# Patient Record
Sex: MALE | Race: WHITE | Employment: OTHER
[De-identification: names, ages, dates, MRNs, and addresses within clinical notes are randomized per-mention and may not be internally consistent; named-entity substitution may affect disease eponyms.]

---

## 2017-01-17 ENCOUNTER — TELEPHONE (OUTPATIENT)
Dept: ONCOLOGY | Facility: CLINIC | Age: 67
End: 2017-01-17

## 2017-02-01 ENCOUNTER — TELEPHONE (OUTPATIENT)
Dept: INFUSION THERAPY | Facility: MEDICAL CENTER | Age: 67
End: 2017-02-01

## 2017-02-10 ENCOUNTER — TELEPHONE (OUTPATIENT)
Dept: ONCOLOGY | Facility: CLINIC | Age: 67
End: 2017-02-10

## 2017-02-13 ENCOUNTER — HOSPITAL ENCOUNTER (OUTPATIENT)
Dept: INFUSION THERAPY | Facility: MEDICAL CENTER | Age: 67
Discharge: HOME | End: 2017-02-13
Payer: MEDICARE

## 2017-02-13 VITALS
DIASTOLIC BLOOD PRESSURE: 65 MMHG | RESPIRATION RATE: 18 BRPM | SYSTOLIC BLOOD PRESSURE: 147 MMHG | HEART RATE: 85 BPM | TEMPERATURE: 97.5 F

## 2017-02-13 DIAGNOSIS — N18.4 ANEMIA ASSOCIATED WITH CHRONIC RENAL FAILURE, STAGE 4 (SEVERE): ICD-10-CM

## 2017-02-13 DIAGNOSIS — N18.4 CKD (CHRONIC KIDNEY DISEASE) STAGE 4, GFR 15-29 ML/MIN (HCC): ICD-10-CM

## 2017-02-13 DIAGNOSIS — D63.1 ANEMIA ASSOCIATED WITH CHRONIC RENAL FAILURE, STAGE 4 (SEVERE): ICD-10-CM

## 2017-02-13 PROCEDURE — 96372 THER/PROPH/DIAG INJ SC/IM: CPT

## 2017-02-13 PROCEDURE — 36415 COLL VENOUS BLD VENIPUNCTURE: CPT

## 2017-02-13 PROCEDURE — 6360000002 HC RX W HCPCS: Performed by: INTERNAL MEDICINE

## 2017-02-13 RX ORDER — SODIUM CHLORIDE 9 MG/ML
INJECTION, SOLUTION INTRAVENOUS ONCE
Status: CANCELLED | OUTPATIENT
Start: 2017-02-13 | End: 2017-02-13

## 2017-02-13 RX ADMIN — DARBEPOETIN ALFA 60 MCG: 60 INJECTION, SOLUTION INTRAVENOUS; SUBCUTANEOUS at 13:31

## 2017-02-13 NOTE — PROGRESS NOTES
Gena Samples arrives ambulatory with wife  Patient relates recently hospitalized with low blood count and transfusions  Last hg noted was 8.5 on 2/9/17  See MAR for aranesp injection  Tolerated well  Discharged per ambulatory

## 2017-02-24 ENCOUNTER — HOSPITAL ENCOUNTER (INPATIENT)
Age: 67
LOS: 14 days | Discharge: NURSING, REHAB OR LONG-TERM CARE | DRG: 291 | End: 2017-03-10
Attending: EMERGENCY MEDICINE | Admitting: INTERNAL MEDICINE
Payer: MEDICARE

## 2017-02-24 ENCOUNTER — APPOINTMENT (OUTPATIENT)
Dept: GENERAL RADIOLOGY | Age: 67
DRG: 291 | End: 2017-02-24
Payer: MEDICARE

## 2017-02-24 DIAGNOSIS — I50.9 ACUTE ON CHRONIC CONGESTIVE HEART FAILURE, UNSPECIFIED CONGESTIVE HEART FAILURE TYPE: ICD-10-CM

## 2017-02-24 DIAGNOSIS — M25.561 CHRONIC PAIN OF RIGHT KNEE: ICD-10-CM

## 2017-02-24 DIAGNOSIS — R09.02 HYPOXIA: Primary | ICD-10-CM

## 2017-02-24 DIAGNOSIS — G89.29 CHRONIC PAIN OF RIGHT KNEE: ICD-10-CM

## 2017-02-24 LAB
ABSOLUTE EOS #: 0 K/UL (ref 0–0.4)
ABSOLUTE LYMPH #: 0.27 K/UL (ref 1–4.8)
ABSOLUTE MONO #: 0.48 K/UL (ref 0.1–0.8)
ANION GAP SERPL CALCULATED.3IONS-SCNC: 16 MMOL/L (ref 9–17)
BASOPHILS # BLD: 0 % (ref 0–2)
BASOPHILS ABSOLUTE: 0 K/UL (ref 0–0.2)
BNP INTERPRETATION: ABNORMAL
BUN BLDV-MCNC: 55 MG/DL (ref 8–23)
BUN/CREAT BLD: ABNORMAL (ref 9–20)
CALCIUM IONIZED: 1.15 MMOL/L (ref 1.13–1.33)
CALCIUM SERPL-MCNC: 9 MG/DL (ref 8.6–10.4)
CHLORIDE BLD-SCNC: 97 MMOL/L (ref 98–107)
CO2: 23 MMOL/L (ref 20–31)
CREAT SERPL-MCNC: 3.41 MG/DL (ref 0.7–1.2)
DIFFERENTIAL TYPE: ABNORMAL
EOSINOPHILS RELATIVE PERCENT: 0 % (ref 1–4)
GFR AFRICAN AMERICAN: 22 ML/MIN
GFR NON-AFRICAN AMERICAN: 18 ML/MIN
GFR SERPL CREATININE-BSD FRML MDRD: ABNORMAL ML/MIN/{1.73_M2}
GFR SERPL CREATININE-BSD FRML MDRD: ABNORMAL ML/MIN/{1.73_M2}
GLUCOSE BLD-MCNC: 166 MG/DL (ref 70–99)
HCT VFR BLD CALC: 22.6 % (ref 41–53)
HEMOGLOBIN: 7.6 G/DL (ref 13.5–17.5)
LYMPHOCYTES # BLD: 5 % (ref 24–44)
MAGNESIUM: 2.7 MG/DL (ref 1.6–2.6)
MCH RBC QN AUTO: 30.6 PG (ref 26–34)
MCHC RBC AUTO-ENTMCNC: 33.5 G/DL (ref 31–37)
MCV RBC AUTO: 91.6 FL (ref 80–100)
MONOCYTES # BLD: 9 % (ref 1–7)
MORPHOLOGY: ABNORMAL
MYOGLOBIN: 115 NG/ML (ref 28–72)
PDW BLD-RTO: 16.7 % (ref 12.5–15.4)
PHOSPHORUS: 3.6 MG/DL (ref 2.5–4.5)
PLATELET # BLD: 128 K/UL (ref 140–450)
PLATELET ESTIMATE: ABNORMAL
PMV BLD AUTO: 7.3 FL (ref 6–12)
POC TROPONIN I: 0.01 NG/ML (ref 0–0.1)
POC TROPONIN INTERP: NORMAL
POTASSIUM SERPL-SCNC: 4.7 MMOL/L (ref 3.7–5.3)
PRO-BNP: 6672 PG/ML
RBC # BLD: 2.47 M/UL (ref 4.5–5.9)
RBC # BLD: ABNORMAL 10*6/UL
SEG NEUTROPHILS: 86 % (ref 36–66)
SEGMENTED NEUTROPHILS ABSOLUTE COUNT: 4.55 K/UL (ref 1.8–7.7)
SODIUM BLD-SCNC: 136 MMOL/L (ref 135–144)
TOTAL CK: 26 U/L (ref 39–308)
WBC # BLD: 5.3 K/UL (ref 3.5–11)
WBC # BLD: ABNORMAL 10*3/UL

## 2017-02-24 PROCEDURE — 82550 ASSAY OF CK (CPK): CPT

## 2017-02-24 PROCEDURE — 84100 ASSAY OF PHOSPHORUS: CPT

## 2017-02-24 PROCEDURE — 99284 EMERGENCY DEPT VISIT MOD MDM: CPT

## 2017-02-24 PROCEDURE — 82607 VITAMIN B-12: CPT

## 2017-02-24 PROCEDURE — 71020 XR CHEST STANDARD TWO VW: CPT | Performed by: RADIOLOGY

## 2017-02-24 PROCEDURE — 82330 ASSAY OF CALCIUM: CPT

## 2017-02-24 PROCEDURE — 73562 X-RAY EXAM OF KNEE 3: CPT | Performed by: RADIOLOGY

## 2017-02-24 PROCEDURE — 73562 X-RAY EXAM OF KNEE 3: CPT

## 2017-02-24 PROCEDURE — 84484 ASSAY OF TROPONIN QUANT: CPT

## 2017-02-24 PROCEDURE — 71020 XR CHEST STANDARD TWO VW: CPT

## 2017-02-24 PROCEDURE — 82746 ASSAY OF FOLIC ACID SERUM: CPT

## 2017-02-24 PROCEDURE — 85025 COMPLETE CBC W/AUTO DIFF WBC: CPT

## 2017-02-24 PROCEDURE — 83874 ASSAY OF MYOGLOBIN: CPT

## 2017-02-24 PROCEDURE — 80048 BASIC METABOLIC PNL TOTAL CA: CPT

## 2017-02-24 PROCEDURE — 83880 ASSAY OF NATRIURETIC PEPTIDE: CPT

## 2017-02-24 PROCEDURE — 93005 ELECTROCARDIOGRAM TRACING: CPT

## 2017-02-24 PROCEDURE — 6360000002 HC RX W HCPCS: Performed by: EMERGENCY MEDICINE

## 2017-02-24 PROCEDURE — 83735 ASSAY OF MAGNESIUM: CPT

## 2017-02-24 PROCEDURE — 2060000000 HC ICU INTERMEDIATE R&B

## 2017-02-24 RX ORDER — MORPHINE SULFATE 4 MG/ML
4 INJECTION, SOLUTION INTRAMUSCULAR; INTRAVENOUS ONCE
Status: COMPLETED | OUTPATIENT
Start: 2017-02-24 | End: 2017-02-24

## 2017-02-24 RX ORDER — FUROSEMIDE 10 MG/ML
40 INJECTION INTRAMUSCULAR; INTRAVENOUS ONCE
Status: COMPLETED | OUTPATIENT
Start: 2017-02-24 | End: 2017-02-24

## 2017-02-24 RX ORDER — ONDANSETRON 2 MG/ML
4 INJECTION INTRAMUSCULAR; INTRAVENOUS ONCE
Status: COMPLETED | OUTPATIENT
Start: 2017-02-24 | End: 2017-02-24

## 2017-02-24 RX ADMIN — ONDANSETRON 4 MG: 2 INJECTION, SOLUTION INTRAMUSCULAR; INTRAVENOUS at 22:01

## 2017-02-24 RX ADMIN — FUROSEMIDE 40 MG: 10 INJECTION, SOLUTION INTRAMUSCULAR; INTRAVENOUS at 23:19

## 2017-02-24 RX ADMIN — MAGNESIUM SULFATE IN DEXTROSE 2 G: 10 INJECTION, SOLUTION INTRAVENOUS at 22:48

## 2017-02-24 RX ADMIN — MORPHINE SULFATE 4 MG: 4 INJECTION, SOLUTION INTRAMUSCULAR; INTRAVENOUS at 22:02

## 2017-02-24 ASSESSMENT — PAIN DESCRIPTION - LOCATION: LOCATION: KNEE

## 2017-02-24 ASSESSMENT — PAIN SCALES - GENERAL: PAINLEVEL_OUTOF10: 10

## 2017-02-24 ASSESSMENT — ENCOUNTER SYMPTOMS
SHORTNESS OF BREATH: 0
WHEEZING: 0
STRIDOR: 0
COUGH: 0
VOMITING: 0
DIARRHEA: 0
RECTAL PAIN: 0
ABDOMINAL PAIN: 0

## 2017-02-24 ASSESSMENT — PAIN DESCRIPTION - PAIN TYPE: TYPE: CHRONIC PAIN

## 2017-02-25 ENCOUNTER — APPOINTMENT (OUTPATIENT)
Dept: ULTRASOUND IMAGING | Age: 67
DRG: 291 | End: 2017-02-25
Payer: MEDICARE

## 2017-02-25 PROBLEM — I50.33 ACUTE ON CHRONIC DIASTOLIC CHF (CONGESTIVE HEART FAILURE) (HCC): Status: ACTIVE | Noted: 2017-02-25

## 2017-02-25 PROBLEM — M25.461 KNEE EFFUSION, RIGHT: Status: ACTIVE | Noted: 2017-02-25

## 2017-02-25 LAB
ABSOLUTE EOS #: 0.17 K/UL (ref 0–0.4)
ABSOLUTE LYMPH #: 0.13 K/UL (ref 1–4.8)
ABSOLUTE MONO #: 0.09 K/UL (ref 0.1–0.8)
ABSOLUTE RETIC #: 0.05 M/UL (ref 0.02–0.1)
ANION GAP SERPL CALCULATED.3IONS-SCNC: 13 MMOL/L (ref 9–17)
BASOPHILS # BLD: 0 % (ref 0–2)
BASOPHILS ABSOLUTE: 0 K/UL (ref 0–0.2)
BLOOD BANK SPECIMEN: NORMAL
BUN BLDV-MCNC: 56 MG/DL (ref 8–23)
BUN/CREAT BLD: ABNORMAL (ref 9–20)
C-REACTIVE PROTEIN: 153.9 MG/L (ref 0–5)
CALCIUM SERPL-MCNC: 8.5 MG/DL (ref 8.6–10.4)
CHLORIDE BLD-SCNC: 101 MMOL/L (ref 98–107)
CO2: 24 MMOL/L (ref 20–31)
CREAT SERPL-MCNC: 3.6 MG/DL (ref 0.7–1.2)
DIFFERENTIAL TYPE: ABNORMAL
EOSINOPHILS RELATIVE PERCENT: 4 % (ref 1–4)
FERRITIN: 753 UG/L (ref 30–400)
FERRITIN: 830 UG/L (ref 30–400)
FOLATE: >20 NG/ML
FOLATE: >20 NG/ML
GFR AFRICAN AMERICAN: 20 ML/MIN
GFR NON-AFRICAN AMERICAN: 17 ML/MIN
GFR SERPL CREATININE-BSD FRML MDRD: ABNORMAL ML/MIN/{1.73_M2}
GFR SERPL CREATININE-BSD FRML MDRD: ABNORMAL ML/MIN/{1.73_M2}
GLUCOSE BLD-MCNC: 136 MG/DL (ref 70–99)
GLUCOSE BLD-MCNC: 158 MG/DL (ref 75–110)
GLUCOSE BLD-MCNC: 174 MG/DL (ref 75–110)
GLUCOSE BLD-MCNC: 191 MG/DL (ref 75–110)
GLUCOSE BLD-MCNC: 202 MG/DL (ref 75–110)
HCT VFR BLD CALC: 21.2 % (ref 41–53)
HEMOGLOBIN: 7.1 G/DL (ref 13.5–17.5)
IRON SATURATION: 10 % (ref 20–55)
IRON SATURATION: 11 % (ref 20–55)
IRON: 13 UG/DL (ref 59–158)
IRON: 15 UG/DL (ref 59–158)
LACTATE DEHYDROGENASE: 142 U/L (ref 135–225)
LACTATE DEHYDROGENASE: 166 U/L (ref 135–225)
LYMPHOCYTES # BLD: 3 % (ref 24–44)
MCH RBC QN AUTO: 30.6 PG (ref 26–34)
MCHC RBC AUTO-ENTMCNC: 33.7 G/DL (ref 31–37)
MCV RBC AUTO: 91.1 FL (ref 80–100)
MONOCYTES # BLD: 2 % (ref 1–7)
MORPHOLOGY: ABNORMAL
PDW BLD-RTO: 16.3 % (ref 12.5–15.4)
PLATELET # BLD: 116 K/UL (ref 140–450)
PLATELET ESTIMATE: ABNORMAL
PMV BLD AUTO: 7.3 FL (ref 6–12)
POTASSIUM SERPL-SCNC: 4.7 MMOL/L (ref 3.7–5.3)
RBC # BLD: 2.33 M/UL (ref 4.5–5.9)
RBC # BLD: ABNORMAL 10*6/UL
RETIC %: 2.1 % (ref 0.5–2)
SEDIMENTATION RATE, ERYTHROCYTE: 121 MM (ref 0–10)
SEG NEUTROPHILS: 91 % (ref 36–66)
SEGMENTED NEUTROPHILS ABSOLUTE COUNT: 3.91 K/UL (ref 1.8–7.7)
SODIUM BLD-SCNC: 138 MMOL/L (ref 135–144)
TOTAL IRON BINDING CAPACITY: 129 UG/DL (ref 250–450)
TOTAL IRON BINDING CAPACITY: 136 UG/DL (ref 250–450)
TSH SERPL DL<=0.05 MIU/L-ACNC: 0.85 MIU/L (ref 0.3–5)
TSH SERPL DL<=0.05 MIU/L-ACNC: 1.03 MIU/L (ref 0.3–5)
UNSATURATED IRON BINDING CAPACITY: 116 UG/DL (ref 112–347)
UNSATURATED IRON BINDING CAPACITY: 121 UG/DL (ref 112–347)
VITAMIN B-12: >2000 PG/ML (ref 211–946)
VITAMIN B-12: >2000 PG/ML (ref 211–946)
WBC # BLD: 4.3 K/UL (ref 3.5–11)
WBC # BLD: ABNORMAL 10*3/UL

## 2017-02-25 PROCEDURE — 86140 C-REACTIVE PROTEIN: CPT

## 2017-02-25 PROCEDURE — 80048 BASIC METABOLIC PNL TOTAL CA: CPT

## 2017-02-25 PROCEDURE — 2580000003 HC RX 258: Performed by: INTERNAL MEDICINE

## 2017-02-25 PROCEDURE — 86900 BLOOD TYPING SEROLOGIC ABO: CPT

## 2017-02-25 PROCEDURE — 84443 ASSAY THYROID STIM HORMONE: CPT

## 2017-02-25 PROCEDURE — 76775 US EXAM ABDO BACK WALL LIM: CPT

## 2017-02-25 PROCEDURE — 82728 ASSAY OF FERRITIN: CPT

## 2017-02-25 PROCEDURE — 36415 COLL VENOUS BLD VENIPUNCTURE: CPT

## 2017-02-25 PROCEDURE — 82746 ASSAY OF FOLIC ACID SERUM: CPT

## 2017-02-25 PROCEDURE — 83615 LACTATE (LD) (LDH) ENZYME: CPT

## 2017-02-25 PROCEDURE — 83550 IRON BINDING TEST: CPT

## 2017-02-25 PROCEDURE — 51798 US URINE CAPACITY MEASURE: CPT

## 2017-02-25 PROCEDURE — 82947 ASSAY GLUCOSE BLOOD QUANT: CPT

## 2017-02-25 PROCEDURE — 36430 TRANSFUSION BLD/BLD COMPNT: CPT

## 2017-02-25 PROCEDURE — 83540 ASSAY OF IRON: CPT

## 2017-02-25 PROCEDURE — 85651 RBC SED RATE NONAUTOMATED: CPT

## 2017-02-25 PROCEDURE — 99223 1ST HOSP IP/OBS HIGH 75: CPT | Performed by: FAMILY MEDICINE

## 2017-02-25 PROCEDURE — 76775 US EXAM ABDO BACK WALL LIM: CPT | Performed by: RADIOLOGY

## 2017-02-25 PROCEDURE — 86920 COMPATIBILITY TEST SPIN: CPT

## 2017-02-25 PROCEDURE — 6360000002 HC RX W HCPCS: Performed by: INTERNAL MEDICINE

## 2017-02-25 PROCEDURE — P9016 RBC LEUKOCYTES REDUCED: HCPCS

## 2017-02-25 PROCEDURE — 86850 RBC ANTIBODY SCREEN: CPT

## 2017-02-25 PROCEDURE — 85025 COMPLETE CBC W/AUTO DIFF WBC: CPT

## 2017-02-25 PROCEDURE — 86901 BLOOD TYPING SEROLOGIC RH(D): CPT

## 2017-02-25 PROCEDURE — 2580000003 HC RX 258: Performed by: FAMILY MEDICINE

## 2017-02-25 PROCEDURE — 2060000000 HC ICU INTERMEDIATE R&B

## 2017-02-25 PROCEDURE — 82607 VITAMIN B-12: CPT

## 2017-02-25 PROCEDURE — 85045 AUTOMATED RETICULOCYTE COUNT: CPT

## 2017-02-25 PROCEDURE — 6360000002 HC RX W HCPCS: Performed by: FAMILY MEDICINE

## 2017-02-25 PROCEDURE — 6370000000 HC RX 637 (ALT 250 FOR IP): Performed by: FAMILY MEDICINE

## 2017-02-25 RX ORDER — AMLODIPINE BESYLATE 10 MG/1
10 TABLET ORAL 2 TIMES DAILY
Status: DISCONTINUED | OUTPATIENT
Start: 2017-02-25 | End: 2017-03-04

## 2017-02-25 RX ORDER — ISOSORBIDE MONONITRATE 30 MG/1
30 TABLET, EXTENDED RELEASE ORAL DAILY
Status: DISCONTINUED | OUTPATIENT
Start: 2017-02-25 | End: 2017-03-06

## 2017-02-25 RX ORDER — FUROSEMIDE 10 MG/ML
40 INJECTION INTRAMUSCULAR; INTRAVENOUS ONCE
Status: COMPLETED | OUTPATIENT
Start: 2017-02-25 | End: 2017-02-25

## 2017-02-25 RX ORDER — VENLAFAXINE 75 MG/1
75 TABLET ORAL DAILY
Status: DISCONTINUED | OUTPATIENT
Start: 2017-02-25 | End: 2017-03-10 | Stop reason: HOSPADM

## 2017-02-25 RX ORDER — ARIPIPRAZOLE 5 MG/1
5 TABLET ORAL DAILY
Status: DISCONTINUED | OUTPATIENT
Start: 2017-02-25 | End: 2017-03-02

## 2017-02-25 RX ORDER — SODIUM CHLORIDE 0.9 % (FLUSH) 0.9 %
10 SYRINGE (ML) INJECTION EVERY 12 HOURS SCHEDULED
Status: DISCONTINUED | OUTPATIENT
Start: 2017-02-25 | End: 2017-03-10 | Stop reason: HOSPADM

## 2017-02-25 RX ORDER — DOCUSATE SODIUM 100 MG/1
100 CAPSULE, LIQUID FILLED ORAL 2 TIMES DAILY PRN
Status: DISCONTINUED | OUTPATIENT
Start: 2017-02-25 | End: 2017-03-10 | Stop reason: HOSPADM

## 2017-02-25 RX ORDER — FUROSEMIDE 10 MG/ML
40 INJECTION INTRAMUSCULAR; INTRAVENOUS 2 TIMES DAILY
Status: DISCONTINUED | OUTPATIENT
Start: 2017-02-25 | End: 2017-02-26

## 2017-02-25 RX ORDER — 0.9 % SODIUM CHLORIDE 0.9 %
250 INTRAVENOUS SOLUTION INTRAVENOUS ONCE
Status: COMPLETED | OUTPATIENT
Start: 2017-02-25 | End: 2017-02-26

## 2017-02-25 RX ORDER — LEVOTHYROXINE SODIUM 0.05 MG/1
50 TABLET ORAL DAILY
Status: DISCONTINUED | OUTPATIENT
Start: 2017-02-25 | End: 2017-03-10 | Stop reason: HOSPADM

## 2017-02-25 RX ORDER — LAMOTRIGINE 100 MG/1
100 TABLET ORAL DAILY
Status: DISCONTINUED | OUTPATIENT
Start: 2017-02-25 | End: 2017-03-10 | Stop reason: HOSPADM

## 2017-02-25 RX ORDER — ACETAMINOPHEN 325 MG/1
650 TABLET ORAL EVERY 4 HOURS PRN
Status: DISCONTINUED | OUTPATIENT
Start: 2017-02-25 | End: 2017-03-10 | Stop reason: HOSPADM

## 2017-02-25 RX ORDER — SODIUM BICARBONATE 650 MG/1
1950 TABLET ORAL 3 TIMES DAILY
Status: DISCONTINUED | OUTPATIENT
Start: 2017-02-25 | End: 2017-03-05

## 2017-02-25 RX ORDER — NIACIN 500 MG/1
500 TABLET, EXTENDED RELEASE ORAL NIGHTLY
Status: DISCONTINUED | OUTPATIENT
Start: 2017-02-25 | End: 2017-03-10 | Stop reason: HOSPADM

## 2017-02-25 RX ORDER — MORPHINE SULFATE 2 MG/ML
2 INJECTION, SOLUTION INTRAMUSCULAR; INTRAVENOUS EVERY 4 HOURS PRN
Status: DISCONTINUED | OUTPATIENT
Start: 2017-02-25 | End: 2017-03-07

## 2017-02-25 RX ORDER — OXYCODONE HYDROCHLORIDE AND ACETAMINOPHEN 5; 325 MG/1; MG/1
1 TABLET ORAL EVERY 4 HOURS PRN
Status: DISCONTINUED | OUTPATIENT
Start: 2017-02-25 | End: 2017-03-10

## 2017-02-25 RX ORDER — VENLAFAXINE 37.5 MG/1
37.5 TABLET ORAL DAILY
Status: DISCONTINUED | OUTPATIENT
Start: 2017-02-25 | End: 2017-03-10 | Stop reason: HOSPADM

## 2017-02-25 RX ORDER — ASPIRIN 81 MG/1
81 TABLET, CHEWABLE ORAL EVERY OTHER DAY
Status: DISCONTINUED | OUTPATIENT
Start: 2017-02-25 | End: 2017-03-04

## 2017-02-25 RX ORDER — MORPHINE SULFATE 4 MG/ML
4 INJECTION, SOLUTION INTRAMUSCULAR; INTRAVENOUS EVERY 4 HOURS PRN
Status: DISCONTINUED | OUTPATIENT
Start: 2017-02-25 | End: 2017-03-07

## 2017-02-25 RX ORDER — LANOLIN ALCOHOL/MO/W.PET/CERES
325 CREAM (GRAM) TOPICAL
Status: DISCONTINUED | OUTPATIENT
Start: 2017-02-26 | End: 2017-03-03

## 2017-02-25 RX ORDER — PANTOPRAZOLE SODIUM 40 MG/1
40 TABLET, DELAYED RELEASE ORAL DAILY
Status: DISCONTINUED | OUTPATIENT
Start: 2017-02-25 | End: 2017-03-04

## 2017-02-25 RX ORDER — SODIUM CHLORIDE 0.9 % (FLUSH) 0.9 %
10 SYRINGE (ML) INJECTION PRN
Status: DISCONTINUED | OUTPATIENT
Start: 2017-02-25 | End: 2017-03-10 | Stop reason: HOSPADM

## 2017-02-25 RX ADMIN — VITAMIN D, TAB 1000IU (100/BT) 1000 UNITS: 25 TAB at 11:14

## 2017-02-25 RX ADMIN — VENLAFAXINE 37.5 MG: 37.5 TABLET ORAL at 14:34

## 2017-02-25 RX ADMIN — FUROSEMIDE 40 MG: 10 INJECTION, SOLUTION INTRAMUSCULAR; INTRAVENOUS at 09:37

## 2017-02-25 RX ADMIN — OXYCODONE HYDROCHLORIDE AND ACETAMINOPHEN 1 TABLET: 5; 325 TABLET ORAL at 11:19

## 2017-02-25 RX ADMIN — ASPIRIN 81 MG 81 MG: 81 TABLET ORAL at 11:15

## 2017-02-25 RX ADMIN — Medication 10 ML: at 21:01

## 2017-02-25 RX ADMIN — OXYCODONE HYDROCHLORIDE AND ACETAMINOPHEN 1 TABLET: 5; 325 TABLET ORAL at 19:00

## 2017-02-25 RX ADMIN — ARIPIPRAZOLE 5 MG: 5 TABLET ORAL at 11:15

## 2017-02-25 RX ADMIN — FUROSEMIDE 40 MG: 10 INJECTION, SOLUTION INTRAMUSCULAR; INTRAVENOUS at 20:56

## 2017-02-25 RX ADMIN — Medication 10 ML: at 09:38

## 2017-02-25 RX ADMIN — MORPHINE SULFATE 4 MG: 4 INJECTION, SOLUTION INTRAMUSCULAR; INTRAVENOUS at 07:41

## 2017-02-25 RX ADMIN — MORPHINE SULFATE 4 MG: 4 INJECTION, SOLUTION INTRAMUSCULAR; INTRAVENOUS at 01:19

## 2017-02-25 RX ADMIN — LAMOTRIGINE 100 MG: 100 TABLET ORAL at 11:15

## 2017-02-25 RX ADMIN — SODIUM BICARBONATE 1950 MG: 650 TABLET, ORALLY DISINTEGRATING ORAL at 21:03

## 2017-02-25 RX ADMIN — SODIUM CHLORIDE 250 ML: 0.9 INJECTION, SOLUTION INTRAVENOUS at 17:57

## 2017-02-25 RX ADMIN — SODIUM BICARBONATE 1950 MG: 650 TABLET, ORALLY DISINTEGRATING ORAL at 14:35

## 2017-02-25 RX ADMIN — PANTOPRAZOLE SODIUM 40 MG: 40 TABLET, DELAYED RELEASE ORAL at 11:14

## 2017-02-25 RX ADMIN — AMLODIPINE BESYLATE 10 MG: 10 TABLET ORAL at 21:03

## 2017-02-25 RX ADMIN — LEVOTHYROXINE SODIUM 50 MCG: 50 TABLET ORAL at 11:15

## 2017-02-25 RX ADMIN — NIACIN 500 MG: 500 TABLET, FILM COATED, EXTENDED RELEASE ORAL at 21:03

## 2017-02-25 RX ADMIN — ISOSORBIDE MONONITRATE 30 MG: 30 TABLET ORAL at 11:16

## 2017-02-25 ASSESSMENT — ENCOUNTER SYMPTOMS
SHORTNESS OF BREATH: 1
WHEEZING: 0
SORE THROAT: 0
SINUS PRESSURE: 0
COUGH: 0
ABDOMINAL PAIN: 0
BLOOD IN STOOL: 0
CONSTIPATION: 0
NAUSEA: 0
VOICE CHANGE: 0
VOMITING: 0
DIARRHEA: 0

## 2017-02-25 ASSESSMENT — PAIN SCALES - GENERAL
PAINLEVEL_OUTOF10: 10
PAINLEVEL_OUTOF10: 10
PAINLEVEL_OUTOF10: 8
PAINLEVEL_OUTOF10: 10
PAINLEVEL_OUTOF10: 8

## 2017-02-25 ASSESSMENT — PAIN DESCRIPTION - PAIN TYPE: TYPE: ACUTE PAIN

## 2017-02-25 ASSESSMENT — PAIN DESCRIPTION - LOCATION: LOCATION: KNEE

## 2017-02-26 LAB
ANION GAP SERPL CALCULATED.3IONS-SCNC: 13 MMOL/L (ref 9–17)
BUN BLDV-MCNC: 68 MG/DL (ref 8–23)
BUN/CREAT BLD: ABNORMAL (ref 9–20)
CALCIUM SERPL-MCNC: 8.4 MG/DL (ref 8.6–10.4)
CHLORIDE BLD-SCNC: 93 MMOL/L (ref 98–107)
CO2: 23 MMOL/L (ref 20–31)
CREAT SERPL-MCNC: 4.36 MG/DL (ref 0.7–1.2)
GFR AFRICAN AMERICAN: 16 ML/MIN
GFR NON-AFRICAN AMERICAN: 14 ML/MIN
GFR SERPL CREATININE-BSD FRML MDRD: ABNORMAL ML/MIN/{1.73_M2}
GFR SERPL CREATININE-BSD FRML MDRD: ABNORMAL ML/MIN/{1.73_M2}
GLUCOSE BLD-MCNC: 147 MG/DL (ref 75–110)
GLUCOSE BLD-MCNC: 163 MG/DL (ref 70–99)
GLUCOSE BLD-MCNC: 165 MG/DL (ref 75–110)
GLUCOSE BLD-MCNC: 174 MG/DL (ref 75–110)
GLUCOSE BLD-MCNC: 218 MG/DL (ref 75–110)
HCT VFR BLD CALC: 22.7 % (ref 41–53)
HEMOGLOBIN: 7.5 G/DL (ref 13.5–17.5)
MCH RBC QN AUTO: 30.4 PG (ref 26–34)
MCHC RBC AUTO-ENTMCNC: 33 G/DL (ref 31–37)
MCV RBC AUTO: 92.1 FL (ref 80–100)
PDW BLD-RTO: 16.1 % (ref 12.5–15.4)
PLATELET # BLD: 131 K/UL (ref 140–450)
PMV BLD AUTO: 7.3 FL (ref 6–12)
POTASSIUM SERPL-SCNC: 5.2 MMOL/L (ref 3.7–5.3)
RBC # BLD: 2.47 M/UL (ref 4.5–5.9)
SODIUM BLD-SCNC: 129 MMOL/L (ref 135–144)
URIC ACID: 8.8 MG/DL (ref 3.4–7)
WBC # BLD: 4.7 K/UL (ref 3.5–11)

## 2017-02-26 PROCEDURE — 84550 ASSAY OF BLOOD/URIC ACID: CPT

## 2017-02-26 PROCEDURE — 2060000000 HC ICU INTERMEDIATE R&B

## 2017-02-26 PROCEDURE — 82272 OCCULT BLD FECES 1-3 TESTS: CPT

## 2017-02-26 PROCEDURE — 82947 ASSAY GLUCOSE BLOOD QUANT: CPT

## 2017-02-26 PROCEDURE — P9046 ALBUMIN (HUMAN), 25%, 20 ML: HCPCS | Performed by: INTERNAL MEDICINE

## 2017-02-26 PROCEDURE — 6370000000 HC RX 637 (ALT 250 FOR IP): Performed by: FAMILY MEDICINE

## 2017-02-26 PROCEDURE — 99232 SBSQ HOSP IP/OBS MODERATE 35: CPT | Performed by: FAMILY MEDICINE

## 2017-02-26 PROCEDURE — 36415 COLL VENOUS BLD VENIPUNCTURE: CPT

## 2017-02-26 PROCEDURE — 2580000003 HC RX 258: Performed by: FAMILY MEDICINE

## 2017-02-26 PROCEDURE — 2580000003 HC RX 258: Performed by: INTERNAL MEDICINE

## 2017-02-26 PROCEDURE — 86900 BLOOD TYPING SEROLOGIC ABO: CPT

## 2017-02-26 PROCEDURE — 6360000002 HC RX W HCPCS: Performed by: INTERNAL MEDICINE

## 2017-02-26 PROCEDURE — P9016 RBC LEUKOCYTES REDUCED: HCPCS

## 2017-02-26 PROCEDURE — 85027 COMPLETE CBC AUTOMATED: CPT

## 2017-02-26 PROCEDURE — 80048 BASIC METABOLIC PNL TOTAL CA: CPT

## 2017-02-26 RX ORDER — 0.9 % SODIUM CHLORIDE 0.9 %
250 INTRAVENOUS SOLUTION INTRAVENOUS ONCE
Status: COMPLETED | OUTPATIENT
Start: 2017-02-26 | End: 2017-02-26

## 2017-02-26 RX ORDER — CHLOROTHIAZIDE SODIUM 500 MG/1
250 INJECTION INTRAVENOUS ONCE
Status: COMPLETED | OUTPATIENT
Start: 2017-02-26 | End: 2017-02-26

## 2017-02-26 RX ORDER — ALBUMIN (HUMAN) 12.5 G/50ML
25 SOLUTION INTRAVENOUS ONCE
Status: COMPLETED | OUTPATIENT
Start: 2017-02-26 | End: 2017-02-26

## 2017-02-26 RX ORDER — FUROSEMIDE 10 MG/ML
40 INJECTION INTRAMUSCULAR; INTRAVENOUS ONCE
Status: DISCONTINUED | OUTPATIENT
Start: 2017-02-26 | End: 2017-02-26

## 2017-02-26 RX ADMIN — FUROSEMIDE 10 MG/HR: 10 INJECTION, SOLUTION INTRAVENOUS at 16:57

## 2017-02-26 RX ADMIN — LEVOTHYROXINE SODIUM 50 MCG: 50 TABLET ORAL at 08:46

## 2017-02-26 RX ADMIN — FERROUS SULFATE TAB EC 325 MG (65 MG FE EQUIVALENT) 325 MG: 325 (65 FE) TABLET DELAYED RESPONSE at 12:31

## 2017-02-26 RX ADMIN — OXYCODONE HYDROCHLORIDE AND ACETAMINOPHEN 1 TABLET: 5; 325 TABLET ORAL at 20:29

## 2017-02-26 RX ADMIN — MORPHINE SULFATE 4 MG: 4 INJECTION, SOLUTION INTRAMUSCULAR; INTRAVENOUS at 12:30

## 2017-02-26 RX ADMIN — AMLODIPINE BESYLATE 10 MG: 10 TABLET ORAL at 20:29

## 2017-02-26 RX ADMIN — FUROSEMIDE 40 MG: 10 INJECTION, SOLUTION INTRAMUSCULAR; INTRAVENOUS at 08:46

## 2017-02-26 RX ADMIN — VENLAFAXINE 37.5 MG: 37.5 TABLET ORAL at 08:45

## 2017-02-26 RX ADMIN — ISOSORBIDE MONONITRATE 30 MG: 30 TABLET ORAL at 08:45

## 2017-02-26 RX ADMIN — SODIUM BICARBONATE 1950 MG: 650 TABLET, ORALLY DISINTEGRATING ORAL at 20:29

## 2017-02-26 RX ADMIN — FERROUS SULFATE TAB EC 325 MG (65 MG FE EQUIVALENT) 325 MG: 325 (65 FE) TABLET DELAYED RESPONSE at 17:08

## 2017-02-26 RX ADMIN — AMLODIPINE BESYLATE 10 MG: 10 TABLET ORAL at 08:46

## 2017-02-26 RX ADMIN — FERROUS SULFATE TAB EC 325 MG (65 MG FE EQUIVALENT) 325 MG: 325 (65 FE) TABLET DELAYED RESPONSE at 08:46

## 2017-02-26 RX ADMIN — OXYCODONE HYDROCHLORIDE AND ACETAMINOPHEN 1 TABLET: 5; 325 TABLET ORAL at 14:30

## 2017-02-26 RX ADMIN — SODIUM BICARBONATE 1950 MG: 650 TABLET, ORALLY DISINTEGRATING ORAL at 14:47

## 2017-02-26 RX ADMIN — ALBUMIN (HUMAN) 25 G: 0.25 INJECTION, SOLUTION INTRAVENOUS at 14:53

## 2017-02-26 RX ADMIN — MORPHINE SULFATE 4 MG: 4 INJECTION, SOLUTION INTRAMUSCULAR; INTRAVENOUS at 17:25

## 2017-02-26 RX ADMIN — OXYCODONE HYDROCHLORIDE AND ACETAMINOPHEN 1 TABLET: 5; 325 TABLET ORAL at 08:43

## 2017-02-26 RX ADMIN — MORPHINE SULFATE 2 MG: 2 INJECTION, SOLUTION INTRAMUSCULAR; INTRAVENOUS at 02:50

## 2017-02-26 RX ADMIN — CHLOROTHIAZIDE SODIUM 250 MG: 500 INJECTION, POWDER, LYOPHILIZED, FOR SOLUTION INTRAVENOUS at 14:48

## 2017-02-26 RX ADMIN — SODIUM CHLORIDE 250 ML: 0.9 INJECTION, SOLUTION INTRAVENOUS at 11:25

## 2017-02-26 RX ADMIN — OXYCODONE HYDROCHLORIDE AND ACETAMINOPHEN 1 TABLET: 5; 325 TABLET ORAL at 04:49

## 2017-02-26 RX ADMIN — SODIUM BICARBONATE 1950 MG: 650 TABLET, ORALLY DISINTEGRATING ORAL at 08:45

## 2017-02-26 RX ADMIN — FUROSEMIDE 40 MG: 10 INJECTION, SOLUTION INTRAMUSCULAR; INTRAVENOUS at 00:05

## 2017-02-26 RX ADMIN — VITAMIN D, TAB 1000IU (100/BT) 1000 UNITS: 25 TAB at 08:46

## 2017-02-26 RX ADMIN — Medication 10 ML: at 08:48

## 2017-02-26 RX ADMIN — ARIPIPRAZOLE 5 MG: 5 TABLET ORAL at 08:46

## 2017-02-26 RX ADMIN — VENLAFAXINE 75 MG: 75 TABLET ORAL at 08:49

## 2017-02-26 RX ADMIN — PANTOPRAZOLE SODIUM 40 MG: 40 TABLET, DELAYED RELEASE ORAL at 08:45

## 2017-02-26 RX ADMIN — MORPHINE SULFATE 2 MG: 2 INJECTION, SOLUTION INTRAMUSCULAR; INTRAVENOUS at 08:30

## 2017-02-26 RX ADMIN — NIACIN 500 MG: 500 TABLET, FILM COATED, EXTENDED RELEASE ORAL at 20:29

## 2017-02-26 RX ADMIN — LAMOTRIGINE 100 MG: 100 TABLET ORAL at 08:45

## 2017-02-26 ASSESSMENT — PAIN SCALES - GENERAL
PAINLEVEL_OUTOF10: 10
PAINLEVEL_OUTOF10: 9
PAINLEVEL_OUTOF10: 10
PAINLEVEL_OUTOF10: 9

## 2017-02-26 ASSESSMENT — ENCOUNTER SYMPTOMS
SHORTNESS OF BREATH: 1
CONSTIPATION: 0
VOICE CHANGE: 0
NAUSEA: 0
WHEEZING: 0
DIARRHEA: 0
BLOOD IN STOOL: 0
COUGH: 0
ABDOMINAL PAIN: 0
SORE THROAT: 0
VOMITING: 0
SINUS PRESSURE: 0

## 2017-02-27 ENCOUNTER — HOSPITAL ENCOUNTER (OUTPATIENT)
Dept: INFUSION THERAPY | Facility: MEDICAL CENTER | Age: 67
Discharge: HOME | End: 2017-02-27
Payer: MEDICARE

## 2017-02-27 LAB
ABO/RH: NORMAL
ANION GAP SERPL CALCULATED.3IONS-SCNC: 15 MMOL/L (ref 9–17)
ANTIBODY SCREEN: NEGATIVE
ARM BAND NUMBER: NORMAL
BLD PROD TYP BPU: NORMAL
BLD PROD TYP BPU: NORMAL
BUN BLDV-MCNC: 78 MG/DL (ref 8–23)
BUN/CREAT BLD: ABNORMAL (ref 9–20)
CALCIUM SERPL-MCNC: 8.2 MG/DL (ref 8.6–10.4)
CHLORIDE BLD-SCNC: 94 MMOL/L (ref 98–107)
CO2: 25 MMOL/L (ref 20–31)
CREAT SERPL-MCNC: 4.61 MG/DL (ref 0.7–1.2)
CROSSMATCH RESULT: NORMAL
CROSSMATCH RESULT: NORMAL
DISPENSE STATUS BLOOD BANK: NORMAL
DISPENSE STATUS BLOOD BANK: NORMAL
EXPIRATION DATE: NORMAL
GFR AFRICAN AMERICAN: 15 ML/MIN
GFR NON-AFRICAN AMERICAN: 13 ML/MIN
GFR SERPL CREATININE-BSD FRML MDRD: ABNORMAL ML/MIN/{1.73_M2}
GFR SERPL CREATININE-BSD FRML MDRD: ABNORMAL ML/MIN/{1.73_M2}
GLUCOSE BLD-MCNC: 197 MG/DL (ref 75–110)
GLUCOSE BLD-MCNC: 247 MG/DL (ref 70–99)
HCT VFR BLD CALC: 22.7 % (ref 41–53)
HEMOGLOBIN: 7.4 G/DL (ref 13.5–17.5)
MCH RBC QN AUTO: 29.6 PG (ref 26–34)
MCHC RBC AUTO-ENTMCNC: 32.8 G/DL (ref 31–37)
MCV RBC AUTO: 90.4 FL (ref 80–100)
PDW BLD-RTO: 16.9 % (ref 12.5–15.4)
PLATELET # BLD: 132 K/UL (ref 140–450)
PMV BLD AUTO: 7.2 FL (ref 6–12)
POTASSIUM SERPL-SCNC: 4.5 MMOL/L (ref 3.7–5.3)
RBC # BLD: 2.51 M/UL (ref 4.5–5.9)
SODIUM BLD-SCNC: 134 MMOL/L (ref 135–144)
TRANSFUSION STATUS: NORMAL
TRANSFUSION STATUS: NORMAL
UNIT DIVISION: 0
UNIT DIVISION: 0
UNIT NUMBER: NORMAL
UNIT NUMBER: NORMAL
WBC # BLD: 4.4 K/UL (ref 3.5–11)

## 2017-02-27 PROCEDURE — 36415 COLL VENOUS BLD VENIPUNCTURE: CPT

## 2017-02-27 PROCEDURE — 76937 US GUIDE VASCULAR ACCESS: CPT

## 2017-02-27 PROCEDURE — 96365 THER/PROPH/DIAG IV INF INIT: CPT

## 2017-02-27 PROCEDURE — 6360000002 HC RX W HCPCS: Performed by: FAMILY MEDICINE

## 2017-02-27 PROCEDURE — 93306 TTE W/DOPPLER COMPLETE: CPT

## 2017-02-27 PROCEDURE — 96375 TX/PRO/DX INJ NEW DRUG ADDON: CPT

## 2017-02-27 PROCEDURE — 6370000000 HC RX 637 (ALT 250 FOR IP): Performed by: FAMILY MEDICINE

## 2017-02-27 PROCEDURE — 85027 COMPLETE CBC AUTOMATED: CPT

## 2017-02-27 PROCEDURE — 80048 BASIC METABOLIC PNL TOTAL CA: CPT

## 2017-02-27 PROCEDURE — 93970 EXTREMITY STUDY: CPT

## 2017-02-27 PROCEDURE — 2060000000 HC ICU INTERMEDIATE R&B

## 2017-02-27 PROCEDURE — 99232 SBSQ HOSP IP/OBS MODERATE 35: CPT | Performed by: FAMILY MEDICINE

## 2017-02-27 PROCEDURE — 2580000003 HC RX 258: Performed by: INTERNAL MEDICINE

## 2017-02-27 PROCEDURE — 6360000002 HC RX W HCPCS: Performed by: INTERNAL MEDICINE

## 2017-02-27 PROCEDURE — 82947 ASSAY GLUCOSE BLOOD QUANT: CPT

## 2017-02-27 RX ORDER — BENZONATATE 100 MG/1
100 CAPSULE ORAL 3 TIMES DAILY PRN
Status: DISCONTINUED | OUTPATIENT
Start: 2017-02-27 | End: 2017-03-10 | Stop reason: HOSPADM

## 2017-02-27 RX ORDER — GUAIFENESIN 100 MG/5ML
10 SOLUTION ORAL EVERY 4 HOURS PRN
Status: DISCONTINUED | OUTPATIENT
Start: 2017-02-27 | End: 2017-03-10 | Stop reason: HOSPADM

## 2017-02-27 RX ADMIN — ARIPIPRAZOLE 5 MG: 5 TABLET ORAL at 10:40

## 2017-02-27 RX ADMIN — OXYCODONE HYDROCHLORIDE AND ACETAMINOPHEN 1 TABLET: 5; 325 TABLET ORAL at 02:36

## 2017-02-27 RX ADMIN — SODIUM BICARBONATE 1950 MG: 650 TABLET, ORALLY DISINTEGRATING ORAL at 14:39

## 2017-02-27 RX ADMIN — OXYCODONE HYDROCHLORIDE AND ACETAMINOPHEN 1 TABLET: 5; 325 TABLET ORAL at 16:29

## 2017-02-27 RX ADMIN — NIACIN 500 MG: 500 TABLET, FILM COATED, EXTENDED RELEASE ORAL at 21:47

## 2017-02-27 RX ADMIN — ASPIRIN 81 MG 81 MG: 81 TABLET ORAL at 10:41

## 2017-02-27 RX ADMIN — MORPHINE SULFATE 4 MG: 4 INJECTION, SOLUTION INTRAMUSCULAR; INTRAVENOUS at 18:49

## 2017-02-27 RX ADMIN — FUROSEMIDE 19 MG/HR: 10 INJECTION, SOLUTION INTRAVENOUS at 14:48

## 2017-02-27 RX ADMIN — FUROSEMIDE 17 MG/HR: 10 INJECTION, SOLUTION INTRAVENOUS at 11:51

## 2017-02-27 RX ADMIN — FERROUS SULFATE TAB EC 325 MG (65 MG FE EQUIVALENT) 325 MG: 325 (65 FE) TABLET DELAYED RESPONSE at 10:40

## 2017-02-27 RX ADMIN — LEVOTHYROXINE SODIUM 50 MCG: 50 TABLET ORAL at 10:40

## 2017-02-27 RX ADMIN — OXYCODONE HYDROCHLORIDE AND ACETAMINOPHEN 1 TABLET: 5; 325 TABLET ORAL at 07:14

## 2017-02-27 RX ADMIN — FUROSEMIDE 12 MG/HR: 10 INJECTION, SOLUTION INTRAVENOUS at 08:54

## 2017-02-27 RX ADMIN — MORPHINE SULFATE 4 MG: 4 INJECTION, SOLUTION INTRAMUSCULAR; INTRAVENOUS at 12:21

## 2017-02-27 RX ADMIN — AMLODIPINE BESYLATE 10 MG: 10 TABLET ORAL at 21:47

## 2017-02-27 RX ADMIN — VENLAFAXINE 37.5 MG: 37.5 TABLET ORAL at 10:44

## 2017-02-27 RX ADMIN — OXYCODONE HYDROCHLORIDE AND ACETAMINOPHEN 1 TABLET: 5; 325 TABLET ORAL at 11:18

## 2017-02-27 RX ADMIN — FUROSEMIDE 15 MG/HR: 10 INJECTION, SOLUTION INTRAVENOUS at 08:56

## 2017-02-27 RX ADMIN — SODIUM BICARBONATE 1950 MG: 650 TABLET, ORALLY DISINTEGRATING ORAL at 21:47

## 2017-02-27 RX ADMIN — DARBEPOETIN ALFA 80 MCG: 40 INJECTION, SOLUTION INTRAVENOUS; SUBCUTANEOUS at 14:40

## 2017-02-27 RX ADMIN — VENLAFAXINE 75 MG: 75 TABLET ORAL at 10:45

## 2017-02-27 RX ADMIN — AMLODIPINE BESYLATE 10 MG: 10 TABLET ORAL at 10:40

## 2017-02-27 RX ADMIN — OXYCODONE HYDROCHLORIDE AND ACETAMINOPHEN 1 TABLET: 5; 325 TABLET ORAL at 21:50

## 2017-02-27 RX ADMIN — LAMOTRIGINE 100 MG: 100 TABLET ORAL at 10:40

## 2017-02-27 RX ADMIN — FERROUS SULFATE TAB EC 325 MG (65 MG FE EQUIVALENT) 325 MG: 325 (65 FE) TABLET DELAYED RESPONSE at 14:40

## 2017-02-27 RX ADMIN — SODIUM BICARBONATE 1950 MG: 650 TABLET, ORALLY DISINTEGRATING ORAL at 10:40

## 2017-02-27 RX ADMIN — ISOSORBIDE MONONITRATE 30 MG: 30 TABLET ORAL at 10:40

## 2017-02-27 RX ADMIN — FUROSEMIDE 21 MG/HR: 10 INJECTION, SOLUTION INTRAVENOUS at 16:26

## 2017-02-27 RX ADMIN — PANTOPRAZOLE SODIUM 40 MG: 40 TABLET, DELAYED RELEASE ORAL at 10:40

## 2017-02-27 RX ADMIN — MORPHINE SULFATE 4 MG: 4 INJECTION, SOLUTION INTRAMUSCULAR; INTRAVENOUS at 08:40

## 2017-02-27 RX ADMIN — FERROUS SULFATE TAB EC 325 MG (65 MG FE EQUIVALENT) 325 MG: 325 (65 FE) TABLET DELAYED RESPONSE at 21:47

## 2017-02-27 RX ADMIN — VITAMIN D, TAB 1000IU (100/BT) 1000 UNITS: 25 TAB at 10:40

## 2017-02-27 RX ADMIN — FUROSEMIDE 17 MG/HR: 10 INJECTION, SOLUTION INTRAVENOUS at 12:19

## 2017-02-27 ASSESSMENT — PAIN SCALES - GENERAL
PAINLEVEL_OUTOF10: 7
PAINLEVEL_OUTOF10: 10
PAINLEVEL_OUTOF10: 9
PAINLEVEL_OUTOF10: 10
PAINLEVEL_OUTOF10: 5
PAINLEVEL_OUTOF10: 9
PAINLEVEL_OUTOF10: 9
PAINLEVEL_OUTOF10: 7

## 2017-02-27 ASSESSMENT — ENCOUNTER SYMPTOMS
VOMITING: 0
COUGH: 0
SORE THROAT: 0
ABDOMINAL PAIN: 0
BLOOD IN STOOL: 0
SINUS PRESSURE: 0
CONSTIPATION: 0
VOICE CHANGE: 0
SHORTNESS OF BREATH: 1
DIARRHEA: 0
NAUSEA: 0
WHEEZING: 0

## 2017-02-28 LAB
ANION GAP SERPL CALCULATED.3IONS-SCNC: 17 MMOL/L (ref 9–17)
BUN BLDV-MCNC: 86 MG/DL (ref 8–23)
BUN/CREAT BLD: ABNORMAL (ref 9–20)
CALCIUM SERPL-MCNC: 8.5 MG/DL (ref 8.6–10.4)
CHLORIDE BLD-SCNC: 93 MMOL/L (ref 98–107)
CO2: 25 MMOL/L (ref 20–31)
CREAT SERPL-MCNC: 4.55 MG/DL (ref 0.7–1.2)
EKG ATRIAL RATE: 75 BPM
EKG Q-T INTERVAL: 420 MS
EKG QRS DURATION: 100 MS
EKG QTC CALCULATION (BAZETT): 496 MS
EKG R AXIS: 18 DEGREES
EKG T AXIS: 10 DEGREES
EKG VENTRICULAR RATE: 84 BPM
FIO2: 21
FIO2: 5
GFR AFRICAN AMERICAN: 16 ML/MIN
GFR NON-AFRICAN AMERICAN: 13 ML/MIN
GFR SERPL CREATININE-BSD FRML MDRD: ABNORMAL ML/MIN/{1.73_M2}
GFR SERPL CREATININE-BSD FRML MDRD: ABNORMAL ML/MIN/{1.73_M2}
GLUCOSE BLD-MCNC: 150 MG/DL (ref 70–99)
GLUCOSE BLD-MCNC: 176 MG/DL (ref 75–110)
GLUCOSE BLD-MCNC: 217 MG/DL (ref 75–110)
HCO3, MIXED: 26.9 MMOL/L (ref 23–29)
HCT VFR BLD CALC: 23 % (ref 41–53)
HEMOGLOBIN: 7.8 G/DL (ref 13.5–17.5)
MCH RBC QN AUTO: 30.1 PG (ref 26–34)
MCHC RBC AUTO-ENTMCNC: 33.9 G/DL (ref 31–37)
MCV RBC AUTO: 88.9 FL (ref 80–100)
NEGATIVE BASE EXCESS, ART: ABNORMAL (ref 0–2)
NEGATIVE BASE EXCESS, MIXED: NORMAL (ref 0–2)
O2 DEVICE/FLOW/%: ABNORMAL
O2 DEVICE/FLOW/%: NORMAL
O2 SAT, MIXED: 77 %
PATIENT TEMP: ABNORMAL
PATIENT TEMP: NORMAL
PCO2 MIXED: 44 MM HG (ref 42–52)
PDW BLD-RTO: 16.2 % (ref 12.5–15.4)
PH, MIXED: 7.39 (ref 7.31–7.41)
PLATELET # BLD: 144 K/UL (ref 140–450)
PMV BLD AUTO: 7.3 FL (ref 6–12)
PO2 MIXED: 42 MM HG (ref 35–45)
POC HCO3: 25.8 MMOL/L (ref 22–27)
POC O2 SATURATION: 85 %
POC PCO2 TEMP: ABNORMAL MM HG
POC PCO2 TEMP: NORMAL MM HG
POC PCO2: 36 MM HG (ref 32–45)
POC PH TEMP: ABNORMAL
POC PH TEMP: NORMAL
POC PH: 7.46 (ref 7.35–7.45)
POC PO2 TEMP: ABNORMAL MM HG
POC PO2 TEMP: NORMAL MM HG
POC PO2: 47 MM HG (ref 75–95)
POSITIVE BASE EXCESS, ART: 2 (ref 0–2)
POSITIVE BASE EXCESS, MIXED: 2 (ref 0–2)
POTASSIUM SERPL-SCNC: 4.4 MMOL/L (ref 3.7–5.3)
RBC # BLD: 2.59 M/UL (ref 4.5–5.9)
SODIUM BLD-SCNC: 135 MMOL/L (ref 135–144)
SURGICAL PATHOLOGY REPORT: NORMAL
TCO2 (CALC), ART: 27 MM HG (ref 23–28)
TCO2 CALC MIXED: 28 MM HG (ref 24–30)
WBC # BLD: 4.4 K/UL (ref 3.5–11)

## 2017-02-28 PROCEDURE — 2060000000 HC ICU INTERMEDIATE R&B

## 2017-02-28 PROCEDURE — 85027 COMPLETE CBC AUTOMATED: CPT

## 2017-02-28 PROCEDURE — 82803 BLOOD GASES ANY COMBINATION: CPT

## 2017-02-28 PROCEDURE — 6360000002 HC RX W HCPCS: Performed by: INTERNAL MEDICINE

## 2017-02-28 PROCEDURE — 2580000003 HC RX 258: Performed by: INTERNAL MEDICINE

## 2017-02-28 PROCEDURE — 6370000000 HC RX 637 (ALT 250 FOR IP): Performed by: FAMILY MEDICINE

## 2017-02-28 PROCEDURE — 99231 SBSQ HOSP IP/OBS SF/LOW 25: CPT | Performed by: INTERNAL MEDICINE

## 2017-02-28 PROCEDURE — 80048 BASIC METABOLIC PNL TOTAL CA: CPT

## 2017-02-28 PROCEDURE — 36600 WITHDRAWAL OF ARTERIAL BLOOD: CPT

## 2017-02-28 PROCEDURE — G0009 ADMIN PNEUMOCOCCAL VACCINE: HCPCS | Performed by: INTERNAL MEDICINE

## 2017-02-28 PROCEDURE — 82272 OCCULT BLD FECES 1-3 TESTS: CPT

## 2017-02-28 PROCEDURE — 90670 PCV13 VACCINE IM: CPT | Performed by: INTERNAL MEDICINE

## 2017-02-28 PROCEDURE — 36415 COLL VENOUS BLD VENIPUNCTURE: CPT

## 2017-02-28 PROCEDURE — 6370000000 HC RX 637 (ALT 250 FOR IP): Performed by: INTERNAL MEDICINE

## 2017-02-28 PROCEDURE — 82947 ASSAY GLUCOSE BLOOD QUANT: CPT

## 2017-02-28 RX ORDER — METOLAZONE 5 MG/1
5 TABLET ORAL DAILY
Status: DISCONTINUED | OUTPATIENT
Start: 2017-02-28 | End: 2017-03-05

## 2017-02-28 RX ADMIN — OXYCODONE HYDROCHLORIDE AND ACETAMINOPHEN 1 TABLET: 5; 325 TABLET ORAL at 11:26

## 2017-02-28 RX ADMIN — FUROSEMIDE 35 MG/HR: 10 INJECTION, SOLUTION INTRAVENOUS at 09:54

## 2017-02-28 RX ADMIN — FUROSEMIDE 40 MG/HR: 10 INJECTION, SOLUTION INTRAVENOUS at 11:58

## 2017-02-28 RX ADMIN — FUROSEMIDE 38 MG/HR: 10 INJECTION, SOLUTION INTRAVENOUS at 10:56

## 2017-02-28 RX ADMIN — AMLODIPINE BESYLATE 10 MG: 10 TABLET ORAL at 20:51

## 2017-02-28 RX ADMIN — FUROSEMIDE 10 MG/HR: 10 INJECTION, SOLUTION INTRAVENOUS at 07:10

## 2017-02-28 RX ADMIN — AMLODIPINE BESYLATE 10 MG: 10 TABLET ORAL at 08:57

## 2017-02-28 RX ADMIN — FERROUS SULFATE TAB EC 325 MG (65 MG FE EQUIVALENT) 325 MG: 325 (65 FE) TABLET DELAYED RESPONSE at 08:57

## 2017-02-28 RX ADMIN — FERROUS SULFATE TAB EC 325 MG (65 MG FE EQUIVALENT) 325 MG: 325 (65 FE) TABLET DELAYED RESPONSE at 12:22

## 2017-02-28 RX ADMIN — IRON SUCROSE 200 MG: 20 INJECTION, SOLUTION INTRAVENOUS at 21:27

## 2017-02-28 RX ADMIN — VITAMIN D, TAB 1000IU (100/BT) 1000 UNITS: 25 TAB at 08:57

## 2017-02-28 RX ADMIN — FERROUS SULFATE TAB EC 325 MG (65 MG FE EQUIVALENT) 325 MG: 325 (65 FE) TABLET DELAYED RESPONSE at 17:37

## 2017-02-28 RX ADMIN — OXYCODONE HYDROCHLORIDE AND ACETAMINOPHEN 1 TABLET: 5; 325 TABLET ORAL at 02:43

## 2017-02-28 RX ADMIN — LEVOTHYROXINE SODIUM 50 MCG: 50 TABLET ORAL at 08:57

## 2017-02-28 RX ADMIN — Medication 10 ML: at 09:02

## 2017-02-28 RX ADMIN — ISOSORBIDE MONONITRATE 30 MG: 30 TABLET ORAL at 08:57

## 2017-02-28 RX ADMIN — SODIUM BICARBONATE 1950 MG: 650 TABLET, ORALLY DISINTEGRATING ORAL at 20:51

## 2017-02-28 RX ADMIN — OXYCODONE HYDROCHLORIDE AND ACETAMINOPHEN 1 TABLET: 5; 325 TABLET ORAL at 17:23

## 2017-02-28 RX ADMIN — ARIPIPRAZOLE 5 MG: 5 TABLET ORAL at 08:57

## 2017-02-28 RX ADMIN — VENLAFAXINE 75 MG: 75 TABLET ORAL at 09:53

## 2017-02-28 RX ADMIN — SODIUM BICARBONATE 1950 MG: 650 TABLET, ORALLY DISINTEGRATING ORAL at 17:24

## 2017-02-28 RX ADMIN — METOLAZONE 5 MG: 5 TABLET ORAL at 12:22

## 2017-02-28 RX ADMIN — MORPHINE SULFATE 4 MG: 4 INJECTION, SOLUTION INTRAMUSCULAR; INTRAVENOUS at 04:02

## 2017-02-28 RX ADMIN — PNEUMOCOCCAL 13-VALENT CONJUGATE VACCINE 0.5 ML: 2.2; 2.2; 2.2; 2.2; 2.2; 4.4; 2.2; 2.2; 2.2; 2.2; 2.2; 2.2; 2.2 INJECTION, SUSPENSION INTRAMUSCULAR at 10:32

## 2017-02-28 RX ADMIN — FUROSEMIDE 40 MG/HR: 10 INJECTION, SOLUTION INTRAVENOUS at 22:05

## 2017-02-28 RX ADMIN — Medication 10 ML: at 20:56

## 2017-02-28 RX ADMIN — PANTOPRAZOLE SODIUM 40 MG: 40 TABLET, DELAYED RELEASE ORAL at 08:57

## 2017-02-28 RX ADMIN — NIACIN 500 MG: 500 TABLET, FILM COATED, EXTENDED RELEASE ORAL at 20:51

## 2017-02-28 RX ADMIN — SODIUM BICARBONATE 1950 MG: 650 TABLET, ORALLY DISINTEGRATING ORAL at 08:57

## 2017-02-28 RX ADMIN — LAMOTRIGINE 100 MG: 100 TABLET ORAL at 08:57

## 2017-02-28 RX ADMIN — VENLAFAXINE 37.5 MG: 37.5 TABLET ORAL at 08:57

## 2017-02-28 RX ADMIN — OXYCODONE HYDROCHLORIDE AND ACETAMINOPHEN 1 TABLET: 5; 325 TABLET ORAL at 07:13

## 2017-02-28 ASSESSMENT — PAIN SCALES - GENERAL
PAINLEVEL_OUTOF10: 7
PAINLEVEL_OUTOF10: 9
PAINLEVEL_OUTOF10: 9
PAINLEVEL_OUTOF10: 10

## 2017-03-01 ENCOUNTER — APPOINTMENT (OUTPATIENT)
Dept: CT IMAGING | Age: 67
DRG: 291 | End: 2017-03-01
Payer: MEDICARE

## 2017-03-01 ENCOUNTER — HOSPITAL ENCOUNTER (INPATIENT)
Dept: DIALYSIS | Age: 67
Discharge: HOME | DRG: 291 | End: 2017-03-01
Payer: MEDICARE

## 2017-03-01 ENCOUNTER — APPOINTMENT (OUTPATIENT)
Dept: INTERVENTIONAL RADIOLOGY/VASCULAR | Age: 67
DRG: 291 | End: 2017-03-01
Payer: MEDICARE

## 2017-03-01 PROBLEM — G93.41 METABOLIC ENCEPHALOPATHY: Status: ACTIVE | Noted: 2017-03-01

## 2017-03-01 PROBLEM — Z86.73 HISTORY OF CEREBRAL INFARCTION: Status: ACTIVE | Noted: 2017-03-01

## 2017-03-01 PROBLEM — G40.909 SEIZURE DISORDER (HCC): Status: ACTIVE | Noted: 2017-03-01

## 2017-03-01 LAB
ALBUMIN SERPL-MCNC: 3 G/DL (ref 3.5–5.2)
AMMONIA: 23 UMOL/L (ref 16–60)
ANION GAP SERPL CALCULATED.3IONS-SCNC: 16 MMOL/L (ref 9–17)
BUN BLDV-MCNC: 87 MG/DL (ref 8–23)
BUN/CREAT BLD: ABNORMAL (ref 9–20)
CALCIUM SERPL-MCNC: 8.4 MG/DL (ref 8.6–10.4)
CHLORIDE BLD-SCNC: 97 MMOL/L (ref 98–107)
CO2: 27 MMOL/L (ref 20–31)
CREAT SERPL-MCNC: 4.42 MG/DL (ref 0.7–1.2)
GFR AFRICAN AMERICAN: 16 ML/MIN
GFR NON-AFRICAN AMERICAN: 13 ML/MIN
GFR SERPL CREATININE-BSD FRML MDRD: ABNORMAL ML/MIN/{1.73_M2}
GFR SERPL CREATININE-BSD FRML MDRD: ABNORMAL ML/MIN/{1.73_M2}
GLUCOSE BLD-MCNC: 167 MG/DL (ref 70–99)
HBV SURFACE AB TITR SER: <3.5 MIU/ML
HCT VFR BLD CALC: 22.9 % (ref 41–53)
HEMOGLOBIN: 7.8 G/DL (ref 13.5–17.5)
HEPATITIS B CORE TOTAL ANTIBODY: NONREACTIVE
HEPATITIS B SURFACE ANTIGEN: NONREACTIVE
HEPATITIS C ANTIBODY: NONREACTIVE
INR BLD: 1.3
MCH RBC QN AUTO: 29.9 PG (ref 26–34)
MCHC RBC AUTO-ENTMCNC: 33.8 G/DL (ref 31–37)
MCV RBC AUTO: 88.4 FL (ref 80–100)
PARTIAL THROMBOPLASTIN TIME: 30.3 SEC (ref 21.3–31.3)
PDW BLD-RTO: 16.3 % (ref 12.5–15.4)
PLATELET # BLD: 158 K/UL (ref 140–450)
PMV BLD AUTO: 7.6 FL (ref 6–12)
POTASSIUM SERPL-SCNC: 4.3 MMOL/L (ref 3.7–5.3)
PROTHROMBIN TIME: 13.9 SEC (ref 9.4–12.6)
RBC # BLD: 2.59 M/UL (ref 4.5–5.9)
SODIUM BLD-SCNC: 140 MMOL/L (ref 135–144)
WBC # BLD: 5.8 K/UL (ref 3.5–11)

## 2017-03-01 PROCEDURE — 90937 HEMODIALYSIS REPEATED EVAL: CPT

## 2017-03-01 PROCEDURE — 2580000003 HC RX 258: Performed by: INTERNAL MEDICINE

## 2017-03-01 PROCEDURE — 76937 US GUIDE VASCULAR ACCESS: CPT

## 2017-03-01 PROCEDURE — 87340 HEPATITIS B SURFACE AG IA: CPT

## 2017-03-01 PROCEDURE — 82040 ASSAY OF SERUM ALBUMIN: CPT

## 2017-03-01 PROCEDURE — 2060000000 HC ICU INTERMEDIATE R&B

## 2017-03-01 PROCEDURE — 99222 1ST HOSP IP/OBS MODERATE 55: CPT | Performed by: PSYCHIATRY & NEUROLOGY

## 2017-03-01 PROCEDURE — 6360000002 HC RX W HCPCS: Performed by: RADIOLOGY

## 2017-03-01 PROCEDURE — C1752 CATH,HEMODIALYSIS,SHORT-TERM: HCPCS

## 2017-03-01 PROCEDURE — 86706 HEP B SURFACE ANTIBODY: CPT

## 2017-03-01 PROCEDURE — 80048 BASIC METABOLIC PNL TOTAL CA: CPT

## 2017-03-01 PROCEDURE — 86803 HEPATITIS C AB TEST: CPT

## 2017-03-01 PROCEDURE — 02HV33Z INSERTION OF INFUSION DEVICE INTO SUPERIOR VENA CAVA, PERCUTANEOUS APPROACH: ICD-10-PCS | Performed by: RADIOLOGY

## 2017-03-01 PROCEDURE — 5A1D60Z PERFORMANCE OF URINARY FILTRATION, MULTIPLE: ICD-10-PCS | Performed by: FAMILY MEDICINE

## 2017-03-01 PROCEDURE — 99232 SBSQ HOSP IP/OBS MODERATE 35: CPT | Performed by: INTERNAL MEDICINE

## 2017-03-01 PROCEDURE — 6360000002 HC RX W HCPCS

## 2017-03-01 PROCEDURE — 85610 PROTHROMBIN TIME: CPT

## 2017-03-01 PROCEDURE — 6360000002 HC RX W HCPCS: Performed by: INTERNAL MEDICINE

## 2017-03-01 PROCEDURE — 6370000000 HC RX 637 (ALT 250 FOR IP): Performed by: FAMILY MEDICINE

## 2017-03-01 PROCEDURE — 85027 COMPLETE CBC AUTOMATED: CPT

## 2017-03-01 PROCEDURE — 70450 CT HEAD/BRAIN W/O DYE: CPT

## 2017-03-01 PROCEDURE — 36558 INSERT TUNNELED CV CATH: CPT

## 2017-03-01 PROCEDURE — 82140 ASSAY OF AMMONIA: CPT

## 2017-03-01 PROCEDURE — 85730 THROMBOPLASTIN TIME PARTIAL: CPT

## 2017-03-01 PROCEDURE — 86704 HEP B CORE ANTIBODY TOTAL: CPT

## 2017-03-01 PROCEDURE — 90935 HEMODIALYSIS ONE EVALUATION: CPT

## 2017-03-01 PROCEDURE — 77001 FLUOROGUIDE FOR VEIN DEVICE: CPT

## 2017-03-01 RX ORDER — HEPARIN SODIUM 5000 [USP'U]/ML
1600 INJECTION, SOLUTION INTRAVENOUS; SUBCUTANEOUS PRN
Status: DISCONTINUED | OUTPATIENT
Start: 2017-03-01 | End: 2017-03-03 | Stop reason: DRUGHIGH

## 2017-03-01 RX ORDER — FENTANYL CITRATE 50 UG/ML
INJECTION, SOLUTION INTRAMUSCULAR; INTRAVENOUS
Status: COMPLETED | OUTPATIENT
Start: 2017-03-01 | End: 2017-03-01

## 2017-03-01 RX ORDER — HEPARIN SODIUM 1000 [USP'U]/ML
1600 INJECTION, SOLUTION INTRAVENOUS; SUBCUTANEOUS PRN
Status: DISCONTINUED | OUTPATIENT
Start: 2017-03-01 | End: 2017-03-03 | Stop reason: SDUPTHER

## 2017-03-01 RX ORDER — MIDAZOLAM HYDROCHLORIDE 1 MG/ML
INJECTION INTRAMUSCULAR; INTRAVENOUS
Status: COMPLETED | OUTPATIENT
Start: 2017-03-01 | End: 2017-03-01

## 2017-03-01 RX ADMIN — MIDAZOLAM HYDROCHLORIDE 1 MG: 1 INJECTION, SOLUTION INTRAMUSCULAR; INTRAVENOUS at 16:38

## 2017-03-01 RX ADMIN — OXYCODONE HYDROCHLORIDE AND ACETAMINOPHEN 1 TABLET: 5; 325 TABLET ORAL at 22:14

## 2017-03-01 RX ADMIN — NIACIN 500 MG: 500 TABLET, FILM COATED, EXTENDED RELEASE ORAL at 20:43

## 2017-03-01 RX ADMIN — FENTANYL CITRATE 50 MCG: 50 INJECTION INTRAMUSCULAR; INTRAVENOUS at 16:43

## 2017-03-01 RX ADMIN — AMLODIPINE BESYLATE 10 MG: 10 TABLET ORAL at 20:43

## 2017-03-01 RX ADMIN — HEPARIN SODIUM 1600 UNITS: 1000 INJECTION, SOLUTION INTRAVENOUS; SUBCUTANEOUS at 19:40

## 2017-03-01 RX ADMIN — IRON SUCROSE 200 MG: 20 INJECTION, SOLUTION INTRAVENOUS at 20:42

## 2017-03-01 RX ADMIN — SODIUM BICARBONATE 1950 MG: 650 TABLET, ORALLY DISINTEGRATING ORAL at 20:43

## 2017-03-01 RX ADMIN — FENTANYL CITRATE 50 MCG: 50 INJECTION INTRAMUSCULAR; INTRAVENOUS at 16:38

## 2017-03-01 RX ADMIN — FERROUS SULFATE TAB EC 325 MG (65 MG FE EQUIVALENT) 325 MG: 325 (65 FE) TABLET DELAYED RESPONSE at 20:43

## 2017-03-01 RX ADMIN — Medication 10 ML: at 20:44

## 2017-03-01 RX ADMIN — CEFAZOLIN SODIUM 1 G: 1 INJECTION, SOLUTION INTRAVENOUS at 15:36

## 2017-03-01 ASSESSMENT — PAIN DESCRIPTION - ORIENTATION: ORIENTATION: RIGHT

## 2017-03-01 ASSESSMENT — PAIN SCALES - GENERAL
PAINLEVEL_OUTOF10: 0
PAINLEVEL_OUTOF10: 6
PAINLEVEL_OUTOF10: 0
PAINLEVEL_OUTOF10: 3

## 2017-03-01 ASSESSMENT — PAIN DESCRIPTION - PAIN TYPE: TYPE: ACUTE PAIN

## 2017-03-01 ASSESSMENT — PAIN DESCRIPTION - LOCATION: LOCATION: LEG

## 2017-03-02 ENCOUNTER — HOSPITAL ENCOUNTER (INPATIENT)
Dept: DIALYSIS | Age: 67
Discharge: HOME | DRG: 291 | End: 2017-03-02
Payer: MEDICARE

## 2017-03-02 LAB
ANION GAP SERPL CALCULATED.3IONS-SCNC: 14 MMOL/L (ref 9–17)
BUN BLDV-MCNC: 73 MG/DL (ref 8–23)
BUN/CREAT BLD: ABNORMAL (ref 9–20)
CALCIUM SERPL-MCNC: 8.7 MG/DL (ref 8.6–10.4)
CHLORIDE BLD-SCNC: 93 MMOL/L (ref 98–107)
CHOLESTEROL/HDL RATIO: 4.4
CHOLESTEROL: 140 MG/DL
CO2: 29 MMOL/L (ref 20–31)
CREAT SERPL-MCNC: 3.46 MG/DL (ref 0.7–1.2)
DATE, STOOL #1: NORMAL
DATE, STOOL #2: NORMAL
DATE, STOOL #3: NORMAL
GFR AFRICAN AMERICAN: 21 ML/MIN
GFR NON-AFRICAN AMERICAN: 18 ML/MIN
GFR SERPL CREATININE-BSD FRML MDRD: ABNORMAL ML/MIN/{1.73_M2}
GFR SERPL CREATININE-BSD FRML MDRD: ABNORMAL ML/MIN/{1.73_M2}
GLUCOSE BLD-MCNC: 163 MG/DL (ref 75–110)
GLUCOSE BLD-MCNC: 169 MG/DL (ref 70–99)
GLUCOSE BLD-MCNC: 205 MG/DL (ref 75–110)
HCT VFR BLD CALC: 24.5 % (ref 41–53)
HDLC SERPL-MCNC: 32 MG/DL
HEMOCCULT SP1 STL QL: NEGATIVE
HEMOCCULT SP2 STL QL: NORMAL
HEMOCCULT SP3 STL QL: NORMAL
HEMOGLOBIN: 8.2 G/DL (ref 13.5–17.5)
LDL CHOLESTEROL: 90 MG/DL (ref 0–130)
MCH RBC QN AUTO: 29.7 PG (ref 26–34)
MCHC RBC AUTO-ENTMCNC: 33.5 G/DL (ref 31–37)
MCV RBC AUTO: 88.7 FL (ref 80–100)
PATHOLOGIST REVIEW: NORMAL
PDW BLD-RTO: 16.4 % (ref 12.5–15.4)
PLATELET # BLD: 197 K/UL (ref 140–450)
PMV BLD AUTO: 7.3 FL (ref 6–12)
POTASSIUM SERPL-SCNC: 3.7 MMOL/L (ref 3.7–5.3)
RBC # BLD: 2.76 M/UL (ref 4.5–5.9)
SODIUM BLD-SCNC: 136 MMOL/L (ref 135–144)
TIME, STOOL #1: NORMAL
TIME, STOOL #2: NORMAL
TIME, STOOL #3: NORMAL
TRIGL SERPL-MCNC: 88 MG/DL
VITAMIN D 25-HYDROXY: 28 NG/ML (ref 30–100)
VLDLC SERPL CALC-MCNC: ABNORMAL MG/DL (ref 1–30)
WBC # BLD: 8.6 K/UL (ref 3.5–11)

## 2017-03-02 PROCEDURE — 85027 COMPLETE CBC AUTOMATED: CPT

## 2017-03-02 PROCEDURE — 6360000002 HC RX W HCPCS: Performed by: INTERNAL MEDICINE

## 2017-03-02 PROCEDURE — 2060000000 HC ICU INTERMEDIATE R&B

## 2017-03-02 PROCEDURE — 6370000000 HC RX 637 (ALT 250 FOR IP): Performed by: FAMILY MEDICINE

## 2017-03-02 PROCEDURE — 90937 HEMODIALYSIS REPEATED EVAL: CPT

## 2017-03-02 PROCEDURE — 80061 LIPID PANEL: CPT

## 2017-03-02 PROCEDURE — 82947 ASSAY GLUCOSE BLOOD QUANT: CPT

## 2017-03-02 PROCEDURE — 99232 SBSQ HOSP IP/OBS MODERATE 35: CPT | Performed by: NURSE PRACTITIONER

## 2017-03-02 PROCEDURE — 99232 SBSQ HOSP IP/OBS MODERATE 35: CPT | Performed by: INTERNAL MEDICINE

## 2017-03-02 PROCEDURE — 6370000000 HC RX 637 (ALT 250 FOR IP): Performed by: NURSE PRACTITIONER

## 2017-03-02 PROCEDURE — 90935 HEMODIALYSIS ONE EVALUATION: CPT

## 2017-03-02 PROCEDURE — 80048 BASIC METABOLIC PNL TOTAL CA: CPT

## 2017-03-02 PROCEDURE — 93880 EXTRACRANIAL BILAT STUDY: CPT

## 2017-03-02 PROCEDURE — 2580000003 HC RX 258: Performed by: INTERNAL MEDICINE

## 2017-03-02 PROCEDURE — 95819 EEG AWAKE AND ASLEEP: CPT

## 2017-03-02 PROCEDURE — G0328 FECAL BLOOD SCRN IMMUNOASSAY: HCPCS

## 2017-03-02 PROCEDURE — 82306 VITAMIN D 25 HYDROXY: CPT

## 2017-03-02 RX ORDER — QUETIAPINE FUMARATE 25 MG/1
25 TABLET, FILM COATED ORAL 2 TIMES DAILY
Status: DISCONTINUED | OUTPATIENT
Start: 2017-03-02 | End: 2017-03-10 | Stop reason: HOSPADM

## 2017-03-02 RX ORDER — ATORVASTATIN CALCIUM 20 MG/1
20 TABLET, FILM COATED ORAL NIGHTLY
Status: DISCONTINUED | OUTPATIENT
Start: 2017-03-02 | End: 2017-03-10 | Stop reason: HOSPADM

## 2017-03-02 RX ADMIN — MORPHINE SULFATE 2 MG: 2 INJECTION, SOLUTION INTRAMUSCULAR; INTRAVENOUS at 22:12

## 2017-03-02 RX ADMIN — MORPHINE SULFATE 2 MG: 2 INJECTION, SOLUTION INTRAMUSCULAR; INTRAVENOUS at 09:51

## 2017-03-02 RX ADMIN — ISOSORBIDE MONONITRATE 30 MG: 30 TABLET ORAL at 18:58

## 2017-03-02 RX ADMIN — HEPARIN SODIUM 1600 UNITS: 1000 INJECTION, SOLUTION INTRAVENOUS; SUBCUTANEOUS at 11:12

## 2017-03-02 RX ADMIN — ATORVASTATIN CALCIUM 20 MG: 20 TABLET, FILM COATED ORAL at 22:07

## 2017-03-02 RX ADMIN — DARBEPOETIN ALFA 80 MCG: 40 INJECTION, SOLUTION INTRAVENOUS; SUBCUTANEOUS at 10:36

## 2017-03-02 RX ADMIN — QUETIAPINE FUMARATE 25 MG: 25 TABLET ORAL at 22:07

## 2017-03-02 RX ADMIN — OXYCODONE HYDROCHLORIDE AND ACETAMINOPHEN 1 TABLET: 5; 325 TABLET ORAL at 18:37

## 2017-03-02 RX ADMIN — Medication 10 ML: at 22:07

## 2017-03-02 RX ADMIN — SODIUM BICARBONATE 1950 MG: 650 TABLET, ORALLY DISINTEGRATING ORAL at 18:56

## 2017-03-02 RX ADMIN — AMLODIPINE BESYLATE 10 MG: 10 TABLET ORAL at 18:37

## 2017-03-02 RX ADMIN — NIACIN 500 MG: 500 TABLET, FILM COATED, EXTENDED RELEASE ORAL at 22:07

## 2017-03-02 RX ADMIN — OXYCODONE HYDROCHLORIDE AND ACETAMINOPHEN 1 TABLET: 5; 325 TABLET ORAL at 13:37

## 2017-03-02 RX ADMIN — OXYCODONE HYDROCHLORIDE AND ACETAMINOPHEN 1 TABLET: 5; 325 TABLET ORAL at 05:44

## 2017-03-02 RX ADMIN — AMLODIPINE BESYLATE 10 MG: 10 TABLET ORAL at 22:06

## 2017-03-02 RX ADMIN — Medication 100 MG: at 09:42

## 2017-03-02 ASSESSMENT — PAIN SCALES - GENERAL
PAINLEVEL_OUTOF10: 6
PAINLEVEL_OUTOF10: 7
PAINLEVEL_OUTOF10: 8
PAINLEVEL_OUTOF10: 8

## 2017-03-02 ASSESSMENT — PAIN DESCRIPTION - ORIENTATION: ORIENTATION: RIGHT

## 2017-03-02 ASSESSMENT — PAIN DESCRIPTION - FREQUENCY: FREQUENCY: INTERMITTENT

## 2017-03-02 ASSESSMENT — PAIN DESCRIPTION - PAIN TYPE: TYPE: ACUTE PAIN

## 2017-03-02 ASSESSMENT — PAIN DESCRIPTION - LOCATION: LOCATION: LEG

## 2017-03-03 ENCOUNTER — APPOINTMENT (OUTPATIENT)
Dept: MRI IMAGING | Age: 67
DRG: 291 | End: 2017-03-03
Payer: MEDICARE

## 2017-03-03 ENCOUNTER — HOSPITAL ENCOUNTER (INPATIENT)
Dept: DIALYSIS | Age: 67
Discharge: HOME | DRG: 291 | End: 2017-03-03
Payer: MEDICARE

## 2017-03-03 PROBLEM — N28.9 RENAL LESION: Status: ACTIVE | Noted: 2017-03-03

## 2017-03-03 LAB
ANION GAP SERPL CALCULATED.3IONS-SCNC: 16 MMOL/L (ref 9–17)
BUN BLDV-MCNC: 59 MG/DL (ref 8–23)
BUN/CREAT BLD: ABNORMAL (ref 9–20)
CALCIUM SERPL-MCNC: 8.9 MG/DL (ref 8.6–10.4)
CHLORIDE BLD-SCNC: 96 MMOL/L (ref 98–107)
CO2: 24 MMOL/L (ref 20–31)
CREAT SERPL-MCNC: 3.29 MG/DL (ref 0.7–1.2)
DATE, STOOL #1: ABNORMAL
DATE, STOOL #2: ABNORMAL
DATE, STOOL #3: ABNORMAL
GFR AFRICAN AMERICAN: 23 ML/MIN
GFR NON-AFRICAN AMERICAN: 19 ML/MIN
GFR SERPL CREATININE-BSD FRML MDRD: ABNORMAL ML/MIN/{1.73_M2}
GFR SERPL CREATININE-BSD FRML MDRD: ABNORMAL ML/MIN/{1.73_M2}
GLUCOSE BLD-MCNC: 152 MG/DL (ref 75–110)
GLUCOSE BLD-MCNC: 157 MG/DL (ref 70–99)
GLUCOSE BLD-MCNC: 192 MG/DL (ref 75–110)
HCT VFR BLD CALC: 25 % (ref 41–53)
HEMOCCULT SP1 STL QL: POSITIVE
HEMOCCULT SP2 STL QL: NEGATIVE
HEMOCCULT SP3 STL QL: NEGATIVE
HEMOGLOBIN: 8.2 G/DL (ref 13.5–17.5)
MCH RBC QN AUTO: 29.1 PG (ref 26–34)
MCHC RBC AUTO-ENTMCNC: 33 G/DL (ref 31–37)
MCV RBC AUTO: 88.3 FL (ref 80–100)
PDW BLD-RTO: 16.5 % (ref 12.5–15.4)
PLATELET # BLD: 223 K/UL (ref 140–450)
PMV BLD AUTO: 6.9 FL (ref 6–12)
POTASSIUM SERPL-SCNC: 3.7 MMOL/L (ref 3.7–5.3)
RBC # BLD: 2.83 M/UL (ref 4.5–5.9)
SODIUM BLD-SCNC: 136 MMOL/L (ref 135–144)
TIME, STOOL #1: ABNORMAL
TIME, STOOL #2: ABNORMAL
TIME, STOOL #3: ABNORMAL
WBC # BLD: 8.3 K/UL (ref 3.5–11)

## 2017-03-03 PROCEDURE — 81001 URINALYSIS AUTO W/SCOPE: CPT

## 2017-03-03 PROCEDURE — 6370000000 HC RX 637 (ALT 250 FOR IP): Performed by: NURSE PRACTITIONER

## 2017-03-03 PROCEDURE — 87086 URINE CULTURE/COLONY COUNT: CPT

## 2017-03-03 PROCEDURE — 82947 ASSAY GLUCOSE BLOOD QUANT: CPT

## 2017-03-03 PROCEDURE — 2060000000 HC ICU INTERMEDIATE R&B

## 2017-03-03 PROCEDURE — 92610 EVALUATE SWALLOWING FUNCTION: CPT

## 2017-03-03 PROCEDURE — 2580000003 HC RX 258: Performed by: INTERNAL MEDICINE

## 2017-03-03 PROCEDURE — 85027 COMPLETE CBC AUTOMATED: CPT

## 2017-03-03 PROCEDURE — 90937 HEMODIALYSIS REPEATED EVAL: CPT

## 2017-03-03 PROCEDURE — 6370000000 HC RX 637 (ALT 250 FOR IP): Performed by: FAMILY MEDICINE

## 2017-03-03 PROCEDURE — 90935 HEMODIALYSIS ONE EVALUATION: CPT

## 2017-03-03 PROCEDURE — G8998 SWALLOW D/C STATUS: HCPCS

## 2017-03-03 PROCEDURE — 99232 SBSQ HOSP IP/OBS MODERATE 35: CPT | Performed by: INTERNAL MEDICINE

## 2017-03-03 PROCEDURE — 70551 MRI BRAIN STEM W/O DYE: CPT

## 2017-03-03 PROCEDURE — 82272 OCCULT BLD FECES 1-3 TESTS: CPT

## 2017-03-03 PROCEDURE — 80048 BASIC METABOLIC PNL TOTAL CA: CPT

## 2017-03-03 PROCEDURE — 99232 SBSQ HOSP IP/OBS MODERATE 35: CPT | Performed by: NURSE PRACTITIONER

## 2017-03-03 PROCEDURE — 6360000002 HC RX W HCPCS: Performed by: INTERNAL MEDICINE

## 2017-03-03 PROCEDURE — G8996 SWALLOW CURRENT STATUS: HCPCS

## 2017-03-03 PROCEDURE — G8997 SWALLOW GOAL STATUS: HCPCS

## 2017-03-03 RX ORDER — HEPARIN SODIUM 1000 [USP'U]/ML
1600 INJECTION, SOLUTION INTRAVENOUS; SUBCUTANEOUS PRN
Status: DISCONTINUED | OUTPATIENT
Start: 2017-03-03 | End: 2017-03-10 | Stop reason: HOSPADM

## 2017-03-03 RX ADMIN — AMLODIPINE BESYLATE 10 MG: 10 TABLET ORAL at 21:00

## 2017-03-03 RX ADMIN — OXYCODONE HYDROCHLORIDE AND ACETAMINOPHEN 1 TABLET: 5; 325 TABLET ORAL at 15:20

## 2017-03-03 RX ADMIN — Medication 100 MG: at 11:18

## 2017-03-03 RX ADMIN — Medication 10 ML: at 21:00

## 2017-03-03 RX ADMIN — HEPARIN SODIUM 1600 UNITS: 1000 INJECTION, SOLUTION INTRAVENOUS; SUBCUTANEOUS at 12:40

## 2017-03-03 RX ADMIN — NIACIN 500 MG: 500 TABLET, FILM COATED, EXTENDED RELEASE ORAL at 21:00

## 2017-03-03 RX ADMIN — OXYCODONE HYDROCHLORIDE AND ACETAMINOPHEN 1 TABLET: 5; 325 TABLET ORAL at 22:53

## 2017-03-03 RX ADMIN — QUETIAPINE FUMARATE 25 MG: 25 TABLET ORAL at 21:00

## 2017-03-03 RX ADMIN — MORPHINE SULFATE 2 MG: 2 INJECTION, SOLUTION INTRAMUSCULAR; INTRAVENOUS at 11:08

## 2017-03-03 RX ADMIN — ATORVASTATIN CALCIUM 20 MG: 20 TABLET, FILM COATED ORAL at 21:00

## 2017-03-03 RX ADMIN — SODIUM BICARBONATE 1950 MG: 650 TABLET, ORALLY DISINTEGRATING ORAL at 21:00

## 2017-03-03 ASSESSMENT — PAIN DESCRIPTION - PAIN TYPE
TYPE: CHRONIC PAIN
TYPE: CHRONIC PAIN

## 2017-03-03 ASSESSMENT — PAIN SCALES - GENERAL
PAINLEVEL_OUTOF10: 5
PAINLEVEL_OUTOF10: 5
PAINLEVEL_OUTOF10: 6
PAINLEVEL_OUTOF10: 6

## 2017-03-03 ASSESSMENT — PAIN DESCRIPTION - ORIENTATION: ORIENTATION: RIGHT

## 2017-03-03 ASSESSMENT — PAIN DESCRIPTION - LOCATION
LOCATION: BACK;LEG
LOCATION: BACK;LEG

## 2017-03-04 PROBLEM — D64.9 ANEMIA: Status: ACTIVE | Noted: 2017-03-04

## 2017-03-04 PROBLEM — K92.1 HEMATOCHEZIA: Status: ACTIVE | Noted: 2017-03-04

## 2017-03-04 PROBLEM — K92.2 LOWER GI BLEED: Status: ACTIVE | Noted: 2017-03-04

## 2017-03-04 PROBLEM — R57.1 HYPOVOLEMIC SHOCK (HCC): Status: ACTIVE | Noted: 2017-03-04

## 2017-03-04 LAB
-: ABNORMAL
AMORPHOUS: ABNORMAL
ANION GAP SERPL CALCULATED.3IONS-SCNC: 14 MMOL/L (ref 9–17)
BACTERIA: ABNORMAL
BILIRUBIN URINE: ABNORMAL
BLOOD BANK SPECIMEN: NORMAL
BUN BLDV-MCNC: 43 MG/DL (ref 8–23)
BUN/CREAT BLD: ABNORMAL (ref 9–20)
CALCIUM SERPL-MCNC: 9 MG/DL (ref 8.6–10.4)
CASTS UA: ABNORMAL /LPF (ref 0–8)
CHLORIDE BLD-SCNC: 94 MMOL/L (ref 98–107)
CO2: 25 MMOL/L (ref 20–31)
COLOR: ABNORMAL
CREAT SERPL-MCNC: 2.99 MG/DL (ref 0.7–1.2)
CRYSTALS, UA: ABNORMAL /HPF
DATE, STOOL #1: ABNORMAL
DATE, STOOL #2: ABNORMAL
DATE, STOOL #3: ABNORMAL
DIRECT EXAM: ABNORMAL
EPITHELIAL CELLS UA: ABNORMAL /HPF (ref 0–5)
GFR AFRICAN AMERICAN: 25 ML/MIN
GFR NON-AFRICAN AMERICAN: 21 ML/MIN
GFR SERPL CREATININE-BSD FRML MDRD: ABNORMAL ML/MIN/{1.73_M2}
GFR SERPL CREATININE-BSD FRML MDRD: ABNORMAL ML/MIN/{1.73_M2}
GLUCOSE BLD-MCNC: 170 MG/DL (ref 70–99)
GLUCOSE BLD-MCNC: 209 MG/DL (ref 75–110)
GLUCOSE URINE: ABNORMAL
HCT VFR BLD CALC: 21.8 % (ref 41–53)
HCT VFR BLD CALC: 23.3 % (ref 41–53)
HCT VFR BLD CALC: 26.4 % (ref 41–53)
HEMOCCULT SP1 STL QL: POSITIVE
HEMOCCULT SP2 STL QL: ABNORMAL
HEMOCCULT SP3 STL QL: ABNORMAL
HEMOGLOBIN: 7.2 G/DL (ref 13.5–17.5)
HEMOGLOBIN: 7.3 G/DL (ref 13.5–17.5)
HEMOGLOBIN: 7.9 G/DL (ref 13.5–17.5)
HEMOGLOBIN: 8.6 G/DL (ref 13.5–17.5)
INR BLD: 1.3
KETONES, URINE: NEGATIVE
LACTIC ACID, WHOLE BLOOD: 3 MMOL/L (ref 0.7–2.1)
LEUKOCYTE ESTERASE, URINE: ABNORMAL
Lab: ABNORMAL
MCH RBC QN AUTO: 29.2 PG (ref 26–34)
MCHC RBC AUTO-ENTMCNC: 32.6 G/DL (ref 31–37)
MCV RBC AUTO: 89.5 FL (ref 80–100)
MUCUS: ABNORMAL
NITRITE, URINE: NEGATIVE
OTHER OBSERVATIONS UA: ABNORMAL
PARTIAL THROMBOPLASTIN TIME: 31.7 SEC (ref 21.3–31.3)
PDW BLD-RTO: 16.3 % (ref 12.5–15.4)
PH UA: 5 (ref 5–8)
PLATELET # BLD: 258 K/UL (ref 140–450)
PMV BLD AUTO: 7 FL (ref 6–12)
POTASSIUM SERPL-SCNC: 3.7 MMOL/L (ref 3.7–5.3)
PROTEIN UA: ABNORMAL
PROTHROMBIN TIME: 13.7 SEC (ref 9.4–12.6)
RBC # BLD: 2.95 M/UL (ref 4.5–5.9)
RBC UA: ABNORMAL /HPF (ref 0–2)
RENAL EPITHELIAL, UA: ABNORMAL /HPF
SODIUM BLD-SCNC: 133 MMOL/L (ref 135–144)
SPECIFIC GRAVITY UA: 1.02 (ref 1–1.03)
SPECIMEN DESCRIPTION: ABNORMAL
STATUS: ABNORMAL
TIME, STOOL #1: ABNORMAL
TIME, STOOL #2: ABNORMAL
TIME, STOOL #3: ABNORMAL
TRICHOMONAS: ABNORMAL
TURBIDITY: ABNORMAL
URINE HGB: ABNORMAL
UROBILINOGEN, URINE: ABNORMAL
WBC # BLD: 7.9 K/UL (ref 3.5–11)
WBC UA: ABNORMAL /HPF (ref 0–5)
YEAST: ABNORMAL

## 2017-03-04 PROCEDURE — 36416 COLLJ CAPILLARY BLOOD SPEC: CPT

## 2017-03-04 PROCEDURE — 83605 ASSAY OF LACTIC ACID: CPT

## 2017-03-04 PROCEDURE — 2580000003 HC RX 258: Performed by: INTERNAL MEDICINE

## 2017-03-04 PROCEDURE — 36415 COLL VENOUS BLD VENIPUNCTURE: CPT

## 2017-03-04 PROCEDURE — 6370000000 HC RX 637 (ALT 250 FOR IP): Performed by: FAMILY MEDICINE

## 2017-03-04 PROCEDURE — 2000000000 HC ICU R&B

## 2017-03-04 PROCEDURE — 99232 SBSQ HOSP IP/OBS MODERATE 35: CPT | Performed by: INTERNAL MEDICINE

## 2017-03-04 PROCEDURE — 82947 ASSAY GLUCOSE BLOOD QUANT: CPT

## 2017-03-04 PROCEDURE — 6360000002 HC RX W HCPCS

## 2017-03-04 PROCEDURE — 36556 INSERT NON-TUNNEL CV CATH: CPT | Performed by: NURSE PRACTITIONER

## 2017-03-04 PROCEDURE — 86900 BLOOD TYPING SEROLOGIC ABO: CPT

## 2017-03-04 PROCEDURE — 6370000000 HC RX 637 (ALT 250 FOR IP): Performed by: NURSE PRACTITIONER

## 2017-03-04 PROCEDURE — 85018 HEMOGLOBIN: CPT

## 2017-03-04 PROCEDURE — 85014 HEMATOCRIT: CPT

## 2017-03-04 PROCEDURE — 86850 RBC ANTIBODY SCREEN: CPT

## 2017-03-04 PROCEDURE — 36556 INSERT NON-TUNNEL CV CATH: CPT | Performed by: INTERNAL MEDICINE

## 2017-03-04 PROCEDURE — 87641 MR-STAPH DNA AMP PROBE: CPT

## 2017-03-04 PROCEDURE — 99291 CRITICAL CARE FIRST HOUR: CPT | Performed by: INTERNAL MEDICINE

## 2017-03-04 PROCEDURE — P9016 RBC LEUKOCYTES REDUCED: HCPCS

## 2017-03-04 PROCEDURE — 86901 BLOOD TYPING SEROLOGIC RH(D): CPT

## 2017-03-04 PROCEDURE — 36430 TRANSFUSION BLD/BLD COMPNT: CPT | Performed by: NURSE PRACTITIONER

## 2017-03-04 PROCEDURE — 85730 THROMBOPLASTIN TIME PARTIAL: CPT

## 2017-03-04 PROCEDURE — 6360000002 HC RX W HCPCS: Performed by: INTERNAL MEDICINE

## 2017-03-04 PROCEDURE — 85610 PROTHROMBIN TIME: CPT

## 2017-03-04 PROCEDURE — G0328 FECAL BLOOD SCRN IMMUNOASSAY: HCPCS

## 2017-03-04 PROCEDURE — 80048 BASIC METABOLIC PNL TOTAL CA: CPT

## 2017-03-04 PROCEDURE — 86920 COMPATIBILITY TEST SPIN: CPT

## 2017-03-04 PROCEDURE — C9113 INJ PANTOPRAZOLE SODIUM, VIA: HCPCS | Performed by: INTERNAL MEDICINE

## 2017-03-04 PROCEDURE — 85027 COMPLETE CBC AUTOMATED: CPT

## 2017-03-04 RX ORDER — 0.9 % SODIUM CHLORIDE 0.9 %
10 VIAL (ML) INJECTION DAILY
Status: DISCONTINUED | OUTPATIENT
Start: 2017-03-04 | End: 2017-03-07

## 2017-03-04 RX ORDER — MIDAZOLAM HYDROCHLORIDE 1 MG/ML
INJECTION INTRAMUSCULAR; INTRAVENOUS
Status: COMPLETED
Start: 2017-03-04 | End: 2017-03-04

## 2017-03-04 RX ORDER — FENTANYL CITRATE 50 UG/ML
INJECTION, SOLUTION INTRAMUSCULAR; INTRAVENOUS
Status: DISCONTINUED
Start: 2017-03-04 | End: 2017-03-04 | Stop reason: WASHOUT

## 2017-03-04 RX ORDER — 0.9 % SODIUM CHLORIDE 0.9 %
250 INTRAVENOUS SOLUTION INTRAVENOUS ONCE
Status: DISCONTINUED | OUTPATIENT
Start: 2017-03-04 | End: 2017-03-07

## 2017-03-04 RX ORDER — MIDAZOLAM HYDROCHLORIDE 1 MG/ML
1 INJECTION INTRAMUSCULAR; INTRAVENOUS ONCE
Status: COMPLETED | OUTPATIENT
Start: 2017-03-04 | End: 2017-03-04

## 2017-03-04 RX ORDER — 0.9 % SODIUM CHLORIDE 0.9 %
1000 INTRAVENOUS SOLUTION INTRAVENOUS ONCE
Status: COMPLETED | OUTPATIENT
Start: 2017-03-04 | End: 2017-03-04

## 2017-03-04 RX ORDER — PANTOPRAZOLE SODIUM 40 MG/10ML
40 INJECTION, POWDER, LYOPHILIZED, FOR SOLUTION INTRAVENOUS DAILY
Status: DISCONTINUED | OUTPATIENT
Start: 2017-03-04 | End: 2017-03-07

## 2017-03-04 RX ADMIN — SODIUM CHLORIDE 1000 ML: 9 INJECTION, SOLUTION INTRAVENOUS at 11:10

## 2017-03-04 RX ADMIN — ATORVASTATIN CALCIUM 20 MG: 20 TABLET, FILM COATED ORAL at 20:08

## 2017-03-04 RX ADMIN — SODIUM BICARBONATE 1950 MG: 650 TABLET, ORALLY DISINTEGRATING ORAL at 20:30

## 2017-03-04 RX ADMIN — Medication 10 ML: at 14:09

## 2017-03-04 RX ADMIN — NIACIN 500 MG: 500 TABLET, FILM COATED, EXTENDED RELEASE ORAL at 20:08

## 2017-03-04 RX ADMIN — SODIUM CHLORIDE 1000 ML: 9 INJECTION, SOLUTION INTRAVENOUS at 13:00

## 2017-03-04 RX ADMIN — MIDAZOLAM HYDROCHLORIDE 1 MG: 1 INJECTION, SOLUTION INTRAMUSCULAR; INTRAVENOUS at 13:20

## 2017-03-04 RX ADMIN — MIDAZOLAM HYDROCHLORIDE 1 MG: 1 INJECTION INTRAMUSCULAR; INTRAVENOUS at 13:20

## 2017-03-04 RX ADMIN — QUETIAPINE FUMARATE 25 MG: 25 TABLET ORAL at 20:08

## 2017-03-04 RX ADMIN — Medication 10 ML: at 21:00

## 2017-03-04 RX ADMIN — PANTOPRAZOLE SODIUM 40 MG: 40 INJECTION, POWDER, FOR SOLUTION INTRAVENOUS at 14:08

## 2017-03-04 ASSESSMENT — PAIN SCALES - PAIN ASSESSMENT IN ADVANCED DEMENTIA (PAINAD)
BREATHING: 0
FACIALEXPRESSION: 0
TOTALSCORE: 2
BODYLANGUAGE: 1
CONSOLABILITY: 0
NEGVOCALIZATION: 1

## 2017-03-05 ENCOUNTER — APPOINTMENT (OUTPATIENT)
Dept: CT IMAGING | Age: 67
DRG: 291 | End: 2017-03-05
Payer: MEDICARE

## 2017-03-05 LAB
ANION GAP SERPL CALCULATED.3IONS-SCNC: 18 MMOL/L (ref 9–17)
BLD PROD TYP BPU: NORMAL
BUN BLDV-MCNC: 64 MG/DL (ref 8–23)
BUN/CREAT BLD: ABNORMAL (ref 9–20)
CALCIUM SERPL-MCNC: 8 MG/DL (ref 8.6–10.4)
CHLORIDE BLD-SCNC: 97 MMOL/L (ref 98–107)
CO2: 21 MMOL/L (ref 20–31)
CREAT SERPL-MCNC: 4.08 MG/DL (ref 0.7–1.2)
DISPENSE STATUS BLOOD BANK: NORMAL
GFR AFRICAN AMERICAN: 18 ML/MIN
GFR NON-AFRICAN AMERICAN: 15 ML/MIN
GFR SERPL CREATININE-BSD FRML MDRD: ABNORMAL ML/MIN/{1.73_M2}
GFR SERPL CREATININE-BSD FRML MDRD: ABNORMAL ML/MIN/{1.73_M2}
GLUCOSE BLD-MCNC: 199 MG/DL (ref 70–99)
HCT VFR BLD CALC: 18.2 % (ref 41–53)
HCT VFR BLD CALC: 19 % (ref 41–53)
HCT VFR BLD CALC: 20.2 % (ref 41–53)
HCT VFR BLD CALC: 20.2 % (ref 41–53)
HCT VFR BLD CALC: 22.5 % (ref 41–53)
HEMOGLOBIN: 6.1 G/DL (ref 13.5–17.5)
HEMOGLOBIN: 6.2 G/DL (ref 13.5–17.5)
HEMOGLOBIN: 6.7 G/DL (ref 13.5–17.5)
HEMOGLOBIN: 6.7 G/DL (ref 13.5–17.5)
HEMOGLOBIN: 7.3 G/DL (ref 13.5–17.5)
MCH RBC QN AUTO: 29 PG (ref 26–34)
MCHC RBC AUTO-ENTMCNC: 32.4 G/DL (ref 31–37)
MCV RBC AUTO: 89.6 FL (ref 80–100)
PDW BLD-RTO: 16.5 % (ref 12.5–15.4)
PLATELET # BLD: 264 K/UL (ref 140–450)
PMV BLD AUTO: 7 FL (ref 6–12)
POTASSIUM SERPL-SCNC: 4.3 MMOL/L (ref 3.7–5.3)
RBC # BLD: 2.51 M/UL (ref 4.5–5.9)
SODIUM BLD-SCNC: 136 MMOL/L (ref 135–144)
TRANSFUSION STATUS: NORMAL
UNIT DIVISION: 0
UNIT NUMBER: NORMAL
WBC # BLD: 10.9 K/UL (ref 3.5–11)

## 2017-03-05 PROCEDURE — 36415 COLL VENOUS BLD VENIPUNCTURE: CPT

## 2017-03-05 PROCEDURE — 2580000003 HC RX 258: Performed by: INTERNAL MEDICINE

## 2017-03-05 PROCEDURE — 51798 US URINE CAPACITY MEASURE: CPT | Performed by: NURSE PRACTITIONER

## 2017-03-05 PROCEDURE — 74175 CTA ABDOMEN W/CONTRAST: CPT

## 2017-03-05 PROCEDURE — 99232 SBSQ HOSP IP/OBS MODERATE 35: CPT | Performed by: INTERNAL MEDICINE

## 2017-03-05 PROCEDURE — 85018 HEMOGLOBIN: CPT

## 2017-03-05 PROCEDURE — C9113 INJ PANTOPRAZOLE SODIUM, VIA: HCPCS | Performed by: INTERNAL MEDICINE

## 2017-03-05 PROCEDURE — 6370000000 HC RX 637 (ALT 250 FOR IP): Performed by: INTERNAL MEDICINE

## 2017-03-05 PROCEDURE — 85014 HEMATOCRIT: CPT

## 2017-03-05 PROCEDURE — 6370000000 HC RX 637 (ALT 250 FOR IP): Performed by: NURSE PRACTITIONER

## 2017-03-05 PROCEDURE — 2000000000 HC ICU R&B

## 2017-03-05 PROCEDURE — 6370000000 HC RX 637 (ALT 250 FOR IP): Performed by: FAMILY MEDICINE

## 2017-03-05 PROCEDURE — 86900 BLOOD TYPING SEROLOGIC ABO: CPT

## 2017-03-05 PROCEDURE — 74177 CT ABD & PELVIS W/CONTRAST: CPT

## 2017-03-05 PROCEDURE — 99291 CRITICAL CARE FIRST HOUR: CPT | Performed by: INTERNAL MEDICINE

## 2017-03-05 PROCEDURE — 6360000002 HC RX W HCPCS: Performed by: INTERNAL MEDICINE

## 2017-03-05 PROCEDURE — 85027 COMPLETE CBC AUTOMATED: CPT

## 2017-03-05 PROCEDURE — 36430 TRANSFUSION BLD/BLD COMPNT: CPT

## 2017-03-05 PROCEDURE — P9016 RBC LEUKOCYTES REDUCED: HCPCS

## 2017-03-05 PROCEDURE — 6360000004 HC RX CONTRAST MEDICATION: Performed by: INTERNAL MEDICINE

## 2017-03-05 PROCEDURE — 80048 BASIC METABOLIC PNL TOTAL CA: CPT

## 2017-03-05 RX ORDER — FENTANYL CITRATE 50 UG/ML
50 INJECTION, SOLUTION INTRAMUSCULAR; INTRAVENOUS
Status: DISCONTINUED | OUTPATIENT
Start: 2017-03-05 | End: 2017-03-05

## 2017-03-05 RX ORDER — FENTANYL CITRATE 50 UG/ML
75 INJECTION, SOLUTION INTRAMUSCULAR; INTRAVENOUS
Status: DISCONTINUED | OUTPATIENT
Start: 2017-03-05 | End: 2017-03-05

## 2017-03-05 RX ORDER — 0.9 % SODIUM CHLORIDE 0.9 %
500 INTRAVENOUS SOLUTION INTRAVENOUS ONCE
Status: COMPLETED | OUTPATIENT
Start: 2017-03-05 | End: 2017-03-05

## 2017-03-05 RX ORDER — FENTANYL CITRATE 50 UG/ML
100 INJECTION, SOLUTION INTRAMUSCULAR; INTRAVENOUS
Status: DISCONTINUED | OUTPATIENT
Start: 2017-03-05 | End: 2017-03-05

## 2017-03-05 RX ADMIN — Medication 10 ML: at 08:39

## 2017-03-05 RX ADMIN — MORPHINE SULFATE 4 MG: 4 INJECTION, SOLUTION INTRAMUSCULAR; INTRAVENOUS at 01:05

## 2017-03-05 RX ADMIN — METOLAZONE 5 MG: 5 TABLET ORAL at 08:34

## 2017-03-05 RX ADMIN — OXYCODONE HYDROCHLORIDE AND ACETAMINOPHEN 1 TABLET: 5; 325 TABLET ORAL at 22:00

## 2017-03-05 RX ADMIN — QUETIAPINE FUMARATE 25 MG: 25 TABLET ORAL at 08:37

## 2017-03-05 RX ADMIN — PANTOPRAZOLE SODIUM 40 MG: 40 INJECTION, POWDER, FOR SOLUTION INTRAVENOUS at 08:39

## 2017-03-05 RX ADMIN — ISOSORBIDE MONONITRATE 30 MG: 30 TABLET ORAL at 08:34

## 2017-03-05 RX ADMIN — LAMOTRIGINE 100 MG: 100 TABLET ORAL at 08:35

## 2017-03-05 RX ADMIN — OXYCODONE HYDROCHLORIDE AND ACETAMINOPHEN 1 TABLET: 5; 325 TABLET ORAL at 16:30

## 2017-03-05 RX ADMIN — QUETIAPINE FUMARATE 25 MG: 25 TABLET ORAL at 21:30

## 2017-03-05 RX ADMIN — Medication 10 ML: at 21:00

## 2017-03-05 RX ADMIN — NIACIN 500 MG: 500 TABLET, FILM COATED, EXTENDED RELEASE ORAL at 21:30

## 2017-03-05 RX ADMIN — VENLAFAXINE 75 MG: 75 TABLET ORAL at 12:00

## 2017-03-05 RX ADMIN — SODIUM CHLORIDE 500 ML: 9 INJECTION, SOLUTION INTRAVENOUS at 10:41

## 2017-03-05 RX ADMIN — VENLAFAXINE 37.5 MG: 37.5 TABLET ORAL at 08:36

## 2017-03-05 RX ADMIN — Medication 10 ML: at 08:38

## 2017-03-05 RX ADMIN — SODIUM BICARBONATE 1950 MG: 650 TABLET, ORALLY DISINTEGRATING ORAL at 08:36

## 2017-03-05 RX ADMIN — IOHEXOL 170 ML: 350 INJECTION, SOLUTION INTRAVENOUS at 01:17

## 2017-03-05 RX ADMIN — VITAMIN D, TAB 1000IU (100/BT) 1000 UNITS: 25 TAB at 08:37

## 2017-03-05 RX ADMIN — LEVOTHYROXINE SODIUM 50 MCG: 50 TABLET ORAL at 08:33

## 2017-03-05 RX ADMIN — ATORVASTATIN CALCIUM 20 MG: 20 TABLET, FILM COATED ORAL at 21:30

## 2017-03-05 ASSESSMENT — PAIN SCALES - GENERAL: PAINLEVEL_OUTOF10: 4

## 2017-03-05 ASSESSMENT — PAIN SCALES - PAIN ASSESSMENT IN ADVANCED DEMENTIA (PAINAD)
TOTALSCORE: 2
CONSOLABILITY: 0
TOTALSCORE: 2
BREATHING: 0
BODYLANGUAGE: 1
BODYLANGUAGE: 1
CONSOLABILITY: 0
NEGVOCALIZATION: 1
TOTALSCORE: 2
CONSOLABILITY: 0
BODYLANGUAGE: 1
NEGVOCALIZATION: 1
BREATHING: 0
BREATHING: 0
FACIALEXPRESSION: 0
NEGVOCALIZATION: 1
FACIALEXPRESSION: 0
FACIALEXPRESSION: 0

## 2017-03-06 ENCOUNTER — HOSPITAL ENCOUNTER (INPATIENT)
Dept: DIALYSIS | Age: 67
Discharge: HOME | DRG: 291 | End: 2017-03-06
Payer: MEDICARE

## 2017-03-06 LAB
ANION GAP SERPL CALCULATED.3IONS-SCNC: 18 MMOL/L (ref 9–17)
BUN BLDV-MCNC: 76 MG/DL (ref 8–23)
BUN/CREAT BLD: ABNORMAL (ref 9–20)
CALCIUM SERPL-MCNC: 8.4 MG/DL (ref 8.6–10.4)
CHLORIDE BLD-SCNC: 98 MMOL/L (ref 98–107)
CO2: 22 MMOL/L (ref 20–31)
CREAT SERPL-MCNC: 4.77 MG/DL (ref 0.7–1.2)
CULTURE: NO GROWTH
CULTURE: NORMAL
GFR AFRICAN AMERICAN: 15 ML/MIN
GFR NON-AFRICAN AMERICAN: 12 ML/MIN
GFR SERPL CREATININE-BSD FRML MDRD: ABNORMAL ML/MIN/{1.73_M2}
GFR SERPL CREATININE-BSD FRML MDRD: ABNORMAL ML/MIN/{1.73_M2}
GLUCOSE BLD-MCNC: 140 MG/DL (ref 75–110)
GLUCOSE BLD-MCNC: 164 MG/DL (ref 75–110)
GLUCOSE BLD-MCNC: 166 MG/DL (ref 75–110)
GLUCOSE BLD-MCNC: 183 MG/DL (ref 70–99)
GLUCOSE BLD-MCNC: 188 MG/DL (ref 75–110)
GLUCOSE BLD-MCNC: 194 MG/DL (ref 75–110)
HCT VFR BLD CALC: 19.4 % (ref 41–53)
HCT VFR BLD CALC: 21.5 % (ref 41–53)
HCT VFR BLD CALC: 23.2 % (ref 41–53)
HCT VFR BLD CALC: 23.6 % (ref 41–53)
HEMOGLOBIN: 6.4 G/DL (ref 13.5–17.5)
HEMOGLOBIN: 7.1 G/DL (ref 13.5–17.5)
HEMOGLOBIN: 7.6 G/DL (ref 13.5–17.5)
HEMOGLOBIN: 7.9 G/DL (ref 13.5–17.5)
INR BLD: 1.2
Lab: NORMAL
MCH RBC QN AUTO: 29.5 PG (ref 26–34)
MCH RBC QN AUTO: 29.6 PG (ref 26–34)
MCHC RBC AUTO-ENTMCNC: 32.8 G/DL (ref 31–37)
MCHC RBC AUTO-ENTMCNC: 32.9 G/DL (ref 31–37)
MCV RBC AUTO: 89.6 FL (ref 80–100)
MCV RBC AUTO: 90.5 FL (ref 80–100)
PDW BLD-RTO: 16.4 % (ref 12.5–15.4)
PDW BLD-RTO: 16.9 % (ref 12.5–15.4)
PLATELET # BLD: 254 K/UL (ref 140–450)
PLATELET # BLD: 91 K/UL (ref 140–450)
PMV BLD AUTO: 7 FL (ref 6–12)
PMV BLD AUTO: 7.2 FL (ref 6–12)
POTASSIUM SERPL-SCNC: 4.6 MMOL/L (ref 3.7–5.3)
PROTHROMBIN TIME: 12.8 SEC (ref 9.4–12.6)
RBC # BLD: 2.38 M/UL (ref 4.5–5.9)
RBC # BLD: 2.59 M/UL (ref 4.5–5.9)
SODIUM BLD-SCNC: 138 MMOL/L (ref 135–144)
SPECIMEN DESCRIPTION: NORMAL
STATUS: NORMAL
WBC # BLD: 10.2 K/UL (ref 3.5–11)
WBC # BLD: 10.9 K/UL (ref 3.5–11)

## 2017-03-06 PROCEDURE — 51798 US URINE CAPACITY MEASURE: CPT

## 2017-03-06 PROCEDURE — 80048 BASIC METABOLIC PNL TOTAL CA: CPT

## 2017-03-06 PROCEDURE — 82947 ASSAY GLUCOSE BLOOD QUANT: CPT | Performed by: INTERNAL MEDICINE

## 2017-03-06 PROCEDURE — 6370000000 HC RX 637 (ALT 250 FOR IP): Performed by: INTERNAL MEDICINE

## 2017-03-06 PROCEDURE — 2580000003 HC RX 258: Performed by: INTERNAL MEDICINE

## 2017-03-06 PROCEDURE — 85610 PROTHROMBIN TIME: CPT

## 2017-03-06 PROCEDURE — 85027 COMPLETE CBC AUTOMATED: CPT

## 2017-03-06 PROCEDURE — 99232 SBSQ HOSP IP/OBS MODERATE 35: CPT | Performed by: NURSE PRACTITIONER

## 2017-03-06 PROCEDURE — 97162 PT EVAL MOD COMPLEX 30 MIN: CPT

## 2017-03-06 PROCEDURE — P9016 RBC LEUKOCYTES REDUCED: HCPCS

## 2017-03-06 PROCEDURE — 36430 TRANSFUSION BLD/BLD COMPNT: CPT

## 2017-03-06 PROCEDURE — 36415 COLL VENOUS BLD VENIPUNCTURE: CPT

## 2017-03-06 PROCEDURE — 99233 SBSQ HOSP IP/OBS HIGH 50: CPT | Performed by: INTERNAL MEDICINE

## 2017-03-06 PROCEDURE — 6360000002 HC RX W HCPCS: Performed by: INTERNAL MEDICINE

## 2017-03-06 PROCEDURE — 85014 HEMATOCRIT: CPT

## 2017-03-06 PROCEDURE — 90937 HEMODIALYSIS REPEATED EVAL: CPT

## 2017-03-06 PROCEDURE — 99211 OFF/OP EST MAY X REQ PHY/QHP: CPT

## 2017-03-06 PROCEDURE — 97535 SELF CARE MNGMENT TRAINING: CPT | Performed by: OCCUPATIONAL THERAPIST

## 2017-03-06 PROCEDURE — G8988 SELF CARE GOAL STATUS: HCPCS | Performed by: OCCUPATIONAL THERAPIST

## 2017-03-06 PROCEDURE — G8987 SELF CARE CURRENT STATUS: HCPCS | Performed by: OCCUPATIONAL THERAPIST

## 2017-03-06 PROCEDURE — 6370000000 HC RX 637 (ALT 250 FOR IP): Performed by: HOSPITALIST

## 2017-03-06 PROCEDURE — G8978 MOBILITY CURRENT STATUS: HCPCS

## 2017-03-06 PROCEDURE — C9113 INJ PANTOPRAZOLE SODIUM, VIA: HCPCS | Performed by: INTERNAL MEDICINE

## 2017-03-06 PROCEDURE — 2060000000 HC ICU INTERMEDIATE R&B

## 2017-03-06 PROCEDURE — 6370000000 HC RX 637 (ALT 250 FOR IP): Performed by: NURSE PRACTITIONER

## 2017-03-06 PROCEDURE — 85018 HEMOGLOBIN: CPT

## 2017-03-06 PROCEDURE — 86900 BLOOD TYPING SEROLOGIC ABO: CPT

## 2017-03-06 PROCEDURE — 97110 THERAPEUTIC EXERCISES: CPT

## 2017-03-06 PROCEDURE — 90935 HEMODIALYSIS ONE EVALUATION: CPT

## 2017-03-06 PROCEDURE — 6370000000 HC RX 637 (ALT 250 FOR IP): Performed by: FAMILY MEDICINE

## 2017-03-06 PROCEDURE — 97167 OT EVAL HIGH COMPLEX 60 MIN: CPT | Performed by: OCCUPATIONAL THERAPIST

## 2017-03-06 RX ORDER — POLYETHYLENE GLYCOL 3350 17 G/17G
238 POWDER ORAL ONCE
Status: COMPLETED | OUTPATIENT
Start: 2017-03-06 | End: 2017-03-06

## 2017-03-06 RX ORDER — DEXTROSE MONOHYDRATE 50 MG/ML
100 INJECTION, SOLUTION INTRAVENOUS PRN
Status: DISCONTINUED | OUTPATIENT
Start: 2017-03-06 | End: 2017-03-10 | Stop reason: HOSPADM

## 2017-03-06 RX ORDER — DEXTROSE MONOHYDRATE 25 G/50ML
12.5 INJECTION, SOLUTION INTRAVENOUS PRN
Status: DISCONTINUED | OUTPATIENT
Start: 2017-03-06 | End: 2017-03-10 | Stop reason: HOSPADM

## 2017-03-06 RX ORDER — 0.9 % SODIUM CHLORIDE 0.9 %
250 INTRAVENOUS SOLUTION INTRAVENOUS ONCE
Status: DISCONTINUED | OUTPATIENT
Start: 2017-03-06 | End: 2017-03-07

## 2017-03-06 RX ORDER — NICOTINE POLACRILEX 4 MG
15 LOZENGE BUCCAL PRN
Status: DISCONTINUED | OUTPATIENT
Start: 2017-03-06 | End: 2017-03-10 | Stop reason: HOSPADM

## 2017-03-06 RX ADMIN — POLYETHYLENE GLYCOL 3350 238 G: 17 POWDER, FOR SOLUTION ORAL at 11:34

## 2017-03-06 RX ADMIN — INSULIN LISPRO 1 UNITS: 100 INJECTION, SOLUTION INTRAVENOUS; SUBCUTANEOUS at 21:45

## 2017-03-06 RX ADMIN — ISOSORBIDE MONONITRATE 30 MG: 30 TABLET ORAL at 09:00

## 2017-03-06 RX ADMIN — OXYCODONE HYDROCHLORIDE AND ACETAMINOPHEN 1 TABLET: 5; 325 TABLET ORAL at 17:19

## 2017-03-06 RX ADMIN — QUETIAPINE FUMARATE 25 MG: 25 TABLET ORAL at 20:31

## 2017-03-06 RX ADMIN — PANTOPRAZOLE SODIUM 40 MG: 40 INJECTION, POWDER, FOR SOLUTION INTRAVENOUS at 09:00

## 2017-03-06 RX ADMIN — VENLAFAXINE 37.5 MG: 37.5 TABLET ORAL at 09:03

## 2017-03-06 RX ADMIN — VITAMIN D, TAB 1000IU (100/BT) 1000 UNITS: 25 TAB at 08:59

## 2017-03-06 RX ADMIN — Medication 10 ML: at 09:04

## 2017-03-06 RX ADMIN — MORPHINE SULFATE 4 MG: 4 INJECTION, SOLUTION INTRAMUSCULAR; INTRAVENOUS at 04:30

## 2017-03-06 RX ADMIN — Medication 100 MG: at 18:57

## 2017-03-06 RX ADMIN — INSULIN LISPRO 1 UNITS: 100 INJECTION, SOLUTION INTRAVENOUS; SUBCUTANEOUS at 17:17

## 2017-03-06 RX ADMIN — MORPHINE SULFATE 4 MG: 4 INJECTION, SOLUTION INTRAMUSCULAR; INTRAVENOUS at 09:00

## 2017-03-06 RX ADMIN — LEVOTHYROXINE SODIUM 50 MCG: 50 TABLET ORAL at 09:00

## 2017-03-06 RX ADMIN — Medication 10 ML: at 09:00

## 2017-03-06 RX ADMIN — MORPHINE SULFATE 2 MG: 2 INJECTION, SOLUTION INTRAMUSCULAR; INTRAVENOUS at 14:32

## 2017-03-06 RX ADMIN — NIACIN 500 MG: 500 TABLET, FILM COATED, EXTENDED RELEASE ORAL at 20:31

## 2017-03-06 RX ADMIN — HEPARIN SODIUM 1600 UNITS: 1000 INJECTION, SOLUTION INTRAVENOUS; SUBCUTANEOUS at 18:38

## 2017-03-06 RX ADMIN — BENZONATATE 100 MG: 100 CAPSULE ORAL at 20:31

## 2017-03-06 RX ADMIN — OXYCODONE HYDROCHLORIDE AND ACETAMINOPHEN 1 TABLET: 5; 325 TABLET ORAL at 12:39

## 2017-03-06 RX ADMIN — Medication 10 ML: at 19:35

## 2017-03-06 RX ADMIN — LAMOTRIGINE 100 MG: 100 TABLET ORAL at 09:00

## 2017-03-06 RX ADMIN — INSULIN LISPRO 1 UNITS: 100 INJECTION, SOLUTION INTRAVENOUS; SUBCUTANEOUS at 10:31

## 2017-03-06 RX ADMIN — POLYETHYLENE GLYCOL 3350, SODIUM SULFATE ANHYDROUS, SODIUM BICARBONATE, SODIUM CHLORIDE, POTASSIUM CHLORIDE 2000 ML: 236; 22.74; 6.74; 5.86; 2.97 POWDER, FOR SOLUTION ORAL at 17:07

## 2017-03-06 RX ADMIN — MORPHINE SULFATE 2 MG: 2 INJECTION, SOLUTION INTRAMUSCULAR; INTRAVENOUS at 23:21

## 2017-03-06 RX ADMIN — QUETIAPINE FUMARATE 25 MG: 25 TABLET ORAL at 09:00

## 2017-03-06 RX ADMIN — VENLAFAXINE 75 MG: 75 TABLET ORAL at 09:02

## 2017-03-06 RX ADMIN — Medication 10 ML: at 14:33

## 2017-03-06 RX ADMIN — ATORVASTATIN CALCIUM 20 MG: 20 TABLET, FILM COATED ORAL at 20:31

## 2017-03-06 ASSESSMENT — PAIN SCALES - PAIN ASSESSMENT IN ADVANCED DEMENTIA (PAINAD)
BODYLANGUAGE: 0
BREATHING: 0
FACIALEXPRESSION: 1
CONSOLABILITY: 1
CONSOLABILITY: 1
FACIALEXPRESSION: 1
TOTALSCORE: 2
CONSOLABILITY: 1
TOTALSCORE: 3
BREATHING: 0
FACIALEXPRESSION: 1
NEGVOCALIZATION: 1
NEGVOCALIZATION: 1
BODYLANGUAGE: 0
FACIALEXPRESSION: 0
BODYLANGUAGE: 0
NEGVOCALIZATION: 1
BREATHING: 0
FACIALEXPRESSION: 0
BODYLANGUAGE: 0
TOTALSCORE: 3
BODYLANGUAGE: 1
FACIALEXPRESSION: 1
NEGVOCALIZATION: 1
BREATHING: 0
BREATHING: 0
NEGVOCALIZATION: 1
NEGVOCALIZATION: 1
BREATHING: 0
TOTALSCORE: 2
CONSOLABILITY: 0
CONSOLABILITY: 1
BODYLANGUAGE: 1
TOTALSCORE: 3
CONSOLABILITY: 0
TOTALSCORE: 3

## 2017-03-06 ASSESSMENT — PAIN SCALES - GENERAL
PAINLEVEL_OUTOF10: 8
PAINLEVEL_OUTOF10: 7
PAINLEVEL_OUTOF10: 0
PAINLEVEL_OUTOF10: 10
PAINLEVEL_OUTOF10: 10

## 2017-03-06 ASSESSMENT — PAIN DESCRIPTION - DESCRIPTORS
DESCRIPTORS: SHARP
DESCRIPTORS: SHARP;ACHING;DISCOMFORT
DESCRIPTORS: SHARP
DESCRIPTORS: DISCOMFORT;SHARP;ACHING

## 2017-03-06 ASSESSMENT — PAIN DESCRIPTION - PROGRESSION
CLINICAL_PROGRESSION: NOT CHANGED

## 2017-03-06 ASSESSMENT — PAIN DESCRIPTION - PAIN TYPE
TYPE: ACUTE PAIN
TYPE: ACUTE PAIN
TYPE: CHRONIC PAIN
TYPE: CHRONIC PAIN
TYPE: ACUTE PAIN
TYPE: ACUTE PAIN

## 2017-03-06 ASSESSMENT — PAIN DESCRIPTION - ORIENTATION
ORIENTATION: RIGHT

## 2017-03-06 ASSESSMENT — PAIN DESCRIPTION - LOCATION
LOCATION: KNEE;LEG;FOOT
LOCATION: LEG
LOCATION: KNEE
LOCATION: LEG
LOCATION: LEG
LOCATION: KNEE;LEG;FOOT

## 2017-03-06 ASSESSMENT — PAIN DESCRIPTION - ONSET
ONSET: ON-GOING
ONSET: ON-GOING

## 2017-03-06 ASSESSMENT — PAIN DESCRIPTION - FREQUENCY
FREQUENCY: INTERMITTENT

## 2017-03-06 ASSESSMENT — PAIN SCALES - WONG BAKER: WONGBAKER_NUMERICALRESPONSE: 8

## 2017-03-07 LAB
ANION GAP SERPL CALCULATED.3IONS-SCNC: 17 MMOL/L (ref 9–17)
BUN BLDV-MCNC: 34 MG/DL (ref 8–23)
BUN/CREAT BLD: ABNORMAL (ref 9–20)
C-REACTIVE PROTEIN: 91.3 MG/L (ref 0–5)
CALCIUM SERPL-MCNC: 8.1 MG/DL (ref 8.6–10.4)
CHLORIDE BLD-SCNC: 99 MMOL/L (ref 98–107)
CO2: 25 MMOL/L (ref 20–31)
CREAT SERPL-MCNC: 3.37 MG/DL (ref 0.7–1.2)
GFR AFRICAN AMERICAN: 22 ML/MIN
GFR NON-AFRICAN AMERICAN: 18 ML/MIN
GFR SERPL CREATININE-BSD FRML MDRD: ABNORMAL ML/MIN/{1.73_M2}
GFR SERPL CREATININE-BSD FRML MDRD: ABNORMAL ML/MIN/{1.73_M2}
GLUCOSE BLD-MCNC: 120 MG/DL (ref 75–110)
GLUCOSE BLD-MCNC: 127 MG/DL (ref 75–110)
GLUCOSE BLD-MCNC: 143 MG/DL (ref 70–99)
GLUCOSE BLD-MCNC: 178 MG/DL (ref 75–110)
GLUCOSE BLD-MCNC: 213 MG/DL (ref 75–110)
HCT VFR BLD CALC: 20 % (ref 41–53)
HCT VFR BLD CALC: 20.2 % (ref 41–53)
HCT VFR BLD CALC: 20.8 % (ref 41–53)
HCT VFR BLD CALC: 21.1 % (ref 41–53)
HCT VFR BLD CALC: 22.7 % (ref 41–53)
HEMOGLOBIN: 6.6 G/DL (ref 13.5–17.5)
HEMOGLOBIN: 7 G/DL (ref 13.5–17.5)
HEMOGLOBIN: 7.1 G/DL (ref 13.5–17.5)
HEMOGLOBIN: 7.1 G/DL (ref 13.5–17.5)
HEMOGLOBIN: 7.7 G/DL (ref 13.5–17.5)
MCH RBC QN AUTO: 29.7 PG (ref 26–34)
MCHC RBC AUTO-ENTMCNC: 33.6 G/DL (ref 31–37)
MCV RBC AUTO: 88.3 FL (ref 80–100)
PDW BLD-RTO: 16.3 % (ref 12.5–15.4)
PLATELET # BLD: 237 K/UL (ref 140–450)
PMV BLD AUTO: 6.6 FL (ref 6–12)
POTASSIUM SERPL-SCNC: 4 MMOL/L (ref 3.7–5.3)
RBC # BLD: 2.57 M/UL (ref 4.5–5.9)
SEDIMENTATION RATE, ERYTHROCYTE: 95 MM (ref 0–10)
SODIUM BLD-SCNC: 141 MMOL/L (ref 135–144)
URIC ACID: 5 MG/DL (ref 3.4–7)
WBC # BLD: 9 K/UL (ref 3.5–11)

## 2017-03-07 PROCEDURE — 2580000003 HC RX 258: Performed by: INTERNAL MEDICINE

## 2017-03-07 PROCEDURE — 99232 SBSQ HOSP IP/OBS MODERATE 35: CPT | Performed by: INTERNAL MEDICINE

## 2017-03-07 PROCEDURE — 86900 BLOOD TYPING SEROLOGIC ABO: CPT

## 2017-03-07 PROCEDURE — P9016 RBC LEUKOCYTES REDUCED: HCPCS

## 2017-03-07 PROCEDURE — 6370000000 HC RX 637 (ALT 250 FOR IP): Performed by: INTERNAL MEDICINE

## 2017-03-07 PROCEDURE — 85018 HEMOGLOBIN: CPT

## 2017-03-07 PROCEDURE — 99233 SBSQ HOSP IP/OBS HIGH 50: CPT | Performed by: INTERNAL MEDICINE

## 2017-03-07 PROCEDURE — 84550 ASSAY OF BLOOD/URIC ACID: CPT

## 2017-03-07 PROCEDURE — 85014 HEMATOCRIT: CPT

## 2017-03-07 PROCEDURE — 36430 TRANSFUSION BLD/BLD COMPNT: CPT

## 2017-03-07 PROCEDURE — 36415 COLL VENOUS BLD VENIPUNCTURE: CPT

## 2017-03-07 PROCEDURE — 86140 C-REACTIVE PROTEIN: CPT

## 2017-03-07 PROCEDURE — 6370000000 HC RX 637 (ALT 250 FOR IP): Performed by: NURSE PRACTITIONER

## 2017-03-07 PROCEDURE — 6370000000 HC RX 637 (ALT 250 FOR IP): Performed by: FAMILY MEDICINE

## 2017-03-07 PROCEDURE — 97110 THERAPEUTIC EXERCISES: CPT

## 2017-03-07 PROCEDURE — 85027 COMPLETE CBC AUTOMATED: CPT

## 2017-03-07 PROCEDURE — 1200000000 HC SEMI PRIVATE

## 2017-03-07 PROCEDURE — 6370000000 HC RX 637 (ALT 250 FOR IP): Performed by: HOSPITALIST

## 2017-03-07 PROCEDURE — 82947 ASSAY GLUCOSE BLOOD QUANT: CPT

## 2017-03-07 PROCEDURE — 0DJD8ZZ INSPECTION OF LOWER INTESTINAL TRACT, VIA NATURAL OR ARTIFICIAL OPENING ENDOSCOPIC: ICD-10-PCS | Performed by: INTERNAL MEDICINE

## 2017-03-07 PROCEDURE — 6360000002 HC RX W HCPCS: Performed by: INTERNAL MEDICINE

## 2017-03-07 PROCEDURE — 80048 BASIC METABOLIC PNL TOTAL CA: CPT

## 2017-03-07 PROCEDURE — 6360000002 HC RX W HCPCS

## 2017-03-07 PROCEDURE — 2580000003 HC RX 258: Performed by: EMERGENCY MEDICINE

## 2017-03-07 PROCEDURE — 85651 RBC SED RATE NONAUTOMATED: CPT

## 2017-03-07 PROCEDURE — P9040 RBC LEUKOREDUCED IRRADIATED: HCPCS

## 2017-03-07 PROCEDURE — C9113 INJ PANTOPRAZOLE SODIUM, VIA: HCPCS | Performed by: INTERNAL MEDICINE

## 2017-03-07 RX ORDER — 0.9 % SODIUM CHLORIDE 0.9 %
250 INTRAVENOUS SOLUTION INTRAVENOUS ONCE
Status: COMPLETED | OUTPATIENT
Start: 2017-03-07 | End: 2017-03-08

## 2017-03-07 RX ORDER — PANTOPRAZOLE SODIUM 40 MG/1
40 TABLET, DELAYED RELEASE ORAL
Status: DISCONTINUED | OUTPATIENT
Start: 2017-03-08 | End: 2017-03-10 | Stop reason: HOSPADM

## 2017-03-07 RX ORDER — POLYETHYLENE GLYCOL 3350 17 G/17G
17 POWDER, FOR SOLUTION ORAL DAILY
Status: DISCONTINUED | OUTPATIENT
Start: 2017-03-08 | End: 2017-03-10 | Stop reason: HOSPADM

## 2017-03-07 RX ORDER — MIDAZOLAM HYDROCHLORIDE 1 MG/ML
INJECTION INTRAMUSCULAR; INTRAVENOUS
Status: COMPLETED
Start: 2017-03-07 | End: 2017-03-07

## 2017-03-07 RX ORDER — SODIUM CHLORIDE 9 MG/ML
INJECTION, SOLUTION INTRAVENOUS CONTINUOUS
Status: DISCONTINUED | OUTPATIENT
Start: 2017-03-07 | End: 2017-03-10 | Stop reason: HOSPADM

## 2017-03-07 RX ORDER — SODIUM CHLORIDE 9 MG/ML
INJECTION, SOLUTION INTRAVENOUS CONTINUOUS
Status: DISCONTINUED | OUTPATIENT
Start: 2017-03-07 | End: 2017-03-07

## 2017-03-07 RX ORDER — HYDROCORTISONE ACETATE 25 MG/1
25 SUPPOSITORY RECTAL DAILY
Status: DISCONTINUED | OUTPATIENT
Start: 2017-03-08 | End: 2017-03-10 | Stop reason: HOSPADM

## 2017-03-07 RX ORDER — FENTANYL CITRATE 50 UG/ML
INJECTION, SOLUTION INTRAMUSCULAR; INTRAVENOUS
Status: COMPLETED
Start: 2017-03-07 | End: 2017-03-07

## 2017-03-07 RX ORDER — 0.9 % SODIUM CHLORIDE 0.9 %
250 INTRAVENOUS SOLUTION INTRAVENOUS ONCE
Status: COMPLETED | OUTPATIENT
Start: 2017-03-07 | End: 2017-03-07

## 2017-03-07 RX ADMIN — POLYETHYLENE GLYCOL 3350, SODIUM SULFATE ANHYDROUS, SODIUM BICARBONATE, SODIUM CHLORIDE, POTASSIUM CHLORIDE 2000 ML: 236; 22.74; 6.74; 5.86; 2.97 POWDER, FOR SOLUTION ORAL at 01:57

## 2017-03-07 RX ADMIN — OXYCODONE HYDROCHLORIDE AND ACETAMINOPHEN 1 TABLET: 5; 325 TABLET ORAL at 15:03

## 2017-03-07 RX ADMIN — NIACIN 500 MG: 500 TABLET, FILM COATED, EXTENDED RELEASE ORAL at 23:11

## 2017-03-07 RX ADMIN — SODIUM CHLORIDE 50 ML: 9 INJECTION, SOLUTION INTRAVENOUS at 03:28

## 2017-03-07 RX ADMIN — ATORVASTATIN CALCIUM 20 MG: 20 TABLET, FILM COATED ORAL at 23:11

## 2017-03-07 RX ADMIN — FENTANYL CITRATE 100 MCG: 50 INJECTION, SOLUTION INTRAMUSCULAR; INTRAVENOUS at 11:15

## 2017-03-07 RX ADMIN — SODIUM CHLORIDE 250 ML: 9 INJECTION, SOLUTION INTRAVENOUS at 13:23

## 2017-03-07 RX ADMIN — VITAMIN D, TAB 1000IU (100/BT) 1000 UNITS: 25 TAB at 11:25

## 2017-03-07 RX ADMIN — QUETIAPINE FUMARATE 25 MG: 25 TABLET ORAL at 11:25

## 2017-03-07 RX ADMIN — Medication 10 ML: at 04:44

## 2017-03-07 RX ADMIN — Medication 10 ML: at 09:00

## 2017-03-07 RX ADMIN — QUETIAPINE FUMARATE 25 MG: 25 TABLET ORAL at 23:11

## 2017-03-07 RX ADMIN — VENLAFAXINE 37.5 MG: 37.5 TABLET ORAL at 11:25

## 2017-03-07 RX ADMIN — INSULIN LISPRO 1 UNITS: 100 INJECTION, SOLUTION INTRAVENOUS; SUBCUTANEOUS at 23:11

## 2017-03-07 RX ADMIN — SODIUM CHLORIDE: 9 INJECTION, SOLUTION INTRAVENOUS at 11:14

## 2017-03-07 RX ADMIN — MORPHINE SULFATE 4 MG: 4 INJECTION, SOLUTION INTRAMUSCULAR; INTRAVENOUS at 04:42

## 2017-03-07 RX ADMIN — MIDAZOLAM HYDROCHLORIDE 2 MG: 1 INJECTION, SOLUTION INTRAMUSCULAR; INTRAVENOUS at 10:00

## 2017-03-07 RX ADMIN — Medication 10 ML: at 04:45

## 2017-03-07 RX ADMIN — VENLAFAXINE 75 MG: 75 TABLET ORAL at 11:25

## 2017-03-07 RX ADMIN — Medication 10 ML: at 11:14

## 2017-03-07 RX ADMIN — PANTOPRAZOLE SODIUM 40 MG: 40 INJECTION, POWDER, FOR SOLUTION INTRAVENOUS at 11:14

## 2017-03-07 RX ADMIN — LAMOTRIGINE 100 MG: 100 TABLET ORAL at 11:25

## 2017-03-07 ASSESSMENT — PAIN SCALES - PAIN ASSESSMENT IN ADVANCED DEMENTIA (PAINAD)
CONSOLABILITY: 1
TOTALSCORE: 4
CONSOLABILITY: 1
BREATHING: 0
NEGVOCALIZATION: 2
TOTALSCORE: 4
NEGVOCALIZATION: 2
BODYLANGUAGE: 0
BODYLANGUAGE: 0
FACIALEXPRESSION: 1
BODYLANGUAGE: 0
FACIALEXPRESSION: 1
FACIALEXPRESSION: 1
NEGVOCALIZATION: 2
CONSOLABILITY: 1
TOTALSCORE: 4
CONSOLABILITY: 1
TOTALSCORE: 4
BODYLANGUAGE: 0
CONSOLABILITY: 1
NEGVOCALIZATION: 2
FACIALEXPRESSION: 1
BODYLANGUAGE: 0
CONSOLABILITY: 1
BODYLANGUAGE: 0
NEGVOCALIZATION: 2
BREATHING: 0
BREATHING: 0
FACIALEXPRESSION: 1
FACIALEXPRESSION: 1
BREATHING: 0
NEGVOCALIZATION: 2
BODYLANGUAGE: 0
TOTALSCORE: 4
FACIALEXPRESSION: 1
TOTALSCORE: 4
BREATHING: 0
CONSOLABILITY: 1
TOTALSCORE: 4
NEGVOCALIZATION: 2
BREATHING: 0
BODYLANGUAGE: 0
BODYLANGUAGE: 0
FACIALEXPRESSION: 1
BODYLANGUAGE: 0
BODYLANGUAGE: 0
BREATHING: 0
TOTALSCORE: 3
TOTALSCORE: 4
BODYLANGUAGE: 0
NEGVOCALIZATION: 2
CONSOLABILITY: 1
BODYLANGUAGE: 0
NEGVOCALIZATION: 1
NEGVOCALIZATION: 1
NEGVOCALIZATION: 2
NEGVOCALIZATION: 1
CONSOLABILITY: 1
BREATHING: 0
TOTALSCORE: 4
CONSOLABILITY: 1
TOTALSCORE: 3
BREATHING: 0
TOTALSCORE: 4
FACIALEXPRESSION: 1
CONSOLABILITY: 1
NEGVOCALIZATION: 2
NEGVOCALIZATION: 2
BODYLANGUAGE: 0
TOTALSCORE: 4
TOTALSCORE: 4
CONSOLABILITY: 1
BREATHING: 0
FACIALEXPRESSION: 1
TOTALSCORE: 4
FACIALEXPRESSION: 1
BODYLANGUAGE: 0
BREATHING: 0
BREATHING: 0
CONSOLABILITY: 1
NEGVOCALIZATION: 1
FACIALEXPRESSION: 1
CONSOLABILITY: 1
NEGVOCALIZATION: 2
BREATHING: 0
TOTALSCORE: 4
FACIALEXPRESSION: 1
FACIALEXPRESSION: 1
BODYLANGUAGE: 0
TOTALSCORE: 3
NEGVOCALIZATION: 2
BREATHING: 0
FACIALEXPRESSION: 1
TOTALSCORE: 4
BODYLANGUAGE: 0
NEGVOCALIZATION: 2
FACIALEXPRESSION: 1
BREATHING: 0
CONSOLABILITY: 1
BREATHING: 0
FACIALEXPRESSION: 1
BREATHING: 0
FACIALEXPRESSION: 1
BREATHING: 0
BODYLANGUAGE: 0
CONSOLABILITY: 1
FACIALEXPRESSION: 1
CONSOLABILITY: 1
TOTALSCORE: 4
BREATHING: 0
NEGVOCALIZATION: 2
CONSOLABILITY: 1
BODYLANGUAGE: 0
TOTALSCORE: 3
NEGVOCALIZATION: 2
TOTALSCORE: 4
FACIALEXPRESSION: 1
BODYLANGUAGE: 0
FACIALEXPRESSION: 1
CONSOLABILITY: 1
BREATHING: 0
BREATHING: 0
FACIALEXPRESSION: 1
CONSOLABILITY: 1
BREATHING: 0
TOTALSCORE: 4

## 2017-03-07 ASSESSMENT — PAIN SCALES - GENERAL
PAINLEVEL_OUTOF10: 6
PAINLEVEL_OUTOF10: 10
PAINLEVEL_OUTOF10: 0

## 2017-03-07 ASSESSMENT — PAIN DESCRIPTION - PROGRESSION
CLINICAL_PROGRESSION: NOT CHANGED
CLINICAL_PROGRESSION: NOT CHANGED

## 2017-03-07 ASSESSMENT — PAIN SCALES - WONG BAKER
WONGBAKER_NUMERICALRESPONSE: 8

## 2017-03-07 ASSESSMENT — PAIN DESCRIPTION - PAIN TYPE
TYPE: ACUTE PAIN
TYPE: ACUTE PAIN

## 2017-03-07 ASSESSMENT — PAIN DESCRIPTION - DESCRIPTORS
DESCRIPTORS: ACHING;DISCOMFORT
DESCRIPTORS: SHARP;ACHING;DISCOMFORT

## 2017-03-07 ASSESSMENT — PAIN DESCRIPTION - LOCATION
LOCATION: KNEE;LEG;FOOT
LOCATION: KNEE

## 2017-03-07 ASSESSMENT — PAIN DESCRIPTION - ORIENTATION
ORIENTATION: RIGHT
ORIENTATION: RIGHT

## 2017-03-07 ASSESSMENT — PAIN DESCRIPTION - FREQUENCY: FREQUENCY: INTERMITTENT

## 2017-03-08 ENCOUNTER — HOSPITAL ENCOUNTER (INPATIENT)
Dept: DIALYSIS | Age: 67
Discharge: HOME | DRG: 291 | End: 2017-03-08
Payer: MEDICARE

## 2017-03-08 LAB
ABO/RH: NORMAL
ANION GAP SERPL CALCULATED.3IONS-SCNC: 17 MMOL/L (ref 9–17)
ANTIBODY SCREEN: NEGATIVE
ARM BAND NUMBER: NORMAL
BLD PROD TYP BPU: NORMAL
BUN BLDV-MCNC: 40 MG/DL (ref 8–23)
BUN/CREAT BLD: ABNORMAL (ref 9–20)
CALCIUM SERPL-MCNC: 8 MG/DL (ref 8.6–10.4)
CHLORIDE BLD-SCNC: 96 MMOL/L (ref 98–107)
CO2: 23 MMOL/L (ref 20–31)
CREAT SERPL-MCNC: 4.7 MG/DL (ref 0.7–1.2)
CROSSMATCH RESULT: NORMAL
DISPENSE STATUS BLOOD BANK: NORMAL
EXPIRATION DATE: NORMAL
GFR AFRICAN AMERICAN: 15 ML/MIN
GFR NON-AFRICAN AMERICAN: 12 ML/MIN
GFR SERPL CREATININE-BSD FRML MDRD: ABNORMAL ML/MIN/{1.73_M2}
GFR SERPL CREATININE-BSD FRML MDRD: ABNORMAL ML/MIN/{1.73_M2}
GLUCOSE BLD-MCNC: 130 MG/DL (ref 70–99)
GLUCOSE BLD-MCNC: 134 MG/DL (ref 75–110)
GLUCOSE BLD-MCNC: 167 MG/DL (ref 75–110)
GLUCOSE BLD-MCNC: 237 MG/DL (ref 75–110)
GLUCOSE BLD-MCNC: 255 MG/DL (ref 75–110)
HCT VFR BLD CALC: 20.8 % (ref 41–53)
HCT VFR BLD CALC: 21.3 % (ref 41–53)
HCT VFR BLD CALC: 21.8 % (ref 41–53)
HEMOGLOBIN: 7 G/DL (ref 13.5–17.5)
HEMOGLOBIN: 7.1 G/DL (ref 13.5–17.5)
HEMOGLOBIN: 7.3 G/DL (ref 13.5–17.5)
MCH RBC QN AUTO: 29.7 PG (ref 26–34)
MCHC RBC AUTO-ENTMCNC: 33.2 G/DL (ref 31–37)
MCV RBC AUTO: 89.6 FL (ref 80–100)
PDW BLD-RTO: 16.3 % (ref 12.5–15.4)
PLATELET # BLD: 228 K/UL (ref 140–450)
PMV BLD AUTO: 7.1 FL (ref 6–12)
POTASSIUM SERPL-SCNC: 3.8 MMOL/L (ref 3.7–5.3)
RBC # BLD: 2.38 M/UL (ref 4.5–5.9)
SODIUM BLD-SCNC: 136 MMOL/L (ref 135–144)
TRANSFUSION STATUS: NORMAL
UNIT DIVISION: 0
UNIT NUMBER: NORMAL
WBC # BLD: 9.1 K/UL (ref 3.5–11)

## 2017-03-08 PROCEDURE — 90937 HEMODIALYSIS REPEATED EVAL: CPT

## 2017-03-08 PROCEDURE — 2580000003 HC RX 258: Performed by: INTERNAL MEDICINE

## 2017-03-08 PROCEDURE — 6370000000 HC RX 637 (ALT 250 FOR IP): Performed by: HOSPITALIST

## 2017-03-08 PROCEDURE — 97535 SELF CARE MNGMENT TRAINING: CPT

## 2017-03-08 PROCEDURE — 85027 COMPLETE CBC AUTOMATED: CPT

## 2017-03-08 PROCEDURE — 6360000002 HC RX W HCPCS: Performed by: INTERNAL MEDICINE

## 2017-03-08 PROCEDURE — 6370000000 HC RX 637 (ALT 250 FOR IP): Performed by: INTERNAL MEDICINE

## 2017-03-08 PROCEDURE — 36415 COLL VENOUS BLD VENIPUNCTURE: CPT

## 2017-03-08 PROCEDURE — 85018 HEMOGLOBIN: CPT

## 2017-03-08 PROCEDURE — 99233 SBSQ HOSP IP/OBS HIGH 50: CPT | Performed by: INTERNAL MEDICINE

## 2017-03-08 PROCEDURE — 6370000000 HC RX 637 (ALT 250 FOR IP): Performed by: FAMILY MEDICINE

## 2017-03-08 PROCEDURE — 1200000000 HC SEMI PRIVATE

## 2017-03-08 PROCEDURE — 80048 BASIC METABOLIC PNL TOTAL CA: CPT

## 2017-03-08 PROCEDURE — 85014 HEMATOCRIT: CPT

## 2017-03-08 PROCEDURE — 6370000000 HC RX 637 (ALT 250 FOR IP): Performed by: COLON & RECTAL SURGERY

## 2017-03-08 PROCEDURE — 6370000000 HC RX 637 (ALT 250 FOR IP): Performed by: NURSE PRACTITIONER

## 2017-03-08 PROCEDURE — 82947 ASSAY GLUCOSE BLOOD QUANT: CPT

## 2017-03-08 PROCEDURE — 99232 SBSQ HOSP IP/OBS MODERATE 35: CPT | Performed by: FAMILY MEDICINE

## 2017-03-08 RX ORDER — MIDODRINE HYDROCHLORIDE 5 MG/1
5 TABLET ORAL PRN
Status: DISCONTINUED | OUTPATIENT
Start: 2017-03-08 | End: 2017-03-10 | Stop reason: HOSPADM

## 2017-03-08 RX ADMIN — LAMOTRIGINE 100 MG: 100 TABLET ORAL at 08:23

## 2017-03-08 RX ADMIN — HEPARIN SODIUM 1600 UNITS: 1000 INJECTION, SOLUTION INTRAVENOUS; SUBCUTANEOUS at 13:10

## 2017-03-08 RX ADMIN — Medication 100 MG: at 11:19

## 2017-03-08 RX ADMIN — ATORVASTATIN CALCIUM 20 MG: 20 TABLET, FILM COATED ORAL at 20:57

## 2017-03-08 RX ADMIN — HYDROCORTISONE ACETATE 25 MG: 25 SUPPOSITORY RECTAL at 08:25

## 2017-03-08 RX ADMIN — Medication 10 ML: at 20:58

## 2017-03-08 RX ADMIN — VENLAFAXINE 75 MG: 75 TABLET ORAL at 08:26

## 2017-03-08 RX ADMIN — VITAMIN D, TAB 1000IU (100/BT) 1000 UNITS: 25 TAB at 08:23

## 2017-03-08 RX ADMIN — QUETIAPINE FUMARATE 25 MG: 25 TABLET ORAL at 08:23

## 2017-03-08 RX ADMIN — MIDODRINE HYDROCHLORIDE 5 MG: 5 TABLET ORAL at 11:00

## 2017-03-08 RX ADMIN — PANTOPRAZOLE SODIUM 40 MG: 40 TABLET, DELAYED RELEASE ORAL at 08:30

## 2017-03-08 RX ADMIN — INSULIN LISPRO 2 UNITS: 100 INJECTION, SOLUTION INTRAVENOUS; SUBCUTANEOUS at 18:19

## 2017-03-08 RX ADMIN — OXYCODONE HYDROCHLORIDE AND ACETAMINOPHEN 1 TABLET: 5; 325 TABLET ORAL at 18:26

## 2017-03-08 RX ADMIN — QUETIAPINE FUMARATE 25 MG: 25 TABLET ORAL at 20:58

## 2017-03-08 RX ADMIN — BENZONATATE 100 MG: 100 CAPSULE ORAL at 08:23

## 2017-03-08 RX ADMIN — POLYETHYLENE GLYCOL 3350 17 G: 17 POWDER, FOR SOLUTION ORAL at 08:23

## 2017-03-08 RX ADMIN — DARBEPOETIN ALFA 80 MCG: 40 INJECTION, SOLUTION INTRAVENOUS; SUBCUTANEOUS at 11:17

## 2017-03-08 RX ADMIN — Medication 10 ML: at 08:26

## 2017-03-08 RX ADMIN — VENLAFAXINE 37.5 MG: 37.5 TABLET ORAL at 08:24

## 2017-03-08 RX ADMIN — NIACIN 500 MG: 500 TABLET, FILM COATED, EXTENDED RELEASE ORAL at 20:58

## 2017-03-08 RX ADMIN — LEVOTHYROXINE SODIUM 50 MCG: 50 TABLET ORAL at 08:23

## 2017-03-08 RX ADMIN — OXYCODONE HYDROCHLORIDE AND ACETAMINOPHEN 1 TABLET: 5; 325 TABLET ORAL at 11:10

## 2017-03-08 ASSESSMENT — PAIN SCALES - PAIN ASSESSMENT IN ADVANCED DEMENTIA (PAINAD)
NEGVOCALIZATION: 2
BREATHING: 0
CONSOLABILITY: 1
TOTALSCORE: 4
CONSOLABILITY: 1
BREATHING: 0
NEGVOCALIZATION: 2
BODYLANGUAGE: 0
TOTALSCORE: 4
CONSOLABILITY: 1
NEGVOCALIZATION: 2
FACIALEXPRESSION: 1
BREATHING: 0
CONSOLABILITY: 1
FACIALEXPRESSION: 1
TOTALSCORE: 4
NEGVOCALIZATION: 2
NEGVOCALIZATION: 2
FACIALEXPRESSION: 1
CONSOLABILITY: 1
FACIALEXPRESSION: 1
FACIALEXPRESSION: 1
BODYLANGUAGE: 0
FACIALEXPRESSION: 1
BODYLANGUAGE: 0
CONSOLABILITY: 1
BODYLANGUAGE: 0
TOTALSCORE: 4
BREATHING: 0
NEGVOCALIZATION: 2
BODYLANGUAGE: 0
BODYLANGUAGE: 0
BREATHING: 0
TOTALSCORE: 4
BREATHING: 0
TOTALSCORE: 4

## 2017-03-08 ASSESSMENT — PAIN SCALES - WONG BAKER
WONGBAKER_NUMERICALRESPONSE: 8

## 2017-03-08 ASSESSMENT — PAIN SCALES - GENERAL
PAINLEVEL_OUTOF10: 10
PAINLEVEL_OUTOF10: 10
PAINLEVEL_OUTOF10: 6

## 2017-03-08 ASSESSMENT — ENCOUNTER SYMPTOMS
WHEEZING: 0
SORE THROAT: 0
SHORTNESS OF BREATH: 0
DIARRHEA: 0
ABDOMINAL PAIN: 0
COUGH: 0
BLOOD IN STOOL: 0
VOMITING: 0
SINUS PRESSURE: 0
CONSTIPATION: 0
NAUSEA: 0
VOICE CHANGE: 0

## 2017-03-09 LAB
ANION GAP SERPL CALCULATED.3IONS-SCNC: 12 MMOL/L (ref 9–17)
BLOOD BANK SPECIMEN: NORMAL
BUN BLDV-MCNC: 24 MG/DL (ref 8–23)
BUN/CREAT BLD: ABNORMAL (ref 9–20)
CALCIUM SERPL-MCNC: 8 MG/DL (ref 8.6–10.4)
CHLORIDE BLD-SCNC: 102 MMOL/L (ref 98–107)
CO2: 24 MMOL/L (ref 20–31)
CREAT SERPL-MCNC: 3.91 MG/DL (ref 0.7–1.2)
GFR AFRICAN AMERICAN: 19 ML/MIN
GFR NON-AFRICAN AMERICAN: 15 ML/MIN
GFR SERPL CREATININE-BSD FRML MDRD: ABNORMAL ML/MIN/{1.73_M2}
GFR SERPL CREATININE-BSD FRML MDRD: ABNORMAL ML/MIN/{1.73_M2}
GLUCOSE BLD-MCNC: 138 MG/DL (ref 75–110)
GLUCOSE BLD-MCNC: 147 MG/DL (ref 70–99)
GLUCOSE BLD-MCNC: 164 MG/DL (ref 75–110)
GLUCOSE BLD-MCNC: 234 MG/DL (ref 75–110)
GLUCOSE BLD-MCNC: 240 MG/DL (ref 75–110)
HCT VFR BLD CALC: 19.6 % (ref 41–53)
HCT VFR BLD CALC: 21.4 % (ref 41–53)
HCT VFR BLD CALC: 21.6 % (ref 41–53)
HCT VFR BLD CALC: 22.8 % (ref 41–53)
HEMOGLOBIN: 6.8 G/DL (ref 13.5–17.5)
HEMOGLOBIN: 7.1 G/DL (ref 13.5–17.5)
HEMOGLOBIN: 7.1 G/DL (ref 13.5–17.5)
HEMOGLOBIN: 7.4 G/DL (ref 13.5–17.5)
MCH RBC QN AUTO: 30 PG (ref 26–34)
MCHC RBC AUTO-ENTMCNC: 33 G/DL (ref 31–37)
MCV RBC AUTO: 90.8 FL (ref 80–100)
PDW BLD-RTO: 16.3 % (ref 12.5–15.4)
PLATELET # BLD: 212 K/UL (ref 140–450)
PMV BLD AUTO: 6.9 FL (ref 6–12)
POTASSIUM SERPL-SCNC: 4.2 MMOL/L (ref 3.7–5.3)
RBC # BLD: 2.38 M/UL (ref 4.5–5.9)
SODIUM BLD-SCNC: 138 MMOL/L (ref 135–144)
WBC # BLD: 8.3 K/UL (ref 3.5–11)

## 2017-03-09 PROCEDURE — 6370000000 HC RX 637 (ALT 250 FOR IP): Performed by: COLON & RECTAL SURGERY

## 2017-03-09 PROCEDURE — 99212 OFFICE O/P EST SF 10 MIN: CPT

## 2017-03-09 PROCEDURE — 36415 COLL VENOUS BLD VENIPUNCTURE: CPT

## 2017-03-09 PROCEDURE — 6370000000 HC RX 637 (ALT 250 FOR IP): Performed by: HOSPITALIST

## 2017-03-09 PROCEDURE — 85014 HEMATOCRIT: CPT

## 2017-03-09 PROCEDURE — 86900 BLOOD TYPING SEROLOGIC ABO: CPT

## 2017-03-09 PROCEDURE — 82947 ASSAY GLUCOSE BLOOD QUANT: CPT

## 2017-03-09 PROCEDURE — 85018 HEMOGLOBIN: CPT

## 2017-03-09 PROCEDURE — 99232 SBSQ HOSP IP/OBS MODERATE 35: CPT | Performed by: FAMILY MEDICINE

## 2017-03-09 PROCEDURE — 6370000000 HC RX 637 (ALT 250 FOR IP): Performed by: FAMILY MEDICINE

## 2017-03-09 PROCEDURE — 1200000000 HC SEMI PRIVATE

## 2017-03-09 PROCEDURE — 86920 COMPATIBILITY TEST SPIN: CPT

## 2017-03-09 PROCEDURE — 6370000000 HC RX 637 (ALT 250 FOR IP): Performed by: NURSE PRACTITIONER

## 2017-03-09 PROCEDURE — 80048 BASIC METABOLIC PNL TOTAL CA: CPT

## 2017-03-09 PROCEDURE — P9016 RBC LEUKOCYTES REDUCED: HCPCS

## 2017-03-09 PROCEDURE — 2580000003 HC RX 258: Performed by: INTERNAL MEDICINE

## 2017-03-09 PROCEDURE — 36430 TRANSFUSION BLD/BLD COMPNT: CPT

## 2017-03-09 PROCEDURE — 85027 COMPLETE CBC AUTOMATED: CPT

## 2017-03-09 PROCEDURE — 2580000003 HC RX 258: Performed by: NURSE PRACTITIONER

## 2017-03-09 PROCEDURE — 86901 BLOOD TYPING SEROLOGIC RH(D): CPT

## 2017-03-09 PROCEDURE — 6370000000 HC RX 637 (ALT 250 FOR IP): Performed by: INTERNAL MEDICINE

## 2017-03-09 PROCEDURE — 86850 RBC ANTIBODY SCREEN: CPT

## 2017-03-09 RX ORDER — POLYETHYLENE GLYCOL 3350 17 G/17G
17 POWDER, FOR SOLUTION ORAL DAILY
Qty: 527 G | Refills: 1 | DISCHARGE
Start: 2017-03-09 | End: 2017-04-08

## 2017-03-09 RX ORDER — MIDODRINE HYDROCHLORIDE 5 MG/1
5 TABLET ORAL PRN
Qty: 90 TABLET | Refills: 3 | DISCHARGE
Start: 2017-03-09 | End: 2019-05-13 | Stop reason: ALTCHOICE

## 2017-03-09 RX ORDER — HYDROCORTISONE ACETATE 25 MG/1
25 SUPPOSITORY RECTAL DAILY
Status: ON HOLD | DISCHARGE
Start: 2017-03-09 | End: 2018-09-17

## 2017-03-09 RX ORDER — FERROUS SULFATE 325(65) MG
325 TABLET ORAL
Qty: 60 TABLET | Refills: 6 | DISCHARGE
Start: 2017-03-09 | End: 2019-05-13 | Stop reason: ALTCHOICE

## 2017-03-09 RX ORDER — 0.9 % SODIUM CHLORIDE 0.9 %
250 INTRAVENOUS SOLUTION INTRAVENOUS ONCE
Status: COMPLETED | OUTPATIENT
Start: 2017-03-09 | End: 2017-03-09

## 2017-03-09 RX ADMIN — OXYCODONE HYDROCHLORIDE AND ACETAMINOPHEN 1 TABLET: 5; 325 TABLET ORAL at 01:48

## 2017-03-09 RX ADMIN — POLYETHYLENE GLYCOL 3350 17 G: 17 POWDER, FOR SOLUTION ORAL at 10:21

## 2017-03-09 RX ADMIN — Medication 10 ML: at 10:24

## 2017-03-09 RX ADMIN — LAMOTRIGINE 100 MG: 100 TABLET ORAL at 10:20

## 2017-03-09 RX ADMIN — QUETIAPINE FUMARATE 25 MG: 25 TABLET ORAL at 10:21

## 2017-03-09 RX ADMIN — OXYCODONE HYDROCHLORIDE AND ACETAMINOPHEN 1 TABLET: 5; 325 TABLET ORAL at 20:07

## 2017-03-09 RX ADMIN — INSULIN LISPRO 2 UNITS: 100 INJECTION, SOLUTION INTRAVENOUS; SUBCUTANEOUS at 14:17

## 2017-03-09 RX ADMIN — SODIUM CHLORIDE 250 ML: 0.9 INJECTION, SOLUTION INTRAVENOUS at 04:40

## 2017-03-09 RX ADMIN — HYDROCORTISONE ACETATE 25 MG: 25 SUPPOSITORY RECTAL at 10:21

## 2017-03-09 RX ADMIN — VENLAFAXINE 37.5 MG: 37.5 TABLET ORAL at 10:23

## 2017-03-09 RX ADMIN — INSULIN LISPRO 2 UNITS: 100 INJECTION, SOLUTION INTRAVENOUS; SUBCUTANEOUS at 18:23

## 2017-03-09 RX ADMIN — Medication 10 ML: at 20:12

## 2017-03-09 RX ADMIN — INSULIN LISPRO 1 UNITS: 100 INJECTION, SOLUTION INTRAVENOUS; SUBCUTANEOUS at 21:53

## 2017-03-09 RX ADMIN — NIACIN 500 MG: 500 TABLET, FILM COATED, EXTENDED RELEASE ORAL at 20:07

## 2017-03-09 RX ADMIN — PANTOPRAZOLE SODIUM 40 MG: 40 TABLET, DELAYED RELEASE ORAL at 10:23

## 2017-03-09 RX ADMIN — VITAMIN D, TAB 1000IU (100/BT) 1000 UNITS: 25 TAB at 10:22

## 2017-03-09 RX ADMIN — VENLAFAXINE 75 MG: 75 TABLET ORAL at 10:22

## 2017-03-09 RX ADMIN — ATORVASTATIN CALCIUM 20 MG: 20 TABLET, FILM COATED ORAL at 20:07

## 2017-03-09 RX ADMIN — OXYCODONE HYDROCHLORIDE AND ACETAMINOPHEN 1 TABLET: 5; 325 TABLET ORAL at 10:47

## 2017-03-09 RX ADMIN — OXYCODONE HYDROCHLORIDE AND ACETAMINOPHEN 1 TABLET: 5; 325 TABLET ORAL at 15:26

## 2017-03-09 RX ADMIN — LEVOTHYROXINE SODIUM 50 MCG: 50 TABLET ORAL at 10:21

## 2017-03-09 ASSESSMENT — PAIN SCALES - WONG BAKER
WONGBAKER_NUMERICALRESPONSE: 8

## 2017-03-09 ASSESSMENT — ENCOUNTER SYMPTOMS
SORE THROAT: 0
CONSTIPATION: 0
DIARRHEA: 0
VOMITING: 0
SINUS PRESSURE: 0
BLOOD IN STOOL: 0
VOICE CHANGE: 0
SHORTNESS OF BREATH: 0
COUGH: 0
NAUSEA: 0
ABDOMINAL PAIN: 0
WHEEZING: 0

## 2017-03-09 ASSESSMENT — PAIN SCALES - GENERAL
PAINLEVEL_OUTOF10: 8
PAINLEVEL_OUTOF10: 7
PAINLEVEL_OUTOF10: 10
PAINLEVEL_OUTOF10: 10
PAINLEVEL_OUTOF10: 8

## 2017-03-09 ASSESSMENT — PAIN DESCRIPTION - ORIENTATION
ORIENTATION: RIGHT
ORIENTATION: RIGHT

## 2017-03-09 ASSESSMENT — PAIN DESCRIPTION - PAIN TYPE
TYPE: CHRONIC PAIN
TYPE: CHRONIC PAIN

## 2017-03-09 ASSESSMENT — PAIN DESCRIPTION - LOCATION
LOCATION: LEG
LOCATION: LEG

## 2017-03-10 ENCOUNTER — HOSPITAL ENCOUNTER (INPATIENT)
Dept: DIALYSIS | Age: 67
Discharge: HOME | DRG: 291 | End: 2017-03-10
Payer: MEDICARE

## 2017-03-10 VITALS
HEART RATE: 83 BPM | HEIGHT: 72 IN | WEIGHT: 230.16 LBS | TEMPERATURE: 97.7 F | BODY MASS INDEX: 31.17 KG/M2 | OXYGEN SATURATION: 97 % | RESPIRATION RATE: 16 BRPM | DIASTOLIC BLOOD PRESSURE: 78 MMHG | SYSTOLIC BLOOD PRESSURE: 136 MMHG

## 2017-03-10 VITALS — HEART RATE: 80 BPM | SYSTOLIC BLOOD PRESSURE: 133 MMHG | DIASTOLIC BLOOD PRESSURE: 71 MMHG

## 2017-03-10 LAB
ABO/RH: NORMAL
ANION GAP SERPL CALCULATED.3IONS-SCNC: 13 MMOL/L (ref 9–17)
ANTIBODY SCREEN: NEGATIVE
ARM BAND NUMBER: NORMAL
BLD PROD TYP BPU: NORMAL
BUN BLDV-MCNC: 35 MG/DL (ref 8–23)
BUN/CREAT BLD: ABNORMAL (ref 9–20)
CALCIUM SERPL-MCNC: 8.1 MG/DL (ref 8.6–10.4)
CHLORIDE BLD-SCNC: 98 MMOL/L (ref 98–107)
CO2: 22 MMOL/L (ref 20–31)
CREAT SERPL-MCNC: 4.56 MG/DL (ref 0.7–1.2)
CROSSMATCH RESULT: NORMAL
DISPENSE STATUS BLOOD BANK: NORMAL
EXPIRATION DATE: NORMAL
GFR AFRICAN AMERICAN: 16 ML/MIN
GFR NON-AFRICAN AMERICAN: 13 ML/MIN
GFR SERPL CREATININE-BSD FRML MDRD: ABNORMAL ML/MIN/{1.73_M2}
GFR SERPL CREATININE-BSD FRML MDRD: ABNORMAL ML/MIN/{1.73_M2}
GLUCOSE BLD-MCNC: 116 MG/DL (ref 70–99)
GLUCOSE BLD-MCNC: 149 MG/DL (ref 75–110)
HCT VFR BLD CALC: 22 % (ref 41–53)
HCT VFR BLD CALC: 22.3 % (ref 41–53)
HCT VFR BLD CALC: 23.2 % (ref 41–53)
HEMOGLOBIN: 7.2 G/DL (ref 13.5–17.5)
HEMOGLOBIN: 7.4 G/DL (ref 13.5–17.5)
HEMOGLOBIN: 7.8 G/DL (ref 13.5–17.5)
MCH RBC QN AUTO: 30.2 PG (ref 26–34)
MCHC RBC AUTO-ENTMCNC: 33.2 G/DL (ref 31–37)
MCV RBC AUTO: 91 FL (ref 80–100)
PDW BLD-RTO: 16.6 % (ref 12.5–15.4)
PLATELET # BLD: 207 K/UL (ref 140–450)
PMV BLD AUTO: 6.8 FL (ref 6–12)
POTASSIUM SERPL-SCNC: 4.4 MMOL/L (ref 3.7–5.3)
RBC # BLD: 2.45 M/UL (ref 4.5–5.9)
SODIUM BLD-SCNC: 133 MMOL/L (ref 135–144)
TRANSFUSION STATUS: NORMAL
UNIT DIVISION: 0
UNIT NUMBER: NORMAL
WBC # BLD: 7.5 K/UL (ref 3.5–11)

## 2017-03-10 PROCEDURE — 85014 HEMATOCRIT: CPT

## 2017-03-10 PROCEDURE — 6360000002 HC RX W HCPCS: Performed by: FAMILY MEDICINE

## 2017-03-10 PROCEDURE — 80048 BASIC METABOLIC PNL TOTAL CA: CPT

## 2017-03-10 PROCEDURE — 99239 HOSP IP/OBS DSCHRG MGMT >30: CPT | Performed by: FAMILY MEDICINE

## 2017-03-10 PROCEDURE — 6370000000 HC RX 637 (ALT 250 FOR IP): Performed by: INTERNAL MEDICINE

## 2017-03-10 PROCEDURE — 6370000000 HC RX 637 (ALT 250 FOR IP): Performed by: FAMILY MEDICINE

## 2017-03-10 PROCEDURE — 2580000003 HC RX 258: Performed by: INTERNAL MEDICINE

## 2017-03-10 PROCEDURE — 6370000000 HC RX 637 (ALT 250 FOR IP): Performed by: COLON & RECTAL SURGERY

## 2017-03-10 PROCEDURE — 85027 COMPLETE CBC AUTOMATED: CPT

## 2017-03-10 PROCEDURE — 6360000002 HC RX W HCPCS: Performed by: INTERNAL MEDICINE

## 2017-03-10 PROCEDURE — 6370000000 HC RX 637 (ALT 250 FOR IP): Performed by: NURSE PRACTITIONER

## 2017-03-10 PROCEDURE — 85018 HEMOGLOBIN: CPT

## 2017-03-10 PROCEDURE — 2500000003 HC RX 250 WO HCPCS

## 2017-03-10 PROCEDURE — 97110 THERAPEUTIC EXERCISES: CPT

## 2017-03-10 PROCEDURE — 36415 COLL VENOUS BLD VENIPUNCTURE: CPT

## 2017-03-10 PROCEDURE — 82947 ASSAY GLUCOSE BLOOD QUANT: CPT

## 2017-03-10 PROCEDURE — 90937 HEMODIALYSIS REPEATED EVAL: CPT

## 2017-03-10 RX ORDER — OXYCODONE HYDROCHLORIDE AND ACETAMINOPHEN 5; 325 MG/1; MG/1
1 TABLET ORAL EVERY 4 HOURS PRN
Status: DISCONTINUED | OUTPATIENT
Start: 2017-03-10 | End: 2017-03-10 | Stop reason: HOSPADM

## 2017-03-10 RX ORDER — OXYCODONE HYDROCHLORIDE AND ACETAMINOPHEN 5; 325 MG/1; MG/1
1 TABLET ORAL EVERY 4 HOURS PRN
Qty: 20 TABLET | Refills: 0 | Status: SHIPPED | OUTPATIENT
Start: 2017-03-10 | End: 2017-03-17

## 2017-03-10 RX ORDER — OXYCODONE HYDROCHLORIDE AND ACETAMINOPHEN 5; 325 MG/1; MG/1
2 TABLET ORAL EVERY 4 HOURS PRN
Status: DISCONTINUED | OUTPATIENT
Start: 2017-03-10 | End: 2017-03-10 | Stop reason: HOSPADM

## 2017-03-10 RX ADMIN — Medication 10 ML: at 08:27

## 2017-03-10 RX ADMIN — VENLAFAXINE 37.5 MG: 37.5 TABLET ORAL at 16:08

## 2017-03-10 RX ADMIN — OXYCODONE HYDROCHLORIDE AND ACETAMINOPHEN 1 TABLET: 5; 325 TABLET ORAL at 00:10

## 2017-03-10 RX ADMIN — LEVOTHYROXINE SODIUM 50 MCG: 50 TABLET ORAL at 16:06

## 2017-03-10 RX ADMIN — VITAMIN D, TAB 1000IU (100/BT) 1000 UNITS: 25 TAB at 16:06

## 2017-03-10 RX ADMIN — OXYCODONE HYDROCHLORIDE AND ACETAMINOPHEN 2 TABLET: 5; 325 TABLET ORAL at 10:34

## 2017-03-10 RX ADMIN — OXYCODONE HYDROCHLORIDE AND ACETAMINOPHEN 1 TABLET: 5; 325 TABLET ORAL at 05:51

## 2017-03-10 RX ADMIN — QUETIAPINE FUMARATE 25 MG: 25 TABLET ORAL at 16:06

## 2017-03-10 RX ADMIN — HYDROCORTISONE ACETATE 25 MG: 25 SUPPOSITORY RECTAL at 16:09

## 2017-03-10 RX ADMIN — PANTOPRAZOLE SODIUM 40 MG: 40 TABLET, DELAYED RELEASE ORAL at 16:06

## 2017-03-10 RX ADMIN — QUETIAPINE FUMARATE 25 MG: 25 TABLET ORAL at 00:10

## 2017-03-10 RX ADMIN — Medication: at 16:05

## 2017-03-10 RX ADMIN — HYDROMORPHONE HYDROCHLORIDE 1 MG: 1 INJECTION, SOLUTION INTRAMUSCULAR; INTRAVENOUS; SUBCUTANEOUS at 08:25

## 2017-03-10 RX ADMIN — Medication 100 MG: at 10:29

## 2017-03-10 RX ADMIN — OXYCODONE HYDROCHLORIDE AND ACETAMINOPHEN 2 TABLET: 5; 325 TABLET ORAL at 13:19

## 2017-03-10 RX ADMIN — LAMOTRIGINE 100 MG: 100 TABLET ORAL at 16:06

## 2017-03-10 ASSESSMENT — PAIN SCALES - GENERAL
PAINLEVEL_OUTOF10: 8
PAINLEVEL_OUTOF10: 10
PAINLEVEL_OUTOF10: 9

## 2017-03-10 ASSESSMENT — ENCOUNTER SYMPTOMS
SORE THROAT: 0
SHORTNESS OF BREATH: 0
WHEEZING: 0
VOMITING: 0
COUGH: 0
ABDOMINAL PAIN: 0
BLOOD IN STOOL: 0
CONSTIPATION: 0
DIARRHEA: 0
NAUSEA: 0
SINUS PRESSURE: 0
VOICE CHANGE: 0

## 2017-03-10 ASSESSMENT — PAIN DESCRIPTION - ONSET
ONSET: ON-GOING
ONSET: PROGRESSIVE

## 2017-03-10 ASSESSMENT — PAIN DESCRIPTION - PAIN TYPE
TYPE: CHRONIC PAIN
TYPE: ACUTE PAIN;CHRONIC PAIN
TYPE: CHRONIC PAIN

## 2017-03-10 ASSESSMENT — PAIN DESCRIPTION - DESCRIPTORS
DESCRIPTORS: CONSTANT
DESCRIPTORS: CONSTANT

## 2017-03-10 ASSESSMENT — PAIN DESCRIPTION - ORIENTATION
ORIENTATION: RIGHT

## 2017-03-10 ASSESSMENT — PAIN DESCRIPTION - FREQUENCY
FREQUENCY: CONTINUOUS
FREQUENCY: CONTINUOUS

## 2017-03-10 ASSESSMENT — PAIN DESCRIPTION - LOCATION
LOCATION: KNEE
LOCATION: LEG
LOCATION: KNEE

## 2017-03-13 ENCOUNTER — HOSPITAL ENCOUNTER (OUTPATIENT)
Dept: DIALYSIS | Age: 67
Setting detail: DIALYSIS SERIES
Discharge: HOME | End: 2017-03-13
Payer: MEDICARE

## 2017-03-13 ENCOUNTER — HOSPITAL ENCOUNTER (OUTPATIENT)
Dept: INFUSION THERAPY | Facility: MEDICAL CENTER | Age: 67
Discharge: HOME | End: 2017-03-13
Payer: MEDICARE

## 2017-03-13 LAB
ANION GAP SERPL CALCULATED.3IONS-SCNC: 21 MMOL/L (ref 9–17)
BUN BLDV-MCNC: 52 MG/DL (ref 8–23)
BUN/CREAT BLD: ABNORMAL (ref 9–20)
CALCIUM SERPL-MCNC: 8.2 MG/DL (ref 8.6–10.4)
CHLORIDE BLD-SCNC: 91 MMOL/L (ref 98–107)
CO2: 21 MMOL/L (ref 20–31)
CREAT SERPL-MCNC: 4.59 MG/DL (ref 0.7–1.2)
GFR AFRICAN AMERICAN: 15 ML/MIN
GFR NON-AFRICAN AMERICAN: 13 ML/MIN
GFR SERPL CREATININE-BSD FRML MDRD: ABNORMAL ML/MIN/{1.73_M2}
GFR SERPL CREATININE-BSD FRML MDRD: ABNORMAL ML/MIN/{1.73_M2}
GLUCOSE BLD-MCNC: 139 MG/DL (ref 70–99)
HCT VFR BLD CALC: 22.5 % (ref 41–53)
HEMOGLOBIN: 7.5 G/DL (ref 13.5–17.5)
POTASSIUM SERPL-SCNC: 4.3 MMOL/L (ref 3.7–5.3)
SODIUM BLD-SCNC: 133 MMOL/L (ref 135–144)

## 2017-03-13 PROCEDURE — 2500000003 HC RX 250 WO HCPCS: Performed by: INTERNAL MEDICINE

## 2017-03-13 PROCEDURE — 90937 HEMODIALYSIS REPEATED EVAL: CPT

## 2017-03-13 PROCEDURE — 90999 UNLISTED DIALYSIS PROCEDURE: CPT

## 2017-03-13 PROCEDURE — 80048 BASIC METABOLIC PNL TOTAL CA: CPT

## 2017-03-13 PROCEDURE — 85018 HEMOGLOBIN: CPT

## 2017-03-13 PROCEDURE — 85014 HEMATOCRIT: CPT

## 2017-03-13 RX ORDER — MIDODRINE HYDROCHLORIDE 5 MG/1
5 TABLET ORAL PRN
Status: DISCONTINUED | OUTPATIENT
Start: 2017-03-13 | End: 2017-03-13

## 2017-03-13 RX ORDER — 0.9 % SODIUM CHLORIDE 0.9 %
250 INTRAVENOUS SOLUTION INTRAVENOUS PRN
Status: DISCONTINUED | OUTPATIENT
Start: 2017-03-13 | End: 2017-03-14 | Stop reason: HOSPADM

## 2017-03-13 RX ORDER — 0.9 % SODIUM CHLORIDE 0.9 %
150 INTRAVENOUS SOLUTION INTRAVENOUS PRN
Status: DISCONTINUED | OUTPATIENT
Start: 2017-03-13 | End: 2017-03-14 | Stop reason: HOSPADM

## 2017-03-13 RX ORDER — MIDODRINE HYDROCHLORIDE 5 MG/1
10 TABLET ORAL PRN
Status: DISCONTINUED | OUTPATIENT
Start: 2017-03-13 | End: 2017-03-14 | Stop reason: HOSPADM

## 2017-03-13 RX ADMIN — Medication 3 ML: at 18:08

## 2017-03-20 ENCOUNTER — HOSPITAL ENCOUNTER (INPATIENT)
Age: 67
LOS: 4 days | Discharge: NURSING, REHAB OR LONG-TERM CARE | DRG: 485 | End: 2017-03-27
Attending: EMERGENCY MEDICINE | Admitting: INTERNAL MEDICINE
Payer: MEDICARE

## 2017-03-20 DIAGNOSIS — G89.29 CHRONIC PAIN OF RIGHT KNEE: ICD-10-CM

## 2017-03-20 DIAGNOSIS — M25.461 KNEE EFFUSION, RIGHT: Primary | ICD-10-CM

## 2017-03-20 DIAGNOSIS — N17.9 AKI (ACUTE KIDNEY INJURY) (HCC): ICD-10-CM

## 2017-03-20 DIAGNOSIS — M25.561 CHRONIC PAIN OF RIGHT KNEE: ICD-10-CM

## 2017-03-20 LAB
ABSOLUTE EOS #: 0.2 K/UL (ref 0–0.4)
ABSOLUTE LYMPH #: 0.6 K/UL (ref 1–4.8)
ABSOLUTE MONO #: 0.5 K/UL (ref 0.1–1.2)
ANION GAP SERPL CALCULATED.3IONS-SCNC: 14 MMOL/L (ref 9–17)
BASOPHILS # BLD: 1 % (ref 0–2)
BASOPHILS ABSOLUTE: 0 K/UL (ref 0–0.2)
BUN BLDV-MCNC: 48 MG/DL (ref 8–23)
BUN/CREAT BLD: ABNORMAL (ref 9–20)
CALCIUM SERPL-MCNC: 8.6 MG/DL (ref 8.6–10.4)
CHLORIDE BLD-SCNC: 93 MMOL/L (ref 98–107)
CO2: 29 MMOL/L (ref 20–31)
CREAT SERPL-MCNC: 3.21 MG/DL (ref 0.7–1.2)
DIFFERENTIAL TYPE: ABNORMAL
EOSINOPHILS RELATIVE PERCENT: 3 % (ref 1–4)
GFR AFRICAN AMERICAN: 23 ML/MIN
GFR NON-AFRICAN AMERICAN: 19 ML/MIN
GFR SERPL CREATININE-BSD FRML MDRD: ABNORMAL ML/MIN/{1.73_M2}
GFR SERPL CREATININE-BSD FRML MDRD: ABNORMAL ML/MIN/{1.73_M2}
GLUCOSE BLD-MCNC: 131 MG/DL (ref 70–99)
HCT VFR BLD CALC: 22.4 % (ref 41–53)
HEMOGLOBIN: 7.3 G/DL (ref 13.5–17.5)
LYMPHOCYTES # BLD: 9 % (ref 24–44)
MCH RBC QN AUTO: 29.5 PG (ref 26–34)
MCHC RBC AUTO-ENTMCNC: 32.7 G/DL (ref 31–37)
MCV RBC AUTO: 90.3 FL (ref 80–100)
MONOCYTES # BLD: 7 % (ref 2–11)
PDW BLD-RTO: 16.8 % (ref 12.5–15.4)
PLATELET # BLD: 277 K/UL (ref 140–450)
PLATELET ESTIMATE: ABNORMAL
PMV BLD AUTO: 6.8 FL (ref 6–12)
POTASSIUM SERPL-SCNC: 3.8 MMOL/L (ref 3.7–5.3)
RBC # BLD: 2.48 M/UL (ref 4.5–5.9)
RBC # BLD: ABNORMAL 10*6/UL
SEG NEUTROPHILS: 80 % (ref 36–66)
SEGMENTED NEUTROPHILS ABSOLUTE COUNT: 5.3 K/UL (ref 1.8–7.7)
SODIUM BLD-SCNC: 136 MMOL/L (ref 135–144)
WBC # BLD: 6.6 K/UL (ref 3.5–11)
WBC # BLD: ABNORMAL 10*3/UL

## 2017-03-20 PROCEDURE — 99284 EMERGENCY DEPT VISIT MOD MDM: CPT

## 2017-03-20 PROCEDURE — 96375 TX/PRO/DX INJ NEW DRUG ADDON: CPT

## 2017-03-20 PROCEDURE — 96376 TX/PRO/DX INJ SAME DRUG ADON: CPT

## 2017-03-20 PROCEDURE — 85025 COMPLETE CBC W/AUTO DIFF WBC: CPT

## 2017-03-20 PROCEDURE — 80048 BASIC METABOLIC PNL TOTAL CA: CPT

## 2017-03-20 PROCEDURE — 96374 THER/PROPH/DIAG INJ IV PUSH: CPT

## 2017-03-20 PROCEDURE — 6360000002 HC RX W HCPCS: Performed by: EMERGENCY MEDICINE

## 2017-03-20 RX ORDER — OXYCODONE HYDROCHLORIDE AND ACETAMINOPHEN 5; 325 MG/1; MG/1
1 TABLET ORAL ONCE
Status: DISCONTINUED | OUTPATIENT
Start: 2017-03-20 | End: 2017-03-20

## 2017-03-20 RX ORDER — MORPHINE SULFATE 2 MG/ML
2 INJECTION, SOLUTION INTRAMUSCULAR; INTRAVENOUS ONCE
Status: COMPLETED | OUTPATIENT
Start: 2017-03-20 | End: 2017-03-20

## 2017-03-20 RX ADMIN — MORPHINE SULFATE 2 MG: 2 INJECTION, SOLUTION INTRAMUSCULAR; INTRAVENOUS at 23:08

## 2017-03-20 ASSESSMENT — PAIN DESCRIPTION - LOCATION: LOCATION: KNEE

## 2017-03-20 ASSESSMENT — PAIN DESCRIPTION - ORIENTATION: ORIENTATION: RIGHT

## 2017-03-20 ASSESSMENT — PAIN SCALES - GENERAL
PAINLEVEL_OUTOF10: 10
PAINLEVEL_OUTOF10: 10

## 2017-03-21 ENCOUNTER — HOSPITAL ENCOUNTER (OUTPATIENT)
Dept: DIALYSIS | Age: 67
Setting detail: OBSERVATION
Discharge: HOME | DRG: 485 | End: 2017-03-21
Payer: MEDICARE

## 2017-03-21 VITALS
HEART RATE: 97 BPM | OXYGEN SATURATION: 100 % | DIASTOLIC BLOOD PRESSURE: 72 MMHG | WEIGHT: 235.45 LBS | SYSTOLIC BLOOD PRESSURE: 130 MMHG | BODY MASS INDEX: 31.93 KG/M2 | TEMPERATURE: 97.5 F

## 2017-03-21 LAB
ABSOLUTE EOS #: 0 K/UL (ref 0–0.4)
ABSOLUTE LYMPH #: 0.6 K/UL (ref 1–4.8)
ABSOLUTE MONO #: 0.5 K/UL (ref 0.1–1.2)
ANION GAP SERPL CALCULATED.3IONS-SCNC: 17 MMOL/L (ref 9–17)
BASOPHILS # BLD: 1 % (ref 0–2)
BASOPHILS ABSOLUTE: 0 K/UL (ref 0–0.2)
BUN BLDV-MCNC: 52 MG/DL (ref 8–23)
BUN/CREAT BLD: ABNORMAL (ref 9–20)
C-REACTIVE PROTEIN: 208 MG/L (ref 0–5)
CALCIUM SERPL-MCNC: 8.7 MG/DL (ref 8.6–10.4)
CHLORIDE BLD-SCNC: 93 MMOL/L (ref 98–107)
CO2: 27 MMOL/L (ref 20–31)
CREAT SERPL-MCNC: 3.69 MG/DL (ref 0.7–1.2)
DIFFERENTIAL TYPE: ABNORMAL
EOSINOPHILS RELATIVE PERCENT: 1 % (ref 1–4)
GFR AFRICAN AMERICAN: 20 ML/MIN
GFR NON-AFRICAN AMERICAN: 16 ML/MIN
GFR SERPL CREATININE-BSD FRML MDRD: ABNORMAL ML/MIN/{1.73_M2}
GFR SERPL CREATININE-BSD FRML MDRD: ABNORMAL ML/MIN/{1.73_M2}
GLUCOSE BLD-MCNC: 128 MG/DL (ref 70–99)
GLUCOSE BLD-MCNC: 157 MG/DL (ref 75–110)
HCT VFR BLD CALC: 21.6 % (ref 41–53)
HEMOGLOBIN: 7.3 G/DL (ref 13.5–17.5)
LYMPHOCYTES # BLD: 9 % (ref 24–44)
MAGNESIUM: 2.4 MG/DL (ref 1.6–2.6)
MCH RBC QN AUTO: 30.1 PG (ref 26–34)
MCHC RBC AUTO-ENTMCNC: 33.8 G/DL (ref 31–37)
MCV RBC AUTO: 89.1 FL (ref 80–100)
MONOCYTES # BLD: 7 % (ref 2–11)
PDW BLD-RTO: 16.3 % (ref 12.5–15.4)
PHOSPHORUS: 4.7 MG/DL (ref 2.5–4.5)
PLATELET # BLD: 282 K/UL (ref 140–450)
PLATELET ESTIMATE: ABNORMAL
PMV BLD AUTO: 6.8 FL (ref 6–12)
POTASSIUM SERPL-SCNC: 3.9 MMOL/L (ref 3.7–5.3)
RBC # BLD: 2.43 M/UL (ref 4.5–5.9)
RBC # BLD: ABNORMAL 10*6/UL
SEG NEUTROPHILS: 82 % (ref 36–66)
SEGMENTED NEUTROPHILS ABSOLUTE COUNT: 5.8 K/UL (ref 1.8–7.7)
SODIUM BLD-SCNC: 137 MMOL/L (ref 135–144)
URIC ACID: 3.6 MG/DL (ref 3.4–7)
WBC # BLD: 7 K/UL (ref 3.5–11)
WBC # BLD: ABNORMAL 10*3/UL

## 2017-03-21 PROCEDURE — 84550 ASSAY OF BLOOD/URIC ACID: CPT

## 2017-03-21 PROCEDURE — 2500000003 HC RX 250 WO HCPCS: Performed by: INTERNAL MEDICINE

## 2017-03-21 PROCEDURE — 6370000000 HC RX 637 (ALT 250 FOR IP): Performed by: EMERGENCY MEDICINE

## 2017-03-21 PROCEDURE — 2580000003 HC RX 258: Performed by: EMERGENCY MEDICINE

## 2017-03-21 PROCEDURE — G0378 HOSPITAL OBSERVATION PER HR: HCPCS

## 2017-03-21 PROCEDURE — 89051 BODY FLUID CELL COUNT: CPT

## 2017-03-21 PROCEDURE — 82947 ASSAY GLUCOSE BLOOD QUANT: CPT

## 2017-03-21 PROCEDURE — 83735 ASSAY OF MAGNESIUM: CPT

## 2017-03-21 PROCEDURE — 36415 COLL VENOUS BLD VENIPUNCTURE: CPT

## 2017-03-21 PROCEDURE — 96375 TX/PRO/DX INJ NEW DRUG ADDON: CPT

## 2017-03-21 PROCEDURE — 84100 ASSAY OF PHOSPHORUS: CPT

## 2017-03-21 PROCEDURE — 6360000002 HC RX W HCPCS: Performed by: EMERGENCY MEDICINE

## 2017-03-21 PROCEDURE — 90937 HEMODIALYSIS REPEATED EVAL: CPT

## 2017-03-21 PROCEDURE — 87075 CULTR BACTERIA EXCEPT BLOOD: CPT

## 2017-03-21 PROCEDURE — 85025 COMPLETE CBC W/AUTO DIFF WBC: CPT

## 2017-03-21 PROCEDURE — 5A1D60Z PERFORMANCE OF URINARY FILTRATION, MULTIPLE: ICD-10-PCS | Performed by: INTERNAL MEDICINE

## 2017-03-21 PROCEDURE — 96376 TX/PRO/DX INJ SAME DRUG ADON: CPT

## 2017-03-21 PROCEDURE — 89060 EXAM SYNOVIAL FLUID CRYSTALS: CPT

## 2017-03-21 PROCEDURE — 0S9C3ZX DRAINAGE OF RIGHT KNEE JOINT, PERCUTANEOUS APPROACH, DIAGNOSTIC: ICD-10-PCS | Performed by: ORTHOPAEDIC SURGERY

## 2017-03-21 PROCEDURE — 87070 CULTURE OTHR SPECIMN AEROBIC: CPT

## 2017-03-21 PROCEDURE — 87186 SC STD MICRODIL/AGAR DIL: CPT

## 2017-03-21 PROCEDURE — 80048 BASIC METABOLIC PNL TOTAL CA: CPT

## 2017-03-21 PROCEDURE — 85651 RBC SED RATE NONAUTOMATED: CPT

## 2017-03-21 PROCEDURE — 86140 C-REACTIVE PROTEIN: CPT

## 2017-03-21 PROCEDURE — 87205 SMEAR GRAM STAIN: CPT

## 2017-03-21 PROCEDURE — 86403 PARTICLE AGGLUT ANTBDY SCRN: CPT

## 2017-03-21 RX ORDER — ISOSORBIDE MONONITRATE 30 MG/1
30 TABLET, EXTENDED RELEASE ORAL DAILY
Status: DISCONTINUED | OUTPATIENT
Start: 2017-03-21 | End: 2017-03-27 | Stop reason: HOSPADM

## 2017-03-21 RX ORDER — SODIUM CHLORIDE 0.9 % (FLUSH) 0.9 %
10 SYRINGE (ML) INJECTION PRN
Status: DISCONTINUED | OUTPATIENT
Start: 2017-03-21 | End: 2017-03-27 | Stop reason: HOSPADM

## 2017-03-21 RX ORDER — AMLODIPINE BESYLATE 10 MG/1
10 TABLET ORAL 2 TIMES DAILY
Status: DISCONTINUED | OUTPATIENT
Start: 2017-03-21 | End: 2017-03-21

## 2017-03-21 RX ORDER — HYDROCORTISONE ACETATE 25 MG/1
25 SUPPOSITORY RECTAL DAILY
Status: DISCONTINUED | OUTPATIENT
Start: 2017-03-21 | End: 2017-03-27 | Stop reason: HOSPADM

## 2017-03-21 RX ORDER — PANTOPRAZOLE SODIUM 40 MG/1
40 TABLET, DELAYED RELEASE ORAL DAILY
Status: DISCONTINUED | OUTPATIENT
Start: 2017-03-21 | End: 2017-03-27 | Stop reason: HOSPADM

## 2017-03-21 RX ORDER — SODIUM CHLORIDE 0.9 % (FLUSH) 0.9 %
10 SYRINGE (ML) INJECTION EVERY 12 HOURS SCHEDULED
Status: DISCONTINUED | OUTPATIENT
Start: 2017-03-21 | End: 2017-03-27 | Stop reason: HOSPADM

## 2017-03-21 RX ORDER — VENLAFAXINE 37.5 MG/1
37.5 TABLET ORAL DAILY
Status: DISCONTINUED | OUTPATIENT
Start: 2017-03-21 | End: 2017-03-21 | Stop reason: CLARIF

## 2017-03-21 RX ORDER — MORPHINE SULFATE 4 MG/ML
4 INJECTION, SOLUTION INTRAMUSCULAR; INTRAVENOUS
Status: DISCONTINUED | OUTPATIENT
Start: 2017-03-21 | End: 2017-03-22

## 2017-03-21 RX ORDER — MIDODRINE HYDROCHLORIDE 5 MG/1
5 TABLET ORAL PRN
Status: DISCONTINUED | OUTPATIENT
Start: 2017-03-21 | End: 2017-03-21

## 2017-03-21 RX ORDER — LEVOTHYROXINE SODIUM 0.05 MG/1
50 TABLET ORAL DAILY
Status: DISCONTINUED | OUTPATIENT
Start: 2017-03-21 | End: 2017-03-27 | Stop reason: HOSPADM

## 2017-03-21 RX ORDER — SODIUM BICARBONATE 650 MG/1
1950 TABLET ORAL 3 TIMES DAILY
Status: DISCONTINUED | OUTPATIENT
Start: 2017-03-21 | End: 2017-03-21

## 2017-03-21 RX ORDER — ACETAMINOPHEN 325 MG/1
650 TABLET ORAL EVERY 4 HOURS PRN
Status: DISCONTINUED | OUTPATIENT
Start: 2017-03-21 | End: 2017-03-27 | Stop reason: HOSPADM

## 2017-03-21 RX ORDER — ASPIRIN 81 MG/1
81 TABLET ORAL EVERY OTHER DAY
Status: DISCONTINUED | OUTPATIENT
Start: 2017-03-21 | End: 2017-03-27 | Stop reason: HOSPADM

## 2017-03-21 RX ORDER — AMLODIPINE BESYLATE 5 MG/1
5 TABLET ORAL 2 TIMES DAILY
Status: DISCONTINUED | OUTPATIENT
Start: 2017-03-21 | End: 2017-03-24

## 2017-03-21 RX ORDER — MIDODRINE HYDROCHLORIDE 5 MG/1
5 TABLET ORAL EVERY 4 HOURS PRN
Status: DISCONTINUED | OUTPATIENT
Start: 2017-03-21 | End: 2017-03-27 | Stop reason: HOSPADM

## 2017-03-21 RX ORDER — LANOLIN ALCOHOL/MO/W.PET/CERES
325 CREAM (GRAM) TOPICAL
Status: DISCONTINUED | OUTPATIENT
Start: 2017-03-21 | End: 2017-03-27 | Stop reason: HOSPADM

## 2017-03-21 RX ORDER — VENLAFAXINE 75 MG/1
75 TABLET ORAL DAILY
Status: DISCONTINUED | OUTPATIENT
Start: 2017-03-21 | End: 2017-03-21

## 2017-03-21 RX ORDER — MORPHINE SULFATE 2 MG/ML
2 INJECTION, SOLUTION INTRAMUSCULAR; INTRAVENOUS
Status: DISCONTINUED | OUTPATIENT
Start: 2017-03-21 | End: 2017-03-22

## 2017-03-21 RX ORDER — LAMOTRIGINE 100 MG/1
100 TABLET ORAL DAILY
Status: DISCONTINUED | OUTPATIENT
Start: 2017-03-21 | End: 2017-03-27 | Stop reason: HOSPADM

## 2017-03-21 RX ADMIN — ISOSORBIDE MONONITRATE 30 MG: 30 TABLET ORAL at 16:43

## 2017-03-21 RX ADMIN — MORPHINE SULFATE 2 MG: 2 INJECTION, SOLUTION INTRAMUSCULAR; INTRAVENOUS at 22:27

## 2017-03-21 RX ADMIN — Medication 10 ML: at 22:11

## 2017-03-21 RX ADMIN — Medication 1.6 ML: at 14:03

## 2017-03-21 RX ADMIN — AMLODIPINE BESYLATE 5 MG: 5 TABLET ORAL at 16:41

## 2017-03-21 RX ADMIN — LAMOTRIGINE 100 MG: 100 TABLET ORAL at 16:44

## 2017-03-21 RX ADMIN — Medication 10 ML: at 09:07

## 2017-03-21 RX ADMIN — MORPHINE SULFATE 4 MG: 4 INJECTION, SOLUTION INTRAMUSCULAR; INTRAVENOUS at 16:29

## 2017-03-21 RX ADMIN — PANTOPRAZOLE SODIUM 40 MG: 40 TABLET, DELAYED RELEASE ORAL at 16:41

## 2017-03-21 RX ADMIN — VITAMIN D, TAB 1000IU (100/BT) 1000 UNITS: 25 TAB at 16:41

## 2017-03-21 RX ADMIN — MORPHINE SULFATE 2 MG: 2 INJECTION, SOLUTION INTRAMUSCULAR; INTRAVENOUS at 11:27

## 2017-03-21 RX ADMIN — HYDROMORPHONE HYDROCHLORIDE 1 MG: 1 INJECTION, SOLUTION INTRAMUSCULAR; INTRAVENOUS; SUBCUTANEOUS at 00:18

## 2017-03-21 RX ADMIN — VENLAFAXINE 112.5 MG: 75 TABLET ORAL at 16:45

## 2017-03-21 RX ADMIN — Medication 10 ML: at 16:30

## 2017-03-21 RX ADMIN — MORPHINE SULFATE 2 MG: 2 INJECTION, SOLUTION INTRAMUSCULAR; INTRAVENOUS at 14:02

## 2017-03-21 RX ADMIN — ASPIRIN 81 MG: 81 TABLET, COATED ORAL at 16:41

## 2017-03-21 RX ADMIN — MORPHINE SULFATE 2 MG: 2 INJECTION, SOLUTION INTRAMUSCULAR; INTRAVENOUS at 09:07

## 2017-03-21 RX ADMIN — HYDROMORPHONE HYDROCHLORIDE 0.5 MG: 1 INJECTION, SOLUTION INTRAMUSCULAR; INTRAVENOUS; SUBCUTANEOUS at 02:32

## 2017-03-21 RX ADMIN — LEVOTHYROXINE SODIUM 50 MCG: 50 TABLET ORAL at 16:44

## 2017-03-21 RX ADMIN — MORPHINE SULFATE 4 MG: 4 INJECTION, SOLUTION INTRAMUSCULAR; INTRAVENOUS at 20:08

## 2017-03-21 RX ADMIN — FERROUS SULFATE TAB EC 325 MG (65 MG FE EQUIVALENT) 325 MG: 325 (65 FE) TABLET DELAYED RESPONSE at 16:42

## 2017-03-21 RX ADMIN — HYDROCORTISONE ACETATE 25 MG: 25 SUPPOSITORY RECTAL at 16:43

## 2017-03-21 ASSESSMENT — PAIN DESCRIPTION - LOCATION
LOCATION: KNEE

## 2017-03-21 ASSESSMENT — PAIN DESCRIPTION - DESCRIPTORS
DESCRIPTORS: ACHING;SHOOTING
DESCRIPTORS: PATIENT UNABLE TO DESCRIBE

## 2017-03-21 ASSESSMENT — PAIN SCALES - GENERAL
PAINLEVEL_OUTOF10: 5
PAINLEVEL_OUTOF10: 10
PAINLEVEL_OUTOF10: 9
PAINLEVEL_OUTOF10: 10
PAINLEVEL_OUTOF10: 9
PAINLEVEL_OUTOF10: 6
PAINLEVEL_OUTOF10: 10
PAINLEVEL_OUTOF10: 7

## 2017-03-21 ASSESSMENT — PAIN DESCRIPTION - ORIENTATION
ORIENTATION: RIGHT

## 2017-03-21 ASSESSMENT — PAIN SCALES - PAIN ASSESSMENT IN ADVANCED DEMENTIA (PAINAD)
BODYLANGUAGE: 2
NEGVOCALIZATION: 1
FACIALEXPRESSION: 2
BREATHING: 1

## 2017-03-21 ASSESSMENT — PAIN DESCRIPTION - PAIN TYPE
TYPE: ACUTE PAIN
TYPE: ACUTE PAIN
TYPE: ACUTE PAIN;CHRONIC PAIN
TYPE: ACUTE PAIN;CHRONIC PAIN
TYPE: ACUTE PAIN
TYPE: ACUTE PAIN

## 2017-03-21 ASSESSMENT — PAIN DESCRIPTION - DIRECTION: RADIATING_TOWARDS: DOWN TO FOOT

## 2017-03-21 ASSESSMENT — PAIN DESCRIPTION - FREQUENCY: FREQUENCY: OTHER (COMMENT)

## 2017-03-22 LAB
APPEARANCE FLUID: NORMAL
BASO FLUID: NORMAL %
COLOR FLUID: NORMAL
CRYSTALS, FLUID: NEGATIVE
EOSINOPHIL FLUID: NORMAL %
FLUID DIFF COMMENT: NORMAL
GLUCOSE BLD-MCNC: 140 MG/DL (ref 75–110)
LYMPHOCYTES, BODY FLUID: 3 %
MONOCYTE, FLUID: NORMAL %
NEUTROPHIL, FLUID: 93 %
OTHER CELLS FLUID: NORMAL %
RBC FLUID: NORMAL /MM3
SEDIMENTATION RATE, ERYTHROCYTE: >130 MM (ref 0–10)
SPECIMEN TYPE: NORMAL
SPECIMEN TYPE: NORMAL
WBC FLUID: 1313 /MM3

## 2017-03-22 PROCEDURE — 6360000002 HC RX W HCPCS: Performed by: EMERGENCY MEDICINE

## 2017-03-22 PROCEDURE — 6370000000 HC RX 637 (ALT 250 FOR IP): Performed by: EMERGENCY MEDICINE

## 2017-03-22 PROCEDURE — 96376 TX/PRO/DX INJ SAME DRUG ADON: CPT

## 2017-03-22 PROCEDURE — 82947 ASSAY GLUCOSE BLOOD QUANT: CPT

## 2017-03-22 PROCEDURE — G0378 HOSPITAL OBSERVATION PER HR: HCPCS

## 2017-03-22 PROCEDURE — 2580000003 HC RX 258: Performed by: EMERGENCY MEDICINE

## 2017-03-22 PROCEDURE — G8988 SELF CARE GOAL STATUS: HCPCS

## 2017-03-22 PROCEDURE — 97535 SELF CARE MNGMENT TRAINING: CPT

## 2017-03-22 PROCEDURE — G8987 SELF CARE CURRENT STATUS: HCPCS

## 2017-03-22 PROCEDURE — 96365 THER/PROPH/DIAG IV INF INIT: CPT

## 2017-03-22 PROCEDURE — 97167 OT EVAL HIGH COMPLEX 60 MIN: CPT

## 2017-03-22 PROCEDURE — 76937 US GUIDE VASCULAR ACCESS: CPT

## 2017-03-22 PROCEDURE — 96366 THER/PROPH/DIAG IV INF ADDON: CPT

## 2017-03-22 RX ORDER — FENTANYL 50 UG/H
1 PATCH TRANSDERMAL
Status: DISCONTINUED | OUTPATIENT
Start: 2017-03-22 | End: 2017-03-22

## 2017-03-22 RX ORDER — OXYCODONE HYDROCHLORIDE AND ACETAMINOPHEN 5; 325 MG/1; MG/1
2 TABLET ORAL EVERY 4 HOURS PRN
Status: DISCONTINUED | OUTPATIENT
Start: 2017-03-22 | End: 2017-03-27 | Stop reason: HOSPADM

## 2017-03-22 RX ORDER — OXYCODONE HYDROCHLORIDE AND ACETAMINOPHEN 5; 325 MG/1; MG/1
1 TABLET ORAL EVERY 4 HOURS PRN
Status: DISCONTINUED | OUTPATIENT
Start: 2017-03-22 | End: 2017-03-27 | Stop reason: HOSPADM

## 2017-03-22 RX ORDER — MORPHINE SULFATE 15 MG/1
30 TABLET ORAL
Status: DISCONTINUED | OUTPATIENT
Start: 2017-03-22 | End: 2017-03-27 | Stop reason: HOSPADM

## 2017-03-22 RX ORDER — VANCOMYCIN HYDROCHLORIDE 1 G/200ML
1000 INJECTION, SOLUTION INTRAVENOUS
Status: DISCONTINUED | OUTPATIENT
Start: 2017-03-23 | End: 2017-03-23

## 2017-03-22 RX ADMIN — MORPHINE SULFATE 30 MG: 15 TABLET ORAL at 15:33

## 2017-03-22 RX ADMIN — AMLODIPINE BESYLATE 5 MG: 5 TABLET ORAL at 10:10

## 2017-03-22 RX ADMIN — FERROUS SULFATE TAB EC 325 MG (65 MG FE EQUIVALENT) 325 MG: 325 (65 FE) TABLET DELAYED RESPONSE at 15:33

## 2017-03-22 RX ADMIN — MORPHINE SULFATE 2 MG: 2 INJECTION, SOLUTION INTRAMUSCULAR; INTRAVENOUS at 04:52

## 2017-03-22 RX ADMIN — MORPHINE SULFATE 30 MG: 15 TABLET ORAL at 21:47

## 2017-03-22 RX ADMIN — VITAMIN D, TAB 1000IU (100/BT) 1000 UNITS: 25 TAB at 10:10

## 2017-03-22 RX ADMIN — MORPHINE SULFATE 4 MG: 4 INJECTION, SOLUTION INTRAMUSCULAR; INTRAVENOUS at 07:25

## 2017-03-22 RX ADMIN — MORPHINE SULFATE 2 MG: 2 INJECTION, SOLUTION INTRAMUSCULAR; INTRAVENOUS at 00:55

## 2017-03-22 RX ADMIN — LAMOTRIGINE 100 MG: 100 TABLET ORAL at 10:10

## 2017-03-22 RX ADMIN — FERROUS SULFATE TAB EC 325 MG (65 MG FE EQUIVALENT) 325 MG: 325 (65 FE) TABLET DELAYED RESPONSE at 10:10

## 2017-03-22 RX ADMIN — OXYCODONE HYDROCHLORIDE AND ACETAMINOPHEN 2 TABLET: 5; 325 TABLET ORAL at 12:46

## 2017-03-22 RX ADMIN — VENLAFAXINE 112.5 MG: 75 TABLET ORAL at 10:10

## 2017-03-22 RX ADMIN — Medication 10 ML: at 10:10

## 2017-03-22 RX ADMIN — OXYCODONE HYDROCHLORIDE AND ACETAMINOPHEN 2 TABLET: 5; 325 TABLET ORAL at 18:03

## 2017-03-22 RX ADMIN — MORPHINE SULFATE 4 MG: 4 INJECTION, SOLUTION INTRAMUSCULAR; INTRAVENOUS at 10:09

## 2017-03-22 RX ADMIN — VANCOMYCIN HYDROCHLORIDE 1500 MG: 100 INJECTION, POWDER, LYOPHILIZED, FOR SOLUTION INTRAVENOUS at 18:04

## 2017-03-22 RX ADMIN — PANTOPRAZOLE SODIUM 40 MG: 40 TABLET, DELAYED RELEASE ORAL at 10:10

## 2017-03-22 RX ADMIN — LEVOTHYROXINE SODIUM 50 MCG: 50 TABLET ORAL at 10:10

## 2017-03-22 RX ADMIN — ISOSORBIDE MONONITRATE 30 MG: 30 TABLET ORAL at 10:10

## 2017-03-22 ASSESSMENT — PAIN SCALES - GENERAL
PAINLEVEL_OUTOF10: 7
PAINLEVEL_OUTOF10: 4
PAINLEVEL_OUTOF10: 9
PAINLEVEL_OUTOF10: 6
PAINLEVEL_OUTOF10: 7
PAINLEVEL_OUTOF10: 8
PAINLEVEL_OUTOF10: 6
PAINLEVEL_OUTOF10: 5
PAINLEVEL_OUTOF10: 8
PAINLEVEL_OUTOF10: 8
PAINLEVEL_OUTOF10: 9
PAINLEVEL_OUTOF10: 9
PAINLEVEL_OUTOF10: 6
PAINLEVEL_OUTOF10: 4
PAINLEVEL_OUTOF10: 4
PAINLEVEL_OUTOF10: 2

## 2017-03-22 ASSESSMENT — PAIN DESCRIPTION - PAIN TYPE
TYPE: ACUTE PAIN

## 2017-03-22 ASSESSMENT — PAIN DESCRIPTION - ORIENTATION
ORIENTATION: RIGHT

## 2017-03-22 ASSESSMENT — PAIN DESCRIPTION - LOCATION
LOCATION: KNEE

## 2017-03-22 ASSESSMENT — PAIN DESCRIPTION - DESCRIPTORS: DESCRIPTORS: ACHING

## 2017-03-22 ASSESSMENT — PAIN DESCRIPTION - FREQUENCY: FREQUENCY: CONTINUOUS

## 2017-03-23 ENCOUNTER — ANESTHESIA EVENT (OUTPATIENT)
Dept: OPERATING ROOM | Age: 67
DRG: 485 | End: 2017-03-23
Payer: MEDICARE

## 2017-03-23 ENCOUNTER — ANESTHESIA (OUTPATIENT)
Dept: OPERATING ROOM | Age: 67
DRG: 485 | End: 2017-03-23
Payer: MEDICARE

## 2017-03-23 ENCOUNTER — HOSPITAL ENCOUNTER (OUTPATIENT)
Dept: DIALYSIS | Age: 67
Setting detail: OBSERVATION
Discharge: HOME | DRG: 485 | End: 2017-03-23
Payer: MEDICARE

## 2017-03-23 VITALS — DIASTOLIC BLOOD PRESSURE: 64 MMHG | TEMPERATURE: 86.2 F | OXYGEN SATURATION: 100 % | SYSTOLIC BLOOD PRESSURE: 131 MMHG

## 2017-03-23 PROBLEM — I10 HTN (HYPERTENSION): Status: ACTIVE | Noted: 2017-03-23

## 2017-03-23 PROBLEM — Z99.2 ESRD (END STAGE RENAL DISEASE) ON DIALYSIS (HCC): Status: ACTIVE | Noted: 2017-03-23

## 2017-03-23 PROBLEM — N18.6 ESRD (END STAGE RENAL DISEASE) ON DIALYSIS (HCC): Status: ACTIVE | Noted: 2017-03-23

## 2017-03-23 PROBLEM — M00.9 PYOGENIC ARTHRITIS OF RIGHT KNEE JOINT (HCC): Status: ACTIVE | Noted: 2017-03-23

## 2017-03-23 LAB
ANION GAP SERPL CALCULATED.3IONS-SCNC: 14 MMOL/L (ref 9–17)
BUN BLDV-MCNC: 41 MG/DL (ref 8–23)
BUN/CREAT BLD: ABNORMAL (ref 9–20)
C-REACTIVE PROTEIN: 153.7 MG/L (ref 0–5)
CALCIUM SERPL-MCNC: 8.5 MG/DL (ref 8.6–10.4)
CHLORIDE BLD-SCNC: 94 MMOL/L (ref 98–107)
CO2: 26 MMOL/L (ref 20–31)
CREAT SERPL-MCNC: 3.27 MG/DL (ref 0.7–1.2)
GFR AFRICAN AMERICAN: 23 ML/MIN
GFR NON-AFRICAN AMERICAN: 19 ML/MIN
GFR SERPL CREATININE-BSD FRML MDRD: ABNORMAL ML/MIN/{1.73_M2}
GFR SERPL CREATININE-BSD FRML MDRD: ABNORMAL ML/MIN/{1.73_M2}
GLUCOSE BLD-MCNC: 112 MG/DL (ref 70–99)
GLUCOSE BLD-MCNC: 117 MG/DL (ref 75–110)
GLUCOSE BLD-MCNC: 121 MG/DL (ref 75–110)
GLUCOSE BLD-MCNC: 132 MG/DL (ref 75–110)
POTASSIUM SERPL-SCNC: 3.7 MMOL/L (ref 3.7–5.3)
SODIUM BLD-SCNC: 134 MMOL/L (ref 135–144)

## 2017-03-23 PROCEDURE — 2500000003 HC RX 250 WO HCPCS: Performed by: NURSE ANESTHETIST, CERTIFIED REGISTERED

## 2017-03-23 PROCEDURE — 6360000002 HC RX W HCPCS: Performed by: INTERNAL MEDICINE

## 2017-03-23 PROCEDURE — 2580000003 HC RX 258: Performed by: ANESTHESIOLOGY

## 2017-03-23 PROCEDURE — 3600000004 HC SURGERY LEVEL 4 BASE: Performed by: ORTHOPAEDIC SURGERY

## 2017-03-23 PROCEDURE — 3600000014 HC SURGERY LEVEL 4 ADDTL 15MIN: Performed by: ORTHOPAEDIC SURGERY

## 2017-03-23 PROCEDURE — 2500000003 HC RX 250 WO HCPCS

## 2017-03-23 PROCEDURE — 2580000003 HC RX 258: Performed by: EMERGENCY MEDICINE

## 2017-03-23 PROCEDURE — 1200000000 HC SEMI PRIVATE

## 2017-03-23 PROCEDURE — 2720000010 HC SURG SUPPLY STERILE: Performed by: ORTHOPAEDIC SURGERY

## 2017-03-23 PROCEDURE — 6360000002 HC RX W HCPCS: Performed by: ANESTHESIOLOGY

## 2017-03-23 PROCEDURE — 2500000003 HC RX 250 WO HCPCS: Performed by: INTERNAL MEDICINE

## 2017-03-23 PROCEDURE — 6360000002 HC RX W HCPCS: Performed by: NURSE ANESTHETIST, CERTIFIED REGISTERED

## 2017-03-23 PROCEDURE — 80048 BASIC METABOLIC PNL TOTAL CA: CPT

## 2017-03-23 PROCEDURE — 3E1U38Z IRRIGATION OF JOINTS USING IRRIGATING SUBSTANCE, PERCUTANEOUS APPROACH: ICD-10-PCS | Performed by: ORTHOPAEDIC SURGERY

## 2017-03-23 PROCEDURE — 82947 ASSAY GLUCOSE BLOOD QUANT: CPT

## 2017-03-23 PROCEDURE — 2580000003 HC RX 258: Performed by: NURSE ANESTHETIST, CERTIFIED REGISTERED

## 2017-03-23 PROCEDURE — 6370000000 HC RX 637 (ALT 250 FOR IP): Performed by: EMERGENCY MEDICINE

## 2017-03-23 PROCEDURE — 90937 HEMODIALYSIS REPEATED EVAL: CPT

## 2017-03-23 PROCEDURE — 86140 C-REACTIVE PROTEIN: CPT

## 2017-03-23 PROCEDURE — 3700000000 HC ANESTHESIA ATTENDED CARE: Performed by: ORTHOPAEDIC SURGERY

## 2017-03-23 PROCEDURE — 7100000001 HC PACU RECOVERY - ADDTL 15 MIN: Performed by: ORTHOPAEDIC SURGERY

## 2017-03-23 PROCEDURE — 36415 COLL VENOUS BLD VENIPUNCTURE: CPT

## 2017-03-23 PROCEDURE — 2580000003 HC RX 258: Performed by: ORTHOPAEDIC SURGERY

## 2017-03-23 PROCEDURE — 7100000000 HC PACU RECOVERY - FIRST 15 MIN: Performed by: ORTHOPAEDIC SURGERY

## 2017-03-23 PROCEDURE — 0SBC4ZZ EXCISION OF RIGHT KNEE JOINT, PERCUTANEOUS ENDOSCOPIC APPROACH: ICD-10-PCS | Performed by: ORTHOPAEDIC SURGERY

## 2017-03-23 PROCEDURE — 3700000001 HC ADD 15 MINUTES (ANESTHESIA): Performed by: ORTHOPAEDIC SURGERY

## 2017-03-23 RX ORDER — FENTANYL CITRATE 50 UG/ML
25 INJECTION, SOLUTION INTRAMUSCULAR; INTRAVENOUS EVERY 5 MIN PRN
Status: DISCONTINUED | OUTPATIENT
Start: 2017-03-23 | End: 2017-03-27 | Stop reason: HOSPADM

## 2017-03-23 RX ORDER — SODIUM CHLORIDE 0.9 % (FLUSH) 0.9 %
10 SYRINGE (ML) INJECTION PRN
Status: DISCONTINUED | OUTPATIENT
Start: 2017-03-23 | End: 2017-03-27 | Stop reason: HOSPADM

## 2017-03-23 RX ORDER — SODIUM CHLORIDE 0.9 % (FLUSH) 0.9 %
10 SYRINGE (ML) INJECTION EVERY 12 HOURS SCHEDULED
Status: DISCONTINUED | OUTPATIENT
Start: 2017-03-23 | End: 2017-03-27 | Stop reason: HOSPADM

## 2017-03-23 RX ORDER — FENTANYL CITRATE 50 UG/ML
INJECTION, SOLUTION INTRAMUSCULAR; INTRAVENOUS PRN
Status: DISCONTINUED | OUTPATIENT
Start: 2017-03-23 | End: 2017-03-23 | Stop reason: SDUPTHER

## 2017-03-23 RX ORDER — LIDOCAINE HYDROCHLORIDE 10 MG/ML
INJECTION, SOLUTION INFILTRATION; PERINEURAL PRN
Status: DISCONTINUED | OUTPATIENT
Start: 2017-03-23 | End: 2017-03-23 | Stop reason: SDUPTHER

## 2017-03-23 RX ORDER — VANCOMYCIN HYDROCHLORIDE 1 G/200ML
1000 INJECTION, SOLUTION INTRAVENOUS
Status: DISCONTINUED | OUTPATIENT
Start: 2017-03-23 | End: 2017-03-24

## 2017-03-23 RX ORDER — FENTANYL CITRATE 50 UG/ML
25 INJECTION, SOLUTION INTRAMUSCULAR; INTRAVENOUS EVERY 5 MIN PRN
Status: COMPLETED | OUTPATIENT
Start: 2017-03-23 | End: 2017-03-23

## 2017-03-23 RX ORDER — ROCURONIUM BROMIDE 10 MG/ML
INJECTION, SOLUTION INTRAVENOUS PRN
Status: DISCONTINUED | OUTPATIENT
Start: 2017-03-23 | End: 2017-03-23 | Stop reason: SDUPTHER

## 2017-03-23 RX ORDER — GLYCOPYRROLATE 0.2 MG/ML
INJECTION INTRAMUSCULAR; INTRAVENOUS PRN
Status: DISCONTINUED | OUTPATIENT
Start: 2017-03-23 | End: 2017-03-23 | Stop reason: SDUPTHER

## 2017-03-23 RX ORDER — MAGNESIUM HYDROXIDE 1200 MG/15ML
LIQUID ORAL CONTINUOUS PRN
Status: DISCONTINUED | OUTPATIENT
Start: 2017-03-23 | End: 2017-03-27 | Stop reason: HOSPADM

## 2017-03-23 RX ORDER — MIDAZOLAM HYDROCHLORIDE 1 MG/ML
1 INJECTION INTRAMUSCULAR; INTRAVENOUS EVERY 10 MIN PRN
Status: DISCONTINUED | OUTPATIENT
Start: 2017-03-23 | End: 2017-03-27 | Stop reason: HOSPADM

## 2017-03-23 RX ORDER — ONDANSETRON 2 MG/ML
INJECTION INTRAMUSCULAR; INTRAVENOUS PRN
Status: DISCONTINUED | OUTPATIENT
Start: 2017-03-23 | End: 2017-03-23 | Stop reason: SDUPTHER

## 2017-03-23 RX ORDER — PROPOFOL 10 MG/ML
INJECTION, EMULSION INTRAVENOUS PRN
Status: DISCONTINUED | OUTPATIENT
Start: 2017-03-23 | End: 2017-03-23 | Stop reason: SDUPTHER

## 2017-03-23 RX ORDER — SODIUM CHLORIDE 9 MG/ML
INJECTION, SOLUTION INTRAVENOUS CONTINUOUS PRN
Status: DISCONTINUED | OUTPATIENT
Start: 2017-03-23 | End: 2017-03-23 | Stop reason: SDUPTHER

## 2017-03-23 RX ORDER — MIDAZOLAM HYDROCHLORIDE 1 MG/ML
0.5 INJECTION INTRAMUSCULAR; INTRAVENOUS
Status: COMPLETED | OUTPATIENT
Start: 2017-03-23 | End: 2017-03-23

## 2017-03-23 RX ADMIN — FENTANYL CITRATE 50 MCG: 50 INJECTION INTRAMUSCULAR; INTRAVENOUS at 14:48

## 2017-03-23 RX ADMIN — FENTANYL CITRATE 50 MCG: 50 INJECTION INTRAMUSCULAR; INTRAVENOUS at 15:15

## 2017-03-23 RX ADMIN — GLYCOPYRROLATE 0.2 MG: 0.2 INJECTION, SOLUTION INTRAMUSCULAR; INTRAVENOUS at 15:53

## 2017-03-23 RX ADMIN — PROPOFOL 150 MG: 10 INJECTION, EMULSION INTRAVENOUS at 14:48

## 2017-03-23 RX ADMIN — ONDANSETRON 4 MG: 2 INJECTION, SOLUTION INTRAMUSCULAR; INTRAVENOUS at 15:32

## 2017-03-23 RX ADMIN — FENTANYL CITRATE 25 MCG: 50 INJECTION, SOLUTION INTRAMUSCULAR; INTRAVENOUS at 16:45

## 2017-03-23 RX ADMIN — OXYCODONE HYDROCHLORIDE AND ACETAMINOPHEN 1 TABLET: 5; 325 TABLET ORAL at 22:12

## 2017-03-23 RX ADMIN — Medication 10 ML: at 02:55

## 2017-03-23 RX ADMIN — LIDOCAINE HYDROCHLORIDE 30 MG: 10 INJECTION, SOLUTION INFILTRATION; PERINEURAL at 14:48

## 2017-03-23 RX ADMIN — Medication 1.6 ML: at 12:08

## 2017-03-23 RX ADMIN — FENTANYL CITRATE 25 MCG: 50 INJECTION, SOLUTION INTRAMUSCULAR; INTRAVENOUS at 16:35

## 2017-03-23 RX ADMIN — MORPHINE SULFATE 30 MG: 15 TABLET ORAL at 05:40

## 2017-03-23 RX ADMIN — ROCURONIUM BROMIDE 20 MG: 10 INJECTION INTRAVENOUS at 14:48

## 2017-03-23 RX ADMIN — OXYCODONE HYDROCHLORIDE AND ACETAMINOPHEN 2 TABLET: 5; 325 TABLET ORAL at 02:50

## 2017-03-23 RX ADMIN — NEOSTIGMINE METHYLSULFATE 2 MG: 1 INJECTION, SOLUTION INTRAMUSCULAR; INTRAVENOUS; SUBCUTANEOUS at 15:53

## 2017-03-23 RX ADMIN — OXYCODONE HYDROCHLORIDE AND ACETAMINOPHEN 2 TABLET: 5; 325 TABLET ORAL at 17:53

## 2017-03-23 RX ADMIN — Medication 10 ML: at 22:11

## 2017-03-23 RX ADMIN — MIDAZOLAM HYDROCHLORIDE 0.5 MG: 1 INJECTION, SOLUTION INTRAMUSCULAR; INTRAVENOUS at 16:30

## 2017-03-23 RX ADMIN — VANCOMYCIN HYDROCHLORIDE 1000 MG: 1 INJECTION, SOLUTION INTRAVENOUS at 11:08

## 2017-03-23 RX ADMIN — Medication 2 G: at 14:55

## 2017-03-23 RX ADMIN — SODIUM CHLORIDE: 9 INJECTION, SOLUTION INTRAVENOUS at 14:46

## 2017-03-23 RX ADMIN — FENTANYL CITRATE 25 MCG: 50 INJECTION, SOLUTION INTRAMUSCULAR; INTRAVENOUS at 16:50

## 2017-03-23 RX ADMIN — FENTANYL CITRATE 25 MCG: 50 INJECTION, SOLUTION INTRAMUSCULAR; INTRAVENOUS at 16:40

## 2017-03-23 ASSESSMENT — PAIN DESCRIPTION - PAIN TYPE
TYPE: ACUTE PAIN
TYPE: SURGICAL PAIN

## 2017-03-23 ASSESSMENT — ENCOUNTER SYMPTOMS
NAUSEA: 0
ABDOMINAL PAIN: 0
SHORTNESS OF BREATH: 0
VOMITING: 0
COUGH: 0
SORE THROAT: 0

## 2017-03-23 ASSESSMENT — PAIN SCALES - GENERAL
PAINLEVEL_OUTOF10: 5
PAINLEVEL_OUTOF10: 9
PAINLEVEL_OUTOF10: 6
PAINLEVEL_OUTOF10: 5
PAINLEVEL_OUTOF10: 5
PAINLEVEL_OUTOF10: 7
PAINLEVEL_OUTOF10: 4
PAINLEVEL_OUTOF10: 5
PAINLEVEL_OUTOF10: 7
PAINLEVEL_OUTOF10: 6
PAINLEVEL_OUTOF10: 10
PAINLEVEL_OUTOF10: 0
PAINLEVEL_OUTOF10: 6
PAINLEVEL_OUTOF10: 5
PAINLEVEL_OUTOF10: 0
PAINLEVEL_OUTOF10: 10
PAINLEVEL_OUTOF10: 6

## 2017-03-23 ASSESSMENT — PAIN DESCRIPTION - FREQUENCY
FREQUENCY: INTERMITTENT
FREQUENCY: INTERMITTENT

## 2017-03-23 ASSESSMENT — PAIN SCALES - PAIN ASSESSMENT IN ADVANCED DEMENTIA (PAINAD)
FACIALEXPRESSION: 0
NEGVOCALIZATION: 0
CONSOLABILITY: 1
NEGVOCALIZATION: 0
NEGVOCALIZATION: 0
TOTALSCORE: 0
BODYLANGUAGE: 1
TOTALSCORE: 2
BREATHING: 1
BODYLANGUAGE: 0
FACIALEXPRESSION: 0
BREATHING: 0
CONSOLABILITY: 1
TOTALSCORE: 3
FACIALEXPRESSION: 0
CONSOLABILITY: 0
BODYLANGUAGE: 0
BREATHING: 1

## 2017-03-23 ASSESSMENT — PAIN DESCRIPTION - LOCATION
LOCATION: KNEE
LOCATION: KNEE

## 2017-03-23 ASSESSMENT — PAIN DESCRIPTION - PROGRESSION: CLINICAL_PROGRESSION: GRADUALLY IMPROVING

## 2017-03-23 ASSESSMENT — PAIN DESCRIPTION - DESCRIPTORS
DESCRIPTORS: ACHING
DESCRIPTORS: ACHING

## 2017-03-23 ASSESSMENT — PAIN DESCRIPTION - ORIENTATION
ORIENTATION: RIGHT
ORIENTATION: RIGHT

## 2017-03-23 ASSESSMENT — PAIN SCALES - WONG BAKER: WONGBAKER_NUMERICALRESPONSE: 2

## 2017-03-23 ASSESSMENT — PAIN DESCRIPTION - ONSET: ONSET: ON-GOING

## 2017-03-24 LAB
ANION GAP SERPL CALCULATED.3IONS-SCNC: 12 MMOL/L (ref 9–17)
BUN BLDV-MCNC: 22 MG/DL (ref 8–23)
BUN/CREAT BLD: ABNORMAL (ref 9–20)
CALCIUM SERPL-MCNC: 8.2 MG/DL (ref 8.6–10.4)
CHLORIDE BLD-SCNC: 94 MMOL/L (ref 98–107)
CO2: 26 MMOL/L (ref 20–31)
CREAT SERPL-MCNC: 2.45 MG/DL (ref 0.7–1.2)
CULTURE: ABNORMAL
DIRECT EXAM: ABNORMAL
DIRECT EXAM: ABNORMAL
ESTIMATED AVERAGE GLUCOSE: 105 MG/DL
GFR AFRICAN AMERICAN: 32 ML/MIN
GFR NON-AFRICAN AMERICAN: 26 ML/MIN
GFR SERPL CREATININE-BSD FRML MDRD: ABNORMAL ML/MIN/{1.73_M2}
GFR SERPL CREATININE-BSD FRML MDRD: ABNORMAL ML/MIN/{1.73_M2}
GLUCOSE BLD-MCNC: 115 MG/DL (ref 75–110)
GLUCOSE BLD-MCNC: 116 MG/DL (ref 75–110)
GLUCOSE BLD-MCNC: 118 MG/DL (ref 70–99)
GLUCOSE BLD-MCNC: 157 MG/DL (ref 75–110)
HBA1C MFR BLD: 5.3 % (ref 4–6)
Lab: ABNORMAL
ORGANISM: ABNORMAL
POTASSIUM SERPL-SCNC: 3.5 MMOL/L (ref 3.7–5.3)
SODIUM BLD-SCNC: 132 MMOL/L (ref 135–144)
SPECIMEN DESCRIPTION: ABNORMAL
STATUS: ABNORMAL

## 2017-03-24 PROCEDURE — 97530 THERAPEUTIC ACTIVITIES: CPT

## 2017-03-24 PROCEDURE — G8979 MOBILITY GOAL STATUS: HCPCS

## 2017-03-24 PROCEDURE — G8978 MOBILITY CURRENT STATUS: HCPCS

## 2017-03-24 PROCEDURE — 6370000000 HC RX 637 (ALT 250 FOR IP): Performed by: EMERGENCY MEDICINE

## 2017-03-24 PROCEDURE — 83036 HEMOGLOBIN GLYCOSYLATED A1C: CPT

## 2017-03-24 PROCEDURE — 2580000003 HC RX 258: Performed by: ANESTHESIOLOGY

## 2017-03-24 PROCEDURE — 6360000002 HC RX W HCPCS: Performed by: INTERNAL MEDICINE

## 2017-03-24 PROCEDURE — 6370000000 HC RX 637 (ALT 250 FOR IP): Performed by: INTERNAL MEDICINE

## 2017-03-24 PROCEDURE — 36415 COLL VENOUS BLD VENIPUNCTURE: CPT

## 2017-03-24 PROCEDURE — 80048 BASIC METABOLIC PNL TOTAL CA: CPT

## 2017-03-24 PROCEDURE — 82947 ASSAY GLUCOSE BLOOD QUANT: CPT

## 2017-03-24 PROCEDURE — 99223 1ST HOSP IP/OBS HIGH 75: CPT | Performed by: INTERNAL MEDICINE

## 2017-03-24 PROCEDURE — 6370000000 HC RX 637 (ALT 250 FOR IP): Performed by: HOSPITALIST

## 2017-03-24 PROCEDURE — 97162 PT EVAL MOD COMPLEX 30 MIN: CPT

## 2017-03-24 PROCEDURE — 1200000000 HC SEMI PRIVATE

## 2017-03-24 PROCEDURE — 93970 EXTREMITY STUDY: CPT

## 2017-03-24 PROCEDURE — 2580000003 HC RX 258: Performed by: EMERGENCY MEDICINE

## 2017-03-24 RX ORDER — POTASSIUM CHLORIDE 20 MEQ/1
20 TABLET, EXTENDED RELEASE ORAL ONCE
Status: COMPLETED | OUTPATIENT
Start: 2017-03-24 | End: 2017-03-24

## 2017-03-24 RX ORDER — NICOTINE POLACRILEX 4 MG
15 LOZENGE BUCCAL PRN
Status: DISCONTINUED | OUTPATIENT
Start: 2017-03-24 | End: 2017-03-27 | Stop reason: HOSPADM

## 2017-03-24 RX ORDER — DEXTROSE MONOHYDRATE 25 G/50ML
12.5 INJECTION, SOLUTION INTRAVENOUS PRN
Status: DISCONTINUED | OUTPATIENT
Start: 2017-03-24 | End: 2017-03-27 | Stop reason: HOSPADM

## 2017-03-24 RX ORDER — DEXTROSE MONOHYDRATE 50 MG/ML
100 INJECTION, SOLUTION INTRAVENOUS PRN
Status: DISCONTINUED | OUTPATIENT
Start: 2017-03-24 | End: 2017-03-27 | Stop reason: HOSPADM

## 2017-03-24 RX ORDER — AMLODIPINE BESYLATE 5 MG/1
5 TABLET ORAL DAILY
Status: DISCONTINUED | OUTPATIENT
Start: 2017-03-25 | End: 2017-03-27 | Stop reason: HOSPADM

## 2017-03-24 RX ADMIN — OXYCODONE HYDROCHLORIDE AND ACETAMINOPHEN 2 TABLET: 5; 325 TABLET ORAL at 06:30

## 2017-03-24 RX ADMIN — PANTOPRAZOLE SODIUM 40 MG: 40 TABLET, DELAYED RELEASE ORAL at 10:05

## 2017-03-24 RX ADMIN — OXYCODONE HYDROCHLORIDE AND ACETAMINOPHEN 1 TABLET: 5; 325 TABLET ORAL at 19:54

## 2017-03-24 RX ADMIN — FERROUS SULFATE TAB EC 325 MG (65 MG FE EQUIVALENT) 325 MG: 325 (65 FE) TABLET DELAYED RESPONSE at 06:31

## 2017-03-24 RX ADMIN — VITAMIN D, TAB 1000IU (100/BT) 1000 UNITS: 25 TAB at 10:06

## 2017-03-24 RX ADMIN — VENLAFAXINE 112.5 MG: 75 TABLET ORAL at 10:05

## 2017-03-24 RX ADMIN — OXYCODONE HYDROCHLORIDE AND ACETAMINOPHEN 1 TABLET: 5; 325 TABLET ORAL at 01:57

## 2017-03-24 RX ADMIN — OXYCODONE HYDROCHLORIDE AND ACETAMINOPHEN 2 TABLET: 5; 325 TABLET ORAL at 10:31

## 2017-03-24 RX ADMIN — INSULIN LISPRO 1 UNITS: 100 INJECTION, SOLUTION INTRAVENOUS; SUBCUTANEOUS at 22:30

## 2017-03-24 RX ADMIN — AMLODIPINE BESYLATE 5 MG: 5 TABLET ORAL at 10:05

## 2017-03-24 RX ADMIN — Medication 3 G: at 13:55

## 2017-03-24 RX ADMIN — Medication 10 ML: at 10:06

## 2017-03-24 RX ADMIN — LAMOTRIGINE 100 MG: 100 TABLET ORAL at 10:05

## 2017-03-24 RX ADMIN — Medication 10 ML: at 19:55

## 2017-03-24 RX ADMIN — LEVOTHYROXINE SODIUM 50 MCG: 50 TABLET ORAL at 06:31

## 2017-03-24 RX ADMIN — OXYCODONE HYDROCHLORIDE AND ACETAMINOPHEN 2 TABLET: 5; 325 TABLET ORAL at 16:05

## 2017-03-24 RX ADMIN — ISOSORBIDE MONONITRATE 30 MG: 30 TABLET ORAL at 10:05

## 2017-03-24 RX ADMIN — FERROUS SULFATE TAB EC 325 MG (65 MG FE EQUIVALENT) 325 MG: 325 (65 FE) TABLET DELAYED RESPONSE at 16:05

## 2017-03-24 RX ADMIN — POTASSIUM CHLORIDE 20 MEQ: 20 TABLET, EXTENDED RELEASE ORAL at 13:55

## 2017-03-24 ASSESSMENT — PAIN SCALES - WONG BAKER
WONGBAKER_NUMERICALRESPONSE: 8
WONGBAKER_NUMERICALRESPONSE: 6
WONGBAKER_NUMERICALRESPONSE: 4

## 2017-03-24 ASSESSMENT — PAIN DESCRIPTION - DESCRIPTORS: DESCRIPTORS: SHARP

## 2017-03-24 ASSESSMENT — PAIN SCALES - GENERAL
PAINLEVEL_OUTOF10: 5
PAINLEVEL_OUTOF10: 7
PAINLEVEL_OUTOF10: 6
PAINLEVEL_OUTOF10: 6
PAINLEVEL_OUTOF10: 5
PAINLEVEL_OUTOF10: 5

## 2017-03-24 ASSESSMENT — PAIN DESCRIPTION - PROGRESSION: CLINICAL_PROGRESSION: GRADUALLY IMPROVING

## 2017-03-24 ASSESSMENT — PAIN DESCRIPTION - ORIENTATION
ORIENTATION: RIGHT
ORIENTATION: RIGHT

## 2017-03-24 ASSESSMENT — PAIN DESCRIPTION - LOCATION
LOCATION: KNEE
LOCATION: KNEE

## 2017-03-24 ASSESSMENT — PAIN DESCRIPTION - PAIN TYPE: TYPE: ACUTE PAIN

## 2017-03-24 ASSESSMENT — PAIN DESCRIPTION - FREQUENCY: FREQUENCY: INTERMITTENT

## 2017-03-25 ENCOUNTER — HOSPITAL ENCOUNTER (INPATIENT)
Dept: DIALYSIS | Age: 67
Discharge: HOME | DRG: 485 | End: 2017-03-25
Payer: MEDICARE

## 2017-03-25 LAB
ABSOLUTE EOS #: 0.09 K/UL (ref 0–0.4)
ABSOLUTE LYMPH #: 0.32 K/UL (ref 1–4.8)
ABSOLUTE MONO #: 0.18 K/UL (ref 0.1–0.8)
ANION GAP SERPL CALCULATED.3IONS-SCNC: 15 MMOL/L (ref 9–17)
BASOPHILS # BLD: 3 % (ref 0–2)
BASOPHILS ABSOLUTE: 0.14 K/UL (ref 0–0.2)
BUN BLDV-MCNC: 32 MG/DL (ref 8–23)
BUN/CREAT BLD: ABNORMAL (ref 9–20)
C-REACTIVE PROTEIN: 151.2 MG/L (ref 0–5)
CALCIUM SERPL-MCNC: 8.2 MG/DL (ref 8.6–10.4)
CHLORIDE BLD-SCNC: 93 MMOL/L (ref 98–107)
CO2: 23 MMOL/L (ref 20–31)
CREAT SERPL-MCNC: 3.38 MG/DL (ref 0.7–1.2)
DIFFERENTIAL TYPE: ABNORMAL
EOSINOPHILS RELATIVE PERCENT: 2 % (ref 1–4)
GFR AFRICAN AMERICAN: 22 ML/MIN
GFR NON-AFRICAN AMERICAN: 18 ML/MIN
GFR SERPL CREATININE-BSD FRML MDRD: ABNORMAL ML/MIN/{1.73_M2}
GFR SERPL CREATININE-BSD FRML MDRD: ABNORMAL ML/MIN/{1.73_M2}
GLUCOSE BLD-MCNC: 144 MG/DL (ref 75–110)
GLUCOSE BLD-MCNC: 192 MG/DL (ref 75–110)
GLUCOSE BLD-MCNC: 201 MG/DL (ref 70–99)
GLUCOSE BLD-MCNC: 226 MG/DL (ref 75–110)
HCT VFR BLD CALC: 17 % (ref 41–53)
HCT VFR BLD CALC: 18.7 % (ref 41–53)
HCT VFR BLD CALC: 24.8 % (ref 41–53)
HEMOGLOBIN: 5.6 G/DL (ref 13.5–17.5)
HEMOGLOBIN: 6.1 G/DL (ref 13.5–17.5)
HEMOGLOBIN: 8.1 G/DL (ref 13.5–17.5)
LYMPHOCYTES # BLD: 7 % (ref 24–44)
MCH RBC QN AUTO: 29.6 PG (ref 26–34)
MCHC RBC AUTO-ENTMCNC: 33.2 G/DL (ref 31–37)
MCV RBC AUTO: 89.2 FL (ref 80–100)
MONOCYTES # BLD: 4 % (ref 1–7)
MORPHOLOGY: ABNORMAL
PDW BLD-RTO: 16.2 % (ref 12.5–15.4)
PLATELET # BLD: 236 K/UL (ref 140–450)
PLATELET ESTIMATE: ABNORMAL
PMV BLD AUTO: 6.7 FL (ref 6–12)
POTASSIUM SERPL-SCNC: 3.7 MMOL/L (ref 3.7–5.3)
RBC # BLD: 1.91 M/UL (ref 4.5–5.9)
RBC # BLD: ABNORMAL 10*6/UL
SEG NEUTROPHILS: 84 % (ref 36–66)
SEGMENTED NEUTROPHILS ABSOLUTE COUNT: 3.87 K/UL (ref 1.8–7.7)
SODIUM BLD-SCNC: 131 MMOL/L (ref 135–144)
WBC # BLD: 4.6 K/UL (ref 3.5–11)
WBC # BLD: ABNORMAL 10*3/UL

## 2017-03-25 PROCEDURE — 6370000000 HC RX 637 (ALT 250 FOR IP): Performed by: HOSPITALIST

## 2017-03-25 PROCEDURE — 6360000002 HC RX W HCPCS: Performed by: INTERNAL MEDICINE

## 2017-03-25 PROCEDURE — 80048 BASIC METABOLIC PNL TOTAL CA: CPT

## 2017-03-25 PROCEDURE — 86850 RBC ANTIBODY SCREEN: CPT

## 2017-03-25 PROCEDURE — 85025 COMPLETE CBC W/AUTO DIFF WBC: CPT

## 2017-03-25 PROCEDURE — 2580000003 HC RX 258: Performed by: ANESTHESIOLOGY

## 2017-03-25 PROCEDURE — 6370000000 HC RX 637 (ALT 250 FOR IP): Performed by: EMERGENCY MEDICINE

## 2017-03-25 PROCEDURE — 86140 C-REACTIVE PROTEIN: CPT

## 2017-03-25 PROCEDURE — 1200000000 HC SEMI PRIVATE

## 2017-03-25 PROCEDURE — 86900 BLOOD TYPING SEROLOGIC ABO: CPT

## 2017-03-25 PROCEDURE — 86901 BLOOD TYPING SEROLOGIC RH(D): CPT

## 2017-03-25 PROCEDURE — 85014 HEMATOCRIT: CPT

## 2017-03-25 PROCEDURE — 2580000003 HC RX 258: Performed by: INTERNAL MEDICINE

## 2017-03-25 PROCEDURE — 82947 ASSAY GLUCOSE BLOOD QUANT: CPT

## 2017-03-25 PROCEDURE — 85018 HEMOGLOBIN: CPT

## 2017-03-25 PROCEDURE — 2500000003 HC RX 250 WO HCPCS: Performed by: INTERNAL MEDICINE

## 2017-03-25 PROCEDURE — 99233 SBSQ HOSP IP/OBS HIGH 50: CPT | Performed by: INTERNAL MEDICINE

## 2017-03-25 PROCEDURE — P9016 RBC LEUKOCYTES REDUCED: HCPCS

## 2017-03-25 PROCEDURE — 86920 COMPATIBILITY TEST SPIN: CPT

## 2017-03-25 PROCEDURE — 36415 COLL VENOUS BLD VENIPUNCTURE: CPT

## 2017-03-25 PROCEDURE — 36430 TRANSFUSION BLD/BLD COMPNT: CPT

## 2017-03-25 PROCEDURE — 2580000003 HC RX 258: Performed by: EMERGENCY MEDICINE

## 2017-03-25 PROCEDURE — 90937 HEMODIALYSIS REPEATED EVAL: CPT

## 2017-03-25 RX ORDER — HALOPERIDOL 5 MG/ML
1 INJECTION INTRAMUSCULAR ONCE
Status: DISCONTINUED | OUTPATIENT
Start: 2017-03-25 | End: 2017-03-27 | Stop reason: HOSPADM

## 2017-03-25 RX ORDER — HEPARIN SODIUM 5000 [USP'U]/ML
5000 INJECTION, SOLUTION INTRAVENOUS; SUBCUTANEOUS EVERY 8 HOURS SCHEDULED
Status: DISCONTINUED | OUTPATIENT
Start: 2017-03-25 | End: 2017-03-26

## 2017-03-25 RX ADMIN — DARBEPOETIN ALFA 80 MCG: 40 INJECTION, SOLUTION INTRAVENOUS; SUBCUTANEOUS at 13:01

## 2017-03-25 RX ADMIN — OXYCODONE HYDROCHLORIDE AND ACETAMINOPHEN 2 TABLET: 5; 325 TABLET ORAL at 20:44

## 2017-03-25 RX ADMIN — Medication 10 ML: at 20:45

## 2017-03-25 RX ADMIN — HYDROMORPHONE HYDROCHLORIDE 0.5 MG: 1 INJECTION, SOLUTION INTRAMUSCULAR; INTRAVENOUS; SUBCUTANEOUS at 14:45

## 2017-03-25 RX ADMIN — OXYCODONE HYDROCHLORIDE AND ACETAMINOPHEN 2 TABLET: 5; 325 TABLET ORAL at 16:38

## 2017-03-25 RX ADMIN — DEXTROSE MONOHYDRATE 3 G: 50 INJECTION, SOLUTION INTRAVENOUS at 11:23

## 2017-03-25 RX ADMIN — Medication 1.6 ML: at 13:10

## 2017-03-25 RX ADMIN — FERROUS SULFATE TAB EC 325 MG (65 MG FE EQUIVALENT) 325 MG: 325 (65 FE) TABLET DELAYED RESPONSE at 20:44

## 2017-03-25 RX ADMIN — LEVOTHYROXINE SODIUM 50 MCG: 50 TABLET ORAL at 06:52

## 2017-03-25 RX ADMIN — OXYCODONE HYDROCHLORIDE AND ACETAMINOPHEN 2 TABLET: 5; 325 TABLET ORAL at 02:27

## 2017-03-25 RX ADMIN — INSULIN LISPRO 1 UNITS: 100 INJECTION, SOLUTION INTRAVENOUS; SUBCUTANEOUS at 20:56

## 2017-03-25 RX ADMIN — FERROUS SULFATE TAB EC 325 MG (65 MG FE EQUIVALENT) 325 MG: 325 (65 FE) TABLET DELAYED RESPONSE at 06:52

## 2017-03-25 RX ADMIN — OXYCODONE HYDROCHLORIDE AND ACETAMINOPHEN 2 TABLET: 5; 325 TABLET ORAL at 06:52

## 2017-03-25 RX ADMIN — MORPHINE SULFATE 30 MG: 15 TABLET ORAL at 10:16

## 2017-03-25 ASSESSMENT — PAIN SCALES - WONG BAKER
WONGBAKER_NUMERICALRESPONSE: 4
WONGBAKER_NUMERICALRESPONSE: 2

## 2017-03-25 ASSESSMENT — PAIN SCALES - GENERAL
PAINLEVEL_OUTOF10: 10
PAINLEVEL_OUTOF10: 10
PAINLEVEL_OUTOF10: 6
PAINLEVEL_OUTOF10: 9
PAINLEVEL_OUTOF10: 7
PAINLEVEL_OUTOF10: 3

## 2017-03-25 ASSESSMENT — PAIN DESCRIPTION - DESCRIPTORS
DESCRIPTORS: ACHING;CONSTANT;SORE;DISCOMFORT
DESCRIPTORS: ACHING;CONSTANT

## 2017-03-25 ASSESSMENT — PAIN DESCRIPTION - ORIENTATION
ORIENTATION: RIGHT;LEFT
ORIENTATION: RIGHT;LEFT
ORIENTATION: RIGHT
ORIENTATION: RIGHT

## 2017-03-25 ASSESSMENT — PAIN DESCRIPTION - LOCATION
LOCATION: KNEE
LOCATION: LEG;KNEE
LOCATION: LEG
LOCATION: KNEE

## 2017-03-25 ASSESSMENT — PAIN DESCRIPTION - PROGRESSION
CLINICAL_PROGRESSION: NOT CHANGED

## 2017-03-25 ASSESSMENT — PAIN DESCRIPTION - ONSET
ONSET: ON-GOING
ONSET: ON-GOING

## 2017-03-25 ASSESSMENT — PAIN DESCRIPTION - FREQUENCY
FREQUENCY: CONTINUOUS
FREQUENCY: INTERMITTENT

## 2017-03-25 ASSESSMENT — PAIN DESCRIPTION - PAIN TYPE
TYPE: ACUTE PAIN
TYPE: SURGICAL PAIN

## 2017-03-26 LAB
ABO/RH: NORMAL
ANTIBODY SCREEN: NEGATIVE
ARM BAND NUMBER: NORMAL
BLD PROD TYP BPU: NORMAL
BLD PROD TYP BPU: NORMAL
CROSSMATCH RESULT: NORMAL
CROSSMATCH RESULT: NORMAL
DATE, STOOL #1: NORMAL
DATE, STOOL #2: NORMAL
DATE, STOOL #3: NORMAL
DISPENSE STATUS BLOOD BANK: NORMAL
DISPENSE STATUS BLOOD BANK: NORMAL
EXPIRATION DATE: NORMAL
GLUCOSE BLD-MCNC: 108 MG/DL (ref 75–110)
GLUCOSE BLD-MCNC: 113 MG/DL (ref 75–110)
GLUCOSE BLD-MCNC: 133 MG/DL (ref 75–110)
GLUCOSE BLD-MCNC: 162 MG/DL (ref 75–110)
HCT VFR BLD CALC: 23.5 % (ref 41–53)
HEMOCCULT SP1 STL QL: NEGATIVE
HEMOCCULT SP2 STL QL: NORMAL
HEMOCCULT SP3 STL QL: NORMAL
HEMOGLOBIN: 7.9 G/DL (ref 13.5–17.5)
TIME, STOOL #1: 232
TIME, STOOL #2: NORMAL
TIME, STOOL #3: NORMAL
TRANSFUSION STATUS: NORMAL
TRANSFUSION STATUS: NORMAL
UNIT DIVISION: 0
UNIT DIVISION: 0
UNIT NUMBER: NORMAL
UNIT NUMBER: NORMAL

## 2017-03-26 PROCEDURE — 82947 ASSAY GLUCOSE BLOOD QUANT: CPT

## 2017-03-26 PROCEDURE — 6370000000 HC RX 637 (ALT 250 FOR IP): Performed by: EMERGENCY MEDICINE

## 2017-03-26 PROCEDURE — 2580000003 HC RX 258: Performed by: EMERGENCY MEDICINE

## 2017-03-26 PROCEDURE — 2580000003 HC RX 258: Performed by: ANESTHESIOLOGY

## 2017-03-26 PROCEDURE — G0328 FECAL BLOOD SCRN IMMUNOASSAY: HCPCS

## 2017-03-26 PROCEDURE — 36415 COLL VENOUS BLD VENIPUNCTURE: CPT

## 2017-03-26 PROCEDURE — 99232 SBSQ HOSP IP/OBS MODERATE 35: CPT | Performed by: INTERNAL MEDICINE

## 2017-03-26 PROCEDURE — 1200000000 HC SEMI PRIVATE

## 2017-03-26 PROCEDURE — 6370000000 HC RX 637 (ALT 250 FOR IP): Performed by: HOSPITALIST

## 2017-03-26 PROCEDURE — 6370000000 HC RX 637 (ALT 250 FOR IP): Performed by: INTERNAL MEDICINE

## 2017-03-26 PROCEDURE — 85018 HEMOGLOBIN: CPT

## 2017-03-26 PROCEDURE — 85014 HEMATOCRIT: CPT

## 2017-03-26 RX ADMIN — ISOSORBIDE MONONITRATE 30 MG: 30 TABLET ORAL at 08:27

## 2017-03-26 RX ADMIN — MORPHINE SULFATE 30 MG: 15 TABLET ORAL at 02:54

## 2017-03-26 RX ADMIN — PANTOPRAZOLE SODIUM 40 MG: 40 TABLET, DELAYED RELEASE ORAL at 08:27

## 2017-03-26 RX ADMIN — OXYCODONE HYDROCHLORIDE AND ACETAMINOPHEN 2 TABLET: 5; 325 TABLET ORAL at 13:54

## 2017-03-26 RX ADMIN — MORPHINE SULFATE 30 MG: 15 TABLET ORAL at 20:18

## 2017-03-26 RX ADMIN — Medication 10 ML: at 08:28

## 2017-03-26 RX ADMIN — AMLODIPINE BESYLATE 5 MG: 5 TABLET ORAL at 08:27

## 2017-03-26 RX ADMIN — OXYCODONE HYDROCHLORIDE AND ACETAMINOPHEN 2 TABLET: 5; 325 TABLET ORAL at 05:29

## 2017-03-26 RX ADMIN — FERROUS SULFATE TAB EC 325 MG (65 MG FE EQUIVALENT) 325 MG: 325 (65 FE) TABLET DELAYED RESPONSE at 06:49

## 2017-03-26 RX ADMIN — FERROUS SULFATE TAB EC 325 MG (65 MG FE EQUIVALENT) 325 MG: 325 (65 FE) TABLET DELAYED RESPONSE at 17:43

## 2017-03-26 RX ADMIN — INSULIN LISPRO 1 UNITS: 100 INJECTION, SOLUTION INTRAVENOUS; SUBCUTANEOUS at 21:35

## 2017-03-26 RX ADMIN — LEVOTHYROXINE SODIUM 50 MCG: 50 TABLET ORAL at 08:27

## 2017-03-26 RX ADMIN — OXYCODONE HYDROCHLORIDE AND ACETAMINOPHEN 2 TABLET: 5; 325 TABLET ORAL at 09:44

## 2017-03-26 RX ADMIN — VENLAFAXINE 112.5 MG: 75 TABLET ORAL at 08:27

## 2017-03-26 RX ADMIN — OXYCODONE HYDROCHLORIDE AND ACETAMINOPHEN 2 TABLET: 5; 325 TABLET ORAL at 00:47

## 2017-03-26 RX ADMIN — LAMOTRIGINE 100 MG: 100 TABLET ORAL at 08:27

## 2017-03-26 RX ADMIN — VITAMIN D, TAB 1000IU (100/BT) 1000 UNITS: 25 TAB at 08:27

## 2017-03-26 RX ADMIN — OXYCODONE HYDROCHLORIDE AND ACETAMINOPHEN 2 TABLET: 5; 325 TABLET ORAL at 17:42

## 2017-03-26 RX ADMIN — Medication 10 ML: at 21:35

## 2017-03-26 ASSESSMENT — PAIN SCALES - GENERAL
PAINLEVEL_OUTOF10: 5
PAINLEVEL_OUTOF10: 3
PAINLEVEL_OUTOF10: 7
PAINLEVEL_OUTOF10: 3
PAINLEVEL_OUTOF10: 8
PAINLEVEL_OUTOF10: 7
PAINLEVEL_OUTOF10: 6
PAINLEVEL_OUTOF10: 3
PAINLEVEL_OUTOF10: 7
PAINLEVEL_OUTOF10: 8
PAINLEVEL_OUTOF10: 7
PAINLEVEL_OUTOF10: 2
PAINLEVEL_OUTOF10: 7
PAINLEVEL_OUTOF10: 0

## 2017-03-26 ASSESSMENT — PAIN DESCRIPTION - ORIENTATION
ORIENTATION: RIGHT
ORIENTATION: RIGHT

## 2017-03-26 ASSESSMENT — PAIN DESCRIPTION - FREQUENCY
FREQUENCY: CONTINUOUS
FREQUENCY: CONTINUOUS

## 2017-03-26 ASSESSMENT — PAIN DESCRIPTION - LOCATION
LOCATION: KNEE
LOCATION: KNEE

## 2017-03-26 ASSESSMENT — PAIN DESCRIPTION - ONSET
ONSET: ON-GOING
ONSET: ON-GOING

## 2017-03-26 ASSESSMENT — PAIN DESCRIPTION - DESCRIPTORS
DESCRIPTORS: ACHING;CONSTANT
DESCRIPTORS: ACHING;CONSTANT;SORE;DISCOMFORT

## 2017-03-26 ASSESSMENT — PAIN DESCRIPTION - PROGRESSION
CLINICAL_PROGRESSION: GRADUALLY IMPROVING
CLINICAL_PROGRESSION: GRADUALLY IMPROVING

## 2017-03-26 ASSESSMENT — PAIN DESCRIPTION - PAIN TYPE
TYPE: ACUTE PAIN
TYPE: ACUTE PAIN

## 2017-03-27 ENCOUNTER — APPOINTMENT (OUTPATIENT)
Dept: CT IMAGING | Age: 67
DRG: 485 | End: 2017-03-27
Payer: MEDICARE

## 2017-03-27 VITALS
TEMPERATURE: 98.5 F | OXYGEN SATURATION: 99 % | WEIGHT: 227.51 LBS | RESPIRATION RATE: 17 BRPM | HEART RATE: 77 BPM | BODY MASS INDEX: 30.86 KG/M2 | DIASTOLIC BLOOD PRESSURE: 49 MMHG | SYSTOLIC BLOOD PRESSURE: 119 MMHG

## 2017-03-27 LAB
C-REACTIVE PROTEIN: 78.2 MG/L (ref 0–5)
GLUCOSE BLD-MCNC: 123 MG/DL (ref 75–110)
GLUCOSE BLD-MCNC: 86 MG/DL (ref 75–110)
HCT VFR BLD CALC: 23.2 % (ref 41–53)
HEMOGLOBIN: 7.7 G/DL (ref 13.5–17.5)
MCH RBC QN AUTO: 29.6 PG (ref 26–34)
MCHC RBC AUTO-ENTMCNC: 33.2 G/DL (ref 31–37)
MCV RBC AUTO: 89.3 FL (ref 80–100)
PDW BLD-RTO: 16.2 % (ref 12.5–15.4)
PLATELET # BLD: 254 K/UL (ref 140–450)
PMV BLD AUTO: 6.4 FL (ref 6–12)
RBC # BLD: 2.59 M/UL (ref 4.5–5.9)
WBC # BLD: 5.2 K/UL (ref 3.5–11)

## 2017-03-27 PROCEDURE — 6370000000 HC RX 637 (ALT 250 FOR IP): Performed by: EMERGENCY MEDICINE

## 2017-03-27 PROCEDURE — 85027 COMPLETE CBC AUTOMATED: CPT

## 2017-03-27 PROCEDURE — 74177 CT ABD & PELVIS W/CONTRAST: CPT

## 2017-03-27 PROCEDURE — 6370000000 HC RX 637 (ALT 250 FOR IP): Performed by: INTERNAL MEDICINE

## 2017-03-27 PROCEDURE — 99232 SBSQ HOSP IP/OBS MODERATE 35: CPT | Performed by: INTERNAL MEDICINE

## 2017-03-27 PROCEDURE — 2580000003 HC RX 258: Performed by: ANESTHESIOLOGY

## 2017-03-27 PROCEDURE — 86140 C-REACTIVE PROTEIN: CPT

## 2017-03-27 PROCEDURE — 97530 THERAPEUTIC ACTIVITIES: CPT

## 2017-03-27 PROCEDURE — 82947 ASSAY GLUCOSE BLOOD QUANT: CPT

## 2017-03-27 PROCEDURE — 6360000004 HC RX CONTRAST MEDICATION: Performed by: INTERNAL MEDICINE

## 2017-03-27 PROCEDURE — 36415 COLL VENOUS BLD VENIPUNCTURE: CPT

## 2017-03-27 RX ORDER — AMLODIPINE BESYLATE 5 MG/1
5 TABLET ORAL DAILY
Qty: 30 TABLET | Refills: 3 | Status: ON HOLD | OUTPATIENT
Start: 2017-03-27 | End: 2018-09-17

## 2017-03-27 RX ORDER — OXYCODONE HYDROCHLORIDE AND ACETAMINOPHEN 5; 325 MG/1; MG/1
1 TABLET ORAL EVERY 4 HOURS PRN
Qty: 20 TABLET | Refills: 0 | Status: SHIPPED | OUTPATIENT
Start: 2017-03-27 | End: 2017-04-03

## 2017-03-27 RX ORDER — TORSEMIDE 20 MG/1
20 TABLET ORAL 2 TIMES DAILY
Qty: 30 TABLET | Refills: 3 | Status: ON HOLD | OUTPATIENT
Start: 2017-03-27 | End: 2018-09-19 | Stop reason: HOSPADM

## 2017-03-27 RX ADMIN — LEVOTHYROXINE SODIUM 50 MCG: 50 TABLET ORAL at 07:39

## 2017-03-27 RX ADMIN — FERROUS SULFATE TAB EC 325 MG (65 MG FE EQUIVALENT) 325 MG: 325 (65 FE) TABLET DELAYED RESPONSE at 07:39

## 2017-03-27 RX ADMIN — LAMOTRIGINE 100 MG: 100 TABLET ORAL at 10:13

## 2017-03-27 RX ADMIN — OXYCODONE HYDROCHLORIDE AND ACETAMINOPHEN 2 TABLET: 5; 325 TABLET ORAL at 14:36

## 2017-03-27 RX ADMIN — ISOSORBIDE MONONITRATE 30 MG: 30 TABLET ORAL at 10:14

## 2017-03-27 RX ADMIN — Medication 10 ML: at 10:49

## 2017-03-27 RX ADMIN — OXYCODONE HYDROCHLORIDE AND ACETAMINOPHEN 2 TABLET: 5; 325 TABLET ORAL at 00:45

## 2017-03-27 RX ADMIN — AMLODIPINE BESYLATE 5 MG: 5 TABLET ORAL at 10:16

## 2017-03-27 RX ADMIN — VITAMIN D, TAB 1000IU (100/BT) 1000 UNITS: 25 TAB at 10:13

## 2017-03-27 RX ADMIN — OXYCODONE HYDROCHLORIDE AND ACETAMINOPHEN 2 TABLET: 5; 325 TABLET ORAL at 10:13

## 2017-03-27 RX ADMIN — ASPIRIN 81 MG: 81 TABLET, COATED ORAL at 10:14

## 2017-03-27 RX ADMIN — VENLAFAXINE 112.5 MG: 75 TABLET ORAL at 10:13

## 2017-03-27 RX ADMIN — PANTOPRAZOLE SODIUM 40 MG: 40 TABLET, DELAYED RELEASE ORAL at 10:14

## 2017-03-27 RX ADMIN — OXYCODONE HYDROCHLORIDE AND ACETAMINOPHEN 2 TABLET: 5; 325 TABLET ORAL at 05:07

## 2017-03-27 RX ADMIN — IOHEXOL 130 ML: 350 INJECTION, SOLUTION INTRAVENOUS at 13:05

## 2017-03-27 RX ADMIN — MIDODRINE HYDROCHLORIDE 5 MG: 5 TABLET ORAL at 10:13

## 2017-03-27 ASSESSMENT — PAIN DESCRIPTION - ORIENTATION
ORIENTATION: RIGHT
ORIENTATION: RIGHT

## 2017-03-27 ASSESSMENT — PAIN SCALES - GENERAL
PAINLEVEL_OUTOF10: 7
PAINLEVEL_OUTOF10: 10
PAINLEVEL_OUTOF10: 7
PAINLEVEL_OUTOF10: 3
PAINLEVEL_OUTOF10: 8
PAINLEVEL_OUTOF10: 10
PAINLEVEL_OUTOF10: 5
PAINLEVEL_OUTOF10: 3

## 2017-03-27 ASSESSMENT — PAIN DESCRIPTION - DESCRIPTORS: DESCRIPTORS: ACHING;DISCOMFORT;CONSTANT

## 2017-03-27 ASSESSMENT — PAIN DESCRIPTION - ONSET: ONSET: ON-GOING

## 2017-03-27 ASSESSMENT — PAIN DESCRIPTION - LOCATION
LOCATION: KNEE
LOCATION: KNEE

## 2017-03-27 ASSESSMENT — PAIN DESCRIPTION - PAIN TYPE
TYPE: ACUTE PAIN
TYPE: ACUTE PAIN

## 2017-03-27 ASSESSMENT — PAIN DESCRIPTION - PROGRESSION: CLINICAL_PROGRESSION: NOT CHANGED

## 2017-03-27 ASSESSMENT — PAIN DESCRIPTION - FREQUENCY: FREQUENCY: CONTINUOUS

## 2017-04-05 ENCOUNTER — HOSPITAL ENCOUNTER (OUTPATIENT)
Age: 67
Setting detail: SPECIMEN
Discharge: HOME | End: 2017-04-05
Payer: MEDICARE

## 2017-04-05 LAB
C DIFFICILE TOXINS, PCR: NORMAL
CAMPYLOBACTER PCR: NORMAL
SALMONELLA PCR: NORMAL
SHIGATOXIN GENE PCR: NORMAL
SHIGELLA SP PCR: NORMAL
SPECIMEN DESCRIPTION: NORMAL
SPECIMEN: NORMAL

## 2017-04-05 PROCEDURE — 87505 NFCT AGENT DETECTION GI: CPT

## 2017-04-05 PROCEDURE — 87493 C DIFF AMPLIFIED PROBE: CPT

## 2017-04-26 ENCOUNTER — APPOINTMENT (OUTPATIENT)
Dept: GENERAL RADIOLOGY | Age: 67
DRG: 553 | End: 2017-04-26
Payer: MEDICARE

## 2017-04-26 ENCOUNTER — HOSPITAL ENCOUNTER (INPATIENT)
Age: 67
LOS: 5 days | Discharge: NURSING, REHAB OR LONG-TERM CARE | DRG: 553 | End: 2017-05-01
Attending: EMERGENCY MEDICINE | Admitting: EMERGENCY MEDICINE
Payer: MEDICARE

## 2017-04-26 DIAGNOSIS — M25.561 RIGHT KNEE PAIN, UNSPECIFIED CHRONICITY: Primary | ICD-10-CM

## 2017-04-26 DIAGNOSIS — R73.9 HYPERGLYCEMIA: ICD-10-CM

## 2017-04-26 DIAGNOSIS — R79.89 ELEVATED SERUM CREATININE: ICD-10-CM

## 2017-04-26 DIAGNOSIS — D64.9 ANEMIA, UNSPECIFIED TYPE: ICD-10-CM

## 2017-04-26 DIAGNOSIS — D69.6 THROMBOCYTOPENIA (HCC): ICD-10-CM

## 2017-04-26 DIAGNOSIS — R79.82 ELEVATED C-REACTIVE PROTEIN (CRP): ICD-10-CM

## 2017-04-26 LAB
ABSOLUTE EOS #: 0.06 K/UL (ref 0–0.4)
ABSOLUTE LYMPH #: 0.7 K/UL (ref 1–4.8)
ABSOLUTE MONO #: 0.41 K/UL (ref 0.1–0.8)
ANION GAP SERPL CALCULATED.3IONS-SCNC: 15 MMOL/L (ref 9–17)
BASOPHILS # BLD: 1 %
BASOPHILS ABSOLUTE: 0.06 K/UL (ref 0–0.2)
BUN BLDV-MCNC: 31 MG/DL (ref 8–23)
BUN/CREAT BLD: ABNORMAL (ref 9–20)
C-REACTIVE PROTEIN: 41.7 MG/L (ref 0–5)
CALCIUM SERPL-MCNC: 8.2 MG/DL (ref 8.6–10.4)
CHLORIDE BLD-SCNC: 96 MMOL/L (ref 98–107)
CO2: 25 MMOL/L (ref 20–31)
CREAT SERPL-MCNC: 3.49 MG/DL (ref 0.7–1.2)
DIFFERENTIAL TYPE: ABNORMAL
EOSINOPHILS RELATIVE PERCENT: 1 %
GFR AFRICAN AMERICAN: 21 ML/MIN
GFR NON-AFRICAN AMERICAN: 18 ML/MIN
GFR SERPL CREATININE-BSD FRML MDRD: ABNORMAL ML/MIN/{1.73_M2}
GFR SERPL CREATININE-BSD FRML MDRD: ABNORMAL ML/MIN/{1.73_M2}
GLUCOSE BLD-MCNC: 162 MG/DL (ref 75–110)
GLUCOSE BLD-MCNC: 187 MG/DL (ref 70–99)
HCT VFR BLD CALC: 25.6 % (ref 41–53)
HEMOGLOBIN: 8.1 G/DL (ref 13.5–17.5)
LYMPHOCYTES # BLD: 12 %
MCH RBC QN AUTO: 30 PG (ref 26–34)
MCHC RBC AUTO-ENTMCNC: 31.8 G/DL (ref 31–37)
MCV RBC AUTO: 94.4 FL (ref 80–100)
MONOCYTES # BLD: 7 %
MORPHOLOGY: ABNORMAL
PDW BLD-RTO: 19.6 % (ref 12.5–15.4)
PLATELET # BLD: 127 K/UL (ref 140–450)
PLATELET ESTIMATE: ABNORMAL
PMV BLD AUTO: 7 FL (ref 6–12)
POC LACTIC ACID, VEN: 2.02 MMOL/L (ref 0.9–1.7)
POTASSIUM SERPL-SCNC: 3.8 MMOL/L (ref 3.7–5.3)
RBC # BLD: 2.71 M/UL (ref 4.5–5.9)
RBC # BLD: ABNORMAL 10*6/UL
SEDIMENTATION RATE, ERYTHROCYTE: 10 MM (ref 0–10)
SEG NEUTROPHILS: 79 %
SEGMENTED NEUTROPHILS ABSOLUTE COUNT: 4.57 K/UL (ref 1.8–7.7)
SODIUM BLD-SCNC: 136 MMOL/L (ref 135–144)
WBC # BLD: 5.8 K/UL (ref 3.5–11)
WBC # BLD: ABNORMAL 10*3/UL

## 2017-04-26 PROCEDURE — 72170 X-RAY EXAM OF PELVIS: CPT

## 2017-04-26 PROCEDURE — 1200000000 HC SEMI PRIVATE

## 2017-04-26 PROCEDURE — 2580000003 HC RX 258: Performed by: EMERGENCY MEDICINE

## 2017-04-26 PROCEDURE — 83550 IRON BINDING TEST: CPT

## 2017-04-26 PROCEDURE — 6370000000 HC RX 637 (ALT 250 FOR IP): Performed by: EMERGENCY MEDICINE

## 2017-04-26 PROCEDURE — 85025 COMPLETE CBC W/AUTO DIFF WBC: CPT

## 2017-04-26 PROCEDURE — 73562 X-RAY EXAM OF KNEE 3: CPT

## 2017-04-26 PROCEDURE — 80048 BASIC METABOLIC PNL TOTAL CA: CPT

## 2017-04-26 PROCEDURE — 83605 ASSAY OF LACTIC ACID: CPT

## 2017-04-26 PROCEDURE — 99284 EMERGENCY DEPT VISIT MOD MDM: CPT

## 2017-04-26 PROCEDURE — 83540 ASSAY OF IRON: CPT

## 2017-04-26 PROCEDURE — 82947 ASSAY GLUCOSE BLOOD QUANT: CPT

## 2017-04-26 PROCEDURE — 86140 C-REACTIVE PROTEIN: CPT

## 2017-04-26 PROCEDURE — 85651 RBC SED RATE NONAUTOMATED: CPT

## 2017-04-26 RX ORDER — SODIUM CHLORIDE 0.9 % (FLUSH) 0.9 %
10 SYRINGE (ML) INJECTION EVERY 12 HOURS SCHEDULED
Status: DISCONTINUED | OUTPATIENT
Start: 2017-04-26 | End: 2017-05-01 | Stop reason: HOSPADM

## 2017-04-26 RX ORDER — DEXTROSE MONOHYDRATE 50 MG/ML
100 INJECTION, SOLUTION INTRAVENOUS PRN
Status: DISCONTINUED | OUTPATIENT
Start: 2017-04-26 | End: 2017-05-01 | Stop reason: HOSPADM

## 2017-04-26 RX ORDER — PANTOPRAZOLE SODIUM 40 MG/1
40 TABLET, DELAYED RELEASE ORAL DAILY
Status: DISCONTINUED | OUTPATIENT
Start: 2017-04-26 | End: 2017-05-01 | Stop reason: HOSPADM

## 2017-04-26 RX ORDER — AMLODIPINE BESYLATE 5 MG/1
5 TABLET ORAL DAILY
Status: DISCONTINUED | OUTPATIENT
Start: 2017-04-27 | End: 2017-05-01 | Stop reason: HOSPADM

## 2017-04-26 RX ORDER — DEXTROSE MONOHYDRATE 25 G/50ML
12.5 INJECTION, SOLUTION INTRAVENOUS PRN
Status: DISCONTINUED | OUTPATIENT
Start: 2017-04-26 | End: 2017-05-01 | Stop reason: HOSPADM

## 2017-04-26 RX ORDER — HYDROCORTISONE ACETATE 25 MG/1
25 SUPPOSITORY RECTAL DAILY
Status: DISCONTINUED | OUTPATIENT
Start: 2017-04-26 | End: 2017-05-01 | Stop reason: HOSPADM

## 2017-04-26 RX ORDER — ASPIRIN 81 MG/1
81 TABLET, CHEWABLE ORAL EVERY OTHER DAY
Status: DISCONTINUED | OUTPATIENT
Start: 2017-04-27 | End: 2017-05-01 | Stop reason: HOSPADM

## 2017-04-26 RX ORDER — OXYCODONE HYDROCHLORIDE AND ACETAMINOPHEN 5; 325 MG/1; MG/1
1 TABLET ORAL ONCE
Status: COMPLETED | OUTPATIENT
Start: 2017-04-26 | End: 2017-04-26

## 2017-04-26 RX ORDER — LEVOTHYROXINE SODIUM 0.05 MG/1
50 TABLET ORAL DAILY
Status: DISCONTINUED | OUTPATIENT
Start: 2017-04-26 | End: 2017-05-01 | Stop reason: HOSPADM

## 2017-04-26 RX ORDER — OXYCODONE HYDROCHLORIDE AND ACETAMINOPHEN 5; 325 MG/1; MG/1
1 TABLET ORAL EVERY 4 HOURS PRN
Status: DISCONTINUED | OUTPATIENT
Start: 2017-04-26 | End: 2017-05-01 | Stop reason: HOSPADM

## 2017-04-26 RX ORDER — NICOTINE POLACRILEX 4 MG
15 LOZENGE BUCCAL PRN
Status: DISCONTINUED | OUTPATIENT
Start: 2017-04-26 | End: 2017-05-01 | Stop reason: HOSPADM

## 2017-04-26 RX ORDER — SODIUM CHLORIDE 0.9 % (FLUSH) 0.9 %
10 SYRINGE (ML) INJECTION PRN
Status: DISCONTINUED | OUTPATIENT
Start: 2017-04-26 | End: 2017-05-01 | Stop reason: HOSPADM

## 2017-04-26 RX ORDER — LAMOTRIGINE 100 MG/1
100 TABLET ORAL DAILY
Status: DISCONTINUED | OUTPATIENT
Start: 2017-04-26 | End: 2017-05-01 | Stop reason: HOSPADM

## 2017-04-26 RX ORDER — MIDODRINE HYDROCHLORIDE 5 MG/1
5 TABLET ORAL PRN
Status: DISCONTINUED | OUTPATIENT
Start: 2017-04-26 | End: 2017-05-01 | Stop reason: HOSPADM

## 2017-04-26 RX ORDER — LANOLIN ALCOHOL/MO/W.PET/CERES
325 CREAM (GRAM) TOPICAL
Status: DISCONTINUED | OUTPATIENT
Start: 2017-04-26 | End: 2017-04-27

## 2017-04-26 RX ORDER — ACETAMINOPHEN 325 MG/1
650 TABLET ORAL EVERY 6 HOURS PRN
Status: DISCONTINUED | OUTPATIENT
Start: 2017-04-26 | End: 2017-05-01 | Stop reason: HOSPADM

## 2017-04-26 RX ORDER — TORSEMIDE 20 MG/1
20 TABLET ORAL 2 TIMES DAILY
Status: DISCONTINUED | OUTPATIENT
Start: 2017-04-26 | End: 2017-05-01 | Stop reason: HOSPADM

## 2017-04-26 RX ORDER — ISOSORBIDE MONONITRATE 30 MG/1
30 TABLET, EXTENDED RELEASE ORAL DAILY
Status: DISCONTINUED | OUTPATIENT
Start: 2017-04-26 | End: 2017-05-01 | Stop reason: HOSPADM

## 2017-04-26 RX ORDER — SODIUM BICARBONATE 650 MG/1
1950 TABLET ORAL 3 TIMES DAILY
Status: DISCONTINUED | OUTPATIENT
Start: 2017-04-26 | End: 2017-04-27

## 2017-04-26 RX ADMIN — SODIUM BICARBONATE 1960 MG: 650 TABLET, ORALLY DISINTEGRATING ORAL at 17:47

## 2017-04-26 RX ADMIN — OXYCODONE HYDROCHLORIDE AND ACETAMINOPHEN 1 TABLET: 5; 325 TABLET ORAL at 20:00

## 2017-04-26 RX ADMIN — Medication 10 ML: at 20:12

## 2017-04-26 RX ADMIN — FERROUS SULFATE TAB EC 325 MG (65 MG FE EQUIVALENT) 325 MG: 325 (65 FE) TABLET DELAYED RESPONSE at 17:44

## 2017-04-26 RX ADMIN — OXYCODONE HYDROCHLORIDE AND ACETAMINOPHEN 1 TABLET: 5; 325 TABLET ORAL at 14:34

## 2017-04-26 RX ADMIN — TORSEMIDE 20 MG: 20 TABLET ORAL at 20:00

## 2017-04-26 RX ADMIN — OXYCODONE HYDROCHLORIDE AND ACETAMINOPHEN 1 TABLET: 5; 325 TABLET ORAL at 23:42

## 2017-04-26 ASSESSMENT — PAIN DESCRIPTION - LOCATION
LOCATION: KNEE
LOCATION: KNEE

## 2017-04-26 ASSESSMENT — PAIN SCALES - GENERAL
PAINLEVEL_OUTOF10: 4
PAINLEVEL_OUTOF10: 7
PAINLEVEL_OUTOF10: 5
PAINLEVEL_OUTOF10: 10
PAINLEVEL_OUTOF10: 9
PAINLEVEL_OUTOF10: 5
PAINLEVEL_OUTOF10: 9

## 2017-04-26 ASSESSMENT — PAIN DESCRIPTION - ONSET: ONSET: ON-GOING

## 2017-04-26 ASSESSMENT — PAIN DESCRIPTION - PROGRESSION
CLINICAL_PROGRESSION: NOT CHANGED
CLINICAL_PROGRESSION: NOT CHANGED

## 2017-04-26 ASSESSMENT — PAIN DESCRIPTION - ORIENTATION
ORIENTATION: RIGHT
ORIENTATION: RIGHT

## 2017-04-26 ASSESSMENT — ENCOUNTER SYMPTOMS: SHORTNESS OF BREATH: 0

## 2017-04-26 ASSESSMENT — PAIN DESCRIPTION - DESCRIPTORS
DESCRIPTORS: ACHING
DESCRIPTORS: ACHING

## 2017-04-26 ASSESSMENT — PAIN DESCRIPTION - PAIN TYPE
TYPE: ACUTE PAIN
TYPE: ACUTE PAIN

## 2017-04-26 ASSESSMENT — PAIN DESCRIPTION - FREQUENCY
FREQUENCY: CONTINUOUS
FREQUENCY: CONTINUOUS

## 2017-04-27 LAB
GLUCOSE BLD-MCNC: 117 MG/DL (ref 75–110)
GLUCOSE BLD-MCNC: 121 MG/DL (ref 75–110)
GLUCOSE BLD-MCNC: 164 MG/DL (ref 75–110)
IRON SATURATION: 33 % (ref 20–55)
IRON: 39 UG/DL (ref 59–158)
TOTAL IRON BINDING CAPACITY: 118 UG/DL (ref 250–450)
UNSATURATED IRON BINDING CAPACITY: 79 UG/DL (ref 112–347)

## 2017-04-27 PROCEDURE — 6370000000 HC RX 637 (ALT 250 FOR IP): Performed by: EMERGENCY MEDICINE

## 2017-04-27 PROCEDURE — 90935 HEMODIALYSIS ONE EVALUATION: CPT

## 2017-04-27 PROCEDURE — 97162 PT EVAL MOD COMPLEX 30 MIN: CPT

## 2017-04-27 PROCEDURE — 2580000003 HC RX 258: Performed by: EMERGENCY MEDICINE

## 2017-04-27 PROCEDURE — G8979 MOBILITY GOAL STATUS: HCPCS

## 2017-04-27 PROCEDURE — 97535 SELF CARE MNGMENT TRAINING: CPT

## 2017-04-27 PROCEDURE — 82947 ASSAY GLUCOSE BLOOD QUANT: CPT

## 2017-04-27 PROCEDURE — 90937 HEMODIALYSIS REPEATED EVAL: CPT

## 2017-04-27 PROCEDURE — 1200000000 HC SEMI PRIVATE

## 2017-04-27 PROCEDURE — 97530 THERAPEUTIC ACTIVITIES: CPT

## 2017-04-27 PROCEDURE — 6370000000 HC RX 637 (ALT 250 FOR IP): Performed by: INTERNAL MEDICINE

## 2017-04-27 PROCEDURE — G8987 SELF CARE CURRENT STATUS: HCPCS

## 2017-04-27 PROCEDURE — G8988 SELF CARE GOAL STATUS: HCPCS

## 2017-04-27 PROCEDURE — 2500000003 HC RX 250 WO HCPCS: Performed by: INTERNAL MEDICINE

## 2017-04-27 PROCEDURE — 97166 OT EVAL MOD COMPLEX 45 MIN: CPT

## 2017-04-27 PROCEDURE — 5A1D60Z PERFORMANCE OF URINARY FILTRATION, MULTIPLE: ICD-10-PCS | Performed by: INTERNAL MEDICINE

## 2017-04-27 PROCEDURE — G0378 HOSPITAL OBSERVATION PER HR: HCPCS

## 2017-04-27 PROCEDURE — G8978 MOBILITY CURRENT STATUS: HCPCS

## 2017-04-27 RX ORDER — 0.9 % SODIUM CHLORIDE 0.9 %
250 INTRAVENOUS SOLUTION INTRAVENOUS PRN
Status: DISCONTINUED | OUTPATIENT
Start: 2017-04-27 | End: 2017-05-01 | Stop reason: HOSPADM

## 2017-04-27 RX ORDER — 0.9 % SODIUM CHLORIDE 0.9 %
150 INTRAVENOUS SOLUTION INTRAVENOUS PRN
Status: DISCONTINUED | OUTPATIENT
Start: 2017-04-27 | End: 2017-05-01

## 2017-04-27 RX ORDER — SODIUM BICARBONATE 650 MG/1
650 TABLET ORAL 2 TIMES DAILY
Status: DISCONTINUED | OUTPATIENT
Start: 2017-04-27 | End: 2017-05-01 | Stop reason: HOSPADM

## 2017-04-27 RX ORDER — MIDODRINE HYDROCHLORIDE 5 MG/1
5 TABLET ORAL PRN
Status: DISPENSED | OUTPATIENT
Start: 2017-04-27 | End: 2017-04-27

## 2017-04-27 RX ADMIN — Medication 10 ML: at 15:00

## 2017-04-27 RX ADMIN — ASPIRIN 81 MG: 81 TABLET, CHEWABLE ORAL at 14:56

## 2017-04-27 RX ADMIN — ISOSORBIDE MONONITRATE 30 MG: 30 TABLET, EXTENDED RELEASE ORAL at 14:57

## 2017-04-27 RX ADMIN — MIDODRINE HYDROCHLORIDE 5 MG: 5 TABLET ORAL at 11:41

## 2017-04-27 RX ADMIN — SODIUM BICARBONATE 650 MG: 650 TABLET, ORALLY DISINTEGRATING ORAL at 20:43

## 2017-04-27 RX ADMIN — LAMOTRIGINE 100 MG: 100 TABLET ORAL at 14:58

## 2017-04-27 RX ADMIN — Medication 10 ML: at 20:49

## 2017-04-27 RX ADMIN — Medication 1.6 ML: at 14:10

## 2017-04-27 RX ADMIN — MIDODRINE HYDROCHLORIDE 5 MG: 5 TABLET ORAL at 12:50

## 2017-04-27 RX ADMIN — OXYCODONE HYDROCHLORIDE AND ACETAMINOPHEN 1 TABLET: 5; 325 TABLET ORAL at 15:10

## 2017-04-27 RX ADMIN — SODIUM BICARBONATE 650 MG: 650 TABLET, ORALLY DISINTEGRATING ORAL at 14:56

## 2017-04-27 RX ADMIN — INSULIN LISPRO 1 UNITS: 100 INJECTION, SOLUTION INTRAVENOUS; SUBCUTANEOUS at 20:46

## 2017-04-27 RX ADMIN — AMLODIPINE BESYLATE 5 MG: 5 TABLET ORAL at 14:57

## 2017-04-27 RX ADMIN — VENLAFAXINE 112.5 MG: 75 TABLET ORAL at 14:57

## 2017-04-27 RX ADMIN — OXYCODONE HYDROCHLORIDE AND ACETAMINOPHEN 1 TABLET: 5; 325 TABLET ORAL at 09:34

## 2017-04-27 RX ADMIN — PANTOPRAZOLE SODIUM 40 MG: 40 TABLET, DELAYED RELEASE ORAL at 14:57

## 2017-04-27 RX ADMIN — TORSEMIDE 20 MG: 20 TABLET ORAL at 20:43

## 2017-04-27 RX ADMIN — LEVOTHYROXINE SODIUM 50 MCG: 50 TABLET ORAL at 05:40

## 2017-04-27 RX ADMIN — FERROUS SULFATE TAB EC 325 MG (65 MG FE EQUIVALENT) 325 MG: 325 (65 FE) TABLET DELAYED RESPONSE at 05:40

## 2017-04-27 RX ADMIN — OXYCODONE HYDROCHLORIDE AND ACETAMINOPHEN 1 TABLET: 5; 325 TABLET ORAL at 05:39

## 2017-04-27 ASSESSMENT — PAIN DESCRIPTION - ORIENTATION
ORIENTATION: RIGHT

## 2017-04-27 ASSESSMENT — PAIN SCALES - GENERAL
PAINLEVEL_OUTOF10: 4
PAINLEVEL_OUTOF10: 10
PAINLEVEL_OUTOF10: 6
PAINLEVEL_OUTOF10: 7
PAINLEVEL_OUTOF10: 8
PAINLEVEL_OUTOF10: 4
PAINLEVEL_OUTOF10: 5
PAINLEVEL_OUTOF10: 0
PAINLEVEL_OUTOF10: 10

## 2017-04-27 ASSESSMENT — PAIN DESCRIPTION - ONSET: ONSET: ON-GOING

## 2017-04-27 ASSESSMENT — PAIN DESCRIPTION - LOCATION
LOCATION: LEG
LOCATION: KNEE
LOCATION: KNEE

## 2017-04-27 ASSESSMENT — PAIN DESCRIPTION - PAIN TYPE
TYPE: CHRONIC PAIN
TYPE: ACUTE PAIN

## 2017-04-27 ASSESSMENT — PAIN DESCRIPTION - DESCRIPTORS
DESCRIPTORS: ACHING;SORE
DESCRIPTORS: ACHING

## 2017-04-27 ASSESSMENT — PAIN DESCRIPTION - PROGRESSION: CLINICAL_PROGRESSION: NOT CHANGED

## 2017-04-27 ASSESSMENT — PAIN DESCRIPTION - FREQUENCY: FREQUENCY: CONTINUOUS

## 2017-04-28 LAB
GLUCOSE BLD-MCNC: 120 MG/DL (ref 75–110)
GLUCOSE BLD-MCNC: 154 MG/DL (ref 75–110)
GLUCOSE BLD-MCNC: 194 MG/DL (ref 75–110)
GLUCOSE BLD-MCNC: 98 MG/DL (ref 75–110)

## 2017-04-28 PROCEDURE — 6360000002 HC RX W HCPCS: Performed by: EMERGENCY MEDICINE

## 2017-04-28 PROCEDURE — 93971 EXTREMITY STUDY: CPT

## 2017-04-28 PROCEDURE — 6370000000 HC RX 637 (ALT 250 FOR IP): Performed by: EMERGENCY MEDICINE

## 2017-04-28 PROCEDURE — 82947 ASSAY GLUCOSE BLOOD QUANT: CPT

## 2017-04-28 PROCEDURE — G0378 HOSPITAL OBSERVATION PER HR: HCPCS

## 2017-04-28 PROCEDURE — 6370000000 HC RX 637 (ALT 250 FOR IP): Performed by: INTERNAL MEDICINE

## 2017-04-28 PROCEDURE — 2580000003 HC RX 258: Performed by: EMERGENCY MEDICINE

## 2017-04-28 PROCEDURE — 1200000000 HC SEMI PRIVATE

## 2017-04-28 RX ORDER — HEPARIN SODIUM 5000 [USP'U]/ML
5000 INJECTION, SOLUTION INTRAVENOUS; SUBCUTANEOUS EVERY 8 HOURS SCHEDULED
Status: DISCONTINUED | OUTPATIENT
Start: 2017-04-28 | End: 2017-04-28

## 2017-04-28 RX ADMIN — HYDROCORTISONE ACETATE 25 MG: 25 SUPPOSITORY RECTAL at 08:31

## 2017-04-28 RX ADMIN — INSULIN LISPRO 1 UNITS: 100 INJECTION, SOLUTION INTRAVENOUS; SUBCUTANEOUS at 21:43

## 2017-04-28 RX ADMIN — SODIUM BICARBONATE 650 MG: 650 TABLET, ORALLY DISINTEGRATING ORAL at 08:31

## 2017-04-28 RX ADMIN — TORSEMIDE 20 MG: 20 TABLET ORAL at 20:34

## 2017-04-28 RX ADMIN — OXYCODONE HYDROCHLORIDE AND ACETAMINOPHEN 1 TABLET: 5; 325 TABLET ORAL at 08:37

## 2017-04-28 RX ADMIN — ISOSORBIDE MONONITRATE 30 MG: 30 TABLET, EXTENDED RELEASE ORAL at 08:31

## 2017-04-28 RX ADMIN — INSULIN LISPRO 1 UNITS: 100 INJECTION, SOLUTION INTRAVENOUS; SUBCUTANEOUS at 12:26

## 2017-04-28 RX ADMIN — Medication 10 ML: at 20:35

## 2017-04-28 RX ADMIN — VENLAFAXINE 112.5 MG: 75 TABLET ORAL at 08:31

## 2017-04-28 RX ADMIN — PANTOPRAZOLE SODIUM 40 MG: 40 TABLET, DELAYED RELEASE ORAL at 08:31

## 2017-04-28 RX ADMIN — AMLODIPINE BESYLATE 5 MG: 5 TABLET ORAL at 08:31

## 2017-04-28 RX ADMIN — LAMOTRIGINE 100 MG: 100 TABLET ORAL at 08:31

## 2017-04-28 RX ADMIN — TORSEMIDE 20 MG: 20 TABLET ORAL at 08:31

## 2017-04-28 RX ADMIN — LEVOTHYROXINE SODIUM 50 MCG: 50 TABLET ORAL at 06:29

## 2017-04-28 RX ADMIN — Medication 10 ML: at 08:37

## 2017-04-28 RX ADMIN — SODIUM BICARBONATE 650 MG: 650 TABLET, ORALLY DISINTEGRATING ORAL at 20:34

## 2017-04-28 ASSESSMENT — PAIN SCALES - GENERAL
PAINLEVEL_OUTOF10: 0
PAINLEVEL_OUTOF10: 0
PAINLEVEL_OUTOF10: 5
PAINLEVEL_OUTOF10: 2
PAINLEVEL_OUTOF10: 0

## 2017-04-29 ENCOUNTER — HOSPITAL ENCOUNTER (OUTPATIENT)
Dept: DIALYSIS | Age: 67
Setting detail: DIALYSIS SERIES
Discharge: HOME | End: 2017-04-29
Payer: MEDICARE

## 2017-04-29 VITALS — SYSTOLIC BLOOD PRESSURE: 90 MMHG | DIASTOLIC BLOOD PRESSURE: 56 MMHG | HEART RATE: 74 BPM

## 2017-04-29 LAB
ANION GAP SERPL CALCULATED.3IONS-SCNC: 19 MMOL/L (ref 9–17)
BUN BLDV-MCNC: 47 MG/DL (ref 8–23)
BUN/CREAT BLD: ABNORMAL (ref 9–20)
CALCIUM SERPL-MCNC: 7.8 MG/DL (ref 8.6–10.4)
CHLORIDE BLD-SCNC: 93 MMOL/L (ref 98–107)
CO2: 24 MMOL/L (ref 20–31)
CREAT SERPL-MCNC: 3.81 MG/DL (ref 0.7–1.2)
GFR AFRICAN AMERICAN: 19 ML/MIN
GFR NON-AFRICAN AMERICAN: 16 ML/MIN
GFR SERPL CREATININE-BSD FRML MDRD: ABNORMAL ML/MIN/{1.73_M2}
GFR SERPL CREATININE-BSD FRML MDRD: ABNORMAL ML/MIN/{1.73_M2}
GLUCOSE BLD-MCNC: 115 MG/DL (ref 75–110)
GLUCOSE BLD-MCNC: 134 MG/DL (ref 75–110)
GLUCOSE BLD-MCNC: 79 MG/DL (ref 70–99)
GLUCOSE BLD-MCNC: 84 MG/DL (ref 75–110)
GLUCOSE BLD-MCNC: 89 MG/DL (ref 75–110)
HCT VFR BLD CALC: 28.2 % (ref 41–53)
HEMOGLOBIN: 9 G/DL (ref 13.5–17.5)
MCH RBC QN AUTO: 30.2 PG (ref 26–34)
MCHC RBC AUTO-ENTMCNC: 32 G/DL (ref 31–37)
MCV RBC AUTO: 94.4 FL (ref 80–100)
PARTIAL THROMBOPLASTIN TIME: 31.5 SEC (ref 21.3–31.3)
PARTIAL THROMBOPLASTIN TIME: 33.7 SEC (ref 21.3–31.3)
PDW BLD-RTO: 19.8 % (ref 12.5–15.4)
PLATELET # BLD: 132 K/UL (ref 140–450)
PMV BLD AUTO: 6.5 FL (ref 6–12)
POTASSIUM SERPL-SCNC: 3.8 MMOL/L (ref 3.7–5.3)
RBC # BLD: 2.98 M/UL (ref 4.5–5.9)
SODIUM BLD-SCNC: 136 MMOL/L (ref 135–144)
WBC # BLD: 5.2 K/UL (ref 3.5–11)

## 2017-04-29 PROCEDURE — 90935 HEMODIALYSIS ONE EVALUATION: CPT

## 2017-04-29 PROCEDURE — 99221 1ST HOSP IP/OBS SF/LOW 40: CPT | Performed by: NEUROLOGICAL SURGERY

## 2017-04-29 PROCEDURE — 36415 COLL VENOUS BLD VENIPUNCTURE: CPT

## 2017-04-29 PROCEDURE — 90937 HEMODIALYSIS REPEATED EVAL: CPT

## 2017-04-29 PROCEDURE — 80048 BASIC METABOLIC PNL TOTAL CA: CPT

## 2017-04-29 PROCEDURE — 82947 ASSAY GLUCOSE BLOOD QUANT: CPT

## 2017-04-29 PROCEDURE — G0378 HOSPITAL OBSERVATION PER HR: HCPCS

## 2017-04-29 PROCEDURE — 6370000000 HC RX 637 (ALT 250 FOR IP): Performed by: INTERNAL MEDICINE

## 2017-04-29 PROCEDURE — 85027 COMPLETE CBC AUTOMATED: CPT

## 2017-04-29 PROCEDURE — 6370000000 HC RX 637 (ALT 250 FOR IP): Performed by: EMERGENCY MEDICINE

## 2017-04-29 PROCEDURE — 1200000000 HC SEMI PRIVATE

## 2017-04-29 PROCEDURE — 85730 THROMBOPLASTIN TIME PARTIAL: CPT

## 2017-04-29 PROCEDURE — 2500000003 HC RX 250 WO HCPCS: Performed by: INTERNAL MEDICINE

## 2017-04-29 RX ORDER — HEPARIN SODIUM 10000 [USP'U]/100ML
18 INJECTION, SOLUTION INTRAVENOUS CONTINUOUS
Status: DISCONTINUED | OUTPATIENT
Start: 2017-04-29 | End: 2017-05-01

## 2017-04-29 RX ORDER — HEPARIN SODIUM 1000 [USP'U]/ML
80 INJECTION, SOLUTION INTRAVENOUS; SUBCUTANEOUS ONCE
Status: DISCONTINUED | OUTPATIENT
Start: 2017-04-29 | End: 2017-05-01

## 2017-04-29 RX ORDER — MIDODRINE HYDROCHLORIDE 5 MG/1
5 TABLET ORAL PRN
Status: DISCONTINUED | OUTPATIENT
Start: 2017-04-29 | End: 2017-05-01 | Stop reason: HOSPADM

## 2017-04-29 RX ORDER — HEPARIN SODIUM 1000 [USP'U]/ML
40 INJECTION, SOLUTION INTRAVENOUS; SUBCUTANEOUS PRN
Status: DISCONTINUED | OUTPATIENT
Start: 2017-04-29 | End: 2017-05-01

## 2017-04-29 RX ORDER — HEPARIN SODIUM 1000 [USP'U]/ML
80 INJECTION, SOLUTION INTRAVENOUS; SUBCUTANEOUS PRN
Status: DISCONTINUED | OUTPATIENT
Start: 2017-04-29 | End: 2017-05-01

## 2017-04-29 RX ADMIN — Medication 1.6 ML: at 12:41

## 2017-04-29 RX ADMIN — SODIUM BICARBONATE 650 MG: 650 TABLET, ORALLY DISINTEGRATING ORAL at 21:36

## 2017-04-29 RX ADMIN — MIDODRINE HYDROCHLORIDE 5 MG: 5 TABLET ORAL at 12:40

## 2017-04-29 RX ADMIN — TORSEMIDE 20 MG: 20 TABLET ORAL at 21:36

## 2017-04-29 RX ADMIN — LEVOTHYROXINE SODIUM 50 MCG: 50 TABLET ORAL at 06:09

## 2017-04-29 ASSESSMENT — PAIN DESCRIPTION - ORIENTATION: ORIENTATION: RIGHT

## 2017-04-29 ASSESSMENT — PAIN SCALES - GENERAL
PAINLEVEL_OUTOF10: 0
PAINLEVEL_OUTOF10: 0
PAINLEVEL_OUTOF10: 7

## 2017-04-29 ASSESSMENT — PAIN DESCRIPTION - ONSET: ONSET: ON-GOING

## 2017-04-29 ASSESSMENT — PAIN DESCRIPTION - PAIN TYPE: TYPE: ACUTE PAIN;SURGICAL PAIN

## 2017-04-29 ASSESSMENT — PAIN DESCRIPTION - DESCRIPTORS: DESCRIPTORS: SHARP;SHOOTING

## 2017-04-29 ASSESSMENT — PAIN DESCRIPTION - FREQUENCY: FREQUENCY: INTERMITTENT

## 2017-04-29 ASSESSMENT — PAIN DESCRIPTION - LOCATION: LOCATION: KNEE

## 2017-04-30 LAB
C-REACTIVE PROTEIN: 27.7 MG/L (ref 0–5)
GLUCOSE BLD-MCNC: 111 MG/DL (ref 75–110)
GLUCOSE BLD-MCNC: 82 MG/DL (ref 75–110)
PARTIAL THROMBOPLASTIN TIME: 31.9 SEC (ref 21.3–31.3)

## 2017-04-30 PROCEDURE — 1200000000 HC SEMI PRIVATE

## 2017-04-30 PROCEDURE — 85730 THROMBOPLASTIN TIME PARTIAL: CPT

## 2017-04-30 PROCEDURE — 97530 THERAPEUTIC ACTIVITIES: CPT

## 2017-04-30 PROCEDURE — 82947 ASSAY GLUCOSE BLOOD QUANT: CPT

## 2017-04-30 PROCEDURE — 6370000000 HC RX 637 (ALT 250 FOR IP): Performed by: EMERGENCY MEDICINE

## 2017-04-30 PROCEDURE — G0378 HOSPITAL OBSERVATION PER HR: HCPCS

## 2017-04-30 PROCEDURE — 36415 COLL VENOUS BLD VENIPUNCTURE: CPT

## 2017-04-30 PROCEDURE — 97110 THERAPEUTIC EXERCISES: CPT

## 2017-04-30 PROCEDURE — 86140 C-REACTIVE PROTEIN: CPT

## 2017-04-30 PROCEDURE — 6370000000 HC RX 637 (ALT 250 FOR IP): Performed by: INTERNAL MEDICINE

## 2017-04-30 RX ADMIN — VENLAFAXINE 112.5 MG: 75 TABLET ORAL at 07:45

## 2017-04-30 RX ADMIN — PANTOPRAZOLE SODIUM 40 MG: 40 TABLET, DELAYED RELEASE ORAL at 07:44

## 2017-04-30 RX ADMIN — TORSEMIDE 20 MG: 20 TABLET ORAL at 07:44

## 2017-04-30 RX ADMIN — SODIUM BICARBONATE 650 MG: 650 TABLET, ORALLY DISINTEGRATING ORAL at 07:44

## 2017-04-30 RX ADMIN — OXYCODONE HYDROCHLORIDE AND ACETAMINOPHEN 1 TABLET: 5; 325 TABLET ORAL at 16:07

## 2017-04-30 RX ADMIN — SODIUM BICARBONATE 650 MG: 650 TABLET, ORALLY DISINTEGRATING ORAL at 21:34

## 2017-04-30 RX ADMIN — LEVOTHYROXINE SODIUM 50 MCG: 50 TABLET ORAL at 06:43

## 2017-04-30 RX ADMIN — LAMOTRIGINE 100 MG: 100 TABLET ORAL at 07:43

## 2017-04-30 RX ADMIN — TORSEMIDE 20 MG: 20 TABLET ORAL at 21:34

## 2017-04-30 RX ADMIN — INSULIN LISPRO 1 UNITS: 100 INJECTION, SOLUTION INTRAVENOUS; SUBCUTANEOUS at 21:34

## 2017-04-30 ASSESSMENT — PAIN SCALES - GENERAL
PAINLEVEL_OUTOF10: 5
PAINLEVEL_OUTOF10: 10

## 2017-05-01 VITALS
DIASTOLIC BLOOD PRESSURE: 58 MMHG | HEART RATE: 74 BPM | WEIGHT: 233.47 LBS | RESPIRATION RATE: 16 BRPM | TEMPERATURE: 97.4 F | SYSTOLIC BLOOD PRESSURE: 118 MMHG | HEIGHT: 72 IN | BODY MASS INDEX: 31.62 KG/M2 | OXYGEN SATURATION: 100 %

## 2017-05-01 LAB
C DIFFICILE TOXINS, PCR: NORMAL
C-REACTIVE PROTEIN: 28.8 MG/L (ref 0–5)
GLUCOSE BLD-MCNC: 103 MG/DL (ref 75–110)
GLUCOSE BLD-MCNC: 150 MG/DL (ref 75–110)
GLUCOSE BLD-MCNC: 155 MG/DL (ref 75–110)
PARTIAL THROMBOPLASTIN TIME: 33.4 SEC (ref 21.3–31.3)
PLATELET # BLD: 107 K/UL (ref 140–450)
SPECIMEN DESCRIPTION: NORMAL

## 2017-05-01 PROCEDURE — 85049 AUTOMATED PLATELET COUNT: CPT

## 2017-05-01 PROCEDURE — 6370000000 HC RX 637 (ALT 250 FOR IP): Performed by: INTERNAL MEDICINE

## 2017-05-01 PROCEDURE — G0378 HOSPITAL OBSERVATION PER HR: HCPCS

## 2017-05-01 PROCEDURE — 87493 C DIFF AMPLIFIED PROBE: CPT

## 2017-05-01 PROCEDURE — 6370000000 HC RX 637 (ALT 250 FOR IP): Performed by: EMERGENCY MEDICINE

## 2017-05-01 PROCEDURE — 97530 THERAPEUTIC ACTIVITIES: CPT

## 2017-05-01 PROCEDURE — 82947 ASSAY GLUCOSE BLOOD QUANT: CPT

## 2017-05-01 PROCEDURE — 86140 C-REACTIVE PROTEIN: CPT

## 2017-05-01 PROCEDURE — 85730 THROMBOPLASTIN TIME PARTIAL: CPT

## 2017-05-01 PROCEDURE — 36415 COLL VENOUS BLD VENIPUNCTURE: CPT

## 2017-05-01 PROCEDURE — 97535 SELF CARE MNGMENT TRAINING: CPT

## 2017-05-01 RX ORDER — HYDROCODONE BITARTRATE AND ACETAMINOPHEN 5; 325 MG/1; MG/1
1 TABLET ORAL EVERY 6 HOURS PRN
Qty: 20 TABLET | Refills: 0 | Status: SHIPPED | OUTPATIENT
Start: 2017-05-01 | End: 2017-05-08

## 2017-05-01 RX ADMIN — OXYCODONE HYDROCHLORIDE AND ACETAMINOPHEN 1 TABLET: 5; 325 TABLET ORAL at 12:08

## 2017-05-01 RX ADMIN — LAMOTRIGINE 100 MG: 100 TABLET ORAL at 08:46

## 2017-05-01 RX ADMIN — VENLAFAXINE 112.5 MG: 75 TABLET ORAL at 08:46

## 2017-05-01 RX ADMIN — INSULIN LISPRO 1 UNITS: 100 INJECTION, SOLUTION INTRAVENOUS; SUBCUTANEOUS at 08:52

## 2017-05-01 RX ADMIN — TORSEMIDE 20 MG: 20 TABLET ORAL at 08:46

## 2017-05-01 RX ADMIN — LEVOTHYROXINE SODIUM 50 MCG: 50 TABLET ORAL at 08:46

## 2017-05-01 RX ADMIN — SODIUM BICARBONATE 650 MG: 650 TABLET, ORALLY DISINTEGRATING ORAL at 08:46

## 2017-05-01 RX ADMIN — HYDROCORTISONE ACETATE 25 MG: 25 SUPPOSITORY RECTAL at 08:46

## 2017-05-01 RX ADMIN — ASPIRIN 81 MG: 81 TABLET, CHEWABLE ORAL at 08:45

## 2017-05-01 RX ADMIN — PANTOPRAZOLE SODIUM 40 MG: 40 TABLET, DELAYED RELEASE ORAL at 08:46

## 2017-05-01 RX ADMIN — ISOSORBIDE MONONITRATE 30 MG: 30 TABLET, EXTENDED RELEASE ORAL at 08:45

## 2017-05-01 ASSESSMENT — PAIN SCALES - GENERAL
PAINLEVEL_OUTOF10: 5
PAINLEVEL_OUTOF10: 10
PAINLEVEL_OUTOF10: 5

## 2017-05-01 ASSESSMENT — PAIN DESCRIPTION - FREQUENCY: FREQUENCY: INTERMITTENT

## 2017-05-01 ASSESSMENT — PAIN DESCRIPTION - PAIN TYPE
TYPE: ACUTE PAIN
TYPE: ACUTE PAIN

## 2017-05-01 ASSESSMENT — PAIN DESCRIPTION - LOCATION
LOCATION: KNEE
LOCATION: KNEE

## 2017-05-01 ASSESSMENT — PAIN DESCRIPTION - DESCRIPTORS: DESCRIPTORS: SHOOTING;STABBING

## 2017-05-06 ENCOUNTER — HOSPITAL ENCOUNTER (OUTPATIENT)
Age: 67
Setting detail: SPECIMEN
Discharge: HOME | End: 2017-05-06
Payer: MEDICARE

## 2017-05-06 LAB
C DIFFICILE TOXINS, PCR: NORMAL
SPECIMEN DESCRIPTION: NORMAL

## 2017-05-06 PROCEDURE — 87493 C DIFF AMPLIFIED PROBE: CPT

## 2017-05-08 ENCOUNTER — HOSPITAL ENCOUNTER (OUTPATIENT)
Age: 67
Setting detail: SPECIMEN
Discharge: HOME | End: 2017-05-08
Payer: MEDICARE

## 2017-05-09 LAB
APPEARANCE FLUID: NORMAL
BASO FLUID: NORMAL %
COLOR FLUID: NORMAL
EOSINOPHIL FLUID: NORMAL %
FLUID DIFF COMMENT: NORMAL
LYMPHOCYTES, BODY FLUID: 2 %
MONOCYTE, FLUID: NORMAL %
NEUTROPHIL, FLUID: 88 %
OTHER CELLS FLUID: NORMAL %
RBC FLUID: NORMAL /MM3
SPECIMEN TYPE: NORMAL
WBC FLUID: NORMAL /MM3

## 2017-05-14 LAB
CULTURE: ABNORMAL
CULTURE: ABNORMAL
DIRECT EXAM: ABNORMAL
DIRECT EXAM: ABNORMAL
Lab: ABNORMAL
SPECIMEN DESCRIPTION: ABNORMAL
STATUS: ABNORMAL

## 2017-05-23 ENCOUNTER — HOSPITAL ENCOUNTER (OUTPATIENT)
Age: 67
Setting detail: SPECIMEN
Discharge: HOME | End: 2017-05-23
Payer: MEDICARE

## 2017-05-23 PROCEDURE — 87493 C DIFF AMPLIFIED PROBE: CPT

## 2017-05-24 ENCOUNTER — HOSPITAL ENCOUNTER (OUTPATIENT)
Age: 67
Setting detail: SPECIMEN
Discharge: HOME | End: 2017-05-24
Payer: MEDICARE

## 2017-05-24 LAB
C DIFFICILE TOXINS, PCR: NORMAL
SPECIMEN DESCRIPTION: NORMAL

## 2017-05-25 ENCOUNTER — HOSPITAL ENCOUNTER (OUTPATIENT)
Age: 67
Setting detail: SPECIMEN
Discharge: HOME | End: 2017-05-25
Payer: MEDICARE

## 2017-05-25 LAB
ABSOLUTE EOS #: 0.1 K/UL (ref 0–0.4)
ABSOLUTE LYMPH #: 0.8 K/UL (ref 1–4.8)
ABSOLUTE MONO #: 0.4 K/UL (ref 0.1–1.2)
BASOPHILS # BLD: 1 %
BASOPHILS ABSOLUTE: 0 K/UL (ref 0–0.2)
DIFFERENTIAL TYPE: ABNORMAL
EOSINOPHILS RELATIVE PERCENT: 2 %
HCT VFR BLD CALC: 32.8 % (ref 41–53)
HEMOGLOBIN: 10.2 G/DL (ref 13.5–17.5)
LYMPHOCYTES # BLD: 13 %
MCH RBC QN AUTO: 30.3 PG (ref 26–34)
MCHC RBC AUTO-ENTMCNC: 31.1 G/DL (ref 31–37)
MCV RBC AUTO: 97.3 FL (ref 80–100)
MONOCYTES # BLD: 6 %
PDW BLD-RTO: 17.8 % (ref 12.5–15.4)
PLATELET # BLD: 241 K/UL (ref 140–450)
PLATELET ESTIMATE: ABNORMAL
PMV BLD AUTO: 6.7 FL (ref 6–12)
RBC # BLD: 3.37 M/UL (ref 4.5–5.9)
RBC # BLD: ABNORMAL 10*6/UL
SEG NEUTROPHILS: 78 %
SEGMENTED NEUTROPHILS ABSOLUTE COUNT: 4.9 K/UL (ref 1.8–7.7)
WBC # BLD: 6.2 K/UL (ref 3.5–11)
WBC # BLD: ABNORMAL 10*3/UL

## 2017-05-25 PROCEDURE — P9603 ONE-WAY ALLOW PRORATED MILES: HCPCS

## 2017-05-25 PROCEDURE — 36415 COLL VENOUS BLD VENIPUNCTURE: CPT

## 2017-05-25 PROCEDURE — 85025 COMPLETE CBC W/AUTO DIFF WBC: CPT

## 2017-06-23 ENCOUNTER — HOSPITAL ENCOUNTER (OUTPATIENT)
Age: 67
Setting detail: SPECIMEN
Discharge: HOME | End: 2017-06-23
Payer: MEDICARE

## 2017-06-23 LAB
ABSOLUTE EOS #: 0.2 K/UL (ref 0–0.4)
ABSOLUTE LYMPH #: 1 K/UL (ref 1–4.8)
ABSOLUTE MONO #: 0.4 K/UL (ref 0.1–1.2)
BASOPHILS # BLD: 1 %
BASOPHILS ABSOLUTE: 0 K/UL (ref 0–0.2)
C-REACTIVE PROTEIN: 8.1 MG/L (ref 0–5)
DIFFERENTIAL TYPE: ABNORMAL
EOSINOPHILS RELATIVE PERCENT: 4 %
HCT VFR BLD CALC: 31.4 % (ref 41–53)
HEMOGLOBIN: 10 G/DL (ref 13.5–17.5)
LYMPHOCYTES # BLD: 20 %
MCH RBC QN AUTO: 30.6 PG (ref 26–34)
MCHC RBC AUTO-ENTMCNC: 31.8 G/DL (ref 31–37)
MCV RBC AUTO: 96.1 FL (ref 80–100)
MONOCYTES # BLD: 9 %
PDW BLD-RTO: 16.9 % (ref 12.5–15.4)
PLATELET # BLD: 195 K/UL (ref 140–450)
PLATELET ESTIMATE: ABNORMAL
PMV BLD AUTO: 7.5 FL (ref 6–12)
RBC # BLD: 3.27 M/UL (ref 4.5–5.9)
RBC # BLD: ABNORMAL 10*6/UL
SEDIMENTATION RATE, ERYTHROCYTE: 42 MM (ref 0–10)
SEG NEUTROPHILS: 66 %
SEGMENTED NEUTROPHILS ABSOLUTE COUNT: 3.4 K/UL (ref 1.8–7.7)
WBC # BLD: 5.1 K/UL (ref 3.5–11)
WBC # BLD: ABNORMAL 10*3/UL

## 2017-06-23 PROCEDURE — 85025 COMPLETE CBC W/AUTO DIFF WBC: CPT

## 2017-06-23 PROCEDURE — P9603 ONE-WAY ALLOW PRORATED MILES: HCPCS

## 2017-06-23 PROCEDURE — 86140 C-REACTIVE PROTEIN: CPT

## 2017-06-23 PROCEDURE — 85651 RBC SED RATE NONAUTOMATED: CPT

## 2017-06-23 PROCEDURE — 36415 COLL VENOUS BLD VENIPUNCTURE: CPT

## 2018-01-07 ENCOUNTER — APPOINTMENT (OUTPATIENT)
Dept: GENERAL RADIOLOGY | Age: 68
End: 2018-01-07
Payer: MEDICARE

## 2018-01-07 ENCOUNTER — HOSPITAL ENCOUNTER (EMERGENCY)
Age: 68
Discharge: HOME | End: 2018-01-07
Attending: EMERGENCY MEDICINE
Payer: MEDICARE

## 2018-01-07 VITALS
SYSTOLIC BLOOD PRESSURE: 141 MMHG | HEART RATE: 72 BPM | DIASTOLIC BLOOD PRESSURE: 69 MMHG | OXYGEN SATURATION: 96 % | RESPIRATION RATE: 16 BRPM

## 2018-01-07 DIAGNOSIS — L03.116 CELLULITIS OF LEFT LOWER EXTREMITY: Primary | ICD-10-CM

## 2018-01-07 LAB
ABSOLUTE EOS #: 0.1 K/UL (ref 0–0.4)
ABSOLUTE IMMATURE GRANULOCYTE: ABNORMAL K/UL (ref 0–0.3)
ABSOLUTE LYMPH #: 0.9 K/UL (ref 1–4.8)
ABSOLUTE MONO #: 0.3 K/UL (ref 0.2–0.8)
ANION GAP SERPL CALCULATED.3IONS-SCNC: 14 MMOL/L (ref 9–17)
BASOPHILS # BLD: 1 % (ref 0–2)
BASOPHILS ABSOLUTE: 0 K/UL (ref 0–0.2)
BUN BLDV-MCNC: 44 MG/DL (ref 8–23)
BUN/CREAT BLD: 10 (ref 9–20)
CALCIUM SERPL-MCNC: 8.6 MG/DL (ref 8.6–10.4)
CHLORIDE BLD-SCNC: 96 MMOL/L (ref 98–107)
CO2: 29 MMOL/L (ref 20–31)
CREAT SERPL-MCNC: 4.45 MG/DL (ref 0.7–1.2)
DIFFERENTIAL TYPE: ABNORMAL
EOSINOPHILS RELATIVE PERCENT: 2 % (ref 1–4)
GFR AFRICAN AMERICAN: 16 ML/MIN
GFR NON-AFRICAN AMERICAN: 13 ML/MIN
GFR SERPL CREATININE-BSD FRML MDRD: ABNORMAL ML/MIN/{1.73_M2}
GFR SERPL CREATININE-BSD FRML MDRD: ABNORMAL ML/MIN/{1.73_M2}
GLUCOSE BLD-MCNC: 106 MG/DL (ref 70–99)
HCT VFR BLD CALC: 28.3 % (ref 41–53)
HEMOGLOBIN: 8.9 G/DL (ref 13.5–17.5)
IMMATURE GRANULOCYTES: ABNORMAL %
LACTIC ACID: 1.2 MMOL/L (ref 0.5–2.2)
LYMPHOCYTES # BLD: 19 % (ref 24–44)
MCH RBC QN AUTO: 30.9 PG (ref 26–34)
MCHC RBC AUTO-ENTMCNC: 31.6 G/DL (ref 31–37)
MCV RBC AUTO: 97.8 FL (ref 80–100)
MONOCYTES # BLD: 8 % (ref 1–7)
PDW BLD-RTO: 15.1 % (ref 11.5–14.5)
PLATELET # BLD: 140 K/UL (ref 130–400)
PLATELET ESTIMATE: ABNORMAL
PMV BLD AUTO: 7.1 FL (ref 6–12)
POTASSIUM SERPL-SCNC: 5 MMOL/L (ref 3.7–5.3)
RBC # BLD: 2.89 M/UL (ref 4.5–5.9)
RBC # BLD: ABNORMAL 10*6/UL
SEG NEUTROPHILS: 70 % (ref 36–66)
SEGMENTED NEUTROPHILS ABSOLUTE COUNT: 3.2 K/UL (ref 1.8–7.7)
SODIUM BLD-SCNC: 139 MMOL/L (ref 135–144)
WBC # BLD: 4.5 K/UL (ref 3.5–11)
WBC # BLD: ABNORMAL 10*3/UL

## 2018-01-07 PROCEDURE — 83605 ASSAY OF LACTIC ACID: CPT

## 2018-01-07 PROCEDURE — 99283 EMERGENCY DEPT VISIT LOW MDM: CPT

## 2018-01-07 PROCEDURE — 73630 X-RAY EXAM OF FOOT: CPT

## 2018-01-07 PROCEDURE — 85025 COMPLETE CBC W/AUTO DIFF WBC: CPT

## 2018-01-07 PROCEDURE — 36415 COLL VENOUS BLD VENIPUNCTURE: CPT

## 2018-01-07 PROCEDURE — 80048 BASIC METABOLIC PNL TOTAL CA: CPT

## 2018-01-07 PROCEDURE — 6370000000 HC RX 637 (ALT 250 FOR IP): Performed by: EMERGENCY MEDICINE

## 2018-01-07 RX ORDER — CEPHALEXIN 500 MG/1
500 CAPSULE ORAL ONCE
Status: COMPLETED | OUTPATIENT
Start: 2018-01-07 | End: 2018-01-07

## 2018-01-07 RX ORDER — CEPHALEXIN 500 MG/1
500 CAPSULE ORAL 4 TIMES DAILY
Qty: 28 CAPSULE | Refills: 0 | Status: SHIPPED | OUTPATIENT
Start: 2018-01-07 | End: 2018-01-14

## 2018-01-07 RX ORDER — CEPHALEXIN 500 MG/1
500 CAPSULE ORAL 4 TIMES DAILY
Qty: 28 CAPSULE | Refills: 0 | Status: SHIPPED | OUTPATIENT
Start: 2018-01-07 | End: 2018-01-07

## 2018-01-07 RX ADMIN — CEPHALEXIN 500 MG: 500 CAPSULE ORAL at 17:53

## 2018-01-07 ASSESSMENT — PAIN DESCRIPTION - ORIENTATION: ORIENTATION: LEFT

## 2018-01-07 ASSESSMENT — PAIN DESCRIPTION - LOCATION: LOCATION: FOOT

## 2018-01-07 ASSESSMENT — PAIN SCALES - GENERAL: PAINLEVEL_OUTOF10: 7

## 2018-01-07 ASSESSMENT — PAIN DESCRIPTION - PROGRESSION: CLINICAL_PROGRESSION: GRADUALLY WORSENING

## 2018-01-07 ASSESSMENT — PAIN DESCRIPTION - PAIN TYPE: TYPE: ACUTE PAIN

## 2018-01-07 ASSESSMENT — PAIN DESCRIPTION - FREQUENCY: FREQUENCY: CONTINUOUS

## 2018-01-07 ASSESSMENT — PAIN DESCRIPTION - ONSET: ONSET: SUDDEN

## 2018-01-07 NOTE — ED TRIAGE NOTES
Pt to ED by private auto with spouse, w/c to rm 32. Pt c/o L foot swelling, redness and pain since Monday. Pt AOx4. Pt fell Monday and he's been treating with ice since. Pain, redness and swelling increasing since fall. Seen at an Urgent Care facility this a. m.for xrays.

## 2018-01-07 NOTE — ED PROVIDER NOTES
[facility]  Emergency Medicine Department    Pt Name: Shyrl Sever  MRN: [id number]  [place] 1950  Date of evaluation: 1/7/2018  Provider: John Sims MD    CHIEF COMPLAINT       Chief Complaint   Patient presents with    Foot Injury     L foot       HISTORY OF PRESENT ILLNESS  (Location/Symptom, Timing/Onset, Context/Setting, Quality, Duration, Modifying Factors, Severity.)   Shyrl Sever is a 69 y.o. male who presents to the emergency department Complaining of left foot pain. One week ago he hit his foot on a table. He became swollen and blackish blue. It seemed to be improving but then in the past few days it has been getting more red and painful over the top of the foot. He denies any fevers. He rates the pain a 7 out of 10. He has tried taking over-the-counter pain medication. He has not seen his doctor for the pain since it started. He went to urgent care today and an x-ray showed concerns for a foreign body and he was therefore referred here. Nursing Notes were reviewed.     ALLERGIES     Bactrim [sulfamethoxazole-trimethoprim]    CURRENT MEDICATIONS       Discharge Medication List as of 1/7/2018  6:23 PM      CONTINUE these medications which have NOT CHANGED    Details   torsemide (DEMADEX) 20 MG tablet Take 1 tablet by mouth 2 times daily, Disp-30 tablet, R-3Normal      amLODIPine (NORVASC) 5 MG tablet Take 1 tablet by mouth daily, Disp-30 tablet, R-3Normal      hydrocortisone (ANUSOL-HC) 25 MG suppository Place 1 suppository rectally dailyDC to SNF      darbepoetin jean pierre-polysorbate (ARANESP) 40 MCG/0.4ML SOSY injection Infuse 0.8 mLs intravenously every 7 days, Disp-8.4 mLDC to SNF      midodrine (PROAMATINE) 5 MG tablet Take 1 tablet by mouth as needed (Hypotension), Disp-90 tablet, R-3DC to SNF      ferrous sulfate 325 (65 FE) MG tablet Take 1 tablet by mouth 2 times daily (before meals), Disp-60 tablet, R-6DC to SNF      !! venlafaxine (EFFEXOR) 75 MG tablet Take 75 mg by mouth daily      !! venlafaxine (EFFEXOR) 37.5 MG tablet Take 37.5 mg by mouth daily Indications: takes total of 112.5 mg in morning       lamoTRIgine (LAMICTAL) 25 MG tablet Take 100 mg by mouth daily       Darbepoetin Judd-Polysorbate (ARANESP, ALBUMIN FREE,) 10 MCG/0.4ML SOSY Inject 80 mcg as directed every 30 days, Disp-1 Syringe, R-0      sodium bicarbonate 650 MG tablet Take 3 tablets by mouth 3 times daily, Disp-180 tablet, R-3      isosorbide mononitrate (IMDUR) 30 MG CR tablet Take 30 mg by mouth daily       aspirin 81 MG tablet Take 81 mg by mouth every other day. pantoprazole (PROTONIX) 40 MG tablet Take 40 mg by mouth daily       levothyroxine (LEVOTHROID) 50 MCG tablet Take 50 mcg by mouth Daily. Cyanocobalamin (VITAMIN B 12 PO) Take 3,000 mg by mouth Takes twice weekly      Cholecalciferol (VITAMIN D3) 2000 UNITS CAPS Take 1,000 Units by mouth daily. !! - Potential duplicate medications found. Please discuss with provider.           PAST MEDICAL HISTORY         Diagnosis Date    A-fib ([address]     Anemia     ARF (acute renal failure) ([address] 5/28/2014    From pre renal azotemia from newly added diuretic and ARB use, creat peaked at 2.8 from 2 in may 2014    Arthritis     CHF (congestive heart failure) (HCC)     Chronic kidney disease     CKD (chronic kidney disease) stage 4, GFR 15-29 ml/min ([address] 5/28/2014    From diabetic and ischemic nephrosclerosis, creat now 2-2.5, GFR 25-30 ml/min    Diabetes mellitus ([address]     DVT (deep venous thrombosis) ([address]     Foot ulcer due to secondary DM ([address] 5/28/2014    Left side on antibiotics    GERD (gastroesophageal reflux disease)     Hx of blood clots     Hyperlipidemia     Hypertension     Irregular heartbeat     hx of a-fib    Lower GI bleed 3/4/2017    MDRO (multiple drug resistant organisms) resistance     MRSA (methicillin resistant staph aureus) culture positive 1/13/2014    left foot    NADIRA on CPAP     Pneumonia    

## 2018-01-15 ENCOUNTER — HOSPITAL ENCOUNTER (EMERGENCY)
Age: 68
Discharge: HOME | End: 2018-01-15
Attending: EMERGENCY MEDICINE
Payer: MEDICARE

## 2018-01-15 VITALS
OXYGEN SATURATION: 98 % | TEMPERATURE: 98.1 F | RESPIRATION RATE: 17 BRPM | DIASTOLIC BLOOD PRESSURE: 61 MMHG | WEIGHT: 275 LBS | BODY MASS INDEX: 37.25 KG/M2 | HEART RATE: 69 BPM | SYSTOLIC BLOOD PRESSURE: 141 MMHG | HEIGHT: 72 IN

## 2018-01-15 DIAGNOSIS — L03.116 CELLULITIS OF LEFT LOWER EXTREMITY: Primary | ICD-10-CM

## 2018-01-15 LAB
ABSOLUTE EOS #: 0.2 K/UL (ref 0–0.4)
ABSOLUTE IMMATURE GRANULOCYTE: ABNORMAL K/UL (ref 0–0.3)
ABSOLUTE LYMPH #: 0.7 K/UL (ref 1–4.8)
ABSOLUTE MONO #: 0.3 K/UL (ref 0.2–0.8)
ANION GAP SERPL CALCULATED.3IONS-SCNC: 17 MMOL/L (ref 9–17)
BASOPHILS # BLD: 1 % (ref 0–2)
BASOPHILS ABSOLUTE: 0 K/UL (ref 0–0.2)
BUN BLDV-MCNC: 52 MG/DL (ref 8–23)
BUN/CREAT BLD: 11 (ref 9–20)
CALCIUM SERPL-MCNC: 8.4 MG/DL (ref 8.6–10.4)
CHLORIDE BLD-SCNC: 96 MMOL/L (ref 98–107)
CO2: 24 MMOL/L (ref 20–31)
CREAT SERPL-MCNC: 4.89 MG/DL (ref 0.7–1.2)
DIFFERENTIAL TYPE: ABNORMAL
EOSINOPHILS RELATIVE PERCENT: 5 % (ref 1–4)
GFR AFRICAN AMERICAN: 14 ML/MIN
GFR NON-AFRICAN AMERICAN: 12 ML/MIN
GFR SERPL CREATININE-BSD FRML MDRD: ABNORMAL ML/MIN/{1.73_M2}
GFR SERPL CREATININE-BSD FRML MDRD: ABNORMAL ML/MIN/{1.73_M2}
GLUCOSE BLD-MCNC: 105 MG/DL (ref 70–99)
HCT VFR BLD CALC: 28 % (ref 41–53)
HEMOGLOBIN: 8.8 G/DL (ref 13.5–17.5)
IMMATURE GRANULOCYTES: ABNORMAL %
LYMPHOCYTES # BLD: 21 % (ref 24–44)
MCH RBC QN AUTO: 30.4 PG (ref 26–34)
MCHC RBC AUTO-ENTMCNC: 31.3 G/DL (ref 31–37)
MCV RBC AUTO: 97.1 FL (ref 80–100)
MONOCYTES # BLD: 9 % (ref 1–7)
NRBC AUTOMATED: ABNORMAL PER 100 WBC
PDW BLD-RTO: 16 % (ref 11.5–14.5)
PLATELET # BLD: 130 K/UL (ref 130–400)
PLATELET ESTIMATE: ABNORMAL
PMV BLD AUTO: 6.9 FL (ref 6–12)
POTASSIUM SERPL-SCNC: 5 MMOL/L (ref 3.7–5.3)
RBC # BLD: 2.88 M/UL (ref 4.5–5.9)
RBC # BLD: ABNORMAL 10*6/UL
SEG NEUTROPHILS: 64 % (ref 36–66)
SEGMENTED NEUTROPHILS ABSOLUTE COUNT: 2.3 K/UL (ref 1.8–7.7)
SODIUM BLD-SCNC: 137 MMOL/L (ref 135–144)
WBC # BLD: 3.5 K/UL (ref 3.5–11)
WBC # BLD: ABNORMAL 10*3/UL

## 2018-01-15 PROCEDURE — 96365 THER/PROPH/DIAG IV INF INIT: CPT

## 2018-01-15 PROCEDURE — 85025 COMPLETE CBC W/AUTO DIFF WBC: CPT

## 2018-01-15 PROCEDURE — 80048 BASIC METABOLIC PNL TOTAL CA: CPT

## 2018-01-15 PROCEDURE — 99283 EMERGENCY DEPT VISIT LOW MDM: CPT

## 2018-01-15 PROCEDURE — 6360000002 HC RX W HCPCS: Performed by: NURSE PRACTITIONER

## 2018-01-15 PROCEDURE — 96366 THER/PROPH/DIAG IV INF ADDON: CPT

## 2018-01-15 RX ORDER — CALCIUM CARBONATE 500(1250)
500 TABLET ORAL 3 TIMES DAILY
Status: ON HOLD | COMMUNITY
End: 2018-09-17

## 2018-01-15 RX ORDER — VANCOMYCIN HYDROCHLORIDE 1 G/200ML
1000 INJECTION, SOLUTION INTRAVENOUS ONCE
Status: COMPLETED | OUTPATIENT
Start: 2018-01-15 | End: 2018-01-15

## 2018-01-15 RX ORDER — CEPHALEXIN 500 MG/1
500 CAPSULE ORAL 4 TIMES DAILY
COMMUNITY
End: 2018-09-17

## 2018-01-15 RX ORDER — ATORVASTATIN CALCIUM 40 MG/1
40 TABLET, FILM COATED ORAL DAILY
COMMUNITY
End: 2019-05-13 | Stop reason: ALTCHOICE

## 2018-01-15 RX ORDER — SEVELAMER CARBONATE 800 MG/1
1 TABLET, FILM COATED ORAL 2 TIMES DAILY
Status: ON HOLD | COMMUNITY
End: 2018-09-17

## 2018-01-15 RX ORDER — CLINDAMYCIN HYDROCHLORIDE 150 MG/1
300 CAPSULE ORAL 3 TIMES DAILY
Qty: 42 CAPSULE | Refills: 0 | Status: SHIPPED | OUTPATIENT
Start: 2018-01-15 | End: 2018-01-22

## 2018-01-15 RX ADMIN — VANCOMYCIN HYDROCHLORIDE 1000 MG: 1 INJECTION, SOLUTION INTRAVENOUS at 20:16

## 2018-01-15 ASSESSMENT — PAIN SCALES - GENERAL: PAINLEVEL_OUTOF10: 5

## 2018-01-15 ASSESSMENT — PAIN DESCRIPTION - ONSET: ONSET: ON-GOING

## 2018-01-15 ASSESSMENT — PAIN DESCRIPTION - FREQUENCY: FREQUENCY: CONTINUOUS

## 2018-01-15 ASSESSMENT — PAIN DESCRIPTION - LOCATION: LOCATION: FOOT

## 2018-01-15 ASSESSMENT — PAIN DESCRIPTION - DESCRIPTORS: DESCRIPTORS: CONSTANT

## 2018-01-15 ASSESSMENT — PAIN DESCRIPTION - ORIENTATION: ORIENTATION: LEFT

## 2018-01-15 ASSESSMENT — PAIN DESCRIPTION - PAIN TYPE: TYPE: ACUTE PAIN

## 2018-01-16 ASSESSMENT — ENCOUNTER SYMPTOMS
ABDOMINAL PAIN: 0
SINUS PRESSURE: 0
SORE THROAT: 0
COLOR CHANGE: 0
WHEEZING: 0
DIARRHEA: 0
NAUSEA: 0
COUGH: 0
CONSTIPATION: 0
VOMITING: 0
SHORTNESS OF BREATH: 0
RHINORRHEA: 0

## 2018-01-16 NOTE — ED PROVIDER NOTES
Eosinophils % 5 (*)     Absolute Lymph # 0.70 (*)     All other components within normal limits   BASIC METABOLIC PANEL - Abnormal; Notable for the following:     Glucose 105 (*)     BUN 52 (*)     CREATININE 4.89 (*)     Calcium 8.4 (*)     Chloride 96 (*)     GFR Non- 12 (*)     GFR  14 (*)     All other components within normal limits       All other labs were within normal range or not returned as of this dictation. EMERGENCY DEPARTMENT COURSE and DIFFERENTIAL DIAGNOSIS/MDM:   Vitals:    Vitals:    01/15/18 1823   BP: (!) 141/61   Pulse: 69   Resp: 17   Temp: 98.1 °F (36.7 °C)   TempSrc: Oral   SpO2: 98%   Weight: 275 lb (124.7 kg)   Height: 6' (1.829 m)       Medical Decision Making: She was given a dose of vancomycin. Per Dr. Sanya Leone request, the patient will placed on clindamycin. Follow-up with primary care physician for further evaluation and follow-up, return for worsening symptoms or concerns  Medications   vancomycin (VANCOCIN) 1000 mg in dextrose 5% 200 mL IVPB (0 mg Intravenous Stopped 1/15/18 2149)       FINAL IMPRESSION      1.  Cellulitis of left lower extremity          DISPOSITION/PLAN   DISPOSITION Decision To Discharge 01/15/2018 09:36:59 PM      PATIENT REFERRED TO:   [address] [place] [address] [facility]  [phone]    Call in 2 days      [facility] ED  [address] [facility]  [phone]    If symptoms worsen      DISCHARGE MEDICATIONS:     Discharge Medication List as of 1/15/2018  9:40 PM      START taking these medications    Details   clindamycin (CLEOCIN) 150 MG capsule Take 2 capsules by mouth 3 times daily for 7 days, Disp-42 capsule, R-0Print                 (Please note that portions of this note were completed with a voice recognition program.  Efforts were made to edit the dictations but occasionally words are mis-transcribed.)    [address] [facility] NP, CNP  Certified Nurse Practitioner

## 2018-08-10 ENCOUNTER — HOSPITAL ENCOUNTER (EMERGENCY)
Age: 68
Discharge: HOME | End: 2018-08-10
Attending: SPECIALIST
Payer: MEDICARE

## 2018-08-10 VITALS
DIASTOLIC BLOOD PRESSURE: 55 MMHG | TEMPERATURE: 98 F | SYSTOLIC BLOOD PRESSURE: 116 MMHG | HEART RATE: 89 BPM | RESPIRATION RATE: 18 BRPM | OXYGEN SATURATION: 93 %

## 2018-08-10 DIAGNOSIS — L03.116 CELLULITIS OF LEFT LOWER LEG: Primary | ICD-10-CM

## 2018-08-10 LAB
ABSOLUTE EOS #: 0.1 K/UL (ref 0–0.4)
ABSOLUTE IMMATURE GRANULOCYTE: ABNORMAL K/UL (ref 0–0.3)
ABSOLUTE LYMPH #: 0.5 K/UL (ref 1–4.8)
ABSOLUTE MONO #: 0.3 K/UL (ref 0.1–1.2)
ANION GAP SERPL CALCULATED.3IONS-SCNC: 14 MMOL/L (ref 9–17)
BASOPHILS # BLD: 2 % (ref 0–2)
BASOPHILS ABSOLUTE: 0.1 K/UL (ref 0–0.2)
BUN BLDV-MCNC: 37 MG/DL (ref 8–23)
BUN/CREAT BLD: ABNORMAL (ref 9–20)
CALCIUM SERPL-MCNC: 8.9 MG/DL (ref 8.6–10.4)
CHLORIDE BLD-SCNC: 98 MMOL/L (ref 98–107)
CO2: 30 MMOL/L (ref 20–31)
CREAT SERPL-MCNC: 3.48 MG/DL (ref 0.7–1.2)
DIFFERENTIAL TYPE: ABNORMAL
EOSINOPHILS RELATIVE PERCENT: 3 % (ref 1–4)
GFR AFRICAN AMERICAN: 21 ML/MIN
GFR NON-AFRICAN AMERICAN: 18 ML/MIN
GFR SERPL CREATININE-BSD FRML MDRD: ABNORMAL ML/MIN/{1.73_M2}
GFR SERPL CREATININE-BSD FRML MDRD: ABNORMAL ML/MIN/{1.73_M2}
GLUCOSE BLD-MCNC: 120 MG/DL (ref 70–99)
HCT VFR BLD CALC: 29.3 % (ref 41–53)
HEMOGLOBIN: 9.8 G/DL (ref 13.5–17.5)
IMMATURE GRANULOCYTES: ABNORMAL %
LYMPHOCYTES # BLD: 13 % (ref 24–44)
MCH RBC QN AUTO: 34.4 PG (ref 26–34)
MCHC RBC AUTO-ENTMCNC: 33.4 G/DL (ref 31–37)
MCV RBC AUTO: 102.8 FL (ref 80–100)
MONOCYTES # BLD: 8 % (ref 2–11)
NRBC AUTOMATED: ABNORMAL PER 100 WBC
PDW BLD-RTO: 15.9 % (ref 12.5–15.4)
PLATELET # BLD: 99 K/UL (ref 140–450)
PLATELET ESTIMATE: ABNORMAL
PMV BLD AUTO: 7.3 FL (ref 6–12)
POTASSIUM SERPL-SCNC: 3.8 MMOL/L (ref 3.7–5.3)
RBC # BLD: 2.85 M/UL (ref 4.5–5.9)
RBC # BLD: ABNORMAL 10*6/UL
SEG NEUTROPHILS: 74 % (ref 36–66)
SEGMENTED NEUTROPHILS ABSOLUTE COUNT: 3.1 K/UL (ref 1.8–7.7)
SODIUM BLD-SCNC: 142 MMOL/L (ref 135–144)
WBC # BLD: 4.1 K/UL (ref 3.5–11)
WBC # BLD: ABNORMAL 10*3/UL

## 2018-08-10 PROCEDURE — 99283 EMERGENCY DEPT VISIT LOW MDM: CPT

## 2018-08-10 PROCEDURE — 6370000000 HC RX 637 (ALT 250 FOR IP): Performed by: SPECIALIST

## 2018-08-10 PROCEDURE — 80048 BASIC METABOLIC PNL TOTAL CA: CPT

## 2018-08-10 PROCEDURE — 87040 BLOOD CULTURE FOR BACTERIA: CPT

## 2018-08-10 PROCEDURE — 6360000002 HC RX W HCPCS: Performed by: SPECIALIST

## 2018-08-10 PROCEDURE — 36415 COLL VENOUS BLD VENIPUNCTURE: CPT

## 2018-08-10 PROCEDURE — 96365 THER/PROPH/DIAG IV INF INIT: CPT

## 2018-08-10 PROCEDURE — 85025 COMPLETE CBC W/AUTO DIFF WBC: CPT

## 2018-08-10 RX ORDER — CLINDAMYCIN HYDROCHLORIDE 150 MG/1
300 CAPSULE ORAL 3 TIMES DAILY
Qty: 42 CAPSULE | Refills: 0 | Status: SHIPPED | OUTPATIENT
Start: 2018-08-10 | End: 2018-08-17

## 2018-08-10 RX ORDER — OXYCODONE HYDROCHLORIDE AND ACETAMINOPHEN 5; 325 MG/1; MG/1
1 TABLET ORAL ONCE
Status: COMPLETED | OUTPATIENT
Start: 2018-08-10 | End: 2018-08-10

## 2018-08-10 RX ORDER — VANCOMYCIN HYDROCHLORIDE 1 G/200ML
1000 INJECTION, SOLUTION INTRAVENOUS ONCE
Status: COMPLETED | OUTPATIENT
Start: 2018-08-10 | End: 2018-08-10

## 2018-08-10 RX ADMIN — VANCOMYCIN HYDROCHLORIDE 1000 MG: 1 INJECTION, SOLUTION INTRAVENOUS at 11:04

## 2018-08-10 RX ADMIN — OXYCODONE HYDROCHLORIDE AND ACETAMINOPHEN 1 TABLET: 5; 325 TABLET ORAL at 10:41

## 2018-08-10 ASSESSMENT — PAIN SCALES - GENERAL
PAINLEVEL_OUTOF10: 2
PAINLEVEL_OUTOF10: 5

## 2018-08-10 ASSESSMENT — ENCOUNTER SYMPTOMS: COLOR CHANGE: 1

## 2018-08-10 ASSESSMENT — PAIN DESCRIPTION - PROGRESSION: CLINICAL_PROGRESSION: RAPIDLY IMPROVING

## 2018-08-10 NOTE — ED PROVIDER NOTES
[facility]  eMERGENCY dEPARTMENT eNCOUnter      Pt Name: Mady Dillon  MRN: [id number]  [place] 1950  Date of evaluation: 8/10/18      CHIEF COMPLAINT       Chief Complaint   Patient presents with    Wound Infection         HISTORY OF PRESENT ILLNESS    Mady Dillon is a 77 y.o. male who presents To the emergency department brought in by his wife after patient has been having pain, redness and swelling in the left lower leg since last 4 days. Patient developed pockets of fluid in the left calf and left anterior leg which broke open as per wife. She has noticed a increasing redness and swelling in the left lower leg. Patient describes pain as sharp in character 7 out of 10 in intensity worse with the weightbearing and ambulation and better with rest.  No history of fever or chills. Patient denies any fall or injuries. He normally uses a walker to ambulate. Patient has history of diabetes mellitus which is diet controlled and he has a chronic renal failure on hemodialysis with last dialysis yesterday. He also has history of deep venous thrombosis in the right lower leg and is on Eliquis which she has been taking on a regular basis. He denies any chest pain, shortness of breath, lightheadedness, dizziness, palpitations or diaphoresis. He should not Percocet 1 tablet yesterday and Tylenol last night with some relief. REVIEW OF SYSTEMS       Review of Systems   Constitutional: Negative for chills and fever. Cardiovascular: Positive for leg swelling. Negative for chest pain. Musculoskeletal: Positive for gait problem and myalgias. Skin: Positive for color change and wound. Neurological: Negative for dizziness and light-headedness. All other systems reviewed and are negative. PAST MEDICAL HISTORY    has a past medical history of A-fib ([address] Anemia; ARF (acute renal failure) ([facility] Arthritis; CHF (congestive heart failure) ([facility]  Chronic kidney disease; CKD (chronic kidney 2 times daily (before meals)    HYDROCORTISONE (ANUSOL-HC) 25 MG SUPPOSITORY    Place 1 suppository rectally daily    ISOSORBIDE MONONITRATE (IMDUR) 30 MG CR TABLET    Take 30 mg by mouth daily     LAMOTRIGINE (LAMICTAL) 25 MG TABLET    Take 100 mg by mouth daily     LEVOTHYROXINE (LEVOTHROID) 50 MCG TABLET    Take 50 mcg by mouth Daily. MIDODRINE (PROAMATINE) 5 MG TABLET    Take 1 tablet by mouth as needed (Hypotension)    PANTOPRAZOLE (PROTONIX) 40 MG TABLET    Take 40 mg by mouth daily     SEVELAMER (RENVELA) 800 MG TABLET    Take 1 tablet by mouth 2 times daily    SODIUM BICARBONATE 650 MG TABLET    Take 3 tablets by mouth 3 times daily    TORSEMIDE (DEMADEX) 20 MG TABLET    Take 1 tablet by mouth 2 times daily    VENLAFAXINE (EFFEXOR) 37.5 MG TABLET    Take 37.5 mg by mouth daily Indications: takes total of 112.5 mg in morning     VENLAFAXINE (EFFEXOR) 75 MG TABLET    Take 75 mg by mouth daily       ALLERGIES     is allergic to bactrim [sulfamethoxazole-trimethoprim]. FAMILY HISTORY     indicated that the status of his father is unknown. He indicated that the status of his sister is unknown.      family history includes Cancer in his sister; Coronary Art Dis in his father. SOCIAL HISTORY      reports that he has never smoked. He has never used smokeless tobacco. He reports that he does not drink alcohol or use drugs. PHYSICAL EXAM     INITIAL VITALS:  oral temperature is 98 °F (36.7 °C). His blood pressure is 118/51 (abnormal) and his pulse is 86. His respiration is 18 and oxygen saturation is 93%. Physical Exam   Constitutional: He is oriented to person, place, and time. He appears well-developed and well-nourished. HENT:   Head: Normocephalic and atraumatic. Nose: Nose normal.   Mouth/Throat: Oropharynx is clear and moist.   Eyes: EOM are normal. Pupils are equal, round, and reactive to light. Neck: Normal range of motion. Neck supple.    Cardiovascular: Normal rate, regular rhythm, outpatient with oral antibiotic and possibly vancomycin at the dialysis center. Wife will call his nephrologist to arrange for the antibiotics at dialysis center. I prescribed him clindamycin for 7 days course. He is advised to also follow up with primary care physician and provided clinic list.  He is advised to call for appointment, return if worse. CONSULTS:  None    PROCEDURES:  None    FINAL IMPRESSION      1. Cellulitis of left lower leg          DISPOSITION/PLAN       PATIENT REFERRED TO:  call 419-same-day for follow up    Call today  For wound re-check    MD ALESSIO [facility] [address], [address] [facility]  [phone]    Call today  For reevaluation of current symptoms    [facility] ED  [address] [place] [address]  [phone]    If symptoms worsen      DISCHARGE MEDICATIONS:  New Prescriptions    CLINDAMYCIN (CLEOCIN) 150 MG CAPSULE    Take 2 capsules by mouth 3 times daily for 7 days Please Dispense as 150 mg tabs. Instruction to take 2 tabs for each dose. (Please note that portions of this note were completed with a voice recognition program.  Efforts were made to edit the dictations but occasionally words are mis-transcribed.)    Espinal MD, F.A.C.E.P.   Attending Emergency Medicine Physician         Veronica Jose MD  08/10/18 2005

## 2018-08-10 NOTE — ED NOTES
Pt to ED with cellulitis and wounds to the LLE. Pt states that he has had similar infections in the past and has required IV and oral antibiotics. Pt has multiple wounds noted to LLE, one at the calf and one on the shin. Both have clear drainage noted from the wounds. Pt denies any injury or trauma. PMS intact. Pt has redness but no warmth noted to the LLE.           Bailey Boswell RN  08/10/18 0354

## 2018-08-16 LAB
CULTURE: NORMAL
CULTURE: NORMAL
Lab: NORMAL
Lab: NORMAL
SPECIMEN DESCRIPTION: NORMAL
SPECIMEN DESCRIPTION: NORMAL
STATUS: NORMAL
STATUS: NORMAL

## 2018-09-17 ENCOUNTER — APPOINTMENT (OUTPATIENT)
Dept: ULTRASOUND IMAGING | Age: 68
DRG: 602 | End: 2018-09-17
Payer: MEDICARE

## 2018-09-17 ENCOUNTER — HOSPITAL ENCOUNTER (INPATIENT)
Age: 68
LOS: 2 days | Discharge: HOME | DRG: 602 | End: 2018-09-19
Attending: EMERGENCY MEDICINE | Admitting: FAMILY MEDICINE
Payer: MEDICARE

## 2018-09-17 DIAGNOSIS — N18.6 ESRD (END STAGE RENAL DISEASE) (HCC): ICD-10-CM

## 2018-09-17 DIAGNOSIS — L03.116 CELLULITIS OF LEFT LOWER LIMB: Primary | ICD-10-CM

## 2018-09-17 PROBLEM — L03.90 CELLULITIS: Status: ACTIVE | Noted: 2018-09-17

## 2018-09-17 PROBLEM — Z79.01 ON CONTINUOUS ORAL ANTICOAGULATION: Status: ACTIVE | Noted: 2018-09-17

## 2018-09-17 PROBLEM — Z86.718 HISTORY OF DVT (DEEP VEIN THROMBOSIS): Status: ACTIVE | Noted: 2018-09-17

## 2018-09-17 PROBLEM — R17 ELEVATED BILIRUBIN: Status: ACTIVE | Noted: 2018-09-17

## 2018-09-17 LAB
ABSOLUTE EOS #: 0.07 K/UL (ref 0–0.4)
ABSOLUTE IMMATURE GRANULOCYTE: ABNORMAL K/UL (ref 0–0.3)
ABSOLUTE LYMPH #: 0.29 K/UL (ref 1–4.8)
ABSOLUTE MONO #: 0.37 K/UL (ref 0.1–0.8)
ALBUMIN SERPL-MCNC: 3.6 G/DL (ref 3.5–5.2)
ALBUMIN/GLOBULIN RATIO: 1 (ref 1–2.5)
ALP BLD-CCNC: 79 U/L (ref 40–129)
ALT SERPL-CCNC: 13 U/L (ref 5–41)
ANION GAP SERPL CALCULATED.3IONS-SCNC: 16 MMOL/L (ref 9–17)
AST SERPL-CCNC: 22 U/L
BASOPHILS # BLD: 0 % (ref 0–2)
BASOPHILS ABSOLUTE: 0 K/UL (ref 0–0.2)
BILIRUB SERPL-MCNC: 2.07 MG/DL (ref 0.3–1.2)
BUN BLDV-MCNC: 55 MG/DL (ref 8–23)
BUN/CREAT BLD: ABNORMAL (ref 9–20)
C-REACTIVE PROTEIN: 98.2 MG/L (ref 0–5)
CALCIUM SERPL-MCNC: 9.1 MG/DL (ref 8.6–10.4)
CHLORIDE BLD-SCNC: 96 MMOL/L (ref 98–107)
CO2: 24 MMOL/L (ref 20–31)
CREAT SERPL-MCNC: 5 MG/DL (ref 0.7–1.2)
DIFFERENTIAL TYPE: ABNORMAL
EOSINOPHILS RELATIVE PERCENT: 1 % (ref 1–4)
GFR AFRICAN AMERICAN: 14 ML/MIN
GFR NON-AFRICAN AMERICAN: 12 ML/MIN
GFR SERPL CREATININE-BSD FRML MDRD: ABNORMAL ML/MIN/{1.73_M2}
GFR SERPL CREATININE-BSD FRML MDRD: ABNORMAL ML/MIN/{1.73_M2}
GLUCOSE BLD-MCNC: 149 MG/DL (ref 70–99)
HCT VFR BLD CALC: 34.7 % (ref 41–53)
HEMOGLOBIN: 11.5 G/DL (ref 13.5–17.5)
IMMATURE GRANULOCYTES: ABNORMAL %
INR BLD: 1.3
LACTIC ACID: 1.9 MMOL/L (ref 0.5–2.2)
LYMPHOCYTES # BLD: 4 % (ref 24–44)
MCH RBC QN AUTO: 34.4 PG (ref 26–34)
MCHC RBC AUTO-ENTMCNC: 33.1 G/DL (ref 31–37)
MCV RBC AUTO: 104 FL (ref 80–100)
MONOCYTES # BLD: 5 % (ref 1–7)
MORPHOLOGY: NORMAL
NRBC AUTOMATED: ABNORMAL PER 100 WBC
PARTIAL THROMBOPLASTIN TIME: 43.3 SEC (ref 21.3–31.3)
PDW BLD-RTO: 14.3 % (ref 12.5–15.4)
PLATELET # BLD: 95 K/UL (ref 140–450)
PLATELET ESTIMATE: ABNORMAL
PMV BLD AUTO: 7.1 FL (ref 6–12)
POTASSIUM SERPL-SCNC: 3.9 MMOL/L (ref 3.7–5.3)
PROTHROMBIN TIME: 13.2 SEC (ref 9.4–12.6)
RBC # BLD: 3.34 M/UL (ref 4.5–5.9)
RBC # BLD: ABNORMAL 10*6/UL
SEG NEUTROPHILS: 90 % (ref 36–66)
SEGMENTED NEUTROPHILS ABSOLUTE COUNT: 6.57 K/UL (ref 1.8–7.7)
SODIUM BLD-SCNC: 136 MMOL/L (ref 135–144)
TOTAL PROTEIN: 7.2 G/DL (ref 6.4–8.3)
TSH SERPL DL<=0.05 MIU/L-ACNC: 1.01 MIU/L (ref 0.3–5)
WBC # BLD: 7.3 K/UL (ref 3.5–11)
WBC # BLD: ABNORMAL 10*3/UL

## 2018-09-17 PROCEDURE — 86140 C-REACTIVE PROTEIN: CPT

## 2018-09-17 PROCEDURE — 86403 PARTICLE AGGLUT ANTBDY SCRN: CPT

## 2018-09-17 PROCEDURE — 99284 EMERGENCY DEPT VISIT MOD MDM: CPT

## 2018-09-17 PROCEDURE — 2580000003 HC RX 258: Performed by: STUDENT IN AN ORGANIZED HEALTH CARE EDUCATION/TRAINING PROGRAM

## 2018-09-17 PROCEDURE — 87077 CULTURE AEROBIC IDENTIFY: CPT

## 2018-09-17 PROCEDURE — 85610 PROTHROMBIN TIME: CPT

## 2018-09-17 PROCEDURE — 87186 SC STD MICRODIL/AGAR DIL: CPT

## 2018-09-17 PROCEDURE — 93971 EXTREMITY STUDY: CPT

## 2018-09-17 PROCEDURE — 87070 CULTURE OTHR SPECIMN AEROBIC: CPT

## 2018-09-17 PROCEDURE — 6370000000 HC RX 637 (ALT 250 FOR IP): Performed by: EMERGENCY MEDICINE

## 2018-09-17 PROCEDURE — 99222 1ST HOSP IP/OBS MODERATE 55: CPT | Performed by: INTERNAL MEDICINE

## 2018-09-17 PROCEDURE — 6360000002 HC RX W HCPCS: Performed by: STUDENT IN AN ORGANIZED HEALTH CARE EDUCATION/TRAINING PROGRAM

## 2018-09-17 PROCEDURE — 87040 BLOOD CULTURE FOR BACTERIA: CPT

## 2018-09-17 PROCEDURE — 2580000003 HC RX 258: Performed by: EMERGENCY MEDICINE

## 2018-09-17 PROCEDURE — 6370000000 HC RX 637 (ALT 250 FOR IP): Performed by: FAMILY MEDICINE

## 2018-09-17 PROCEDURE — 80053 COMPREHEN METABOLIC PANEL: CPT

## 2018-09-17 PROCEDURE — 36415 COLL VENOUS BLD VENIPUNCTURE: CPT

## 2018-09-17 PROCEDURE — 83605 ASSAY OF LACTIC ACID: CPT

## 2018-09-17 PROCEDURE — 84443 ASSAY THYROID STIM HORMONE: CPT

## 2018-09-17 PROCEDURE — 99223 1ST HOSP IP/OBS HIGH 75: CPT | Performed by: FAMILY MEDICINE

## 2018-09-17 PROCEDURE — 87147 CULTURE TYPE IMMUNOLOGIC: CPT

## 2018-09-17 PROCEDURE — 87205 SMEAR GRAM STAIN: CPT

## 2018-09-17 PROCEDURE — 6360000002 HC RX W HCPCS: Performed by: EMERGENCY MEDICINE

## 2018-09-17 PROCEDURE — 2580000003 HC RX 258: Performed by: FAMILY MEDICINE

## 2018-09-17 PROCEDURE — 1200000000 HC SEMI PRIVATE

## 2018-09-17 PROCEDURE — 85730 THROMBOPLASTIN TIME PARTIAL: CPT

## 2018-09-17 PROCEDURE — 85025 COMPLETE CBC W/AUTO DIFF WBC: CPT

## 2018-09-17 RX ORDER — ARIPIPRAZOLE 5 MG/1
5 TABLET ORAL DAILY
COMMUNITY
Start: 2017-07-26

## 2018-09-17 RX ORDER — SODIUM CHLORIDE 0.9 % (FLUSH) 0.9 %
10 SYRINGE (ML) INJECTION EVERY 12 HOURS SCHEDULED
Status: DISCONTINUED | OUTPATIENT
Start: 2018-09-17 | End: 2018-09-19 | Stop reason: HOSPADM

## 2018-09-17 RX ORDER — BISACODYL 10 MG
10 SUPPOSITORY, RECTAL RECTAL DAILY PRN
Status: DISCONTINUED | OUTPATIENT
Start: 2018-09-17 | End: 2018-09-19 | Stop reason: HOSPADM

## 2018-09-17 RX ORDER — PANTOPRAZOLE SODIUM 40 MG/1
40 TABLET, DELAYED RELEASE ORAL DAILY
Status: DISCONTINUED | OUTPATIENT
Start: 2018-09-17 | End: 2018-09-19 | Stop reason: HOSPADM

## 2018-09-17 RX ORDER — VENLAFAXINE 75 MG/1
75 TABLET ORAL DAILY
Status: DISCONTINUED | OUTPATIENT
Start: 2018-09-18 | End: 2018-09-19 | Stop reason: HOSPADM

## 2018-09-17 RX ORDER — LEVOTHYROXINE SODIUM 0.05 MG/1
50 TABLET ORAL DAILY
Status: DISCONTINUED | OUTPATIENT
Start: 2018-09-17 | End: 2018-09-19 | Stop reason: HOSPADM

## 2018-09-17 RX ORDER — ARIPIPRAZOLE 5 MG/1
5 TABLET ORAL DAILY
Status: DISCONTINUED | OUTPATIENT
Start: 2018-09-17 | End: 2018-09-19 | Stop reason: HOSPADM

## 2018-09-17 RX ORDER — MIDODRINE HYDROCHLORIDE 5 MG/1
5 TABLET ORAL PRN
Status: DISCONTINUED | OUTPATIENT
Start: 2018-09-17 | End: 2018-09-19 | Stop reason: HOSPADM

## 2018-09-17 RX ORDER — POTASSIUM CHLORIDE 20MEQ/15ML
40 LIQUID (ML) ORAL PRN
Status: DISCONTINUED | OUTPATIENT
Start: 2018-09-17 | End: 2018-09-19 | Stop reason: HOSPADM

## 2018-09-17 RX ORDER — OXYCODONE HYDROCHLORIDE AND ACETAMINOPHEN 5; 325 MG/1; MG/1
1 TABLET ORAL ONCE
Status: COMPLETED | OUTPATIENT
Start: 2018-09-17 | End: 2018-09-17

## 2018-09-17 RX ORDER — POTASSIUM CHLORIDE 20 MEQ/1
40 TABLET, EXTENDED RELEASE ORAL PRN
Status: DISCONTINUED | OUTPATIENT
Start: 2018-09-17 | End: 2018-09-19 | Stop reason: HOSPADM

## 2018-09-17 RX ORDER — POTASSIUM CHLORIDE 7.45 MG/ML
10 INJECTION INTRAVENOUS PRN
Status: DISCONTINUED | OUTPATIENT
Start: 2018-09-17 | End: 2018-09-19 | Stop reason: HOSPADM

## 2018-09-17 RX ORDER — VENLAFAXINE 37.5 MG/1
37.5 TABLET ORAL DAILY
Status: DISCONTINUED | OUTPATIENT
Start: 2018-09-17 | End: 2018-09-17

## 2018-09-17 RX ORDER — PANTOPRAZOLE SODIUM 40 MG/1
40 TABLET, DELAYED RELEASE ORAL
COMMUNITY
End: 2018-09-17

## 2018-09-17 RX ORDER — ONDANSETRON 2 MG/ML
4 INJECTION INTRAMUSCULAR; INTRAVENOUS EVERY 6 HOURS PRN
Status: DISCONTINUED | OUTPATIENT
Start: 2018-09-17 | End: 2018-09-19 | Stop reason: HOSPADM

## 2018-09-17 RX ORDER — ACETAMINOPHEN 325 MG/1
650 TABLET ORAL EVERY 4 HOURS PRN
Status: DISCONTINUED | OUTPATIENT
Start: 2018-09-17 | End: 2018-09-19 | Stop reason: HOSPADM

## 2018-09-17 RX ORDER — TRAMADOL HYDROCHLORIDE 50 MG/1
50 TABLET ORAL EVERY 6 HOURS PRN
Status: DISCONTINUED | OUTPATIENT
Start: 2018-09-17 | End: 2018-09-19 | Stop reason: HOSPADM

## 2018-09-17 RX ORDER — ASPIRIN 81 MG/1
81 TABLET, CHEWABLE ORAL EVERY OTHER DAY
Status: DISCONTINUED | OUTPATIENT
Start: 2018-09-17 | End: 2018-09-19 | Stop reason: HOSPADM

## 2018-09-17 RX ORDER — ATORVASTATIN CALCIUM 40 MG/1
40 TABLET, FILM COATED ORAL DAILY
Status: DISCONTINUED | OUTPATIENT
Start: 2018-09-17 | End: 2018-09-19 | Stop reason: HOSPADM

## 2018-09-17 RX ORDER — LANOLIN ALCOHOL/MO/W.PET/CERES
325 CREAM (GRAM) TOPICAL
Status: DISCONTINUED | OUTPATIENT
Start: 2018-09-17 | End: 2018-09-19 | Stop reason: HOSPADM

## 2018-09-17 RX ORDER — VENLAFAXINE 75 MG/1
75 TABLET ORAL DAILY
Status: DISCONTINUED | OUTPATIENT
Start: 2018-09-17 | End: 2018-09-17

## 2018-09-17 RX ORDER — NICOTINE 21 MG/24HR
1 PATCH, TRANSDERMAL 24 HOURS TRANSDERMAL DAILY PRN
Status: DISCONTINUED | OUTPATIENT
Start: 2018-09-17 | End: 2018-09-19 | Stop reason: HOSPADM

## 2018-09-17 RX ORDER — SODIUM CHLORIDE 0.9 % (FLUSH) 0.9 %
10 SYRINGE (ML) INJECTION PRN
Status: DISCONTINUED | OUTPATIENT
Start: 2018-09-17 | End: 2018-09-19 | Stop reason: HOSPADM

## 2018-09-17 RX ORDER — ISOSORBIDE MONONITRATE 30 MG/1
30 TABLET, EXTENDED RELEASE ORAL DAILY
Status: DISCONTINUED | OUTPATIENT
Start: 2018-09-17 | End: 2018-09-19 | Stop reason: HOSPADM

## 2018-09-17 RX ORDER — TRAMADOL HYDROCHLORIDE 50 MG/1
25 TABLET ORAL EVERY 6 HOURS PRN
Status: DISCONTINUED | OUTPATIENT
Start: 2018-09-17 | End: 2018-09-19 | Stop reason: HOSPADM

## 2018-09-17 RX ORDER — HYDROCORTISONE ACETATE 25 MG/1
25 SUPPOSITORY RECTAL DAILY
Status: DISCONTINUED | OUTPATIENT
Start: 2018-09-17 | End: 2018-09-19 | Stop reason: HOSPADM

## 2018-09-17 RX ORDER — LAMOTRIGINE 100 MG/1
100 TABLET ORAL DAILY
Status: DISCONTINUED | OUTPATIENT
Start: 2018-09-17 | End: 2018-09-19 | Stop reason: HOSPADM

## 2018-09-17 RX ORDER — AMLODIPINE BESYLATE 5 MG/1
5 TABLET ORAL DAILY
Status: DISCONTINUED | OUTPATIENT
Start: 2018-09-17 | End: 2018-09-19 | Stop reason: HOSPADM

## 2018-09-17 RX ORDER — MAGNESIUM SULFATE 1 G/100ML
1 INJECTION INTRAVENOUS PRN
Status: DISCONTINUED | OUTPATIENT
Start: 2018-09-17 | End: 2018-09-19 | Stop reason: HOSPADM

## 2018-09-17 RX ADMIN — OXYCODONE HYDROCHLORIDE AND ACETAMINOPHEN 1 TABLET: 5; 325 TABLET ORAL at 10:56

## 2018-09-17 RX ADMIN — APIXABAN 5 MG: 5 TABLET, FILM COATED ORAL at 21:00

## 2018-09-17 RX ADMIN — LAMOTRIGINE 100 MG: 100 TABLET ORAL at 22:00

## 2018-09-17 RX ADMIN — CEFTRIAXONE SODIUM 2 G: 2 INJECTION, POWDER, FOR SOLUTION INTRAMUSCULAR; INTRAVENOUS at 18:07

## 2018-09-17 RX ADMIN — DESMOPRESSIN ACETATE 40 MG: 0.2 TABLET ORAL at 18:13

## 2018-09-17 RX ADMIN — FERROUS SULFATE TAB EC 325 MG (65 MG FE EQUIVALENT) 325 MG: 325 (65 FE) TABLET DELAYED RESPONSE at 18:12

## 2018-09-17 RX ADMIN — TRAMADOL HYDROCHLORIDE 50 MG: 50 TABLET, FILM COATED ORAL at 19:35

## 2018-09-17 RX ADMIN — ARIPIPRAZOLE 5 MG: 5 TABLET ORAL at 22:00

## 2018-09-17 RX ADMIN — Medication 10 ML: at 21:00

## 2018-09-17 RX ADMIN — VANCOMYCIN HYDROCHLORIDE 1750 MG: 1 INJECTION, POWDER, LYOPHILIZED, FOR SOLUTION INTRAVENOUS at 12:51

## 2018-09-17 ASSESSMENT — ENCOUNTER SYMPTOMS
WHEEZING: 0
SORE THROAT: 0
CONSTIPATION: 0
COLOR CHANGE: 1
NAUSEA: 0
DIARRHEA: 0
EYE PAIN: 0
BLOOD IN STOOL: 0
COUGH: 0
ALLERGIC/IMMUNOLOGIC NEGATIVE: 1
SINUS PRESSURE: 0
SHORTNESS OF BREATH: 0
GASTROINTESTINAL NEGATIVE: 1
RESPIRATORY NEGATIVE: 1
RECTAL PAIN: 0
BACK PAIN: 0
ABDOMINAL PAIN: 0
CHEST TIGHTNESS: 0
EYES NEGATIVE: 1
VOMITING: 0

## 2018-09-17 ASSESSMENT — PAIN SCALES - GENERAL
PAINLEVEL_OUTOF10: 8
PAINLEVEL_OUTOF10: 6
PAINLEVEL_OUTOF10: 8

## 2018-09-17 ASSESSMENT — PAIN DESCRIPTION - LOCATION: LOCATION: LEG

## 2018-09-17 ASSESSMENT — PAIN DESCRIPTION - ORIENTATION: ORIENTATION: LEFT

## 2018-09-17 ASSESSMENT — PAIN DESCRIPTION - PAIN TYPE: TYPE: ACUTE PAIN

## 2018-09-17 NOTE — PROGRESS NOTES
Pharmacy Vancomycin Consult     Vancomycin Day: 1  Current [facility]    Temp max:  37.1 C    Recent Labs      18   1100   BUN  55*       Recent Labs      18   1100   CREATININE  5.00*       Recent Labs      18   1100   WBC  7.3       Ht Readings from Last 1 Encounters:   18 6' (1.829 m)        Wt Readings from Last 1 Encounters:   18 263 lb 5 oz (119.4 kg)         Body mass index is 35.71 kg/m². Estimated Creatinine Clearance: 18 mL/min (A) (based on SCr of 5 mg/dL (H)). ESRD on HD    Assessment/Plan:  Patient received vancomycin 1750 mg IV x 1 dose in [facility] ED, then transferred to [facility] and pharmacy consulted to dose vancomycin by Dr. Bora Ferris. No additional doses needed at this time, will plan for future doses to be administered post-HD once dialysis schedule is determined.     Asad Fofana, PharmD, ADELIA, BCPS 2018 4:15 PM

## 2018-09-17 NOTE — ED PROVIDER NOTES
PAST MEDICAL HISTORY    has a past medical history of A-fib ([facility] Anemia; ARF (acute renal failure) ([facility] Arthritis; CHF (congestive heart failure) ([facility] Chronic kidney disease; CKD (chronic kidney disease) stage 4, GFR 15-29 ml/min ([facility] Diabetes mellitus ([facility] DVT (deep venous thrombosis) ([facility] Foot ulcer due to secondary DM ([facility] GERD (gastroesophageal reflux disease); Hx of blood clots; Hyperlipidemia; Hypertension; Irregular heartbeat; Lower GI bleed; MDRO (multiple drug resistant organisms) resistance; MRSA (methicillin resistant staph aureus) culture positive; NADIRA on CPAP; Pneumonia; Proteinuria; Renal insufficiency; SAH (subarachnoid hemorrhage) ([facility] Secondary hyperparathyroidism of renal origin ([facility] Thyroid disease; Unspecified cerebral artery occlusion with cerebral infarction; and Venous stasis dermatitis. SURGICAL HISTORY      has a past surgical history that includes Gastric bypass surgery; Vena Cava Filter Placement; Foot Debridement (Left); Foot surgery (Right); Tonsillectomy; Colonoscopy; skin biopsy; Dilatation, esophagus; tracheostomy (summer 2013); Gastrostomy tube placement (summer 2013); Abdomen surgery; Cardiac catheterization; Knee arthroscopy (Right, 03/23/2017); and pr knee scope,diagnostic (Right, 3/23/2017). CURRENT MEDICATIONS       Previous Medications    AMLODIPINE (NORVASC) 5 MG TABLET    Take 1 tablet by mouth daily    APIXABAN (ELIQUIS) 5 MG TABS TABLET    Take 5 mg by mouth 2 times daily    ARIPIPRAZOLE (ABILIFY) 5 MG TABLET    Take 5 mg by mouth    ASPIRIN 81 MG TABLET    Take 81 mg by mouth every other day. ATORVASTATIN (LIPITOR) 40 MG TABLET    Take 40 mg by mouth daily    CALCIUM CARBONATE (OSCAL) 500 MG TABS TABLET    Take 500 mg by mouth three times daily    CHOLECALCIFEROL (VITAMIN D3) 2000 UNITS CAPS    Take 1,000 Units by mouth daily.     CYANOCOBALAMIN (VITAMIN B 12 PO)    Take 3,000 mg by mouth Takes twice weekly    DARBEPOETIN DIONNE-POLYSORBATE his pulse is 76. His respiration is 16 and oxygen saturation is 99%. Patient is alert and oriented, in mild distress due to pain. HEENT is atraumatic. Pupils are PERRL at 4 mm. Mucous membranes moist.    Neck is supple with no lymphadenopathy. No JVD. No meningismus. Heart sounds regular rate and rhythm with no gallops, murmurs, or rubs. Lungs clear, no wheezes, rales or rhonchi. Abdomen: soft, nontender with no pain to palpation. Musculoskeletal exam:  chronic stasis change in both lower extremities. thickening of the skin and darkening of the skin is noted below the knee in both legs. Left leg demonstrates redness as well as some purulent drainage from the anterior leg. The largest area measures 3 x 1 cm. Intact distal pulses are noted bilaterally however. Redness is also noted in the medial left thigh. It is tender to palpation in this area and also in the left calf and anterior leg. AV fistula noted in left arm. Skin: skin appearance as above. Neurological exam reveals cranial nerves 2 through 12 grossly intact. Patient has equal  and normal deep tendon reflexes. Psychiatric: no hallucinations or suicidal ideation. Lymphatics.:  No lymphadenopathy. DIFFERENTIAL DIAGNOSIS/ MDM:     Wound infection, cellulitis, DVT    DIAGNOSTIC RESULTS       RADIOLOGY:   I directly visualized the following  images and reviewed the radiologist interpretations:  US DUP LOWER EXTREMITY LEFT MAMTA   Final Result   No evidence of DVT in the visualized vessels of the left lower extremity.               US DUP LOWER EXTREMITY LEFT MAMTA (Final result)   Result time 09/17/18 11:33:47   Final result by Jodi Feliz MD (09/17/18 11:33:47)                Impression:    No evidence of DVT in the visualized vessels of the left lower extremity.             Narrative:    EXAMINATION:  DUPLEX VENOUS ULTRASOUND OF THE LEFT LOWER EXTREMITY    9/17/2018 10:57 am    TECHNIQUE:  Duplex ultrasound and Doppler images were obtained of the left lower  extremity. COMPARISON:  None.     HISTORY:  ORDERING SYSTEM PROVIDED HISTORY: swelling, pain; history of DVT  TECHNOLOGIST PROVIDED HISTORY:  Ordering Physician Provided Reason for Exam: cellulitis, r/o DVT, h/o DVT  Acuity: Chronic  Type of Exam: Initial  Relevant Medical/Surgical History: taking blood thinners    FINDINGS:  The visualized veins of the left lower extremity are patent and free of  echogenic thrombus.  The peroneal veins could not be visualized.  Examination  is somewhat limited due to cellulitis and patient pain.                      ED BEDSIDE ULTRASOUND:       LABS:  Results for orders placed or performed during the hospital encounter of 09/17/18   CBC Auto Differential   Result Value Ref Range    WBC 7.3 3.5 - 11.0 k/uL    RBC 3.34 (L) 4.5 - 5.9 m/uL    Hemoglobin 11.5 (L) 13.5 - 17.5 g/dL    Hematocrit 34.7 (L) 41 - 53 %    .0 (H) 80 - 100 fL    MCH 34.4 (H) 26 - 34 pg    MCHC 33.1 31 - 37 g/dL    RDW 14.3 12.5 - 15.4 %    Platelets 95 (L) 125 - 450 k/uL    MPV 7.1 6.0 - 12.0 fL    NRBC Automated NOT REPORTED per 100 WBC    Differential Type NOT REPORTED     Immature Granulocytes NOT REPORTED 0 %    Absolute Immature Granulocyte NOT REPORTED 0.00 - 0.30 k/uL    WBC Morphology NOT REPORTED     RBC Morphology NOT REPORTED     Platelet Estimate NOT REPORTED     Seg Neutrophils 90 (H) 36 - 66 %    Lymphocytes 4 (L) 24 - 44 %    Monocytes 5 1 - 7 %    Eosinophils % 1 1 - 4 %    Basophils 0 0 - 2 %    Segs Absolute 6.57 1.8 - 7.7 k/uL    Absolute Lymph # 0.29 (L) 1.0 - 4.8 k/uL    Absolute Mono # 0.37 0.1 - 0.8 k/uL    Absolute Eos # 0.07 0.0 - 0.4 k/uL    Basophils # 0.00 0.0 - 0.2 k/uL    Morphology Normal    Comprehensive Metabolic Panel   Result Value Ref Range    Glucose 149 (H) 70 - 99 mg/dL    BUN 55 (H) 8 - 23 mg/dL    CREATININE 5.00 (H) 0.70 - 1.20 mg/dL    Bun/Cre Ratio NOT REPORTED 9 - 20    Calcium 9.1 8.6 - 10.4 mg/dL    Sodium 136 135 - 144 mmol/L    Potassium 3.9 3.7 - 5.3 mmol/L    Chloride 96 (L) 98 - 107 mmol/L    CO2 24 20 - 31 mmol/L    Anion Gap 16 9 - 17 mmol/L    Alkaline Phosphatase 79 40 - 129 U/L    ALT 13 5 - 41 U/L    AST 22 <40 U/L    Total Bilirubin 2.07 (H) 0.3 - 1.2 mg/dL    Total Protein 7.2 6.4 - 8.3 g/dL    Alb 3.6 3.5 - 5.2 g/dL    Albumin/Globulin Ratio 1.0 1.0 - 2.5    GFR Non- 12 (L) >60 mL/min    GFR  14 (L) >60 mL/min    GFR Comment          GFR Staging NOT REPORTED    Protime-INR   Result Value Ref Range    Protime 13.2 (H) 9.4 - 12.6 sec    INR 1.3    APTT   Result Value Ref Range    PTT 43.3 (H) 21.3 - 31.3 sec   C-Reactive Protein   Result Value Ref Range    CRP 98.2 (H) 0.0 - 5.0 mg/L   Lactic Acid   Result Value Ref Range    Lactic Acid 1.9 0.5 - 2.2 mmol/L         EMERGENCY DEPARTMENT COURSE:   Vitals:    Vitals:    09/17/18 1036   BP: (!) 140/55   Pulse: 76   Resp: 16   Temp: 98.8 °F (37.1 °C)   TempSrc: Oral   SpO2: 99%   Weight: 119.4 kg (263 lb 5 oz)   Height: 6' (1.829 m)     -------------------------  BP: (!) 140/55, Temp: 98.8 °F (37.1 °C), Pulse: 76, Resp: 16      Re-evaluation Notes    The patient received vancomycin in the emergency department. I've discussed the case with the hospitalist.  I think he can be better managed inpatient where they can provide infectious disease consultation and dialysis as well. The patient is transferred to [facility] in stable condition. CONSULTS:    Mami 6 contacted. 5  Discussed with Dr. Mimi Powell at [facility]. Will admit and write orders. FINAL IMPRESSION      1. Cellulitis of left lower limb    2.  ESRD (end stage renal disease) ([facility]          DISPOSITION/PLAN   DISPOSITION        Condition on Disposition    stable    PATIENT REFERRED TO:  Flavia Alejo  [address] [place] [id number] [phone]            DISCHARGE MEDICATIONS:  New Prescriptions    No medications on file       (Please note that portions of this note were completed with a voice recognition program.  Efforts were made to edit the dictations but occasionally words are mis-transcribed.)    Munoz MD   Attending Emergency Physician         Martín Finney MD  09/17/18 3407

## 2018-09-17 NOTE — ED NOTES
Pt brought to ed by spouse with c/o redness and tenderness to the right lower leg. Pt has a hx of cellulitis and dvt, wife states he was on iv abx about 1 month ago. Upon arrival pt is brought to room via wheelchair. Pts wife states that the pain and tenderness has really just increased over the past couple days. There is redness throughout the leg and tenderness to even light touch. Pt denies fever or chills. Call light placed within reach, denies needs. Will continue to monitor.       Gage Judge RN  09/17/18 4006

## 2018-09-17 NOTE — H&P
[address]    HISTORY AND PHYSICAL EXAMINATION            Date:   9/17/2018  Patient name:  Amber Espinoza  Date of admission:  9/17/2018 10:30 AM  MRN:   [id number]  Account:  [de-identified]  YOB: 1950  PCP:    Shikha Arora  Room:   3790/7602-04  Code Status:    Full Code    Chief Complaint:     Chief Complaint   Patient presents with    Cellulitis     left leg       History Obtained From:     patient, electronic medical record    History of Present Illness:     Amber Espinoza is a 77 y.o. male who presents with Cellulitis (left leg) , Patient is poor historian. Significant history of cellulitis in the past treated last month with vancomycin with hemodialysis, switch to oral clindamycin. That was stopped a few weeks ago, patient reported that symptoms are becoming worse and come back. Started to worsen last weekend presents welding, hotness, tenderness and redness with drainage, yellow in color. Patient has significant history of lymphedema with use of lymph pump. That was ascites in August.  .   Patient also reports fatigue and tiredness with chills but no fever, no nausea, no vomiting, no diarrhea. Patient also ESRD on hemodialysis, TTS, last dialysis was on Saturday    Significant history of DVT, on Eliquis  History of cerebral hemorrhage in the past that was related to falls, with residual deficit and aphasia so that's why patient is okay to be on anticoagulation. At ED ,  lower limb Dopplers is negative, blood cultures were obtained. Wound culture was obtained.   Patient was seen to be needed admission for IV antibiotics and ID consultation,    and he is admitted to the hospital for the management of Cellulitis      Past Medical History:     Past Medical History:   Diagnosis Date    A-fib ([address]     Anemia     ARF (acute renal failure) ([facility] 5/28/2014    From pre renal azotemia from newly added diuretic and ARB [sulfamethoxazole-trimethoprim]    Social History:     Tobacco:    reports that he has never smoked. He has never used smokeless tobacco.  Alcohol:      reports that he does not drink alcohol. Drug Use:  reports that he does not use drugs. Family History:     Family History   Problem Relation Age of Onset    Coronary Art Dis Father     Cancer Sister        Review of Systems:   Review of Systems   Constitutional: Positive for activity change and fatigue. Negative for appetite change, fever and unexpected weight change. HENT: Negative for ear discharge, sinus pressure and sore throat. Eyes: Negative for pain. Respiratory: Negative for cough, chest tightness, shortness of breath and wheezing. Cardiovascular: Positive for leg swelling. Negative for chest pain. Gastrointestinal: Negative for abdominal pain, blood in stool, constipation, diarrhea, nausea, rectal pain and vomiting. Endocrine: Negative for polyuria. Genitourinary: Negative for dysuria, flank pain and urgency. Musculoskeletal: Positive for myalgias. Negative for back pain and gait problem. Skin: Positive for rash. Neurological: Negative for speech difficulty and headaches. Psychiatric/Behavioral: Negative for decreased concentration. All other systems reviewed and are negative.     Physical Exam:     Vitals:    18 1036 18 1322 18 1431 18 1645   BP: (!) 140/55 (!) 118/51 (!) 114/39 130/62   Pulse: 76  65 68   Resp:    Temp: 98.8 °F (37.1 °C)  98.6 °F (37 °C) 98.8 °F (37.1 °C)   TempSrc: Oral   Oral   SpO2: 99%  95% 93%   Weight: 263 lb 5 oz (119.4 kg)      Height: 6' (1.829 m)        Temp (24hrs), Av.7 °F (37.1 °C), Min:98.6 °F (37 °C), Max:98.8 °F (37.1 °C)      Intake/Output Summary (Last 24 hours) at 18  Last data filed at 18 1847   Gross per 24 hour   Intake                0 ml   Output              100 ml   Net             -100 ml       Physical Exam   Constitutional: He 0 - 2 %    Segs Absolute 6.57 1.8 - 7.7 k/uL    Absolute Lymph # 0.29 (L) 1.0 - 4.8 k/uL    Absolute Mono # 0.37 0.1 - 0.8 k/uL    Absolute Eos # 0.07 0.0 - 0.4 k/uL    Basophils # 0.00 0.0 - 0.2 k/uL    Morphology Normal    Comprehensive Metabolic Panel    Collection Time: 09/17/18 11:00 AM   Result Value Ref Range    Glucose 149 (H) 70 - 99 mg/dL    BUN 55 (H) 8 - 23 mg/dL    CREATININE 5.00 (H) 0.70 - 1.20 mg/dL    Bun/Cre Ratio NOT REPORTED 9 - 20    Calcium 9.1 8.6 - 10.4 mg/dL    Sodium 136 135 - 144 mmol/L    Potassium 3.9 3.7 - 5.3 mmol/L    Chloride 96 (L) 98 - 107 mmol/L    CO2 24 20 - 31 mmol/L    Anion Gap 16 9 - 17 mmol/L    Alkaline Phosphatase 79 40 - 129 U/L    ALT 13 5 - 41 U/L    AST 22 <40 U/L    Total Bilirubin 2.07 (H) 0.3 - 1.2 mg/dL    Total Protein 7.2 6.4 - 8.3 g/dL    Alb 3.6 3.5 - 5.2 g/dL    Albumin/Globulin Ratio 1.0 1.0 - 2.5    GFR Non- 12 (L) >60 mL/min    GFR  14 (L) >60 mL/min    GFR Comment          GFR Staging NOT REPORTED    Protime-INR    Collection Time: 09/17/18 11:00 AM   Result Value Ref Range    Protime 13.2 (H) 9.4 - 12.6 sec    INR 1.3    APTT    Collection Time: 09/17/18 11:00 AM   Result Value Ref Range    PTT 43.3 (H) 21.3 - 31.3 sec   C-Reactive Protein    Collection Time: 09/17/18 11:00 AM   Result Value Ref Range    CRP 98.2 (H) 0.0 - 5.0 mg/L   Lactic Acid    Collection Time: 09/17/18 11:00 AM   Result Value Ref Range    Lactic Acid 1.9 0.5 - 2.2 mmol/L       Imaging/Diagonstics:  Us Dup Lower Extremity Left Federico    Result Date: 9/17/2018  EXAMINATION: DUPLEX VENOUS ULTRASOUND OF THE LEFT LOWER EXTREMITY 9/17/2018 10:57 am TECHNIQUE: Duplex ultrasound and Doppler images were obtained of the left lower extremity. COMPARISON: None.  HISTORY: ORDERING SYSTEM PROVIDED HISTORY: swelling, pain; history of DVT TECHNOLOGIST PROVIDED HISTORY: Ordering Physician Provided Reason for Exam: cellulitis, r/o DVT, h/o DVT Acuity: Chronic Type of Exam:

## 2018-09-17 NOTE — CONSULTS
very fatigued over the weekend with some chills. He denies any nausea or vomiting. Interval changes9/17/2018     Summary of relevant labs:  Labs:  Creatinine- 5.0  CRP- 98.2  WBC- 7.3  Micro:  Wound culture (9/17/18)- pending  Blood cx x 2 (9/17/18)- pending  Imaging:      I have personally reviewed the past medical history, past surgical history, medications, social history, and family history, and I have updated the database accordingly.   Past Medical History:     Past Medical History:   Diagnosis Date    A-fib ([address]     Anemia     ARF (acute renal failure) ([address] 5/28/2014    From pre renal azotemia from newly added diuretic and ARB use, creat peaked at 2.8 from 2 in may 2014    Arthritis     CHF (congestive heart failure) (HCC)     Chronic kidney disease     CKD (chronic kidney disease) stage 4, GFR 15-29 ml/min ([address] 5/28/2014    From diabetic and ischemic nephrosclerosis, creat now 2-2.5, GFR 25-30 ml/min    Diabetes mellitus ([address]     DVT (deep venous thrombosis) ([address]     Foot ulcer due to secondary DM ([address] 5/28/2014    Left side on antibiotics    GERD (gastroesophageal reflux disease)     Hx of blood clots     Hyperlipidemia     Hypertension     Irregular heartbeat     hx of a-fib    Lower GI bleed 3/4/2017    MDRO (multiple drug resistant organisms) resistance     MRSA (methicillin resistant staph aureus) culture positive 1/13/2014    left foot    NADIRA on CPAP     Pneumonia     Proteinuria 11/30/2016    Fluctuates between 2-3 grams, cant use ACEI due to hyperkalemia    Renal insufficiency     SAH (subarachnoid hemorrhage) ([address]     Secondary hyperparathyroidism of renal origin ([address] 12/16/2015    Thyroid disease     Unspecified cerebral artery occlusion with cerebral infarction     Venous stasis dermatitis 5/28/2014       Past Surgical  History:     Past Surgical History:   Procedure Laterality Date    ABDOMEN SURGERY      CARDIAC CATHETERIZATION      COLONOSCOPY      DILATATION, ESOPHAGUS      FOOT DEBRIDEMENT Left     FOOT SURGERY Right     #5 toe removed  , 1/2 toes #1, #2  partially removed    GASTRIC BYPASS SURGERY      GASTROSTOMY TUBE PLACEMENT  summer 2013    KNEE ARTHROSCOPY Right 03/23/2017    I & D    OR KNEE SCOPE,DIAGNOSTIC Right 3/23/2017    KNEE ARTHROSCOPY,EXTENSIVE DEBRIDEMENT PARTIAL PROXIMAL AND MEDIAL MENISECTOMY  performed by Micaela Russo MD at [address] [place] [address]  summer 2013    VENA CAVA FILTER PLACEMENT         Medications:      vancomycin (VANCOCIN) intermittent dosing (placeholder)   Other RX Placeholder    amLODIPine  5 mg Oral Daily    aspirin  81 mg Oral Every Other Day    atorvastatin  40 mg Oral Daily    ferrous sulfate  325 mg Oral BID AC    pantoprazole  40 mg Oral Daily    levothyroxine  50 mcg Oral Daily    sodium chloride flush  10 mL Intravenous 2 times per day    apixaban  5 mg Oral BID    venlafaxine  112.5 mg Oral Daily       Social History:     Social History     Social History    Marital status:      Spouse name: N/A    Number of children: N/A    Years of education: N/A     Occupational History    Not on file. Social History Main Topics    Smoking status: Never Smoker    Smokeless tobacco: Never Used    Alcohol use No    Drug use: No      Comment: smoked pipe years ago    Sexual activity: Not on file     Other Topics Concern    Not on file     Social History Narrative    ** Merged History Encounter **            Family History:     Family History   Problem Relation Age of Onset    Coronary Art Dis Father     Cancer Sister         Allergies:   Bactrim [sulfamethoxazole-trimethoprim]     Review of Systems:     Review of Systems   Constitutional: Positive for chills and fatigue. Negative for fever and unexpected weight change. HENT: Negative. Eyes: Negative. Respiratory: Negative. Cardiovascular: Positive for leg swelling.  Negative for chest

## 2018-09-17 NOTE — ED NOTES
Spoke with vickie at access, states pt will go to [phone].  [facility], will await return call for transport [address], RN  09/17/18 7232

## 2018-09-18 ENCOUNTER — APPOINTMENT (OUTPATIENT)
Dept: DIALYSIS | Age: 68
DRG: 602 | End: 2018-09-18
Payer: MEDICARE

## 2018-09-18 PROBLEM — R17 ELEVATED BILIRUBIN: Status: RESOLVED | Noted: 2018-09-17 | Resolved: 2018-09-18

## 2018-09-18 LAB
ALBUMIN SERPL-MCNC: 2.9 G/DL (ref 3.5–5.2)
ALBUMIN/GLOBULIN RATIO: 0.7 (ref 1–2.5)
ALP BLD-CCNC: 69 U/L (ref 40–129)
ALT SERPL-CCNC: 12 U/L (ref 5–41)
ANION GAP SERPL CALCULATED.3IONS-SCNC: 19 MMOL/L (ref 9–17)
AST SERPL-CCNC: 19 U/L
BILIRUB SERPL-MCNC: 0.89 MG/DL (ref 0.3–1.2)
BILIRUBIN DIRECT: 0.34 MG/DL
BILIRUBIN, INDIRECT: 0.55 MG/DL (ref 0–1)
BUN BLDV-MCNC: 64 MG/DL (ref 8–23)
BUN/CREAT BLD: ABNORMAL (ref 9–20)
C-REACTIVE PROTEIN: 158.3 MG/L (ref 0–5)
CALCIUM SERPL-MCNC: 8.4 MG/DL (ref 8.6–10.4)
CHLORIDE BLD-SCNC: 95 MMOL/L (ref 98–107)
CO2: 20 MMOL/L (ref 20–31)
CREAT SERPL-MCNC: 5.74 MG/DL (ref 0.7–1.2)
GFR AFRICAN AMERICAN: 12 ML/MIN
GFR NON-AFRICAN AMERICAN: 10 ML/MIN
GFR SERPL CREATININE-BSD FRML MDRD: ABNORMAL ML/MIN/{1.73_M2}
GFR SERPL CREATININE-BSD FRML MDRD: ABNORMAL ML/MIN/{1.73_M2}
GLOBULIN: ABNORMAL G/DL (ref 1.5–3.8)
GLUCOSE BLD-MCNC: 132 MG/DL (ref 70–99)
HCT VFR BLD CALC: 34.1 % (ref 40.7–50.3)
HEMOGLOBIN: 10.4 G/DL (ref 13–17)
MCH RBC QN AUTO: 33.1 PG (ref 25.2–33.5)
MCHC RBC AUTO-ENTMCNC: 30.5 G/DL (ref 28.4–34.8)
MCV RBC AUTO: 108.6 FL (ref 82.6–102.9)
NRBC AUTOMATED: 0 PER 100 WBC
PDW BLD-RTO: 13.4 % (ref 11.8–14.4)
PLATELET # BLD: 65 K/UL (ref 138–453)
PMV BLD AUTO: 9.4 FL (ref 8.1–13.5)
POTASSIUM SERPL-SCNC: 3.8 MMOL/L (ref 3.7–5.3)
RBC # BLD: 3.14 M/UL (ref 4.21–5.77)
SODIUM BLD-SCNC: 134 MMOL/L (ref 135–144)
TOTAL PROTEIN: 6.8 G/DL (ref 6.4–8.3)
WBC # BLD: 5.1 K/UL (ref 3.5–11.3)

## 2018-09-18 PROCEDURE — 2580000003 HC RX 258: Performed by: FAMILY MEDICINE

## 2018-09-18 PROCEDURE — 86140 C-REACTIVE PROTEIN: CPT

## 2018-09-18 PROCEDURE — 6370000000 HC RX 637 (ALT 250 FOR IP): Performed by: FAMILY MEDICINE

## 2018-09-18 PROCEDURE — 1200000000 HC SEMI PRIVATE

## 2018-09-18 PROCEDURE — 94762 N-INVAS EAR/PLS OXIMTRY CONT: CPT

## 2018-09-18 PROCEDURE — 36415 COLL VENOUS BLD VENIPUNCTURE: CPT

## 2018-09-18 PROCEDURE — 90937 HEMODIALYSIS REPEATED EVAL: CPT

## 2018-09-18 PROCEDURE — 99232 SBSQ HOSP IP/OBS MODERATE 35: CPT | Performed by: FAMILY MEDICINE

## 2018-09-18 PROCEDURE — 5A1D70Z PERFORMANCE OF URINARY FILTRATION, INTERMITTENT, LESS THAN 6 HOURS PER DAY: ICD-10-PCS | Performed by: INTERNAL MEDICINE

## 2018-09-18 PROCEDURE — 80076 HEPATIC FUNCTION PANEL: CPT

## 2018-09-18 PROCEDURE — 85027 COMPLETE CBC AUTOMATED: CPT

## 2018-09-18 PROCEDURE — 80048 BASIC METABOLIC PNL TOTAL CA: CPT

## 2018-09-18 RX ORDER — VANCOMYCIN HYDROCHLORIDE 1 G/200ML
1000 INJECTION, SOLUTION INTRAVENOUS
Status: DISCONTINUED | OUTPATIENT
Start: 2018-09-20 | End: 2018-09-19 | Stop reason: HOSPADM

## 2018-09-18 RX ADMIN — APIXABAN 5 MG: 5 TABLET, FILM COATED ORAL at 21:50

## 2018-09-18 RX ADMIN — Medication 10 ML: at 09:26

## 2018-09-18 RX ADMIN — TRAMADOL HYDROCHLORIDE 50 MG: 50 TABLET, FILM COATED ORAL at 06:28

## 2018-09-18 RX ADMIN — LAMOTRIGINE 100 MG: 100 TABLET ORAL at 09:24

## 2018-09-18 RX ADMIN — ARIPIPRAZOLE 5 MG: 5 TABLET ORAL at 09:24

## 2018-09-18 RX ADMIN — VENLAFAXINE 75 MG: 75 TABLET ORAL at 09:24

## 2018-09-18 RX ADMIN — TRAMADOL HYDROCHLORIDE 50 MG: 50 TABLET, FILM COATED ORAL at 12:51

## 2018-09-18 RX ADMIN — Medication 10 ML: at 21:51

## 2018-09-18 RX ADMIN — ISOSORBIDE MONONITRATE 30 MG: 30 TABLET ORAL at 09:24

## 2018-09-18 RX ADMIN — PANTOPRAZOLE SODIUM 40 MG: 40 TABLET, DELAYED RELEASE ORAL at 09:24

## 2018-09-18 RX ADMIN — FERROUS SULFATE TAB EC 325 MG (65 MG FE EQUIVALENT) 325 MG: 325 (65 FE) TABLET DELAYED RESPONSE at 06:21

## 2018-09-18 RX ADMIN — DESMOPRESSIN ACETATE 40 MG: 0.2 TABLET ORAL at 09:24

## 2018-09-18 RX ADMIN — LEVOTHYROXINE SODIUM 50 MCG: 50 TABLET ORAL at 06:21

## 2018-09-18 RX ADMIN — APIXABAN 5 MG: 5 TABLET, FILM COATED ORAL at 09:26

## 2018-09-18 RX ADMIN — MIDODRINE HYDROCHLORIDE 5 MG: 5 TABLET ORAL at 15:31

## 2018-09-18 ASSESSMENT — ENCOUNTER SYMPTOMS
NAUSEA: 0
ALLERGIC/IMMUNOLOGIC NEGATIVE: 1
WHEEZING: 0
EYES NEGATIVE: 1
CONSTIPATION: 0
COLOR CHANGE: 1
VOMITING: 0
DIARRHEA: 0
SHORTNESS OF BREATH: 0
ABDOMINAL DISTENTION: 0
ABDOMINAL PAIN: 0

## 2018-09-18 ASSESSMENT — PAIN SCALES - GENERAL
PAINLEVEL_OUTOF10: 6
PAINLEVEL_OUTOF10: 7
PAINLEVEL_OUTOF10: 0
PAINLEVEL_OUTOF10: 5
PAINLEVEL_OUTOF10: 9

## 2018-09-18 NOTE — CONSULTS
12/16/2015    Thyroid disease     Unspecified cerebral artery occlusion with cerebral infarction     Venous stasis dermatitis 5/28/2014         PAST SURGICAL HISTORY         Procedure Laterality Date    ABDOMEN SURGERY      CARDIAC CATHETERIZATION      COLONOSCOPY      DILATATION, ESOPHAGUS      FOOT DEBRIDEMENT Left     FOOT SURGERY Right     #5 toe removed  , 1/2 toes #1, #2  partially removed    GASTRIC BYPASS SURGERY      GASTROSTOMY TUBE PLACEMENT  summer 2013    KNEE ARTHROSCOPY Right 03/23/2017    I & D    SC KNEE SCOPE,DIAGNOSTIC Right 3/23/2017    KNEE ARTHROSCOPY,EXTENSIVE DEBRIDEMENT PARTIAL PROXIMAL AND MEDIAL MENISECTOMY  performed by Liam Lizarraga MD at [address] [place] [address]  summer 2013   [address] [place] [address] Meds:                Prescriptions Prior to Admission: ARIPiprazole (ABILIFY) 5 MG tablet, Take 5 mg by mouth  apixaban (ELIQUIS) 5 MG TABS tablet, Take 5 mg by mouth 2 times daily  Ferric Citrate (AURYXIA) 1  MG(Fe) TABS, Take 210 mg by mouth 2 times daily Patients med list states frequency is 2-3 times a day  atorvastatin (LIPITOR) 40 MG tablet, Take 40 mg by mouth daily  midodrine (PROAMATINE) 5 MG tablet, Take 1 tablet by mouth as needed (Hypotension)  venlafaxine (EFFEXOR) 75 MG tablet, Take 75 mg by mouth daily  lamoTRIgine (LAMICTAL) 25 MG tablet, Take 100 mg by mouth daily   isosorbide mononitrate (IMDUR) 30 MG CR tablet, Take 30 mg by mouth daily   pantoprazole (PROTONIX) 40 MG tablet, Take 40 mg by mouth daily   levothyroxine (LEVOTHROID) 50 MCG tablet, Take 50 mcg by mouth Daily.   Cyanocobalamin (VITAMIN B 12 PO), Take 3,000 mg by mouth Takes twice weekly  [DISCONTINUED] sevelamer (RENVELA) 800 MG tablet, Take 1 tablet by mouth 2 times daily  [DISCONTINUED] calcium carbonate (OSCAL) 500 MG TABS tablet, Take 500 mg by mouth three times daily  torsemide (DEMADEX) 20 MG tablet, Take 1 Weight:   275 lb (124.7 kg)    Height:         24HR INTAKE/OUTPUT:    Intake/Output Summary (Last 24 hours) at 09/18/18 1405  Last data filed at 09/18/18 0659   Gross per 24 hour   Intake              160 ml   Output              300 ml   Net             -140 ml       General appearance:Awake, alert, in no acute distress  Skin: warm and dry, no rash or erythema  Eyes: conjunctivae normal and sclera anicteric  ENT: no thrush no pharyngeal congestion  orodental hygiene   Neck: No JVD, Lymphadenopathty or thyromegaly  Respiratory: vesicular breath sounds,no wheeze/crackles  Cardiovascular: S1 S2 normal,no gallop or organic murmur. No carotid bruit  Abdomen:Non tender/non distended. Bowel sounds present  Extremities: No Cyanosis or Clubbing,Present Lower extremity edema  Neurological:Alert and oriented. No abnormalities of mood, affect, memory, mentation, or behavior are noted    INVESTIGATIONS     PTH:  No results found for: PTH  abs:   CBC: Recent Labs      09/17/18   1100  09/18/18   0604   WBC  7.3  5.1   RBC  3.34*  3.14*   HGB  11.5*  10.4*   HCT  34.7*  34.1*   MCV  104.0*  108.6*   MCH  34.4*  33.1   MCHC  33.1  30.5   RDW  14.3  13.4   PLT  95*  65*   MPV  7.1  9.4      BMP: Recent Labs      09/17/18   1100  09/18/18   0604   NA  136  134*   K  3.9  3.8   CL  96*  95*   CO2  24  20   BUN  55*  64*   CREATININE  5.00*  5.74*   GLUCOSE  149*  132*   CALCIUM  9.1  8.4*        Phosphorus:  No results for input(s): PHOS in the last 72 hours. Magnesium: No results for input(s): MG in the last 72 hours. Albumin:   Recent Labs      09/17/18   1100  09/18/18   0604   LABALBU  3.6  2.9*       ASSESSMENT     1. Admitted with left lower extremity cellulitis  2. End-stage renal disease on intermittent maintenance for dialysis Tuesday Thursday Saturday at [facility]  3. Type 2 diabetes  4. Essential hypertension  5. Obstructive sleep apnea  6. History of hemorrhagic CVA  7. History of DVT status post IVC filter  8. Left ventricular diastolic dysfunction    PLAN     1. Hemodialysis today. Orders reviewed with the nursing staff. 2.  Antibiotics as per ESRD dosing  3. Resume all home medications  4. Will follow      Thank you for the consultation. Please do not hesitate to call with questions.     This note is created with the assistance of a speech-recognition program. While intending to generate a document that actually reflects the content of the visit, no guarantees can be provided that every mistake has been identified and corrected by editing      Aline Lozano MD, [facility] Amy Macedo), [address]   9/18/2018 2:05 PM  [facility]

## 2018-09-18 NOTE — PROGRESS NOTES
Dialysis Post Treatment Note  Patient tolerated treatment well. Denies complaints at time of discharge.    Vitals:    09/18/18 1853   BP: 131/66   Pulse: 61   Resp: 17   Temp: 97.7 °F (36.5 °C)   SpO2:      Pre-Weight = 120.6  Post-weight = Weight: 265 lb 14 oz (120.6 kg)  Total Liters Processed = Total Liters Processed (l/min): 106.2 l/min  Rinseback Volume (mL) = Rinseback Volume (ml): 360 ml  Net Removal (mL) = 4000mL  Length of treatment= 4hrs

## 2018-09-18 NOTE — PROGRESS NOTES
[facility] - Initial Consult Note  admission date 9/17/2018  Impression :   Current:  · Left leg cellulitis - erysipelas lower and upper leg  · Left lower leg infected wounds- MRSA  · DM with associated chronic renal failure  · ESRD on HD  · Elevated CRP  · Venous statis  · Lymphedema     Other:  · Hx of DVT  Discussion / summary of stay / plan of care   · Patient has had recurrent cellulitis, most recently on 8/10. Off antibiotics for 2 weeks and noticed redness, purulent drainage, and swelling  · Looks like erysipelas yet cx of the wound is MRSA  Recommendations   · Wound cx shows SA by  PCR  · Switch to vanco iv every dialysis as of today  · Monitor CRP   · Anticipate disch once redness better - plan 14 days antibiotics   · Might need suppression w po antibiotics after course is over to prevent relapse  · Needs compression stockings to allow the wounds to heal as outpt and stop the entry sources  · hydration to skin to close disrupted skin  · Pending blood cultures  · Discussed plan with patient and wife    Infection Control Recommendations   · [place] Precautions  · Contact Isolation     Antimicrobial Stewardship Recommendations   · Simplification of therapy  · Targeted therapy    Coordination of Outpatient Care:   · Estimated Length of IV antimicrobials:  · Patient will need Midline / picc Catheter Insertion:   · Patient will need SNF:  · Patient will need outpatient wound care:   Chief complaint/reason for consultation:   Left leg cellulitis  History of Present Illness:   Initial history:  Radha Santos is a 77y.o.-year-old male presents with left leg cellulitis. Patient is a poor historian and much of the history was obtained from is wife. Patient has has a history of cellulitis and was most recently treated on August 10th. He was treated with IV vancomycin on HD and switch to oral clindamycin.  Patient has been off of antibiotics for a few weeks, and patient noticed grams, cant use ACEI due to hyperkalemia    Renal insufficiency     SAH (subarachnoid hemorrhage) (HCC)     Secondary hyperparathyroidism of renal origin ([facility] 12/16/2015    Thyroid disease     Unspecified cerebral artery occlusion with cerebral infarction     Venous stasis dermatitis 5/28/2014       Past Surgical  History:     Past Surgical History:   Procedure Laterality Date    ABDOMEN SURGERY      CARDIAC CATHETERIZATION      COLONOSCOPY      DILATATION, ESOPHAGUS      FOOT DEBRIDEMENT Left     FOOT SURGERY Right     #5 toe removed  , 1/2 toes #1, #2  partially removed    GASTRIC BYPASS SURGERY      GASTROSTOMY TUBE PLACEMENT  summer 2013    KNEE ARTHROSCOPY Right 03/23/2017    I & D    SC KNEE SCOPE,DIAGNOSTIC Right 3/23/2017    KNEE ARTHROSCOPY,EXTENSIVE DEBRIDEMENT PARTIAL PROXIMAL AND MEDIAL MENISECTOMY  performed by Carmen Jernigan MD at [address] [place] [address]  summer 2013    VENA CAVA FILTER PLACEMENT         Medications:      amLODIPine  5 mg Oral Daily    aspirin  81 mg Oral Every Other Day    atorvastatin  40 mg Oral Daily    ferrous sulfate  325 mg Oral BID AC    pantoprazole  40 mg Oral Daily    levothyroxine  50 mcg Oral Daily    sodium chloride flush  10 mL Intravenous 2 times per day    apixaban  5 mg Oral BID    cefTRIAXone (ROCEPHIN) IV  2 g Intravenous Q24H    ARIPiprazole  5 mg Oral Daily    lamoTRIgine  100 mg Oral Daily    isosorbide mononitrate  30 mg Oral Daily    hydrocortisone  25 mg Rectal Daily    Ferric Citrate  210 mg Oral BID    venlafaxine  75 mg Oral Daily       Social History:     Social History     Social History    Marital status:      Spouse name: N/A    Number of children: N/A    Years of education: N/A     Occupational History    Not on file.      Social History Main Topics    Smoking status: Never Smoker    Smokeless tobacco: Never Used    Alcohol use No    Drug use: No      Comment: smoked pipe years ago    Sexual activity: Not on file     Other Topics Concern    Not on file     Social History Narrative    ** Merged History Encounter **            Family History:     Family History   Problem Relation Age of Onset    Coronary Art Dis Father     Cancer Sister         Allergies:   Bactrim [sulfamethoxazole-trimethoprim]     Review of Systems:     Review of Systems   Constitutional: Negative for chills, diaphoresis, fatigue and fever. HENT: Negative. Eyes: Negative. Respiratory:        Wearing CPAP   Cardiovascular: Positive for leg swelling. Negative for chest pain. Gastrointestinal: Negative for abdominal distention, abdominal pain, diarrhea and nausea. Endocrine: Negative. Genitourinary: Negative. Musculoskeletal:        Left leg pain   Skin:        Left leg erythema  Venous statis bilateral legs  Right leg lymphedema   Allergic/Immunologic: Negative. Neurological: Negative for dizziness, light-headedness and headaches. Hematological: Negative. Psychiatric/Behavioral: Negative for agitation, behavioral problems and hallucinations. Physical Examination :   Patient Vitals for the past 8 hrs:   BP Temp Temp src Pulse Resp SpO2 Weight   09/18/18 0748 (!) 106/55 97.9 °F (36.6 °C) Axillary 62 18 97 % -   09/18/18 0600 - - - - - - 275 lb (124.7 kg)       Physical Exam   Constitutional: He is oriented to person, place, and time and well-developed, well-nourished, and in no distress. No distress. HENT:   Head: Normocephalic and atraumatic. Eyes: Pupils are equal, round, and reactive to light. Neck: Normal range of motion. Cardiovascular: Normal rate. Exam reveals no friction rub. Pulmonary/Chest: Effort normal. He has no wheezes. Abdominal: Soft. Bowel sounds are normal. There is no tenderness. Genitourinary: No discharge found. Musculoskeletal: He exhibits edema, tenderness and deformity (Prior toe amputations).    Neurological: He is alert and

## 2018-09-19 VITALS
BODY MASS INDEX: 34.82 KG/M2 | WEIGHT: 257.06 LBS | HEIGHT: 72 IN | TEMPERATURE: 98.1 F | SYSTOLIC BLOOD PRESSURE: 123 MMHG | RESPIRATION RATE: 16 BRPM | OXYGEN SATURATION: 97 % | DIASTOLIC BLOOD PRESSURE: 64 MMHG | HEART RATE: 63 BPM

## 2018-09-19 LAB
C-REACTIVE PROTEIN: 104.5 MG/L (ref 0–5)
GLUCOSE BLD-MCNC: 114 MG/DL (ref 75–110)
GLUCOSE BLD-MCNC: 118 MG/DL (ref 75–110)
GLUCOSE BLD-MCNC: 130 MG/DL (ref 75–110)

## 2018-09-19 PROCEDURE — 36415 COLL VENOUS BLD VENIPUNCTURE: CPT

## 2018-09-19 PROCEDURE — 82947 ASSAY GLUCOSE BLOOD QUANT: CPT

## 2018-09-19 PROCEDURE — 99239 HOSP IP/OBS DSCHRG MGMT >30: CPT | Performed by: FAMILY MEDICINE

## 2018-09-19 PROCEDURE — 99232 SBSQ HOSP IP/OBS MODERATE 35: CPT | Performed by: INTERNAL MEDICINE

## 2018-09-19 PROCEDURE — 6370000000 HC RX 637 (ALT 250 FOR IP): Performed by: FAMILY MEDICINE

## 2018-09-19 PROCEDURE — 86140 C-REACTIVE PROTEIN: CPT

## 2018-09-19 RX ORDER — DOXYCYCLINE 100 MG/1
100 TABLET ORAL 2 TIMES DAILY
Qty: 60 TABLET | Refills: 5 | Status: SHIPPED | OUTPATIENT
Start: 2018-09-19 | End: 2018-10-19

## 2018-09-19 RX ADMIN — LEVOTHYROXINE SODIUM 50 MCG: 50 TABLET ORAL at 06:09

## 2018-09-19 RX ADMIN — DESMOPRESSIN ACETATE 40 MG: 0.2 TABLET ORAL at 08:43

## 2018-09-19 RX ADMIN — PANTOPRAZOLE SODIUM 40 MG: 40 TABLET, DELAYED RELEASE ORAL at 09:00

## 2018-09-19 RX ADMIN — TRAMADOL HYDROCHLORIDE 50 MG: 50 TABLET, FILM COATED ORAL at 02:16

## 2018-09-19 RX ADMIN — FERROUS SULFATE TAB EC 325 MG (65 MG FE EQUIVALENT) 325 MG: 325 (65 FE) TABLET DELAYED RESPONSE at 06:09

## 2018-09-19 RX ADMIN — APIXABAN 5 MG: 5 TABLET, FILM COATED ORAL at 08:43

## 2018-09-19 RX ADMIN — VENLAFAXINE 75 MG: 75 TABLET ORAL at 08:43

## 2018-09-19 RX ADMIN — ARIPIPRAZOLE 5 MG: 5 TABLET ORAL at 08:43

## 2018-09-19 RX ADMIN — ISOSORBIDE MONONITRATE 30 MG: 30 TABLET ORAL at 08:42

## 2018-09-19 RX ADMIN — HYDROCORTISONE ACETATE 25 MG: 25 SUPPOSITORY RECTAL at 08:43

## 2018-09-19 RX ADMIN — AMLODIPINE BESYLATE 5 MG: 5 TABLET ORAL at 08:43

## 2018-09-19 RX ADMIN — LAMOTRIGINE 100 MG: 100 TABLET ORAL at 08:43

## 2018-09-19 RX ADMIN — ASPIRIN 81 MG: 81 TABLET, CHEWABLE ORAL at 08:43

## 2018-09-19 ASSESSMENT — ENCOUNTER SYMPTOMS
NAUSEA: 0
COLOR CHANGE: 1
DIARRHEA: 0
ALLERGIC/IMMUNOLOGIC NEGATIVE: 1
CONSTIPATION: 0
SHORTNESS OF BREATH: 0
WHEEZING: 0
EYES NEGATIVE: 1
VOMITING: 0
ABDOMINAL DISTENTION: 0
ABDOMINAL PAIN: 0

## 2018-09-19 ASSESSMENT — PAIN DESCRIPTION - ONSET: ONSET: ON-GOING

## 2018-09-19 ASSESSMENT — PAIN DESCRIPTION - PROGRESSION
CLINICAL_PROGRESSION: GRADUALLY IMPROVING

## 2018-09-19 ASSESSMENT — PAIN SCALES - GENERAL
PAINLEVEL_OUTOF10: 5
PAINLEVEL_OUTOF10: 1
PAINLEVEL_OUTOF10: 9

## 2018-09-19 ASSESSMENT — PAIN DESCRIPTION - DESCRIPTORS: DESCRIPTORS: DISCOMFORT

## 2018-09-19 ASSESSMENT — PAIN DESCRIPTION - LOCATION: LOCATION: LEG

## 2018-09-19 ASSESSMENT — PAIN DESCRIPTION - PAIN TYPE: TYPE: ACUTE PAIN

## 2018-09-19 ASSESSMENT — PAIN DESCRIPTION - FREQUENCY: FREQUENCY: CONTINUOUS

## 2018-09-19 ASSESSMENT — PAIN DESCRIPTION - ORIENTATION: ORIENTATION: LEFT

## 2018-09-19 NOTE — CARE COORDINATION
Case Management Initial Discharge Plan  Stefania Adam,         Readmission Risk              Risk of Unplanned Readmission:        24               Met with:patient to discuss discharge plans. Information verified: address, contacts, phone number, , insurance Yes  PCP: Adilson Read  Date of last visit:   no longer with practice.  Patient to see Dr Liliam Sierra" on Oct 1    Insurance Provider: [employer], [employer]    Discharge Planning    Living Arrangements:  Spouse/Significant Other   Support Systems:  Spouse/Significant Other    Home has 1 stories  1 stairs to climb to get into front door, n/a stairs to climb to reach second floor  Location of bedroom/bathroom in home main level    Patient able to perform ADL's:Independent    Current Services (outpatient & in home) DME  DME equipment: CPAP, WC, RW, glucometer  DME provider:     Pharmacy: [email or web address] Medications:  No  Does patient want to participate in local refill/ meds to beds program?  No    Potential Assistance Needed:  N/A    Patient agreeable to home care: No  [place] of choice provided:  n/a    Prior SN[facility]/Rehab Pl[facility] and [facility]: [facility]  Agreeable to SNF/Rehab: No  [place] of choice provided: n/a   Evaluation: n/a    Expected Discharge date:  18  Patient expects to be discharged to:  [facility]  Follow Up Appointment: Best Day/ Time:      Transportation provider: wife  Transportation arrangements needed for discharge: No     Discharge Plan: Home with wife, has needed DME  Goes to [facility] dialysis T-Th-Sat      Electronically signed by Elvia Diamond RN on 18 at 4:16 PM

## 2018-09-19 NOTE — DISCHARGE SUMMARY
that was related to falls, with residual deficit and aphasia so that's why patient is okay to be on anticoagulation. At ED ,  lower limb Dopplers is negative, blood cultures were obtained. Wound culture was obtained. Patient was seen to be needed admission for IV antibiotics and ID consultation,     and he is admitted to the hospital for the management of Cellulitis      Hospital Course/Significant therapeutic interventions:   Cellulitis: Wound culture, blood cultures, CRP follow-up, ID consult. IV antibiotics. Per ID onVancomycin for now   Discussed with Dr. Dell Joe    · \"Continue IV vancomycin with every dialysis - 14 days antibiotics   · Then doxy x 6 months suppression  · ACE legs and wound care to heal them - and creams to cure dry skin - all to help stop relapse another cellulitis relapse\"  Chronic A. fib: Rate controlled, on Eliquis for DVT as well  History of DVT: On Eliquis. Elevated bilirubin/abdominal discomfort. Follow-up LFTs improving, DC gallbladder ultrasound. ESRD on HD: hemodialysis, nephrology consulted, scheduled on TTS. Okay to discharge by nephrology. Anemia of chronic disease: Follow-up CBC. History of cerebral bleeding and stroke: The residual deficits and aphasia. PT, OT  Hypothyroidism: TSH WNL, levothyroxine. Seizure disorder: On lamotrigine, resume  HTN: stable,resume home meds      Significant Diagnostic Studies:   Labs / Micro:  Recent Results (from the past 168 hour(s))   Wound Culture    Collection Time: 09/17/18 10:43 AM   Result Value Ref Range    Specimen Description . LEG SWAB LEFT LOWER     Special Requests NOT REPORTED     Direct Exam NO NEUTROPHILS SEEN     Direct Exam MANY GRAM POSITIVE COCCI SINGLY AND IN PAIRS (A)     Direct Exam RARE GRAM POSITIVE COCCI IN CLUSTERS (A)     Culture (A)      METHICILLIN RESISTANT STAPHYLOCOCCUS AUREUS MODERATE GROWTH    Culture PROTEUS SPECIES SCANT GROWTH (A)     Status Pending     Organism MRSA        Susceptibility Methicillin resistant staphylococcus aureus - ADONAY     penicillin >=0.5 RESISTANT Resistant      cefoxitin screen NOT REPORTED       ciprofloxacin NOT REPORTED       clindamycin >=8 RESISTANT Resistant      erythromycin >=8 RESISTANT Resistant      gentamicin <=0.5 SUSCEPTIBLE Sensitive      Induced Clind Resist NOT REPORTED       levofloxacin >=8 RESISTANT Resistant      linezolid NOT REPORTED       moxifloxacin NOT REPORTED       nitrofurantoin NOT REPORTED       oxacillin >=4 RESISTANT Resistant      Synercid NOT REPORTED       rifampin NOT REPORTED       tetracycline <=1 SUSCEPTIBLE Sensitive      tigecycline NOT REPORTED       trimethoprim-sulfamethoxazole <=10 SUSCEPTIBLE Sensitive      vancomycin 1 SUSCEPTIBLE Sensitive    CBC Auto Differential    Collection Time: 09/17/18 11:00 AM   Result Value Ref Range    WBC 7.3 3.5 - 11.0 k/uL    RBC 3.34 (L) 4.5 - 5.9 m/uL    Hemoglobin 11.5 (L) 13.5 - 17.5 g/dL    Hematocrit 34.7 (L) 41 - 53 %    .0 (H) 80 - 100 fL    MCH 34.4 (H) 26 - 34 pg    MCHC 33.1 31 - 37 g/dL    RDW 14.3 12.5 - 15.4 %    Platelets 95 (L) 647 - 450 k/uL    MPV 7.1 6.0 - 12.0 fL    NRBC Automated NOT REPORTED per 100 WBC    Differential Type NOT REPORTED     Immature Granulocytes NOT REPORTED 0 %    Absolute Immature Granulocyte NOT REPORTED 0.00 - 0.30 k/uL    WBC Morphology NOT REPORTED     RBC Morphology NOT REPORTED     Platelet Estimate NOT REPORTED     Seg Neutrophils 90 (H) 36 - 66 %    Lymphocytes 4 (L) 24 - 44 %    Monocytes 5 1 - 7 %    Eosinophils % 1 1 - 4 %    Basophils 0 0 - 2 %    Segs Absolute 6.57 1.8 - 7.7 k/uL    Absolute Lymph # 0.29 (L) 1.0 - 4.8 k/uL    Absolute Mono # 0.37 0.1 - 0.8 k/uL    Absolute Eos # 0.07 0.0 - 0.4 k/uL    Basophils # 0.00 0.0 - 0.2 k/uL    Morphology Normal    Culture Blood #1    Collection Time: 09/17/18 11:00 AM   Result Value Ref Range    Specimen Description . BLOOD     Special Requests RIGHT ANTECUBE 20ML     Culture NO GROWTH 2 DAYS Status Pending    Comprehensive Metabolic Panel    Collection Time: 09/17/18 11:00 AM   Result Value Ref Range    Glucose 149 (H) 70 - 99 mg/dL    BUN 55 (H) 8 - 23 mg/dL    CREATININE 5.00 (H) 0.70 - 1.20 mg/dL    Bun/Cre Ratio NOT REPORTED 9 - 20    Calcium 9.1 8.6 - 10.4 mg/dL    Sodium 136 135 - 144 mmol/L    Potassium 3.9 3.7 - 5.3 mmol/L    Chloride 96 (L) 98 - 107 mmol/L    CO2 24 20 - 31 mmol/L    Anion Gap 16 9 - 17 mmol/L    Alkaline Phosphatase 79 40 - 129 U/L    ALT 13 5 - 41 U/L    AST 22 <40 U/L    Total Bilirubin 2.07 (H) 0.3 - 1.2 mg/dL    Total Protein 7.2 6.4 - 8.3 g/dL    Alb 3.6 3.5 - 5.2 g/dL    Albumin/Globulin Ratio 1.0 1.0 - 2.5    GFR Non- 12 (L) >60 mL/min    GFR  14 (L) >60 mL/min    GFR Comment          GFR Staging NOT REPORTED    Protime-INR    Collection Time: 09/17/18 11:00 AM   Result Value Ref Range    Protime 13.2 (H) 9.4 - 12.6 sec    INR 1.3    APTT    Collection Time: 09/17/18 11:00 AM   Result Value Ref Range    PTT 43.3 (H) 21.3 - 31.3 sec   C-Reactive Protein    Collection Time: 09/17/18 11:00 AM   Result Value Ref Range    CRP 98.2 (H) 0.0 - 5.0 mg/L   Lactic Acid    Collection Time: 09/17/18 11:00 AM   Result Value Ref Range    Lactic Acid 1.9 0.5 - 2.2 mmol/L   Culture Blood #1    Collection Time: 09/17/18 11:33 AM   Result Value Ref Range    Specimen Description . BLOOD     Special Requests NOT REPORTED     Culture NO GROWTH 2 DAYS     Status Pending    TSH with Reflex    Collection Time: 09/17/18  8:12 PM   Result Value Ref Range    TSH 1.01 0.30 - 5.00 mIU/L   Basic Metabolic Panel w/ Reflex to MG    Collection Time: 09/18/18  6:04 AM   Result Value Ref Range    Glucose 132 (H) 70 - 99 mg/dL    BUN 64 (H) 8 - 23 mg/dL    CREATININE 5.74 (HH) 0.70 - 1.20 mg/dL    Bun/Cre Ratio NOT REPORTED 9 - 20    Calcium 8.4 (L) 8.6 - 10.4 mg/dL    Sodium 134 (L) 135 - 144 mmol/L    Potassium 3.8 3.7 - 5.3 mmol/L    Chloride 95 (L) 98 - 107 mmol/L    CO2 INFECTIOUS DISEASES  IP CONSULT TO NEPHROLOGY      The patient was seen and examined on day of discharge and this discharge summary is in conjunction with any daily progress note from day of discharge. Discharge plan:     Disposition: Skilled Facility    Physician Follow Up: [address] [place] [address] [place] [facility]  [phone]    Schedule an appointment as soon as possible for a visit in 1 week  Post Hospitalization KAYE Larios MD  [address] [place] [address],  [place]  [phone]    Schedule an appointment as soon as possible for a visit in 3 weeks  Post Hospitalization FU    Hemodialysis  TTS  Go on 9/20/2018         Requiring Further Evaluation/Follow Up POST HOSPITALIZATION/Incidental Findings: Follow-up as above. Diet:     DIET CARB CONTROL; Renal    Activity: As tolerated    Discharge Medications:   Current Discharge Medication List      START taking these medications    Details   vancomycin (VANCOCIN) infusion Infuse 1,000 mg intravenously three times a week Every TTS w hemodialysis  Compound per protocol. till 9/29/18 the stop  Qty: 1 each, Refills: 0      doxycycline monohydrate (ADOXA) 100 MG tablet Take 1 tablet by mouth 2 times daily w meals pls  Qty: 60 tablet, Refills: 5             Current Discharge Medication List          Current Discharge Medication List      CONTINUE these medications which have NOT CHANGED    Details   ARIPiprazole (ABILIFY) 5 MG tablet Take 5 mg by mouth      apixaban (ELIQUIS) 5 MG TABS tablet Take 5 mg by mouth 2 times daily      Ferric Citrate (AURYXIA) 1  MG(Fe) TABS Take 210 mg by mouth 2 times daily Patients med list states frequency is 2-3 times a day      atorvastatin (LIPITOR) 40 MG tablet Take 40 mg by mouth daily      midodrine (PROAMATINE) 5 MG tablet Take 1 tablet by mouth as needed (Hypotension)  Qty: 90 tablet, Refills: 3      venlafaxine (EFFEXOR) 75 MG tablet Take 75 mg by mouth daily      lamoTRIgine

## 2018-09-19 NOTE — PROGRESS NOTES
origin ([facility] [address] 12/16/2015    Thyroid disease     Unspecified cerebral artery occlusion with cerebral infarction     Venous stasis dermatitis 5/28/2014       Past Surgical  History:     Past Surgical History:   Procedure Laterality Date    ABDOMEN SURGERY      CARDIAC CATHETERIZATION      COLONOSCOPY      DILATATION, ESOPHAGUS      FOOT DEBRIDEMENT Left     FOOT SURGERY Right     #5 toe removed  , 1/2 toes #1, #2  partially removed    GASTRIC BYPASS SURGERY      GASTROSTOMY TUBE PLACEMENT  summer 2013    KNEE ARTHROSCOPY Right 03/23/2017    I & D    MS KNEE SCOPE,DIAGNOSTIC Right 3/23/2017    KNEE ARTHROSCOPY,EXTENSIVE DEBRIDEMENT PARTIAL PROXIMAL AND MEDIAL MENISECTOMY  performed by Mehreen Brand MD at [address] [place] [address]  summer 2013    VENA CAVA FILTER PLACEMENT         Medications:      [START ON 9/20/2018] vancomycin  1,000 mg Intravenous Once per day on Tue Thu Sat    amLODIPine  5 mg Oral Daily    aspirin  81 mg Oral Every Other Day    atorvastatin  40 mg Oral Daily    ferrous sulfate  325 mg Oral BID AC    pantoprazole  40 mg Oral Daily    levothyroxine  50 mcg Oral Daily    sodium chloride flush  10 mL Intravenous 2 times per day    apixaban  5 mg Oral BID    ARIPiprazole  5 mg Oral Daily    lamoTRIgine  100 mg Oral Daily    isosorbide mononitrate  30 mg Oral Daily    hydrocortisone  25 mg Rectal Daily    Ferric Citrate  210 mg Oral BID    venlafaxine  75 mg Oral Daily       Social History:     Social History     Social History    Marital status:      Spouse name: N/A    Number of children: N/A    Years of education: N/A     Occupational History    Not on file.      Social History Main Topics    Smoking status: Never Smoker    Smokeless tobacco: Never Used    Alcohol use No    Drug use: No      Comment: smoked pipe years ago    Sexual activity: Not on file     Other Topics Concern    Not on file     Social History improving. Less tender. Hemosiderin deposit bilateral lower extremities   Psychiatric: Mood normal.         Medical Decision Making:   I have independently reviewed/ordered the following labs:    CBC with Differential:   Recent Labs      09/17/18   1100  09/18/18   0604   WBC  7.3  5.1   HGB  11.5*  10.4*   HCT  34.7*  34.1*   PLT  95*  65*   LYMPHOPCT  4*   --    MONOPCT  5   --      BMP:   Recent Labs      09/17/18   1100  09/18/18   0604   NA  136  134*   K  3.9  3.8   CL  96*  95*   CO2  24  20   BUN  55*  64*   CREATININE  5.00*  5.74*     Hepatic Function Panel:   Recent Labs      09/17/18   1100  09/18/18   0604   PROT  7.2  6.8   LABALBU  3.6  2.9*   BILIDIR   --   0.34*   IBILI   --   0.55   BILITOT  2.07*  0.89   ALKPHOS  79  69   ALT  13  12   AST  22  19     No results for input(s): RPR in the last 72 hours. No results for input(s): HIV in the last 72 hours. No results for input(s): BC in the last 72 hours. Lab Results   Component Value Date    CREATININE 5.74 09/18/2018    GLUCOSE 132 09/18/2018    GLUCOSE 135 05/18/2012       Detailed results: Thank you for allowing us to participate in the care of this patient. Please call with questions. This note is created with the assistance of a speech recognition program.  While intending to generate a document that actually reflects the content of the visit, the document can still have some errors including those of syntax and sound a like substitutions which may escape proof reading. It such instances, actual meaning can be extrapolated by contextual diversion. Electronically signed by Azra Manning DPM on 9/19/2018 at 10:34 AM      I have discussed the care of the patient, including pertinent history and exam findings,  with the resident. I have seen and examined the patient and the key elements of all parts of the encounter have been performed by me.   I agree with the assessment, plan and orders as documented by the resident.     Elena Coleman, Infectious Diseases

## 2018-09-19 NOTE — PROGRESS NOTES
Discharge order received. IV removed. Writer went over d/c instructions with pt, including medications and follow up appointments. Pt verbalized understanding. Pt was d/c with all documented belongings.

## 2018-09-19 NOTE — PROGRESS NOTES
nicotine, acetaminophen, midodrine, traMADol **OR** traMADol  Home Meds:                Prescriptions Prior to Admission: ARIPiprazole (ABILIFY) 5 MG tablet, Take 5 mg by mouth  apixaban (ELIQUIS) 5 MG TABS tablet, Take 5 mg by mouth 2 times daily  Ferric Citrate (AURYXIA) 1  MG(Fe) TABS, Take 210 mg by mouth 2 times daily Patients med list states frequency is 2-3 times a day  atorvastatin (LIPITOR) 40 MG tablet, Take 40 mg by mouth daily  midodrine (PROAMATINE) 5 MG tablet, Take 1 tablet by mouth as needed (Hypotension)  venlafaxine (EFFEXOR) 75 MG tablet, Take 75 mg by mouth daily  lamoTRIgine (LAMICTAL) 25 MG tablet, Take 100 mg by mouth daily   isosorbide mononitrate (IMDUR) 30 MG CR tablet, Take 30 mg by mouth daily   pantoprazole (PROTONIX) 40 MG tablet, Take 40 mg by mouth daily   levothyroxine (LEVOTHROID) 50 MCG tablet, Take 50 mcg by mouth Daily.   Cyanocobalamin (VITAMIN B 12 PO), Take 3,000 mg by mouth Takes twice weekly  [DISCONTINUED] sevelamer (RENVELA) 800 MG tablet, Take 1 tablet by mouth 2 times daily  [DISCONTINUED] calcium carbonate (OSCAL) 500 MG TABS tablet, Take 500 mg by mouth three times daily  torsemide (DEMADEX) 20 MG tablet, Take 1 tablet by mouth 2 times daily  [DISCONTINUED] amLODIPine (NORVASC) 5 MG tablet, Take 1 tablet by mouth daily  darbepoetin judd-polysorbate (ARANESP) 40 MCG/0.4ML SOSY injection, Infuse 0.8 mLs intravenously every 7 days  ferrous sulfate 325 (65 FE) MG tablet, Take 1 tablet by mouth 2 times daily (before meals)  [DISCONTINUED] hydrocortisone (ANUSOL-HC) 25 MG suppository, Place 1 suppository rectally daily  [DISCONTINUED] venlafaxine (EFFEXOR) 37.5 MG tablet, Take 37.5 mg by mouth daily Indications: takes total of 112.5 mg in morning   Darbepoetin Judd-Polysorbate (ARANESP, ALBUMIN FREE,) 10 MCG/0.4ML SOSY, Inject 80 mcg as directed every 30 days (Patient taking differently: Inject 80 mcg as directed every 14 days Indications: next due Feb 27

## 2018-09-19 NOTE — PROGRESS NOTES
[address] [facility]          Progress Note    9/19/2018    2:06 PM    Name:   Fiona Blood  MRN:     [id number]     Natilyside:      [de-identified]   Room:   [address] [place] [address] Day:  2  Admit Date:  9/17/2018 10:30 AM    PCP:   Meghan Flores  Code Status:  Full Code    Subjective:     C/C:   Chief Complaint   Patient presents with    Cellulitis     left leg       Interval History Status: Improving, but with a target. The patient is a 77 y.o.  male who is admitted to the hospital for the management of Cellulitis       Patient seen and examined at the bed side , no new acute events overnight except abdominal pain improved. Leg pain, still ongoing, but improving. Swelling is slightly improving,     Pt denies any Chest pain , new pain, vomiting. Vitals : Stable  Labs: CRP is improving    Notes from nursing staff and Consults had been reviewed, and the overnight progress had been checked with the nursing staff as well. Brief History:     Fiona Blood is a 77 y.o. male who presents with Cellulitis (left leg) , Patient is poor historian. Significant history of cellulitis in the past treated last month with vancomycin with hemodialysis, switch to oral clindamycin. That was stopped a few weeks ago, patient reported that symptoms are becoming worse and come back. Started to worsen last weekend presents welding, hotness, tenderness and redness with drainage, yellow in color. Patient has significant history of lymphedema with use of lymph pump. That was ascites in August.  .   Patient also reports fatigue and tiredness with chills but no fever, no nausea, no vomiting, no diarrhea. Patient also ESRD on hemodialysis, TTS, last dialysis was on Saturday     Significant history of DVT, on Eliquis  History of cerebral hemorrhage in the past that was related to falls, with residual deficit and aphasia so that's why patient is okay to be on anticoagulation.      At ED ,  lower limb Dopplers is negative, blood cultures were obtained. Wound culture was obtained. Patient was seen to be needed admission for IV antibiotics and ID consultation,     and he is admitted to the hospital for the management of Cellulitis      Review of Systems:   Review of Systems   Constitutional: Negative for activity change, appetite change, fatigue, fever and unexpected weight change. Respiratory: Negative for shortness of breath and wheezing. Cardiovascular: Positive for leg swelling. Negative for chest pain. Gastrointestinal: Negative for abdominal pain, constipation, diarrhea, nausea and vomiting. Musculoskeletal: Positive for myalgias. Skin: Positive for color change and rash. Neurological: Negative for syncope and headaches. Medications: Allergies: Allergies   Allergen Reactions    Bactrim [Sulfamethoxazole-Trimethoprim]        Current Meds:   Scheduled Meds:    [START ON 9/20/2018] vancomycin  1,000 mg Intravenous Once per day on Tue Thu Sat    amLODIPine  5 mg Oral Daily    aspirin  81 mg Oral Every Other Day    atorvastatin  40 mg Oral Daily    ferrous sulfate  325 mg Oral BID AC    pantoprazole  40 mg Oral Daily    levothyroxine  50 mcg Oral Daily    sodium chloride flush  10 mL Intravenous 2 times per day    apixaban  5 mg Oral BID    ARIPiprazole  5 mg Oral Daily    lamoTRIgine  100 mg Oral Daily    isosorbide mononitrate  30 mg Oral Daily    hydrocortisone  25 mg Rectal Daily    Ferric Citrate  210 mg Oral BID    venlafaxine  75 mg Oral Daily     Continuous Infusions:   PRN Meds: sodium chloride flush, potassium chloride **OR** potassium chloride **OR** potassium chloride, magnesium sulfate, magnesium hydroxide, bisacodyl, ondansetron, nicotine, acetaminophen, midodrine, traMADol **OR** traMADol    Data:     Past Medical History:   has a past medical history of A-fib ([facility] [address] Anemia; ARF (acute renal failure) ([facility] [address]  Arthritis; CHF (congestive heart failure) ([facility] Chronic kidney disease; CKD (chronic kidney disease) stage 4, GFR 15-29 ml/min ([facility] Diabetes mellitus ([facility] DVT (deep venous thrombosis) ([facility] Foot ulcer due to secondary DM ([facility] GERD (gastroesophageal reflux disease); Hx of blood clots; Hyperlipidemia; Hypertension; Irregular heartbeat; Lower GI bleed; MDRO (multiple drug resistant organisms) resistance; MRSA (methicillin resistant staph aureus) culture positive; NADIRA on CPAP; Pneumonia; Proteinuria; Renal insufficiency; SAH (subarachnoid hemorrhage) ([facility] Secondary hyperparathyroidism of renal origin ([facility] Thyroid disease; Unspecified cerebral artery occlusion with cerebral infarction; and Venous stasis dermatitis. Social History:   reports that he has never smoked. He has never used smokeless tobacco. He reports that he does not drink alcohol or use drugs. Family History:   Family History   Problem Relation Age of Onset    Coronary Art Dis Father     Cancer Sister        Vitals:  Patient Vitals for the past 24 hrs:   BP Temp Temp src Pulse Resp SpO2 Weight   18 0749 123/64 98.1 °F (36.7 °C) Oral 63 16 97 % -   18 1953 (!) 120/41 98 °F (36.7 °C) Oral 66 18 91 % -   18 1853 131/66 97.7 °F (36.5 °C) - 61 17 - 257 lb 0.9 oz (116.6 kg)   18 1842 121/61 - - 63 - - -   18 1812 (!) 116/57 - - 63 - - -   18 1742 112/60 - - 63 - - -   18 1712 (!) 112/59 - - 60 - - -   18 1642 (!) 117/58 - - 58 - - -   18 1612 116/60 - - 62 - - -   18 1542 (!) 111/58 - - 60 - - -   18 1512 (!) 111/56 - - 62 - - -   18 1442 (!) 124/56 - - 63 - - -   18 1428 133/63 97.9 °F (36.6 °C) - 66 - - 265 lb 14 oz (120.6 kg)     Temp (24hrs), Av.9 °F (36.6 °C), Min:97.7 °F (36.5 °C), Max:98.1 °F (36.7 °C)    Recent Labs      18   0747  18   1133   POCGLU  114*  130*       I/O (24Hr):     Intake/Output Summary (Last 24 hours) at 18 1406  Last data filed at 18 1344 Nursing note and vitals reviewed. Assessment:        Principal Problem:    Cellulitis  Active Problems:    Diabetes mellitus ([facility] [address]    Chronic atrial fibrillation (HCC)    Other specified hypothyroidism    Anemia associated with chronic renal failure    Seizure disorder (HCC)    History of cerebral infarction    ESRD (end stage renal disease) (HCC)    Essential hypertension    History of DVT (deep vein thrombosis)    On continuous oral anticoagulation    Elevated C-reactive protein (CRP)    Left leg cellulitis    MRSA infection  Resolved Problems:    Elevated bilirubin      Plan:        Cellulitis: Wound culture, blood cultures, CRP follow-up, ID consult. IV antibiotics. Per ID onVancomycin for now pending final input on discharge. Discussed with Dr. Estefania Goldman. fib: Rate controlled, on Eliquis for DVT as well  History of DVT: On Eliquis. Elevated bilirubin/abdominal discomfort. Follow-up LFTs improving, DC gallbladder ultrasound. ESRD on HD: hemodialysis, nephrology consulted, scheduled on TTS. Okay to discharge by nephrology. Anemia of chronic disease: Follow-up CBC. History of cerebral bleeding and stroke: The residual deficits and aphasia. PT, OT  Hypothyroidism: TSH WNL, levothyroxine. Seizure disorder: On lamotrigine, resume  HTN: stable,resume home meds  Check Electrolytes and Electrolytes replacement as needed   Morbid obesity complicating factor. DVT prophylaxis: already anticoagulated with Eliquis  PT, OT as needed. Okay to discharge today to SNF pending antibiotics logistics. IV Fluids: ,    Nutrition:  DIET CARB CONTROL; Renal      Consultations:   PHARMACY TO DOSE VANCOMYCIN  IP CONSULT TO INFECTIOUS DISEASES  IP CONSULT TO NEPHROLOGY       Discussed care plan with nurse after getting input from the nurse.     Above plan discussed with the patient in room, who agreed to the above plan     Please call if any questions    Anneliese Ghotra MD  [facility] Hospitalist  9/19/2018  2:06 PM     (Please note that this chart was generated using voice recognition Dragon dictation software program. Although every effort was made to ensure the accuracy of this automated transcription, some errors in transcription may have occurred.)

## 2018-09-21 LAB
CULTURE: ABNORMAL
CULTURE: ABNORMAL
DIRECT EXAM: ABNORMAL
Lab: ABNORMAL
ORGANISM: ABNORMAL
ORGANISM: ABNORMAL
SPECIMEN DESCRIPTION: ABNORMAL
STATUS: ABNORMAL

## 2018-09-28 PROBLEM — Z99.2 ARTERIOVENOUS FISTULA FOR HEMODIALYSIS IN PLACE, PRIMARY (HCC): Status: ACTIVE | Noted: 2017-11-17

## 2018-10-15 ENCOUNTER — OFFICE VISIT (OUTPATIENT)
Dept: INFECTIOUS DISEASES | Age: 68
End: 2018-10-15
Payer: MEDICARE

## 2018-10-15 VITALS
HEIGHT: 72 IN | DIASTOLIC BLOOD PRESSURE: 67 MMHG | SYSTOLIC BLOOD PRESSURE: 151 MMHG | BODY MASS INDEX: 35.76 KG/M2 | WEIGHT: 264 LBS | HEART RATE: 67 BPM | TEMPERATURE: 96.4 F

## 2018-10-15 DIAGNOSIS — L03.119 RECURRENT CELLULITIS OF LOWER EXTREMITY: Primary | ICD-10-CM

## 2018-10-15 PROCEDURE — 1111F DSCHRG MED/CURRENT MED MERGE: CPT | Performed by: INTERNAL MEDICINE

## 2018-10-15 PROCEDURE — G8427 DOCREV CUR MEDS BY ELIG CLIN: HCPCS | Performed by: INTERNAL MEDICINE

## 2018-10-15 PROCEDURE — G8417 CALC BMI ABV UP PARAM F/U: HCPCS | Performed by: INTERNAL MEDICINE

## 2018-10-15 PROCEDURE — G8484 FLU IMMUNIZE NO ADMIN: HCPCS | Performed by: INTERNAL MEDICINE

## 2018-10-15 PROCEDURE — 99214 OFFICE O/P EST MOD 30 MIN: CPT | Performed by: INTERNAL MEDICINE

## 2018-10-15 PROCEDURE — 1036F TOBACCO NON-USER: CPT | Performed by: INTERNAL MEDICINE

## 2018-10-15 PROCEDURE — 4040F PNEUMOC VAC/ADMIN/RCVD: CPT | Performed by: INTERNAL MEDICINE

## 2018-10-15 PROCEDURE — 3017F COLORECTAL CA SCREEN DOC REV: CPT | Performed by: INTERNAL MEDICINE

## 2018-10-15 PROCEDURE — 1101F PT FALLS ASSESS-DOCD LE1/YR: CPT | Performed by: INTERNAL MEDICINE

## 2018-10-15 PROCEDURE — G8598 ASA/ANTIPLAT THER USED: HCPCS | Performed by: INTERNAL MEDICINE

## 2018-10-15 PROCEDURE — 1123F ACP DISCUSS/DSCN MKR DOCD: CPT | Performed by: INTERNAL MEDICINE

## 2018-10-15 ASSESSMENT — ENCOUNTER SYMPTOMS
RESPIRATORY NEGATIVE: 1
ALLERGIC/IMMUNOLOGIC NEGATIVE: 1
EYES NEGATIVE: 1
GASTROINTESTINAL NEGATIVE: 1

## 2019-04-15 ENCOUNTER — OFFICE VISIT (OUTPATIENT)
Dept: INFECTIOUS DISEASES | Age: 69
End: 2019-04-15
Payer: MEDICARE

## 2019-04-15 VITALS
DIASTOLIC BLOOD PRESSURE: 80 MMHG | BODY MASS INDEX: 35.08 KG/M2 | HEIGHT: 72 IN | TEMPERATURE: 97 F | SYSTOLIC BLOOD PRESSURE: 140 MMHG | WEIGHT: 259 LBS

## 2019-04-15 DIAGNOSIS — Z87.2 HISTORY OF CELLULITIS: Primary | ICD-10-CM

## 2019-04-15 DIAGNOSIS — L03.90 RECURRENT CELLULITIS: ICD-10-CM

## 2019-04-15 DIAGNOSIS — B35.3 ATHLETE'S FOOT ON LEFT: ICD-10-CM

## 2019-04-15 PROCEDURE — G8427 DOCREV CUR MEDS BY ELIG CLIN: HCPCS | Performed by: INTERNAL MEDICINE

## 2019-04-15 PROCEDURE — 4040F PNEUMOC VAC/ADMIN/RCVD: CPT | Performed by: INTERNAL MEDICINE

## 2019-04-15 PROCEDURE — 1123F ACP DISCUSS/DSCN MKR DOCD: CPT | Performed by: INTERNAL MEDICINE

## 2019-04-15 PROCEDURE — 3017F COLORECTAL CA SCREEN DOC REV: CPT | Performed by: INTERNAL MEDICINE

## 2019-04-15 PROCEDURE — 99214 OFFICE O/P EST MOD 30 MIN: CPT | Performed by: INTERNAL MEDICINE

## 2019-04-15 PROCEDURE — G8598 ASA/ANTIPLAT THER USED: HCPCS | Performed by: INTERNAL MEDICINE

## 2019-04-15 PROCEDURE — G8417 CALC BMI ABV UP PARAM F/U: HCPCS | Performed by: INTERNAL MEDICINE

## 2019-04-15 PROCEDURE — 1036F TOBACCO NON-USER: CPT | Performed by: INTERNAL MEDICINE

## 2019-04-15 ASSESSMENT — ENCOUNTER SYMPTOMS
EYES NEGATIVE: 1
RESPIRATORY NEGATIVE: 1
ALLERGIC/IMMUNOLOGIC NEGATIVE: 1
GASTROINTESTINAL NEGATIVE: 1

## 2019-04-15 NOTE — PROGRESS NOTES
[facility] - Initial Consult Note  Today's Date: 4/15/19    Impression :   · Left leg and thigh relapsing cellulitis 9/15/18  · Left lower leg wound cx MRSA - vanco qHD x 2 weeks then doxy 6 months  · Left athlete's foot  · DM on diet control    Recommendations   · Patient prefers to stay off antibiotics, we will keep him off antibiotics at this point in watch for relapsing cellulitis of the leg. · I am little concerned that the cellulitis relapsed despite the doxycycline. She understands and will watch closely and call me if it is to occur. · Hydrate the skin otherwise with good creams and use Lamisil creams to the left foot to treat open fungal infections between the toes. Diagnosis Orders   1. History of cellulitis     2. Recurrent cellulitis     3. Athlete's foot on left         Return if symptoms worsen or fail to improve. History of Present Illness:   Mando Yousif is a 68y.o.-year-old  male who presents with   Chief Complaint   Patient presents with    Cellulitis     Follow up   elderly Making after hospital visit, status post a left lower extremity cellulitis affecting the thigh and the lower leg mid September 2018, treated with vancomycin every dialysis for 2 weeks. Had a lower leg wound, chronic MRSA. This was a third cellulitis after January and in August 2018 event  He had a wound in his left lower extremity that never healed. At the time. Along with edema. He comes in fits finished his vancomycin has been already on the doxycycline, he has a compression stocking on the left leg, the redness auscultated resolved, the left. It continues to be swollen, but improved, the wound has completely healed. No signs of fungal infection at this time. No complaints, no abdominal pain or diarrhea. Tolerating the antibiotic well    Visit of 4/15/19  , Status post 3 times cellulitis, culture MRSA from the wound. 10/2018.   Took 6 months of doxycycline until 2 weeks before today's visit, which makes it 6 months total.    About 2-3 months ago had a flareup on the left lower extremity with redness that was considered to be cellulitis, while he was on the doxycycline suppression. He was switched for about 4 days of vancomycin IV, then back to doxycycline, which kept him doing well. According to his wife, he was taken the doxycycline before he goes to dialysis and they were figuring that is probably not working well because of that, since they have been given it after dialysis. His off the doxycycline for 2 weeks without any issues. And he would like to stay off antibiotics. Has a chronic right lower extremity lymphedema since birth. While it is contrasting with a left lower extremity that is not swollen. He has small open wounds from Band-Aids on that leg. No signs of infection. Small oozing though. Right lower extremity does not have a cellulitis, this is the leg that orders have been relapsing sinusitis in the past.    Upper left extremity with an AV fistula been used for dialysis in good condition, every TTS. [facility] He has long-standing dystrophic toenails and he is diabetic. I have personally reviewed the past medical history, past surgical history, medications, social history, and family history, and I haveupdated the database accordingly.   Past Medical History:     Past Medical History:   Diagnosis Date    A-fib ([address]     Acute ischemic stroke ([address] 7/13/2013    Acute metabolic encephalopathy 5/6/5179    Acute on chronic congestive heart failure ([address] 5/9/2014    Acute on chronic diastolic CHF (congestive heart failure) ([address] 2/25/2017    Altered mental status 8/24/2013    Anemia     Anemia associated with chronic renal failure 5/28/2014    Aphasia 8/24/2013    ARF (acute renal failure) ([address] 5/28/2014    From pre renal azotemia from newly added diuretic and ARB use, creat peaked at 2.8 from 2 in may 2014    Arteriovenous fistula for hemodialysis in place, primary [facility]) 11/17/2017    Arthritis     Bradyarrhythmia 7/13/2013    Cellulitis 9/17/2018    Cerebral contusion ([address] 7/7/2013    CHF (congestive heart failure) ([facility])     Chronic atrial fibrillation ([facility]) 7/6/2013    Chronic kidney disease     Chronic pain of right knee     CKD (chronic kidney disease) stage 4, GFR 15-29 ml/min ([facility]) 5/28/2014    From diabetic and ischemic nephrosclerosis, creat now 2-2.5, GFR 25-30 ml/min    Coagulation defect ([address] 7/6/2013    Diabetes mellitus ([address]     Diabetes mellitus type 2 in obese ([address]     Diarrhea 7/16/2013    DVT (deep venous thrombosis) ([facility])     Elevated C-reactive protein (CRP)     ESRD (end stage renal disease) ([address] 3/23/2017    Essential hypertension 3/23/2017    Fever 7/18/2013    Foot ulcer due to secondary DM ([address] 5/28/2014    Left side on antibiotics    GERD (gastroesophageal reflux disease)     Hematochezia 3/4/2017    History of cerebral infarction 3/1/2017    History of DVT (deep vein thrombosis) 9/17/2018    History of superior vena cava filter placement 7/18/2013    Hx of blood clots     Hyperkalemia 7/16/2013    Hyperlipidemia     Hypernatremia 7/11/2013    Hypertension     Hypocalcemia 7/18/2013    Hypovolemic shock ([address] 3/4/2017    Hypoxia     Irregular heartbeat     hx of a-fib    Knee effusion, right 2/25/2017    Left leg cellulitis     Lower GI bleed 3/4/2017    MDRO (multiple drug resistant organisms) resistance     Metabolic acidosis 1/21/3425    MRSA (methicillin resistant staph aureus) culture positive 09/17/2018    leg    On continuous oral anticoagulation 9/17/2018    NADIRA on CPAP     Other specified hypothyroidism 8/24/2013    Parietal lobe infarction ([address] 8/26/2013    Pneumonia     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    Proteinuria 11/30/2016    Fluctuates between 2-3 grams, cant use ACEI due to hyperkalemia    Pyogenic arthritis of right knee joint ([address] 3/23/2017  Pyrexia 7/19/2013    Renal insufficiency     Renal lesion 3/3/2017    Respiratory failure, acute ([address] 7/7/2013    Right knee pain     SAH (subarachnoid hemorrhage) (HCC)     Secondary hyperparathyroidism of renal origin ([address] 12/16/2015    Seizure disorder ([address] 3/1/2017    Stercoral ulcer of rectum     Streptococcal infection     Subarachnoid bleed (HCC) 7/13/2013    Subdural bleeding ([address] 7/5/2013    Thyroid disease     Traumatic cerebral hemorrhage ([address] 7/7/2013    Type 2 diabetes mellitus with left diabetic foot ulcer ([address] 4/16/2014    Unspecified cerebral artery occlusion with cerebral infarction     Venous stasis dermatitis 5/28/2014    Venous stasis dermatitis of both lower extremities 5/28/2014       Past Surgical  History:     Past Surgical History:   Procedure Laterality Date    ABDOMEN SURGERY      CARDIAC CATHETERIZATION      COLONOSCOPY      DILATATION, ESOPHAGUS      FOOT DEBRIDEMENT Left     FOOT SURGERY Right     #5 toe removed  , 1/2 toes #1, #2  partially removed    GASTRIC BYPASS SURGERY      GASTROSTOMY TUBE PLACEMENT  summer 2013    KNEE ARTHROSCOPY Right 03/23/2017    I & D    DC KNEE SCOPE,DIAGNOSTIC Right 3/23/2017    KNEE ARTHROSCOPY,EXTENSIVE DEBRIDEMENT PARTIAL PROXIMAL AND MEDIAL MENISECTOMY  performed by Guillermo Brown MD at [address] [place] [address]  summer 2013    VENA CAVA FILTER PLACEMENT         Medications:     Current Outpatient Medications:     vancomycin (VANCOCIN) infusion, Infuse 1,000 mg intravenously three times a week Every TTS w hemodialysis Compound per protocol. till 9/29/18 the stop, Disp: 1 each, Rfl: 0    ARIPiprazole (ABILIFY) 5 MG tablet, Take 5 mg by mouth, Disp: , Rfl:     apixaban (ELIQUIS) 5 MG TABS tablet, Take 5 mg by mouth 2 times daily, Disp: , Rfl:     Ferric Citrate (AURYXIA) 1  MG(Fe) TABS, Take 210 mg by mouth 2 times daily Patients med list states frequency is 2-3 times a day, Disp: , Rfl:     atorvastatin (LIPITOR) 40 MG tablet, Take 40 mg by mouth daily, Disp: , Rfl:     darbepoetin jean pierre-polysorbate (ARANESP) 40 MCG/0.4ML SOSY injection, Infuse 0.8 mLs intravenously every 7 days, Disp: 8.4 mL, Rfl:     midodrine (PROAMATINE) 5 MG tablet, Take 1 tablet by mouth as needed (Hypotension), Disp: 90 tablet, Rfl: 3    ferrous sulfate 325 (65 FE) MG tablet, Take 1 tablet by mouth 2 times daily (before meals), Disp: 60 tablet, Rfl: 6    venlafaxine (EFFEXOR) 75 MG tablet, Take 75 mg by mouth daily, Disp: , Rfl:     lamoTRIgine (LAMICTAL) 25 MG tablet, Take 100 mg by mouth daily , Disp: , Rfl:     isosorbide mononitrate (IMDUR) 30 MG CR tablet, Take 30 mg by mouth daily , Disp: , Rfl:     pantoprazole (PROTONIX) 40 MG tablet, Take 40 mg by mouth daily , Disp: , Rfl:     levothyroxine (LEVOTHROID) 50 MCG tablet, Take 50 mcg by mouth Daily. , Disp: , Rfl:     Cyanocobalamin (VITAMIN B 12 PO), Take 3,000 mg by mouth Takes twice weekly, Disp: , Rfl:     Cholecalciferol (VITAMIN D3) 2000 UNITS CAPS, Take 1,000 Units by mouth daily. , Disp: , Rfl:       Social History:     Social History     Socioeconomic History    Marital status:      Spouse name: Not on file    Number of children: Not on file    Years of education: Not on file    Highest education level: Not on file   Occupational History    Not on file   Social Needs    Financial resource strain: Not on file    Food insecurity:     Worry: Not on file     Inability: Not on file    Transportation needs:     Medical: Not on file     Non-medical: Not on file   Tobacco Use    Smoking status: Never Smoker    Smokeless tobacco: Never Used   Substance and Sexual Activity    Alcohol use: No    Drug use: No     Comment: smoked pipe years ago    Sexual activity: Not on file   Lifestyle    Physical activity:     Days per week: Not on file     Minutes per session: Not on file    Stress: Not on file   Relationships    Social connections:     Talks on phone: Not on file     Gets together: Not on file     Attends [employer] service: Not on file     Active member of club or organization: Not on file     Attends meetings of clubs or organizations: Not on file     Relationship status: Not on file    Intimate partner violence:     Fear of current or ex partner: Not on file     Emotionally abused: Not on file     Physically abused: Not on file     Forced sexual activity: Not on file   Other Topics Concern    Not on file   Social History Narrative    ** Merged History Encounter **            Family History:     Family History   Problem Relation Age of Onset    Coronary Art Dis Father     Cancer Sister         Allergies:   Bactrim [sulfamethoxazole-trimethoprim]     Review of Systems:   Review of Systems   Constitutional: Negative. HENT: Negative. Eyes: Negative. Respiratory: Negative. Cardiovascular: Positive for leg swelling. Gastrointestinal: Negative. Negative for constipation. Endocrine: Negative. Genitourinary: Negative for dysuria. Makes it the urine   Musculoskeletal: Negative. Skin: Negative. Negative for color change. Left foot shows athlete's foot sign   Allergic/Immunologic: Negative. Neurological: Negative. Negative for headaches. Hematological: Negative. Psychiatric/Behavioral: Negative. The patient is not hyperactive. Physical Examination :   Blood pressure (!) 140/80, temperature 97 °F (36.1 °C), height 6' (1.829 m), weight 259 lb (117.5 kg). Physical Exam   Constitutional: He is oriented to person, place, and time. He appears well-developed and well-nourished. No distress. HENT:   Head: Normocephalic and atraumatic. Mouth/Throat: No oropharyngeal exudate. Eyes: Conjunctivae and EOM are normal. No scleral icterus. Neck: No tracheal deviation present. Cardiovascular: Normal rate and regular rhythm. Exam reveals no friction rub.    No murmur heard.  Pulmonary/Chest: Effort normal. No stridor. No respiratory distress. Abdominal: Soft. He exhibits no distension. There is no tenderness. There is no guarding. Genitourinary:   Genitourinary Comments: No penile discharge and makes a few urine   Musculoskeletal: He exhibits edema. Neurological: He is alert and oriented to person, place, and time. Skin: Skin is warm and dry. He is not diaphoretic. No erythema. Psychiatric: He has a normal mood and affect.  His behavior is normal.         Medical Decision Making:   I have independently reviewed/ordered the following labs:    CBCwith Differential:   Lab Results   Component Value Date    WBC 5.1 09/18/2018    WBC 7.3 09/17/2018    HGB 10.4 09/18/2018    HGB 11.5 09/17/2018    HCT 34.1 09/18/2018    HCT 34.7 09/17/2018    PLT 65 09/18/2018    PLT 95 09/17/2018     05/18/2012     04/19/2012    LYMPHOPCT 4 09/17/2018    LYMPHOPCT 13 08/10/2018    MONOPCT 5 09/17/2018    MONOPCT 8 08/10/2018     BMP:  Lab Results   Component Value Date     09/18/2018     09/17/2018    K 3.8 09/18/2018    K 3.9 09/17/2018    CL 95 09/18/2018    CL 96 09/17/2018    CO2 20 09/18/2018    CO2 24 09/17/2018    BUN 64 09/18/2018    BUN 55 09/17/2018    CREATININE 5.74 09/18/2018    CREATININE 5.00 09/17/2018    MG 2.4 03/21/2017    MG 2.7 02/24/2017     Hepatic Function Panel:   Lab Results   Component Value Date    PROT 6.8 09/18/2018    PROT 7.2 09/17/2018    PROT 6.2 06/27/2012    LABALBU 2.9 09/18/2018    LABALBU 3.6 09/17/2018    LABALBU 3.7 05/18/2012    LABALBU 3.2 04/17/2012    BILIDIR 0.34 09/18/2018    BILIDIR 0.19 08/24/2013    IBILI 0.55 09/18/2018    IBILI 0.08 08/24/2013    BILITOT 0.89 09/18/2018    BILITOT 2.07 09/17/2018    ALKPHOS 69 09/18/2018    ALKPHOS 79 09/17/2018    ALT 12 09/18/2018    ALT 13 09/17/2018    AST 19 09/18/2018    AST 22 09/17/2018     No results found for: RPR  No results found for: HIV  No results found for: [facility]  Lab

## 2019-04-15 NOTE — PATIENT INSTRUCTIONS
lamisil cream both feet think layer from the toes till the ankle, allow to dry.  2 x per day x 7-10 days

## 2019-04-23 ASSESSMENT — ENCOUNTER SYMPTOMS
CONSTIPATION: 0
COLOR CHANGE: 0

## 2019-05-13 ENCOUNTER — APPOINTMENT (OUTPATIENT)
Dept: GENERAL RADIOLOGY | Age: 69
DRG: 469 | End: 2019-05-13
Payer: MEDICARE

## 2019-05-13 ENCOUNTER — HOSPITAL ENCOUNTER (INPATIENT)
Age: 69
LOS: 5 days | Discharge: NURSING, REHAB OR LONG-TERM CARE | DRG: 469 | End: 2019-05-18
Attending: EMERGENCY MEDICINE | Admitting: INTERNAL MEDICINE
Payer: MEDICARE

## 2019-05-13 DIAGNOSIS — S72.001A CLOSED FRACTURE OF RIGHT HIP, INITIAL ENCOUNTER (HCC): Primary | ICD-10-CM

## 2019-05-13 LAB
ABSOLUTE EOS #: 0 K/UL (ref 0–0.4)
ABSOLUTE IMMATURE GRANULOCYTE: ABNORMAL K/UL (ref 0–0.3)
ABSOLUTE LYMPH #: 0.3 K/UL (ref 1–4.8)
ABSOLUTE MONO #: 0.2 K/UL (ref 0.1–1.2)
ANION GAP SERPL CALCULATED.3IONS-SCNC: 16 MMOL/L (ref 9–17)
BASOPHILS # BLD: 1 % (ref 0–2)
BASOPHILS ABSOLUTE: 0 K/UL (ref 0–0.2)
BNP INTERPRETATION: ABNORMAL
BUN BLDV-MCNC: 54 MG/DL (ref 8–23)
BUN/CREAT BLD: ABNORMAL (ref 9–20)
CALCIUM SERPL-MCNC: 9.1 MG/DL (ref 8.6–10.4)
CHLORIDE BLD-SCNC: 95 MMOL/L (ref 98–107)
CO2: 26 MMOL/L (ref 20–31)
CREAT SERPL-MCNC: 5.6 MG/DL (ref 0.7–1.2)
DIFFERENTIAL TYPE: ABNORMAL
EOSINOPHILS RELATIVE PERCENT: 0 % (ref 1–4)
GFR AFRICAN AMERICAN: 12 ML/MIN
GFR NON-AFRICAN AMERICAN: 10 ML/MIN
GFR SERPL CREATININE-BSD FRML MDRD: ABNORMAL ML/MIN/{1.73_M2}
GFR SERPL CREATININE-BSD FRML MDRD: ABNORMAL ML/MIN/{1.73_M2}
GLUCOSE BLD-MCNC: 150 MG/DL (ref 75–110)
GLUCOSE BLD-MCNC: 192 MG/DL (ref 70–99)
HCT VFR BLD CALC: 29.4 % (ref 41–53)
HEMOGLOBIN: 9.9 G/DL (ref 13.5–17.5)
IMMATURE GRANULOCYTES: ABNORMAL %
INR BLD: 1.2
LACTATE DEHYDROGENASE: 204 U/L (ref 135–225)
LYMPHOCYTES # BLD: 6 % (ref 24–44)
MCH RBC QN AUTO: 33.3 PG (ref 26–34)
MCHC RBC AUTO-ENTMCNC: 33.6 G/DL (ref 31–37)
MCV RBC AUTO: 99.1 FL (ref 80–100)
MONOCYTES # BLD: 5 % (ref 2–11)
NRBC AUTOMATED: ABNORMAL PER 100 WBC
PARTIAL THROMBOPLASTIN TIME: 37.9 SEC (ref 21.3–31.3)
PDW BLD-RTO: 15.1 % (ref 12.5–15.4)
PLATELET # BLD: 129 K/UL (ref 140–450)
PLATELET ESTIMATE: ABNORMAL
PMV BLD AUTO: 7 FL (ref 6–12)
POTASSIUM SERPL-SCNC: 4.8 MMOL/L (ref 3.7–5.3)
PRO-BNP: ABNORMAL PG/ML
PROTHROMBIN TIME: 12 SEC (ref 9.4–12.6)
RBC # BLD: 2.97 M/UL (ref 4.5–5.9)
RBC # BLD: ABNORMAL 10*6/UL
SEG NEUTROPHILS: 88 % (ref 36–66)
SEGMENTED NEUTROPHILS ABSOLUTE COUNT: 4.1 K/UL (ref 1.8–7.7)
SODIUM BLD-SCNC: 137 MMOL/L (ref 135–144)
TOTAL PROTEIN: 6.9 G/DL (ref 6.4–8.3)
TROPONIN INTERP: ABNORMAL
TROPONIN INTERP: ABNORMAL
TROPONIN T: ABNORMAL NG/ML
TROPONIN T: ABNORMAL NG/ML
TROPONIN, HIGH SENSITIVITY: 89 NG/L (ref 0–22)
TROPONIN, HIGH SENSITIVITY: 94 NG/L (ref 0–22)
WBC # BLD: 4.7 K/UL (ref 3.5–11)
WBC # BLD: ABNORMAL 10*3/UL

## 2019-05-13 PROCEDURE — 84155 ASSAY OF PROTEIN SERUM: CPT

## 2019-05-13 PROCEDURE — 87205 SMEAR GRAM STAIN: CPT

## 2019-05-13 PROCEDURE — 87070 CULTURE OTHR SPECIMN AEROBIC: CPT

## 2019-05-13 PROCEDURE — 87075 CULTR BACTERIA EXCEPT BLOOD: CPT

## 2019-05-13 PROCEDURE — 87116 MYCOBACTERIA CULTURE: CPT

## 2019-05-13 PROCEDURE — 73502 X-RAY EXAM HIP UNI 2-3 VIEWS: CPT

## 2019-05-13 PROCEDURE — 36415 COLL VENOUS BLD VENIPUNCTURE: CPT

## 2019-05-13 PROCEDURE — 6370000000 HC RX 637 (ALT 250 FOR IP): Performed by: INTERNAL MEDICINE

## 2019-05-13 PROCEDURE — 6360000002 HC RX W HCPCS: Performed by: INTERNAL MEDICINE

## 2019-05-13 PROCEDURE — 82945 GLUCOSE OTHER FLUID: CPT

## 2019-05-13 PROCEDURE — 85025 COMPLETE CBC W/AUTO DIFF WBC: CPT

## 2019-05-13 PROCEDURE — 6360000002 HC RX W HCPCS: Performed by: EMERGENCY MEDICINE

## 2019-05-13 PROCEDURE — 83615 LACTATE (LD) (LDH) ENZYME: CPT

## 2019-05-13 PROCEDURE — 84484 ASSAY OF TROPONIN QUANT: CPT

## 2019-05-13 PROCEDURE — 80048 BASIC METABOLIC PNL TOTAL CA: CPT

## 2019-05-13 PROCEDURE — 6370000000 HC RX 637 (ALT 250 FOR IP): Performed by: EMERGENCY MEDICINE

## 2019-05-13 PROCEDURE — 99285 EMERGENCY DEPT VISIT HI MDM: CPT

## 2019-05-13 PROCEDURE — 82947 ASSAY GLUCOSE BLOOD QUANT: CPT

## 2019-05-13 PROCEDURE — 89051 BODY FLUID CELL COUNT: CPT

## 2019-05-13 PROCEDURE — 71045 X-RAY EXAM CHEST 1 VIEW: CPT

## 2019-05-13 PROCEDURE — 87102 FUNGUS ISOLATION CULTURE: CPT

## 2019-05-13 PROCEDURE — 93005 ELECTROCARDIOGRAM TRACING: CPT

## 2019-05-13 PROCEDURE — 85730 THROMBOPLASTIN TIME PARTIAL: CPT

## 2019-05-13 PROCEDURE — 1200000000 HC SEMI PRIVATE

## 2019-05-13 PROCEDURE — 85610 PROTHROMBIN TIME: CPT

## 2019-05-13 PROCEDURE — 2580000003 HC RX 258: Performed by: INTERNAL MEDICINE

## 2019-05-13 PROCEDURE — 83986 ASSAY PH BODY FLUID NOS: CPT

## 2019-05-13 PROCEDURE — 84157 ASSAY OF PROTEIN OTHER: CPT

## 2019-05-13 PROCEDURE — 87015 SPECIMEN INFECT AGNT CONCNTJ: CPT

## 2019-05-13 PROCEDURE — 87206 SMEAR FLUORESCENT/ACID STAI: CPT

## 2019-05-13 PROCEDURE — 83880 ASSAY OF NATRIURETIC PEPTIDE: CPT

## 2019-05-13 PROCEDURE — 73562 X-RAY EXAM OF KNEE 3: CPT

## 2019-05-13 RX ORDER — FUROSEMIDE 20 MG/1
20 TABLET ORAL 2 TIMES DAILY
Status: ON HOLD | COMMUNITY
End: 2021-09-24

## 2019-05-13 RX ORDER — ARIPIPRAZOLE 5 MG/1
5 TABLET ORAL DAILY
Status: DISCONTINUED | OUTPATIENT
Start: 2019-05-14 | End: 2019-05-18 | Stop reason: HOSPADM

## 2019-05-13 RX ORDER — ONDANSETRON 2 MG/ML
4 INJECTION INTRAMUSCULAR; INTRAVENOUS EVERY 6 HOURS PRN
Status: CANCELLED | OUTPATIENT
Start: 2019-05-13

## 2019-05-13 RX ORDER — ACETAMINOPHEN 325 MG/1
650 TABLET ORAL EVERY 4 HOURS PRN
Status: DISCONTINUED | OUTPATIENT
Start: 2019-05-13 | End: 2019-05-18 | Stop reason: HOSPADM

## 2019-05-13 RX ORDER — HEPARIN SODIUM 5000 [USP'U]/ML
5000 INJECTION, SOLUTION INTRAVENOUS; SUBCUTANEOUS EVERY 8 HOURS SCHEDULED
Status: DISCONTINUED | OUTPATIENT
Start: 2019-05-13 | End: 2019-05-18 | Stop reason: HOSPADM

## 2019-05-13 RX ORDER — FENTANYL CITRATE 50 UG/ML
25 INJECTION, SOLUTION INTRAMUSCULAR; INTRAVENOUS ONCE
Status: COMPLETED | OUTPATIENT
Start: 2019-05-13 | End: 2019-05-13

## 2019-05-13 RX ORDER — NICOTINE 21 MG/24HR
1 PATCH, TRANSDERMAL 24 HOURS TRANSDERMAL DAILY PRN
Status: DISCONTINUED | OUTPATIENT
Start: 2019-05-13 | End: 2019-05-18 | Stop reason: HOSPADM

## 2019-05-13 RX ORDER — MORPHINE SULFATE 4 MG/ML
4 INJECTION, SOLUTION INTRAMUSCULAR; INTRAVENOUS ONCE
Status: COMPLETED | OUTPATIENT
Start: 2019-05-13 | End: 2019-05-13

## 2019-05-13 RX ORDER — LAMOTRIGINE 100 MG/1
100 TABLET ORAL DAILY
Status: DISCONTINUED | OUTPATIENT
Start: 2019-05-14 | End: 2019-05-18 | Stop reason: HOSPADM

## 2019-05-13 RX ORDER — LEVOTHYROXINE SODIUM 0.05 MG/1
50 TABLET ORAL DAILY
Status: DISCONTINUED | OUTPATIENT
Start: 2019-05-14 | End: 2019-05-18 | Stop reason: HOSPADM

## 2019-05-13 RX ORDER — VENLAFAXINE 75 MG/1
75 TABLET ORAL DAILY
Status: DISCONTINUED | OUTPATIENT
Start: 2019-05-14 | End: 2019-05-18 | Stop reason: HOSPADM

## 2019-05-13 RX ORDER — HYDROCODONE BITARTRATE AND ACETAMINOPHEN 5; 325 MG/1; MG/1
2 TABLET ORAL ONCE
Status: COMPLETED | OUTPATIENT
Start: 2019-05-13 | End: 2019-05-13

## 2019-05-13 RX ORDER — SODIUM CHLORIDE 0.9 % (FLUSH) 0.9 %
10 SYRINGE (ML) INJECTION EVERY 12 HOURS SCHEDULED
Status: DISCONTINUED | OUTPATIENT
Start: 2019-05-13 | End: 2019-05-18 | Stop reason: HOSPADM

## 2019-05-13 RX ORDER — PANTOPRAZOLE SODIUM 40 MG/1
40 TABLET, DELAYED RELEASE ORAL DAILY
Status: DISCONTINUED | OUTPATIENT
Start: 2019-05-14 | End: 2019-05-18 | Stop reason: HOSPADM

## 2019-05-13 RX ORDER — ISOSORBIDE MONONITRATE 30 MG/1
30 TABLET, EXTENDED RELEASE ORAL DAILY
Status: DISCONTINUED | OUTPATIENT
Start: 2019-05-14 | End: 2019-05-18 | Stop reason: HOSPADM

## 2019-05-13 RX ORDER — HYDROCODONE BITARTRATE AND ACETAMINOPHEN 5; 325 MG/1; MG/1
1 TABLET ORAL EVERY 4 HOURS PRN
Status: DISCONTINUED | OUTPATIENT
Start: 2019-05-13 | End: 2019-05-17

## 2019-05-13 RX ORDER — SODIUM CHLORIDE 0.9 % (FLUSH) 0.9 %
10 SYRINGE (ML) INJECTION PRN
Status: DISCONTINUED | OUTPATIENT
Start: 2019-05-13 | End: 2019-05-18 | Stop reason: HOSPADM

## 2019-05-13 RX ORDER — FUROSEMIDE 20 MG/1
20 TABLET ORAL 2 TIMES DAILY
Status: DISCONTINUED | OUTPATIENT
Start: 2019-05-13 | End: 2019-05-15

## 2019-05-13 RX ADMIN — Medication 10 ML: at 22:00

## 2019-05-13 RX ADMIN — HEPARIN SODIUM 5000 UNITS: 5000 INJECTION INTRAVENOUS; SUBCUTANEOUS at 22:46

## 2019-05-13 RX ADMIN — HYDROCODONE BITARTRATE AND ACETAMINOPHEN 2 TABLET: 5; 325 TABLET ORAL at 13:21

## 2019-05-13 RX ADMIN — HYDROCODONE BITARTRATE AND ACETAMINOPHEN 1 TABLET: 5; 325 TABLET ORAL at 23:21

## 2019-05-13 RX ADMIN — FENTANYL CITRATE 25 MCG: 50 INJECTION, SOLUTION INTRAMUSCULAR; INTRAVENOUS at 15:26

## 2019-05-13 RX ADMIN — FENTANYL CITRATE 25 MCG: 50 INJECTION, SOLUTION INTRAMUSCULAR; INTRAVENOUS at 18:13

## 2019-05-13 RX ADMIN — FUROSEMIDE 20 MG: 20 TABLET ORAL at 23:21

## 2019-05-13 RX ADMIN — MORPHINE SULFATE 4 MG: 4 INJECTION INTRAVENOUS at 20:30

## 2019-05-13 ASSESSMENT — PAIN DESCRIPTION - PAIN TYPE
TYPE: ACUTE PAIN

## 2019-05-13 ASSESSMENT — PAIN DESCRIPTION - FREQUENCY
FREQUENCY: CONTINUOUS

## 2019-05-13 ASSESSMENT — PAIN DESCRIPTION - ONSET
ONSET: ON-GOING
ONSET: ON-GOING

## 2019-05-13 ASSESSMENT — PAIN DESCRIPTION - LOCATION
LOCATION: HIP
LOCATION: HIP;LEG

## 2019-05-13 ASSESSMENT — PAIN SCALES - GENERAL
PAINLEVEL_OUTOF10: 10
PAINLEVEL_OUTOF10: 4
PAINLEVEL_OUTOF10: 10
PAINLEVEL_OUTOF10: 9
PAINLEVEL_OUTOF10: 8
PAINLEVEL_OUTOF10: 7
PAINLEVEL_OUTOF10: 4
PAINLEVEL_OUTOF10: 10

## 2019-05-13 ASSESSMENT — PAIN DESCRIPTION - DESCRIPTORS
DESCRIPTORS: SORE
DESCRIPTORS: ACHING

## 2019-05-13 ASSESSMENT — PAIN DESCRIPTION - PROGRESSION
CLINICAL_PROGRESSION: NOT CHANGED
CLINICAL_PROGRESSION: RAPIDLY IMPROVING
CLINICAL_PROGRESSION: GRADUALLY IMPROVING

## 2019-05-13 ASSESSMENT — PAIN DESCRIPTION - ORIENTATION
ORIENTATION: LEFT
ORIENTATION: RIGHT
ORIENTATION: RIGHT;MID

## 2019-05-13 NOTE — ED PROVIDER NOTES
Chronic pain of right knee, CKD (chronic kidney disease) stage 4, GFR 15-29 ml/min ([facility]), Coagulation defect ([address] Diabetes mellitus ([address] Diabetes mellitus type 2 in obese ([address] Diarrhea, DVT (deep venous thrombosis) ([facility]), Elevated C-reactive protein (CRP), ESRD (end stage renal disease) ([address] Essential hypertension, Fever, Foot ulcer due to secondary DM ([address] GERD (gastroesophageal reflux disease), Hematochezia, History of cerebral infarction, History of DVT (deep vein thrombosis), History of superior vena cava filter placement, Hx of blood clots, Hyperkalemia, Hyperlipidemia, Hypernatremia, Hypertension, Hypocalcemia, Hypovolemic shock ([facility]), Hypoxia, Irregular heartbeat, Knee effusion, right, Left leg cellulitis, Lower GI bleed, MDRO (multiple drug resistant organisms) resistance, Metabolic acidosis, MRSA (methicillin resistant staph aureus) culture positive, On continuous oral anticoagulation, NADIRA on CPAP, Other specified hypothyroidism, Parietal lobe infarction ([address] Pneumonia, Post traumatic encephalopathy, Proteinuria, Pyogenic arthritis of right knee joint ([address] Pyrexia, Renal insufficiency, Renal lesion, Respiratory failure, acute ([address] Right knee pain, SAH (subarachnoid hemorrhage) ([address] Secondary hyperparathyroidism of renal origin ([address] Seizure disorder ([address] Stercoral ulcer of rectum, Streptococcal infection, Subarachnoid bleed ([address] Subdural bleeding ([address] Thyroid disease, Traumatic cerebral hemorrhage ([address] Type 2 diabetes mellitus with left diabetic foot ulcer ([address] Unspecified cerebral artery occlusion with cerebral infarction, Venous stasis dermatitis, and Venous stasis dermatitis of both lower extremities. SURGICAL HISTORY      has a past surgical history that includes Gastric bypass surgery; Vena Cava Filter Placement; Foot Debridement (Left); Foot surgery (Right); Tonsillectomy; Colonoscopy; skin biopsy; Dilatation, esophagus; tracheostomy (summer 2013);  Gastrostomy tube placement (summer 2013); Abdomen surgery; Cardiac catheterization; Knee arthroscopy (Right, 03/23/2017); and pr knee scope,diagnostic (Right, 3/23/2017). CURRENT MEDICATIONS       Previous Medications    APIXABAN (ELIQUIS) 5 MG TABS TABLET    Take 5 mg by mouth 2 times daily    ARIPIPRAZOLE (ABILIFY) 5 MG TABLET    Take 5 mg by mouth    CHOLECALCIFEROL (VITAMIN D3) 2000 UNITS CAPS    Take 1,000 Units by mouth daily. FERRIC CITRATE (AURYXIA) 1  MG(FE) TABS    Take 210 mg by mouth 2 times daily Patients med list states frequency is 2-3 times a day    FUROSEMIDE (LASIX) 20 MG TABLET    Take 20 mg by mouth 2 times daily    ISOSORBIDE MONONITRATE (IMDUR) 30 MG CR TABLET    Take 30 mg by mouth daily     LAMOTRIGINE (LAMICTAL) 25 MG TABLET    Take 100 mg by mouth daily     LEVOTHYROXINE (LEVOTHROID) 50 MCG TABLET    Take 50 mcg by mouth Daily. PANTOPRAZOLE (PROTONIX) 40 MG TABLET    Take 40 mg by mouth daily     VENLAFAXINE (EFFEXOR) 75 MG TABLET    Take 75 mg by mouth daily       ALLERGIES     is allergic to bactrim [sulfamethoxazole-trimethoprim]. FAMILY HISTORY     indicated that the status of his father is unknown. He indicated that the status of his sister is unknown.     family history includes Cancer in his sister; Coronary Art Dis in his father. SOCIAL HISTORY      reports that he has never smoked. He has never used smokeless tobacco. He reports that he does not drink alcohol or use drugs.     PHYSICAL EXAM       ED Triage Vitals [05/13/19 1259]   BP Temp Temp Source Pulse Resp SpO2 Height Weight   (!) 149/58 98.2 °F (36.8 °C) Oral 78 16 (!) 88 % -- 248 lb (112.5 kg)     Constitutional: Alert, oriented x3, nontoxic, answering questions appropriately, acting properly for age, in no acute distress   HEENT: Extraocular muscles intact, mucus membranes moist,  Neck: Trachea midline no posterior midline neck tenderness to palpation  Cardiovascular: Regular rhythm and rate no S3, S4, or murmurs Respiratory: Diminished right greater than left no tachypnea no retractions. Gastrointestinal: Soft, nontender, nondistended, positive bowel sounds. No rebound, rigidity, or guarding. Musculoskeletal: Right lower extremity tenderness over the hip no ecchymosis or deformity. Hip slightly held in abduction. Tenderness to the anterolateral aspect of the knee without ecchymosis or deformity. Chronic edema right lower shin renal calf tenderness  Neurologic: No gross focal neurologic deficits  Skin: Warm and dry       Physical Exam  DIFFERENTIAL DIAGNOSIS/ MDM:     X-ray right knee and right hip    DIAGNOSTIC RESULTS     EKG: All EKG's are interpreted by the[facility] Physician who either signs or Co-signs this chart in the absence of a cardiologist.    1509 atrial fibrillation rate 75 MA undetectable cure is 94  no acute ST or T wave changes.     Not indicated unless otherwise documented above    LABS:  Results for orders placed or performed during the hospital encounter of 56/62/11   Basic Metabolic Panel   Result Value Ref Range    Glucose 192 (H) 70 - 99 mg/dL    BUN 54 (H) 8 - 23 mg/dL    CREATININE 5.60 (HH) 0.70 - 1.20 mg/dL    Bun/Cre Ratio NOT REPORTED 9 - 20    Calcium 9.1 8.6 - 10.4 mg/dL    Sodium 137 135 - 144 mmol/L    Potassium 4.8 3.7 - 5.3 mmol/L    Chloride 95 (L) 98 - 107 mmol/L    CO2 26 20 - 31 mmol/L    Anion Gap 16 9 - 17 mmol/L    GFR Non-African American 10 (L) >60 mL/min    GFR  12 (L) >60 mL/min    GFR Comment          GFR Staging NOT REPORTED    CBC Auto Differential   Result Value Ref Range    WBC 4.7 3.5 - 11.0 k/uL    RBC 2.97 (L) 4.5 - 5.9 m/uL    Hemoglobin 9.9 (L) 13.5 - 17.5 g/dL    Hematocrit 29.4 (L) 41 - 53 %    MCV 99.1 80 - 100 fL    MCH 33.3 26 - 34 pg    MCHC 33.6 31 - 37 g/dL    RDW 15.1 12.5 - 15.4 %    Platelets 314 (L) 060 - 450 k/uL    MPV 7.0 6.0 - 12.0 fL    NRBC Automated NOT REPORTED per 100 WBC    Differential Type NOT REPORTED but occasionally words are mis-transcribed.)    Rubina Dang, DO  Attending Emergency Physician           Rubina Dang,   05/13/19 [id number]

## 2019-05-13 NOTE — ED NOTES
[facility] was informed still [facility]     Nacho Gallo RN  05/13/19 [id number]

## 2019-05-13 NOTE — ED NOTES
Miranda Zafar, please call wife when they are departing with patient for transport  [address] [place] [address]  05/13/19 [id number]

## 2019-05-13 NOTE — ED NOTES
Pt to exam room 8 via wheelchair with c/o fall, right knee, right hip, and right buttock pain. Pt was in his wheelchair last night attempted to stand and fell forward onto his right side. Pt denies LOC or hitting his head. Family unsure exactly how the fall happened because it was unwitnessed. Pt his renal failure with a new fistula placed recently left upper arm, surgical drain still in place. He also has chronic right lower leg swelling. Pt unwilling to pt put weight on right leg. Pt was 2 assist into bed.      Nacho Gallo RN  05/13/19 [id number]

## 2019-05-14 ENCOUNTER — APPOINTMENT (OUTPATIENT)
Dept: GENERAL RADIOLOGY | Age: 69
DRG: 469 | End: 2019-05-14
Payer: MEDICARE

## 2019-05-14 ENCOUNTER — APPOINTMENT (OUTPATIENT)
Dept: INTERVENTIONAL RADIOLOGY/VASCULAR | Age: 69
DRG: 469 | End: 2019-05-14
Payer: MEDICARE

## 2019-05-14 ENCOUNTER — APPOINTMENT (OUTPATIENT)
Dept: ULTRASOUND IMAGING | Age: 69
DRG: 469 | End: 2019-05-14
Payer: MEDICARE

## 2019-05-14 ENCOUNTER — APPOINTMENT (OUTPATIENT)
Dept: DIALYSIS | Age: 69
DRG: 469 | End: 2019-05-14
Payer: MEDICARE

## 2019-05-14 PROBLEM — I50.32 CHRONIC DIASTOLIC CONGESTIVE HEART FAILURE (HCC): Status: ACTIVE | Noted: 2017-02-25

## 2019-05-14 LAB
ANION GAP SERPL CALCULATED.3IONS-SCNC: 16 MMOL/L (ref 9–17)
BUN BLDV-MCNC: 57 MG/DL (ref 8–23)
BUN/CREAT BLD: ABNORMAL (ref 9–20)
CALCIUM SERPL-MCNC: 8.7 MG/DL (ref 8.6–10.4)
CHLORIDE BLD-SCNC: 95 MMOL/L (ref 98–107)
CO2: 25 MMOL/L (ref 20–31)
CREAT SERPL-MCNC: 5.32 MG/DL (ref 0.7–1.2)
DIRECT EXAM: NORMAL
EKG ATRIAL RATE: 71 BPM
EKG Q-T INTERVAL: 456 MS
EKG QRS DURATION: 94 MS
EKG QTC CALCULATION (BAZETT): 509 MS
EKG R AXIS: 38 DEGREES
EKG T AXIS: 73 DEGREES
EKG VENTRICULAR RATE: 75 BPM
GFR AFRICAN AMERICAN: 13 ML/MIN
GFR NON-AFRICAN AMERICAN: 11 ML/MIN
GFR SERPL CREATININE-BSD FRML MDRD: ABNORMAL ML/MIN/{1.73_M2}
GFR SERPL CREATININE-BSD FRML MDRD: ABNORMAL ML/MIN/{1.73_M2}
GLUCOSE BLD-MCNC: 118 MG/DL (ref 75–110)
GLUCOSE BLD-MCNC: 136 MG/DL (ref 75–110)
GLUCOSE BLD-MCNC: 139 MG/DL (ref 70–99)
GLUCOSE BLD-MCNC: 185 MG/DL (ref 75–110)
HCT VFR BLD CALC: 30.8 % (ref 40.7–50.3)
HEMOGLOBIN: 9.3 G/DL (ref 13–17)
INR BLD: 1.1
Lab: NORMAL
MCH RBC QN AUTO: 31.7 PG (ref 25.2–33.5)
MCHC RBC AUTO-ENTMCNC: 30.2 G/DL (ref 28.4–34.8)
MCV RBC AUTO: 105.1 FL (ref 82.6–102.9)
NRBC AUTOMATED: 0 PER 100 WBC
PDW BLD-RTO: 14 % (ref 11.8–14.4)
PLATELET # BLD: 91 K/UL (ref 138–453)
PMV BLD AUTO: 9 FL (ref 8.1–13.5)
POTASSIUM SERPL-SCNC: 4.2 MMOL/L (ref 3.7–5.3)
PROTHROMBIN TIME: 12 SEC (ref 9–12)
RBC # BLD: 2.93 M/UL (ref 4.21–5.77)
SODIUM BLD-SCNC: 136 MMOL/L (ref 135–144)
SPECIMEN DESCRIPTION: NORMAL
TROPONIN INTERP: ABNORMAL
TROPONIN INTERP: ABNORMAL
TROPONIN T: ABNORMAL NG/ML
TROPONIN T: ABNORMAL NG/ML
TROPONIN, HIGH SENSITIVITY: 104 NG/L (ref 0–22)
TROPONIN, HIGH SENSITIVITY: 111 NG/L (ref 0–22)
TSH SERPL DL<=0.05 MIU/L-ACNC: 2.33 MIU/L (ref 0.3–5)
VITAMIN D 25-HYDROXY: 46.3 NG/ML (ref 30–100)
WBC # BLD: 4.8 K/UL (ref 3.5–11.3)

## 2019-05-14 PROCEDURE — 84484 ASSAY OF TROPONIN QUANT: CPT

## 2019-05-14 PROCEDURE — 88112 CYTOPATH CELL ENHANCE TECH: CPT

## 2019-05-14 PROCEDURE — 82947 ASSAY GLUCOSE BLOOD QUANT: CPT

## 2019-05-14 PROCEDURE — 0W993ZZ DRAINAGE OF RIGHT PLEURAL CAVITY, PERCUTANEOUS APPROACH: ICD-10-PCS | Performed by: RADIOLOGY

## 2019-05-14 PROCEDURE — 1200000000 HC SEMI PRIVATE

## 2019-05-14 PROCEDURE — 73502 X-RAY EXAM HIP UNI 2-3 VIEWS: CPT

## 2019-05-14 PROCEDURE — 85027 COMPLETE CBC AUTOMATED: CPT

## 2019-05-14 PROCEDURE — 6370000000 HC RX 637 (ALT 250 FOR IP): Performed by: INTERNAL MEDICINE

## 2019-05-14 PROCEDURE — 88305 TISSUE EXAM BY PATHOLOGIST: CPT

## 2019-05-14 PROCEDURE — 2580000003 HC RX 258: Performed by: STUDENT IN AN ORGANIZED HEALTH CARE EDUCATION/TRAINING PROGRAM

## 2019-05-14 PROCEDURE — C1729 CATH, DRAINAGE: HCPCS

## 2019-05-14 PROCEDURE — 2709999900 HC NON-CHARGEABLE SUPPLY

## 2019-05-14 PROCEDURE — 84443 ASSAY THYROID STIM HORMONE: CPT

## 2019-05-14 PROCEDURE — 90935 HEMODIALYSIS ONE EVALUATION: CPT

## 2019-05-14 PROCEDURE — 6370000000 HC RX 637 (ALT 250 FOR IP): Performed by: NURSE PRACTITIONER

## 2019-05-14 PROCEDURE — 76705 ECHO EXAM OF ABDOMEN: CPT

## 2019-05-14 PROCEDURE — 85610 PROTHROMBIN TIME: CPT

## 2019-05-14 PROCEDURE — 36415 COLL VENOUS BLD VENIPUNCTURE: CPT

## 2019-05-14 PROCEDURE — 71045 X-RAY EXAM CHEST 1 VIEW: CPT

## 2019-05-14 PROCEDURE — 80048 BASIC METABOLIC PNL TOTAL CA: CPT

## 2019-05-14 PROCEDURE — 73552 X-RAY EXAM OF FEMUR 2/>: CPT

## 2019-05-14 PROCEDURE — 82306 VITAMIN D 25 HYDROXY: CPT

## 2019-05-14 PROCEDURE — 6360000002 HC RX W HCPCS: Performed by: INTERNAL MEDICINE

## 2019-05-14 PROCEDURE — 2580000003 HC RX 258: Performed by: INTERNAL MEDICINE

## 2019-05-14 PROCEDURE — 6360000002 HC RX W HCPCS: Performed by: STUDENT IN AN ORGANIZED HEALTH CARE EDUCATION/TRAINING PROGRAM

## 2019-05-14 PROCEDURE — 32555 ASPIRATE PLEURA W/ IMAGING: CPT

## 2019-05-14 PROCEDURE — 99223 1ST HOSP IP/OBS HIGH 75: CPT | Performed by: INTERNAL MEDICINE

## 2019-05-14 PROCEDURE — 73562 X-RAY EXAM OF KNEE 3: CPT

## 2019-05-14 RX ORDER — LANOLIN ALCOHOL/MO/W.PET/CERES
325 CREAM (GRAM) TOPICAL
Status: DISCONTINUED | OUTPATIENT
Start: 2019-05-14 | End: 2019-05-18 | Stop reason: HOSPADM

## 2019-05-14 RX ADMIN — HYDROCODONE BITARTRATE AND ACETAMINOPHEN 1 TABLET: 5; 325 TABLET ORAL at 18:29

## 2019-05-14 RX ADMIN — ISOSORBIDE MONONITRATE 30 MG: 30 TABLET ORAL at 09:02

## 2019-05-14 RX ADMIN — VENLAFAXINE 75 MG: 75 TABLET ORAL at 09:02

## 2019-05-14 RX ADMIN — HEPARIN SODIUM 5000 UNITS: 5000 INJECTION INTRAVENOUS; SUBCUTANEOUS at 06:24

## 2019-05-14 RX ADMIN — FERROUS SULFATE TAB EC 325 MG (65 MG FE EQUIVALENT) 325 MG: 325 (65 FE) TABLET DELAYED RESPONSE at 09:01

## 2019-05-14 RX ADMIN — ACETAMINOPHEN 650 MG: 325 TABLET ORAL at 13:49

## 2019-05-14 RX ADMIN — FUROSEMIDE 20 MG: 20 TABLET ORAL at 21:03

## 2019-05-14 RX ADMIN — HYDROCODONE BITARTRATE AND ACETAMINOPHEN 1 TABLET: 5; 325 TABLET ORAL at 03:22

## 2019-05-14 RX ADMIN — PANTOPRAZOLE SODIUM 40 MG: 40 TABLET, DELAYED RELEASE ORAL at 09:02

## 2019-05-14 RX ADMIN — HYDROCODONE BITARTRATE AND ACETAMINOPHEN 1 TABLET: 5; 325 TABLET ORAL at 13:10

## 2019-05-14 RX ADMIN — HYDROCODONE BITARTRATE AND ACETAMINOPHEN 1 TABLET: 5; 325 TABLET ORAL at 22:02

## 2019-05-14 RX ADMIN — LEVOTHYROXINE SODIUM 50 MCG: 50 TABLET ORAL at 06:22

## 2019-05-14 RX ADMIN — LAMOTRIGINE 100 MG: 100 TABLET ORAL at 09:02

## 2019-05-14 RX ADMIN — Medication 10 ML: at 21:11

## 2019-05-14 RX ADMIN — VITAMIN D, TAB 1000IU (100/BT) 1000 UNITS: 25 TAB at 09:02

## 2019-05-14 RX ADMIN — DEXTROSE MONOHYDRATE 2 G: 50 INJECTION, SOLUTION INTRAVENOUS at 15:29

## 2019-05-14 RX ADMIN — HYDROCODONE BITARTRATE AND ACETAMINOPHEN 1 TABLET: 5; 325 TABLET ORAL at 09:01

## 2019-05-14 RX ADMIN — FUROSEMIDE 20 MG: 20 TABLET ORAL at 09:01

## 2019-05-14 RX ADMIN — ARIPIPRAZOLE 5 MG: 5 TABLET ORAL at 09:02

## 2019-05-14 ASSESSMENT — PAIN SCALES - GENERAL
PAINLEVEL_OUTOF10: 10
PAINLEVEL_OUTOF10: 8
PAINLEVEL_OUTOF10: 7
PAINLEVEL_OUTOF10: 0
PAINLEVEL_OUTOF10: 10
PAINLEVEL_OUTOF10: 6
PAINLEVEL_OUTOF10: 6
PAINLEVEL_OUTOF10: 9
PAINLEVEL_OUTOF10: 4

## 2019-05-14 ASSESSMENT — PAIN DESCRIPTION - LOCATION: LOCATION: LEG

## 2019-05-14 ASSESSMENT — PAIN DESCRIPTION - FREQUENCY: FREQUENCY: CONTINUOUS

## 2019-05-14 ASSESSMENT — PAIN DESCRIPTION - PAIN TYPE: TYPE: SURGICAL PAIN

## 2019-05-14 ASSESSMENT — PAIN DESCRIPTION - DESCRIPTORS: DESCRIPTORS: ACHING;CONSTANT

## 2019-05-14 ASSESSMENT — PAIN DESCRIPTION - ORIENTATION: ORIENTATION: RIGHT

## 2019-05-14 NOTE — H&P
[facility]    HISTORY AND PHYSICAL EXAMINATION            Date:   5/14/2019  Patient name:  Andres Triplett  Date of admission:  5/13/2019 12:52 PM  MRN:   [id number]  Account:  [de-identified]  YOB: 1950  PCP:    Lori Mays MD  Room:   0318/0318-01  Code Status:    Full Code    Chief Complaint:     Chief Complaint   Patient presents with    Fall     c/o right hip,right knee, and right buttucks pain       History Obtained From:     patient, electronic medical record    History of Present Illness: This is 68years old gentleman who was transferred to us from [facility] emergency department. Patient was getting from his wheelchair and sustained a fall. Patient denies hitting his head or losing his consciousness. It was a mechanical fall. He started having pain on the right side. X-ray of the right hip was concerning for fracture. Incidentally chest x-ray also showed pleural effusion on the right side. Patient has a history of CHF and is also on hemodialysis. He does have some mild shortness of breath. He denies any chest pain, denies any coughing. Patient has history of atrial fibrillation and he takes Eliquis for that. He has also history of end-stage renal disease and he is on hemodialysis.       Past Medical History:     Past Medical History:   Diagnosis Date    A-fib ([address]     Acute ischemic stroke ([address] 7/13/2013    Acute metabolic encephalopathy 1/8/1986    Acute on chronic congestive heart failure ([address] 5/9/2014    Acute on chronic diastolic CHF (congestive heart failure) ([address] 2/25/2017    Altered mental status 8/24/2013    Anemia     Anemia associated with chronic renal failure 5/28/2014    Aphasia 8/24/2013    ARF (acute renal failure) ([address] 5/28/2014    From pre renal azotemia from newly added diuretic and ARB use, creat peaked at 2.8 from 2 in may 2014    Arteriovenous fistula for hemodialysis in place, primary ([address] 11/17/2017    Arthritis     Bradyarrhythmia 7/13/2013    Cellulitis 9/17/2018    Cerebral contusion ([address] 7/7/2013    CHF (congestive heart failure) (HCC)     Chronic atrial fibrillation (HCC) 7/6/2013    Chronic kidney disease     Chronic pain of right knee     CKD (chronic kidney disease) stage 4, GFR 15-29 ml/min ([address] 5/28/2014    From diabetic and ischemic nephrosclerosis, creat now 2-2.5, GFR 25-30 ml/min    Coagulation defect ([address] 7/6/2013    Diabetes mellitus ([address]     Diabetes mellitus type 2 in obese ([address]     Diarrhea 7/16/2013    [facility] venous thrombosis) [facility]     Elevated C-reactive protein (CRP)     ESRD (end stage renal disease) ([address] 3/23/2017    Essential hypertension 3/23/2017    Fever 7/18/2013    Foot ulcer due to secondary DM ([address] 5/28/2014    Left side on antibiotics    GERD (gastroesophageal reflux disease)     Hematochezia 3/4/2017    History of cerebral infarction 3/1/2017    History of DVT (deep vein thrombosis) 9/17/2018    History of superior vena cava filter placement 7/18/2013    Hx of blood clots     Hyperkalemia 7/16/2013    Hyperlipidemia     Hypernatremia 7/11/2013    Hypertension     Hypocalcemia 7/18/2013    Hypovolemic shock ([address] 3/4/2017    Hypoxia     Irregular heartbeat     hx of a-fib    Knee effusion, right 2/25/2017    Left leg cellulitis     Lower GI bleed 3/4/2017    MDRO (multiple drug resistant organisms) resistance     Metabolic acidosis 8/73/8129    MRSA (methicillin resistant staph aureus) culture positive 09/17/2018    leg    On continuous oral anticoagulation 9/17/2018    NADIRA on CPAP     Other specified hypothyroidism 8/24/2013    Parietal lobe infarction ([address] 8/26/2013    Pneumonia     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    Proteinuria 11/30/2016    Fluctuates between 2-3 grams, cant use ACEI due to hyperkalemia    Pyogenic arthritis of right knee joint ([address] 3/23/2017    Pyrexia 7/19/2013  Renal insufficiency     Renal lesion 3/3/2017    Respiratory failure, acute ([address] 7/7/2013    Right knee pain     SAH (subarachnoid hemorrhage) (HCC)     Secondary hyperparathyroidism of renal origin ([address] 12/16/2015    Seizure disorder ([address] 3/1/2017    Stercoral ulcer of rectum     Streptococcal infection     Subarachnoid bleed (HCC) 7/13/2013    Subdural bleeding ([address] 7/5/2013    Thyroid disease     Traumatic cerebral hemorrhage ([address] 7/7/2013    Type 2 diabetes mellitus with left diabetic foot ulcer ([address] 4/16/2014    Unspecified cerebral artery occlusion with cerebral infarction     Venous stasis dermatitis 5/28/2014    Venous stasis dermatitis of both lower extremities 5/28/2014        Past Surgical History:     Past Surgical History:   Procedure Laterality Date    ABDOMEN SURGERY      CARDIAC CATHETERIZATION      COLONOSCOPY      DILATATION, ESOPHAGUS      FOOT DEBRIDEMENT Left     FOOT SURGERY Right     #5 toe removed  , 1/2 toes #1, #2  partially removed    GASTRIC BYPASS SURGERY      GASTROSTOMY TUBE PLACEMENT  summer 2013    KNEE ARTHROSCOPY Right 03/23/2017    I & D    MA KNEE SCOPE,DIAGNOSTIC Right 3/23/2017    KNEE ARTHROSCOPY,EXTENSIVE DEBRIDEMENT PARTIAL PROXIMAL AND MEDIAL MENISECTOMY  performed by Danielle Tee MD at [address] [place] [address]  summer 2013   [facility] [place]          Medications Prior to Admission:     Prior to Admission medications    Medication Sig Start Date End Date Taking?  Authorizing Provider   furosemide (LASIX) 20 MG tablet Take 20 mg by mouth 2 times daily   Yes Historical Provider, MD   ARIPiprazole (ABILIFY) 5 MG tablet Take 5 mg by mouth 7/26/17  Yes Historical Provider, MD   apixaban (ELIQUIS) 5 MG TABS tablet Take 5 mg by mouth 2 times daily   Yes Historical Provider, MD   Ferric Citrate (AURYXIA) 1  MG(Fe) TABS Take 210 mg by mouth 2 times daily Patients med list states frequency is 2-3 times a day   Yes Historical Provider, MD   venlafaxine (EFFEXOR) 75 MG tablet Take 75 mg by mouth daily   Yes Historical Provider, MD   lamoTRIgine (LAMICTAL) 25 MG tablet Take 100 mg by mouth daily    Yes Historical Provider, MD   isosorbide mononitrate (IMDUR) 30 MG CR tablet Take 30 mg by mouth daily  12/12/14  Yes Historical Provider, MD   pantoprazole (PROTONIX) 40 MG tablet Take 40 mg by mouth daily  10/17/13  Yes Historical Provider, MD   levothyroxine (LEVOTHROID) 50 MCG tablet Take 50 mcg by mouth Daily. Yes Historical Provider, MD   Cholecalciferol (VITAMIN D3) 2000 UNITS CAPS Take 1,000 Units by mouth daily. Yes Historical Provider, MD        Allergies:     Bactrim [sulfamethoxazole-trimethoprim]    Social History:     Tobacco:    reports that he has never smoked. He has never used smokeless tobacco.  Alcohol:      reports that he does not drink alcohol. Drug Use:  reports that he does not use drugs. Family History:     Family History   Problem Relation Age of Onset    Coronary Art Dis Father     Cancer Sister        Review of Systems:     Positive and Negative as described in HPI.     CONSTITUTIONAL:  negative for fevers, chills, sweats, fatigue, weight loss  HEENT:  negative for vision, hearing changes, runny nose, throat pain  RESPIRATORY:  Negative for cough, positive for shortness of breath  CARDIOVASCULAR:  negative for chest pain, palpitations  GASTROINTESTINAL:  negative for nausea, vomiting, diarrhea, constipation, change in bowel habits, abdominal pain   GENITOURINARY:  negative for difficulty of urination, burning with urination, frequency   INTEGUMENT:  negative for rash, skin lesions, easy bruising   HEMATOLOGIC/LYMPHATIC:  negative for swelling/edema   ALLERGIC/IMMUNOLOGIC:  negative for urticaria , itching  ENDOCRINE:  negative increase in drinking, increase in urination, hot or cold intolerance  MUSCULOSKELETAL:  Positive for right hip and knee pain NEUROLOGICAL:  negative for headaches, dizziness, lightheadedness, numbness, pain, tingling extremities  BEHAVIOR/PSYCH:  negative for depression, anxiety    Physical Exam:   BP (!) 129/58   Pulse 76   Temp 98.2 °F (36.8 °C)   Resp 18   Ht 6' (1.829 m)   Wt 236 lb 12.4 oz (107.4 kg)   SpO2 98%   BMI 32.11 kg/m²   Temp (24hrs), Av.1 °F (36.7 °C), Min:98 °F (36.7 °C), Max:98.2 °F (36.8 °C)    Recent Labs     19  2231 19  0805   POCGLU 150* 118*       Intake/Output Summary (Last 24 hours) at 2019 1224  Last data filed at 2019 0805  Gross per 24 hour   Intake 250 ml   Output 5 ml   Net 245 ml       General Appearance:  alert, well appearing, and in no acute distress  Mental status: oriented to person, place, and time with normal affect  Head:  normocephalic, atraumatic. Eye: no icterus, redness, pupils equal and reactive, extraocular eye movements intact, conjunctiva clear  Ear: normal external ear, no discharge, hearing intact  Nose:  no drainage noted  Mouth: mucous membranes moist  Neck: supple, no carotid bruits, thyroid not palpable  Lungs: Bilateral air entry, diminished on the right side   Cardiovascular: normal rate, regular rhythm, no murmur, gallop, rub.   Abdomen: Soft, nontender, nondistended, normal bowel sounds, no hepatomegaly or splenomegaly  Neurologic: There are no new focal motor or sensory deficits, normal muscle tone and bulk, no abnormal sensation, normal speech, cranial nerves II through XII grossly intact  Skin: No gross lesions, rashes, bruising or bleeding on exposed skin area  Extremities:  peripheral pulses palpable, outward rotation of the right lower extremity   Psych: normal affect    Investigations:      Laboratory Testing:  Recent Results (from the past 24 hour(s))   Basic Metabolic Panel    Collection Time: 19  2:26 PM   Result Value Ref Range    Glucose 192 (H) 70 - 99 mg/dL    BUN 54 (H) 8 - 23 mg/dL    CREATININE 5.60 (HH) 0.70 - 1.20 mg/dL unremarkable. There are vascular calcifications. Severe tricompartmental degenerative change with a trace effusion. Xr Knee Right (3 Views)    Result Date: 5/13/2019  EXAMINATION: 3 XRAY VIEWS OF THE RIGHT KNEE 5/13/2019 1:25 pm COMPARISON: 04/26/2017 HISTORY: ORDERING SYSTEM PROVIDED HISTORY: pain TECHNOLOGIST PROVIDED HISTORY: pain Ordering Physician Provided Reason for Exam: right hip and knee pain Acuity: Acute Type of Exam: Initial Mechanism of Injury: fall last night FINDINGS: Severe tricompartmental osteoarthrosis. Osteopenia. Lateral view limited by obliquity. No acute fracture identified within limitations of the exam. Extensive atherosclerosis. Severe tricompartmental osteoarthrosis. No acute fracture identified. Xr Chest Portable    Result Date: 5/13/2019  EXAMINATION: ONE XRAY VIEW OF THE CHEST 5/13/2019 1:44 pm COMPARISON: 02/24/2017. HISTORY: ORDERING SYSTEM PROVIDED HISTORY: surgical clearance TECHNOLOGIST PROVIDED HISTORY: surgical clearance Ordering Physician Provided Reason for Exam: surgery clearance right hip Acuity: Acute Type of Exam: Initial Mechanism of Injury: fall last night hip pain FINDINGS: Large right pleural effusion with compensatory right basilar volume loss. Only the right upper lobe remains aerated. The left lung is clear. No significant mediastinal shift. The cardiac silhouette is enlarged. Large right pleural effusion with only the right upper lobe remaining aerated. Cardiomegaly without overt failure.      Xr Hip 2-3 Vw W Pelvis Right    Result Date: 5/14/2019  EXAMINATION: ONE XRAY VIEW OF THE PELVIS AND TWO XRAY VIEWS RIGHT HIP 5/14/2019 9:01 am COMPARISON: 05/13/2019 HISTORY: ORDERING SYSTEM PROVIDED HISTORY: Trauma/Fracture TECHNOLOGIST PROVIDED HISTORY: AP and cross-table lateral of the hip please, thank you Trauma/Fracture Ordering Physician Provided Reason for Exam: right hip fx, pain FINDINGS: Again identified is the acute varus impacted femoral neck fracture no definite intertrochanteric component is identified. Osteopenia. No other definite fracture is seen. Atherosclerotic calcifications are identified. Acute impacted femoral neck fracture as seen on the recent comparison examination, without definite intertrochanteric involvement. Similar alignment. Xr Hip 2-3 Vw W Pelvis Right    Result Date: 5/13/2019  EXAMINATION: ONE XRAY VIEW OF THE PELVIS AND TWO XRAY VIEWS RIGHT HIP 5/13/2019 1:25 pm COMPARISON: None. HISTORY: ORDERING SYSTEM PROVIDED HISTORY: fall TECHNOLOGIST PROVIDED HISTORY: fall Ordering Physician Provided Reason for Exam: right hip pain Acuity: Acute Type of Exam: Initial Mechanism of Injury: fall last night FINDINGS: Acute varus impacted femoral neck fracture. Cannot exclude a trochanteric extension of the basis of these images. MRI or CT may be useful in further evaluating for intertrochanteric extension. Vascular calcifications. Soft tissue swelling. No additional fractures. Degenerative changes of bilateral hips. Acute varus impacted femoral neck fracture. Cannot exclude extension into the intertrochanteric right femur. Consider cross-sectional imaging for further evaluation.      Assessment :      Primary Problem  Closed fracture of right hip [facility])    Active Hospital Problems    Diagnosis Date Noted    Closed right [facility] fracture, [facility] encounter ([facility] [S72.001A] 05/13/2019    Closed fracture of right hip ([facility] [S72.001A] 05/13/2019    History of DVT (deep vein thrombosis) [Z86.718] 09/17/2018    Essential hypertension [I10] 03/23/2017    ESRD (end stage renal disease) ([facility] [N18.6] 03/23/2017    Seizure disorder ([facility] [G40.909] 03/01/2017    Chronic diastolic congestive heart failure ([facility] [I50.32] 02/25/2017    Other specified hypothyroidism [E03.8] 08/24/2013    Chronic atrial fibrillation ([facility] [I48.2] 07/06/2013    Diabetes mellitus ([facility] [E11.9] 07/06/2013   Pleural effusion    Plan: Patient status Admit as inpatient in the  Med/Surge    1. Patient has pleural effusion on the right side based on chest x-ray, we will get thoracentesis through interventional radiology  2. Appreciate pulmonology input  3. He does have detectable troponin most active from ESRD, denies chest pain, cardiology evaluation for preop risk stratification  4. Hemodialysis today  5. Orthopedic evaluation  6. Hold Eliquis, continue heparin subcutaneous    Consultations:   IP CONSULT TO ORTHOPEDIC SURGERY  IP CONSULT TO PULMONOLOGY  IP CONSULT TO NEPHROLOGY  IP CONSULT TO CARDIOLOGY  IP CONSULT TO RESPIRATORY CARE     Patient is admitted as inpatient status because of co-morbidities listed above, severity of signs and symptoms as outlined, requirement for current medical therapies and most importantly because of direct risk to patient if care not provided in a hospital setting.     Kanika Keating MD  5/14/2019  12:24 PM    Copy sent to Dr. Thomas Mills MD

## 2019-05-14 NOTE — PROGRESS NOTES
Occupational Therapy Not Seen Note    DATE: 2019  Name: Gabrielle Urrutia  : 1950  MRN: [id number]    Patient not available for Occupational Therapy due to:    Strict Bedrest    Next Scheduled Treatment: check back 5/15/19    Electronically signed by AMADOU Orourke on 2019 at 8:21 AM

## 2019-05-14 NOTE — PROGRESS NOTES
Cardiac Testing     Pt of Dr. Jeramy Pedroza at [facility] 2/27/17: EF 50-55%, grade II DD, mild TR, RVSP is 24 mmHg. CATH 11/15/05: Coronaries show proximal ectasia but no obstructions. EF 40%. RCA is dominant.

## 2019-05-14 NOTE — CARE COORDINATION
Case Management Initial Discharge Plan  Alexa Curran,             Met with:spouse/SO to discuss discharge plans. Information verified: address, contacts, phone number, , insurance Yes  PCP: Silvia Briceño MD  Date of last visit: 19  is from [employer] Provider: medicare and [facility]    Discharge Planning    Living Arrangements:  Spouse/Significant Other   Support Systems:  Spouse/Significant Other, Family Members    Home has  stories   stairs to climb to get into front door, stairs to climb to reach second floor  Location of bedroom/bathroom in home main    Patient able to perform ADL's:Assisted    Current Services (outpatient & in home) dialysis at [facility]  DME equipment: walker, wheelchair, cpap, glucometer  DME provider:     Pharmacy: [facility] in [address] [place] [address] Medications:  No  Does patient want to participate in local refill/ meds to beds program?  No    Potential Assistance Needed:  [facility]    Patient agreeable to home care: Yes  Freedom of choice provided:  yes    Prior SNF/Rehab Placement and Facility: [address] [place] [address] to SNF/Rehab: Yes  [place] of choice provided: yes   Evaluation: n/a    Expected Discharge date:  19  Patient expects to be discharged to:  home vs rehab/SNF  Follow Up Appointment: Best Day/ Time: Monday AM    Transportation provider: wife  Transportation arrangements needed for discharge: No    Readmission Risk              Risk of Unplanned Readmission:        22             Does patient have a readmission risk score greater than 14?: Yes  If yes, follow-up appointment must be made within 7 days of discharge. Discharge Plan: spoke with pt's wife Cody Tapia. Await surgical plan from ortho. Currently [facility] RLE. Pt has been to [address] [place] [address] in the past. Has used [facility]. also. Will need PT/OT evals post op.   Likely SNF/rehab.           Electronically signed by Sarah Caldwell RN on 5/14/19 at 2:31 PM

## 2019-05-14 NOTE — PROGRESS NOTES
Smoking Cessation - topics covered   []  Health Risks  []  Benefits of Quitting   []  Smoking Cessation  [x]  Patient has no history of tobacco use  []  Patient is former smoker. [x]  No need for tobacco cessation education. []  Booklet given  []  Patient verbalizes understanding. []  Patient denies need for tobacco cessation education. []  Unable to meet with patient today. Will follow up as able.   Jade Canales  7:26 AM

## 2019-05-14 NOTE — CONSULTS
Nephrology ESRD Consult Note    Reason for Consult:  End stage renal disease, fluid and electrolyte management and hypertension management  Requesting Physician:  hospitalist    Chief Complaint:  Status post out of hospital fall now with a right hip fracture  History Obtained From:  patient, electronic medical record    History of Present Illness: This is a 68 y.o. male with end stage renal disease on hemodialysis at [facility] and an estimated dry weight of 110 kg who presents with an impacted subcapital right femoral neck fracture. On the initial set of x-rays, he was also noted to have significant degenerative disease of the right knee. he is in significant pain when I see the patient on dialysis. His labs and dialysis bath and fluid removal are reviewed with the dialysis nursing staff at the bedside. He rates his pain a 10 out of 10 related to the right hip fracture. His medications are reviewed. He does not have any significant chest pain or significant shortness of breath. No significant nausea or vomiting. No significant swelling of the extremities. His past medical history is quite complex and does include a history of chronic atrial fibrillation, acute ischemic stroke, ESRD, anemia of chronic disease, primary AV fistula creation, CHF, diabetes mellitus type 2, degenerative joint disease, arthritis, DVT, diabetic foot ulcer, essential hypertension, obesity, secondary hyperparathyroidism, thyroid disease, cerebral hemorrhage that was traumatic in nature, hyperlipidemia, cardiac dysrhythmia, pneumonia, obstructive sleep apnea on CPAP, metabolic acidosis, and seizure disorder. Previous surgical history is listed below. He is  and does not smoke cigarettes, drink alcohol or use illicit drugs.     Past Medical History:        Diagnosis Date    A-fib [facility])     Acute ischemic stroke ([facility] 7/13/2013    Acute metabolic encephalopathy 8/7/8803    Acute on chronic congestive staph aureus) culture positive 09/17/2018    leg    On continuous oral anticoagulation 9/17/2018    NADIRA on CPAP     Other specified hypothyroidism 8/24/2013    Parietal lobe infarction ([address] 8/26/2013    Pneumonia     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    Proteinuria 11/30/2016    Fluctuates between 2-3 grams, cant use ACEI due to hyperkalemia    Pyogenic arthritis of right knee joint ([address] 3/23/2017    Pyrexia 7/19/2013    Renal insufficiency     Renal lesion 3/3/2017    Respiratory failure, acute ([address] 7/7/2013    Right knee pain     SAH (subarachnoid hemorrhage) (HCC)     Secondary hyperparathyroidism of renal origin ([address] 12/16/2015    Seizure disorder ([address] 3/1/2017    Stercoral ulcer of rectum     Streptococcal infection     Subarachnoid bleed ([address] 7/13/2013    Subdural bleeding ([address] 7/5/2013    Thyroid disease     Traumatic cerebral hemorrhage ([address] 7/7/2013    Type 2 diabetes mellitus with left diabetic foot ulcer ([address] 4/16/2014    Unspecified cerebral artery occlusion with cerebral infarction     Venous stasis dermatitis 5/28/2014    Venous stasis dermatitis of both lower extremities 5/28/2014       Access:   As previous    Past Surgical History:        Procedure Laterality Date    ABDOMEN SURGERY      CARDIAC CATHETERIZATION      COLONOSCOPY      DILATATION, ESOPHAGUS      FOOT DEBRIDEMENT Left     FOOT SURGERY Right     #5 toe removed  , 1/2 toes #1, #2  partially removed    GASTRIC BYPASS SURGERY      GASTROSTOMY TUBE PLACEMENT  summer 2013    KNEE ARTHROSCOPY Right 03/23/2017    I & D    VT KNEE SCOPE,DIAGNOSTIC Right 3/23/2017    KNEE ARTHROSCOPY,EXTENSIVE DEBRIDEMENT PARTIAL PROXIMAL AND MEDIAL MENISECTOMY  performed by Lucretia Dick MD at [address] [place] [address]  summer 2013    VENA CAVA FILTER PLACEMENT         Current Medications:      ferrous sulfate EC tablet 325 mg Daily with breakfast   ceFAZolin (ANCEF) 2 g in dextrose 5 % 50 mL IVPB On Call to OR   ARIPiprazole (ABILIFY) tablet 5 mg Daily   vitamin D (CHOLECALCIFEROL) tablet 1,000 Units Daily   furosemide (LASIX) tablet 20 mg BID   isosorbide mononitrate (IMDUR) extended release tablet 30 mg Daily   lamoTRIgine (LAMICTAL) tablet 100 mg Daily   levothyroxine (SYNTHROID) tablet 50 mcg Daily   pantoprazole (PROTONIX) tablet 40 mg Daily   venlafaxine (EFFEXOR) tablet 75 mg Daily   sodium chloride flush 0.9 % injection 10 mL 2 times per day   sodium chloride flush 0.9 % injection 10 mL PRN   magnesium hydroxide (MILK OF MAGNESIA) 400 MG/5ML suspension 30 mL Daily PRN   nicotine (NICODERM CQ) 21 MG/24HR 1 patch Daily PRN   acetaminophen (TYLENOL) tablet 650 mg Q4H PRN   heparin (porcine) injection 5,000 Units 3 times per day   HYDROcodone-acetaminophen (NORCO) 5-325 MG per tablet 1 tablet Q4H PRN     0.9 % sodium chloride infusion Continuous       Allergies:  Bactrim [sulfamethoxazole-trimethoprim]    Social History:    Social History     Socioeconomic History    Marital status:      Spouse name: Not on file    Number of children: Not on file    Years of education: Not on file    Highest education level: Not on file   Occupational History    Not on file   Social Needs    Financial resource strain: Not on file    Food insecurity:     Worry: Not on file     Inability: Not on file    Transportation needs:     Medical: Not on file     Non-medical: Not on file   Tobacco Use    Smoking status: Never Smoker    Smokeless tobacco: Never Used   Substance and Sexual Activity    Alcohol use: No    Drug use: No     Comment: smoked pipe years ago    Sexual activity: Not on file   Lifestyle    Physical activity:     Days per week: Not on file     Minutes per session: Not on file    Stress: Not on file   Relationships    Social connections:     Talks on phone: Not on file     Gets together: Not on file     Attends Jain service: Not on file     Active member of club or organization: Not on file     Attends meetings of clubs or organizations: Not on file     Relationship status: Not on file    Intimate partner violence:     Fear of current or ex partner: Not on file     Emotionally abused: Not on file     Physically abused: Not on file     Forced sexual activity: Not on file   Other Topics Concern    Not on file   Social History Narrative    ** Merged History Encounter **            Family History:   Family History   Problem Relation Age of Onset    Coronary Art Dis Father     Cancer Sister        Review of Systems:    Pertinent positives stated above in HPI. All other systems were reviewed and were negative.     Objective:  Constitutional:    CURRENT TEMPERATURE:  Temp: 98.2 °F (36.8 °C)  MAXIMUM TEMPERATURE OVER 24HRS:  Temp (24hrs), Av.1 °F (36.7 °C), Min:98 °F (36.7 °C), Max:98.2 °F (36.8 °C)    CURRENT RESPIRATORY RATE:  Resp: 18  CURRENT PULSE:  Pulse: 79  CURRENT BLOOD PRESSURE:  BP: 137/62  24HR BLOOD PRESSURE RANGE:  Systolic (16LLS), QHJ:637 , Min:115 , JLR:523   ; Diastolic (82NNH), TDK:88, Min:48, Max:66    24HR INTAKE/OUTPUT:      Intake/Output Summary (Last 24 hours) at 2019 1319  Last data filed at 2019 0805  Gross per 24 hour   Intake 250 ml   Output 5 ml   Net 245 ml         Physical Exam:  In general, he is in significant pain related to his right-sided femoral neck fracture  Skin: warm and dry, no rash or erythema  Eyes: conjunctivae pale and sclera anicteric  ENT: relatively moist mucous membranes, no obvious thrush  Neck: No apparent JVD, midline trachea, no accessory muscle use  Pulmonary: good bilateral air entry with some decreased breath sounds in the lung bases and a few basilar crackles as well without significant wheezing  Cardiovascular: Irregularly irregular rhythm with no rubs  Abdomen: obese and soft and non-tender and non-distended with active bowel sounds  Extremities: no significant lower extremity edema with significant pain in the right hip area at rest and with movement  Labs:  PTH:  No results found for: PTH  abs:   CBC: Recent Labs     05/13/19  1426 05/14/19  0450   WBC 4.7 4.8   RBC 2.97* 2.93*   HGB 9.9* 9.3*   HCT 29.4* 30.8*   MCV 99.1 105.1*   MCH 33.3 31.7   MCHC 33.6 30.2   RDW 15.1 14.0   * 91*   MPV 7.0 9.0      BMP: Recent Labs     05/13/19  1426 05/14/19  0450    136   K 4.8 4.2   CL 95* 95*   CO2 26 25   BUN 54* 57*   CREATININE 5.60* 5.32*   GLUCOSE 192* 139*   CALCIUM 9.1 8.7        Phosphorus:  No results for input(s): PHOS in the last 72 hours. Magnesium: No results for input(s): MG in the last 72 hours. Albumin: No results for input(s): LABALBU in the last 72 hours. Assessment:  1. ESRD  2. Essential hypertension with good control  3. Status post fall and right femoral neck fracture   4. Significant pain related to the right femoral neck fracture  5. Obesity   6. Type 2 diabetes mellitus  7. Anemia of chronic disease  8. Secondary hyperparathyroidism      Plan:  1. Dialysis as per the patient's usual schedule. The patient is dialyzing today. 2. Antihypertensives per home dosing  3. Attempt to optimize pain control  4. Follow orthopedic management  5. Follow labs as ordered with the patient having some risk of transfusion on this admission    Thank you for the consultation. Please do not hesitate to call with questions.     Electronically signed by María Elena Perales MD on 5/14/2019 at 1:19 PM

## 2019-05-14 NOTE — PROGRESS NOTES
Nutrition Assessment    Type and Reason for Visit: Initial, Positive Nutrition Screen    Nutrition Recommendations: Recommend continue Renal diet and add Nepro supplements once daily. Nutrition Assessment: Pt is nutritionally compromised due to history of gastric bypass, Renal failure requiring hemodialysis. Pt is further compromised due to DM. See recommendations. Malnutrition Assessment:  · Malnutrition Status: At risk for malnutrition  · Context: Chronic illness  · Findings of the 6 clinical characteristics of malnutrition (Minimum of 2 out of 6 clinical characteristics is required to make the diagnosis of moderate or severe Protein Calorie Malnutrition based on AND/ASPEN Guidelines):  1. Energy Intake-Greater than 75% of estimated energy requirement,      2. Weight Loss-No significant weight loss,    3. Fat Loss-No significant subcutaneous fat loss,    4. Muscle Loss-No significant muscle mass loss,    5. Fluid Accumulation-Severe fluid accumulation, Extremities  6.  Strength-Not measured    Nutrition Risk Level: Moderate    Nutrient Needs:  · Estimated Daily Total Kcal: 25 kcal/kg= 2000 kcal or more  · Estimated Daily Protein (g): 1.2-1.5 g/kg= g  Nutrition Diagnosis:   · Problem: (Predicted suboptimal intake)  · Etiology: related to (severe diet restrictions)     Signs and symptoms:  as evidenced by Weight loss    Objective Information:  · Nutrition-Focused Physical Findings: thoracentesis pending?    · Current Nutrition Therapies:  · Oral Diet Orders: Renal   · Oral Diet intake: %(first meal)  · Anthropometric Measures:  · Ht: 6' (182.9 cm)   · Current Body Wt: 236 lb (107 kg)  · Usual Body Wt: 259 lb (117.5 kg)  · % Weight Change:  ,  23# lost over 1 month possibly due in part to fluid weight  · Ideal Body Wt: 178 lb (80.7 kg), % Ideal Body 133%  · BMI Classification: BMI 30.0 - 34.9 Obese Class I    Nutrition Interventions:   Continue current diet, Start ONS  Education not

## 2019-05-14 NOTE — ED NOTES
Pt departed with [facility] [address] personnel via [facility] in stable condition and with all of their personal belongings. Phoned and spoke with wife to inform her pt has departed from our ER department. Phoned [address] at [place] to inform them pt had departed.      Karyle Lichtenstein, RN  05/13/19 2435

## 2019-05-14 NOTE — CONSULTS
[facility]  In PatientCardiology Consult             Date:   5/14/2019  Patient name: Zita Mobley  Date of admission:  5/13/2019 12:52 PM  MRN:   [id number]  YOB: 1950    Reason for Admission:  Right hip fracture     CHIEF COMPLAINT:  Right hip, knee and buttock pain      History Obtained From:  Patient and medical records. HISTORY OF PRESENT ILLNESS:      Zita Mobley is a 68year old  male who presents to the emergency room following a fall when trying to get up from his wheelchair last night. He is complaining of right hip and knee pain. He denies head injury loss of consciousness no neck pain. Complaint of pain 10/10 worse with movement. He did take an Norco earlier this morning with some relief. Hip XR confirmed impacted subcapital femoral neck fracture. Cardiology was consulted for surgical clearance. He follows with Dr. Sarah Dotson at [facility]. He has a history of chronic AFib and is currently taking Eliquis.        Past Medical History:   has a past medical history of A-fib ([address] Acute ischemic stroke ([address] Acute metabolic encephalopathy, Acute on chronic congestive heart failure ([address] Acute on chronic diastolic CHF (congestive heart failure) ([address] Altered mental status, Anemia, Anemia associated with chronic renal failure, Aphasia, ARF (acute renal failure) ([facility] Arteriovenous fistula for hemodialysis in place, primary ([address] Arthritis, Bradyarrhythmia, Cellulitis, Cerebral contusion ([facility]), CHF (congestive heart failure) ([address] Chronic atrial fibrillation ([address] Chronic kidney disease, Chronic pain of right knee, CKD (chronic kidney disease) stage 4, GFR 15-29 ml/min ([address] Coagulation defect ([address] Diabetes mellitus ([address] Diabetes mellitus type 2 in obese ([address] Diarrhea, DVT (deep venous thrombosis) ([address] Elevated C-reactive protein (CRP), ESRD (end stage renal disease) ([address] Essential hypertension, Fever, Foot ulcer due to secondary DM ([address] GERD ARIPiprazole (ABILIFY) 5 MG tablet Take 5 mg by mouth 7/26/17  Yes Historical Provider, MD   apixaban (ELIQUIS) 5 MG TABS tablet Take 5 mg by mouth 2 times daily   Yes Historical Provider, MD   Ferric Citrate (AURYXIA) 1  MG(Fe) TABS Take 210 mg by mouth 2 times daily Patients med list states frequency is 2-3 times a day   Yes Historical Provider, MD   venlafaxine (EFFEXOR) 75 MG tablet Take 75 mg by mouth daily   Yes Historical Provider, MD   lamoTRIgine (LAMICTAL) 25 MG tablet Take 100 mg by mouth daily    Yes Historical Provider, MD   isosorbide mononitrate (IMDUR) 30 MG CR tablet Take 30 mg by mouth daily  12/12/14  Yes Historical Provider, MD   pantoprazole (PROTONIX) 40 MG tablet Take 40 mg by mouth daily  10/17/13  Yes Historical Provider, MD   levothyroxine (LEVOTHROID) 50 MCG tablet Take 50 mcg by mouth Daily. Yes Historical Provider, MD   Cholecalciferol (VITAMIN D3) 2000 UNITS CAPS Take 1,000 Units by mouth daily. Yes Historical Provider, MD       Allergies:  Bactrim [sulfamethoxazole-trimethoprim]    Social History:   reports that he has never smoked. He has never used smokeless tobacco. He reports that he does not drink alcohol or use drugs. Family History: family history includes Cancer in his sister; Coronary Art Dis in his father. No h/o sudden cardiac death. No for premature CAD      REVIEW OF SYSTEMS:    · Constitutional: there has been no unanticipated weight loss. There's been No change in energy level, No change in activity level. · Eyes: No visual changes or diplopia. No scleral icterus. · ENT: No Headaches, hearing loss or vertigo. No mouth sores or sore throat. · Cardiovascular: As above. · Respiratory: As above. · Gastrointestinal: No abdominal pain, appetite loss, blood in stools. No change in bowel or bladder habits. · Genitourinary: No dysuria, trouble voiding, or hematuria. · Musculoskeletal:  As above. · Integumentary: No rash or pruritis.   · Neurological: No headache, diplopia, change in muscle strength, numbness or tingling. No change in gait, balance, coordination, mood, affect, memory, mentation, behavior. · Psychiatric: No anxiety, or depression. · Endocrine: No temperature intolerance. No excessive thirst, fluid intake, or urination. No tremor. · Hematologic/Lymphatic: No abnormal bruising or bleeding, blood clots or swollen lymph nodes. · Allergic/Immunologic: No nasal congestion or hives. PHYSICAL EXAM:    Physical Examination:    BP (!) 115/48   Pulse 77   Temp 98.1 °F (36.7 °C) (Oral)   Resp 18   Ht 6' (1.829 m)   Wt (!) 324 lb (147 kg)   SpO2 98%   BMI 43.94 kg/m²    Constitutional and General Appearance: alert, cooperative, no distress and appears stated age  HEENT: PERRL, no cervical lymphadenopathy. No masses palpable. Normal oral mucosa  Respiratory:  · Normal excursion and expansion without use of accessory muscles  · Resp Auscultation: Good respiratory effort. No for increased work of breathing. On auscultation: Clear to auscultation. Cardiovascular:  · The apical impulse is not displaced  · Heart tones are crisp and normal. regular S1 and S2. Murmurs: None. · Jugular venous pulsation Normal  · The carotid upstroke is normal in amplitude and contour without delay or bruit  · Peripheral pulses are symmetrical and full   Abdomen:  · No masses or tenderness  · Bowel sounds present  Extremities:  ·  No Cyanosis or Clubbing  ·  Lower extremity edema: No.  ·  Skin: Warm and dry  Neurological:  · Alert and oriented. · Moves all extremities well  · No abnormalities of mood, affect, memory, mentation, or behavior are noted    DATA:    Diagnostics:      EKG: Atrial fibrillation, nonspecific ST and T waves changes, unchanged from previous tracings. ECHO 2/27/17: EF 50-55%, grade II DD, mild TR, RVSP is 24 mmHg.      CATH 11/15/05: Coronaries show proximal ectasia but no obstructions. EF 40%. RCA is dominant.      Labs:     CBC:   Recent Labs 05/13/19 1426 05/14/19  0450   WBC 4.7 4.8   HGB 9.9* 9.3*   HCT 29.4* 30.8*   * 91*     BMP:   Recent Labs     05/13/19 1426 05/14/19  0450    136   K 4.8 4.2   CO2 26 25   BUN 54* 57*   CREATININE 5.60* 5.32*   LABGLOM 10* 11*   GLUCOSE 192* 139*     PT/INR:   Recent Labs     05/13/19 1426 05/14/19 0450   PROTIME 12.0 12.0   INR 1.2 1.1     APTT:  Recent Labs     05/13/19 1426   APTT 37.9*     FASTING LIPID PANEL:  Lab Results   Component Value Date    HDL 32 03/02/2017    TRIG 88 03/02/2017       Patient Active Problem List   Diagnosis    [facility] bleeding ([facility]    Diabetes mellitus ([facility]    Coagulation defect (HCC)    Chronic atrial fibrillation (HCC)    Respiratory failure, acute (HCC)    Traumatic cerebral hemorrhage (HCC)    Cerebral contusion (HCC)    Pneumonia    Hypernatremia    Post traumatic encephalopathy    Subarachnoid bleed (HCC)    Acute ischemic stroke (HCC)    Metabolic acidosis    Bradyarrhythmia    Hyperkalemia    Diarrhea    Deep vein thrombosis (DVT) of left lower extremity (HCC)    Hypocalcemia    Fever    History of superior vena cava filter placement    Pyrexia    Anemia    Altered mental status    Aphasia    Other specified hypothyroidism    Parietal lobe infarction    Type 2 diabetes mellitus with left diabetic foot ulcer (HCC)    Acute on chronic congestive heart failure (HCC)    NADIRA on CPAP    CKD (chronic kidney disease) stage 4, GFR 15-29 ml/min (HCC)    Edema    Venous stasis dermatitis of both lower extremities    Foot ulcer due to secondary DM ([address]    Anemia associated with chronic renal failure    Secondary hyperparathyroidism of renal origin ([address]    Proteinuria    Acute on chronic diastolic CHF (congestive heart failure) (HCC)    Knee effusion, right    Hypoxia    Chronic pain of right knee    Diabetes mellitus type 2 in obese ([address]    Acute metabolic encephalopathy    Seizure disorder ([address]    History of cerebral infarction    Renal lesion    Lower GI bleed    Hypovolemic shock (HCC)    Hematochezia    Anemia    Stercoral ulcer of rectum    Pyogenic arthritis of right knee joint ([facility]    [facility]) [facility]    Essential hypertension    Right knee pain    Cellulitis    History of DVT (deep vein thrombosis)    On continuous oral anticoagulation    Elevated C-reactive protein (CRP)    Left leg cellulitis    MRSA infection    Streptococcal infection    Arteriovenous fistula for hemodialysis in place, primary [facility])    Closed right hip fracture, initial encounter ([facility]    Hip fracture, right, closed, initial encounter ([facility]       IMPRESSION:    1. Right hip fracture as a result of fall when standing up out of wheelchair. 2. History of chronic AFib, on Eliquis. 3. Elevated troponin, likely type II MI related to CKD/HD. 4. Cath in November 2005 with normal coronaries. RECOMMENDATIONS:  1. No active signs of ischemia or congestive heart failure   2. Moderate risk for hip repair given urgency of surgery. 3. Ok to hold Eliquis. Will need to resume when ok with Ortho. 4. Outpatient follow up with Dr. Nena Fernandes at [facility]. Discussed with patient and nursing.     Electronically signed by Carlos Choudhary MD on 5/14/2019 at [facility]  [phone]

## 2019-05-14 NOTE — DISCHARGE INSTR - COC
Active Problems:  Patient Active Problem List   Diagnosis Code    Subdural bleeding ([facility]) I62.00    Diabetes mellitus ([facility] E11.9    Coagulation defect ([facility] [facility] D68.9    Chronic atrial fibrillation ([facility] I48.2    Respiratory failure, acute ([facility] [address] J96.00    Traumatic cerebral hemorrhage ([facility] [address] S06.369A    Cerebral contusion ([facility] X43.036H    Pneumonia J18.9    Hypernatremia E87.0    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    Subarachnoid bleed ([facility]) I60.9    Acute ischemic stroke ([facility]) J66.9    Metabolic acidosis D08.5    Bradyarrhythmia I49.8    Hyperkalemia E87.5    Diarrhea R19.7    Deep vein thrombosis (DVT) of left lower extremity ([facility]) I82.402    Hypocalcemia E83.51    Fever R50.9    History of superior vena cava filter placement Z95.828    Pyrexia R50.9    Anemia D64.9    Altered mental status R41.82    Aphasia R47.01    Other specified hypothyroidism E03.8    Parietal lobe infarction I63.89    Type 2 diabetes mellitus with left diabetic foot ulcer ([facility]) E11.621, L97.529    Acute on chronic congestive heart failure ([facility]) I50.9    NADIRA on CPAP G47.33, Z99.89    CKD (chronic kidney disease) stage 4, GFR 15-29 ml/min ([facility]) N18.4    Edema R60.9    Venous stasis dermatitis of both lower extremities I87.2    Foot ulcer due to secondary DM ([facility]) E13.621, L97.509    Anemia associated with chronic renal failure D63.1    Secondary hyperparathyroidism of renal origin ([facility] [address] N25.81    Proteinuria R80.9    Chronic diastolic congestive heart failure ([facility]) I50.32    Knee effusion, right M25.461    Hypoxia R09.02    Chronic pain of right knee M25.561, G89.29    Diabetes mellitus type 2 in obese ([facility]) E11.69, E66.9    Acute metabolic encephalopathy J87.65    Seizure disorder ([facility]) G40.909    History of cerebral infarction Z86.73    Renal lesion N28.9    Lower GI bleed K92.2    Hypovolemic shock ([facility]) R57.1    Hematochezia K92.1    Anemia D64.9    Stercoral ulcer of rectum K62.6    Pyogenic arthritis of right knee joint ([facility]) M00.9    ESRD (end stage renal disease) ([facility]) N18.6    Essential hypertension I10    Right knee pain M25.561    Cellulitis L03.90    History of DVT (deep vein thrombosis) Z86.718    On continuous oral anticoagulation Z79.01    Elevated C-reactive protein (CRP) R79.82    Left leg cellulitis L03. 116    MRSA infection A49.02    Streptococcal infection A49.1    Arteriovenous fistula for hemodialysis in place, primary [facility]) Z99.2    Closed right hip fracture, initial encounter ([facility] [address] S72.001A    Closed fracture of right hip ([facility] [address] S72.001A       Isolation/Infection:   Isolation          Contact        Patient Infection Status     Infection Encounter Level? Onset Date Added Added By Resolved Resolved By Review Date    VRE No  04/02/14 Alok Clemente RN       2013    MRSA No  01/16/14 Aries Ruvalcaba RN       Leg 9/2018          Nurse Assessment:  Last Vital Signs: /60   Pulse 79   Temp 98.2 °F (36.8 °C)   Resp 18   Ht 6' (1.829 m)   Wt 236 lb 12.4 oz (107.4 kg)   SpO2 98%   BMI 32.11 kg/m²     Last documented pain score (0-10 scale): Pain Level: 10  Last Weight:   Wt Readings from Last 1 Encounters:   05/14/19 236 lb 12.4 oz (107.4 kg)     Mental Status:  oriented, alert and aphasia due to Hx of CVA, occasional word searching. IV Access:  - AV Fistula Left arm    Nursing Mobility/ADLs:  Walking   Assisted  Transfer  Assisted  Bathing  Assisted  Dressing  Assisted  Toileting  Assisted  Feeding  Assisted  Med Admin  Assisted  Med Delivery   whole    Wound Care Documentation and Therapy:        Elimination:  Continence:   · Bowel:  Yes  · Bladder: Anuric  Urinary Catheter: None   Colostomy/Ileostomy/Ileal Conduit: No  LDA Fistula-Stomal Appliance: Clean, Dry, Intact    Date of Last BM: 5/18/2019    Intake/Output Summary (Last 24 hours) at 5/14/2019 1252  Last data filed at 5/14/2019 0805  Gross per 24 hour   Intake 250 ml   Output 5 ml   Net 245 ml control  Glycemic contol - monitor and control blood sugars  Correct electrolyte abnormalities  Dialysis Tuesday Thursday, Satuday  Optimize cardio-pulmonary function  Eliquis / rate control atrial fibrillation  Monitor H&H, Aranesp,     Physician Certification: I certify the above information and transfer of Naima Peterson  is necessary for the continuing treatment of the diagnosis listed and that he requires [facility] for less 30 days. Update Admission H&P:   Principal Problem:    Closed fracture of right hip (HCC)  Active Problems:    Diabetes mellitus (HCC)    Chronic atrial fibrillation (HCC)    Hypernatremia    Hyperkalemia    Hypocalcemia    Other specified hypothyroidism    Anemia associated with chronic renal failure    Secondary hyperparathyroidism of renal origin ([facility] [address]    Chronic diastolic congestive heart failure (HCC)    Seizure disorder (HCC)    ESRD (end stage renal disease) ([facility] [address]    Essential hypertension    History of DVT (deep vein thrombosis)    Closed right hip fracture, initial encounter ([facility] [address]    Thyroid disease  Resolved Problems:    * No resolved hospital problems.  *       PHYSICIAN SIGNATURE:  Electronically signed by Sebastian Hernandez DO on 5/18/19 at 10:18 AM

## 2019-05-14 NOTE — PROGRESS NOTES
Dialysis Post Treatment Note  Patient tolerated treatment well. Denies complaints at time of discharge. Vitals:    05/14/19 1610   BP: 130/61   Pulse: 78   Resp:    Temp: 98.3 °F (36.8 °C)   SpO2:      Pre-Weight = 107.4  Post-weight = Weight: 233 lb 4 oz (105.8 kg)  Total Liters Processed = Total Liters Processed (l/min): 104.6 l/min  Rinseback Volume (mL) = Rinseback Volume (ml): 370 ml  Net Removal (mL) = @[id number]  Length of treatment=4 hours    Pt tolerated tx well, denies pain post treatment.

## 2019-05-14 NOTE — PROGRESS NOTES
A critical troponin level was reported to RN at 01:52 of 104. RN reported this critical level Dr. Siria Myers at 01:54. MD stated, \"no new orders\". No new orders repeated back to MD.  Patient assessed and denied chest pain and discomfort. Patient is alert and able to make all needs known.

## 2019-05-14 NOTE — CONSULTS
Inpatient consult to Pulmonology  Consult performed by: Isabela Hood MD  Consult ordered by: Sg Bone MD            Patient - Cassy Pedroza   MRN -  [id number]   [facility]   - 1950        Date of evaluation -  2019    REASON FOR THE CONSULTATION:  Right pleural effusion   HISTORY OF PRESENT ILLNESS:    Cassy Pedroza is a 68y.o. year old male here for evaluation of fall . He fell on his right side while getting to his wheel chair . He has right hip fx and on xray has right pleural effusion . He is below his dry weight - denies excessive fluid intake . He is sob  But denies any cough hemoptysis or sputum he is afebrile . He has hx of afib and takes eliquis .   No orthopnea   Presently getting hd - he has left upper arm fistula           Immunization   Immunization History   Administered Date(s) Administered    Influenza Vaccine, unspecified formulation 2016    Pneumococcal 13-valent Conjugate ([id number]) 2017    Td, unspecified formulation 2013        Pneumococcal Vaccine     [x] Up to date    [] Indicated   [] Refused  [] Contraindicated       Influenza Vaccine   [] Up to date    [x] Indicated   [] Refused  [] Contraindicated                  PAST MEDICAL HISTORY:       Diagnosis Date    A-fib [facility])     Acute ischemic stroke ([address] 2013    Acute metabolic encephalopathy     Acute on chronic congestive heart failure ([address] 2014    Acute on chronic diastolic CHF (congestive heart failure) ([address] 2017    Altered mental status 2013    Anemia     Anemia associated with chronic renal failure 2014    Aphasia 2013    ARF (acute renal failure) ([address] 2014    From pre renal azotemia from newly added diuretic and ARB use, creat peaked at 2.8 from 2 in may 2014    Arteriovenous fistula for hemodialysis in place, primary ([address] 2017    Arthritis     Bradyarrhythmia 2013    Cellulitis 2018    Cerebral contusion ([address] 7/7/2013    CHF (congestive heart failure) ([facility])     Chronic atrial fibrillation (HCC) 7/6/2013    Chronic kidney disease     Chronic pain of right knee     CKD (chronic kidney disease) stage 4, GFR 15-29 ml/min ([facility]) 5/28/2014    From diabetic and ischemic nephrosclerosis, creat now 2-2.5, GFR 25-30 ml/min    Coagulation defect ([address] 7/6/2013    Diabetes mellitus ([address]     Diabetes mellitus type 2 in obese ([address]     Diarrhea 7/16/2013    DVT (deep venous thrombosis) ([facility])     Elevated C-reactive protein (CRP)     ESRD (end stage renal disease) ([address] 3/23/2017    Essential hypertension 3/23/2017    Fever 7/18/2013    Foot ulcer due to secondary DM ([address] 5/28/2014    Left side on antibiotics    GERD (gastroesophageal reflux disease)     Hematochezia 3/4/2017    History of cerebral infarction 3/1/2017    History of DVT (deep vein thrombosis) 9/17/2018    History of superior vena cava filter placement 7/18/2013    Hx of blood clots     Hyperkalemia 7/16/2013    Hyperlipidemia     Hypernatremia 7/11/2013    Hypertension     Hypocalcemia 7/18/2013    Hypovolemic shock ([address] 3/4/2017    Hypoxia     Irregular heartbeat     hx of a-fib    Knee effusion, right 2/25/2017    Left leg cellulitis     Lower GI bleed 3/4/2017    MDRO (multiple drug resistant organisms) resistance     Metabolic acidosis 5/16/6738    MRSA (methicillin resistant staph aureus) culture positive 09/17/2018    leg    On continuous oral anticoagulation 9/17/2018    NADIRA on CPAP     Other specified hypothyroidism 8/24/2013    Parietal lobe infarction ([address] 8/26/2013    Pneumonia     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    Proteinuria 11/30/2016    Fluctuates between 2-3 grams, cant use ACEI due to hyperkalemia    Pyogenic arthritis of right knee joint ([address] 3/23/2017    Pyrexia 7/19/2013    Renal insufficiency     Renal lesion 3/3/2017    Respiratory failure, acute ([address] 7/7/2013    Right knee pain     SAH (subarachnoid hemorrhage) (HCC)     Secondary hyperparathyroidism of renal origin ([facility] 12/16/2015    Seizure disorder ([facility] [address] 3/1/2017    Stercoral ulcer of rectum     Streptococcal infection     Subarachnoid bleed (HCC) 7/13/2013    Subdural bleeding ([facility] [address] 7/5/2013    Thyroid disease     Traumatic cerebral hemorrhage ([facility] [address] [facility] 7/7/2013    Type 2 diabetes mellitus with left diabetic foot ulcer ([facility] [address] 4/16/2014    Unspecified cerebral artery occlusion with cerebral infarction     Venous stasis dermatitis 5/28/2014    Venous stasis dermatitis of both lower extremities 5/28/2014         Family History:       Problem Relation Age of Onset    Coronary Art Dis Father     Cancer Sister        SURGICAL HISTORY:   Past Surgical History:   Procedure Laterality Date    ABDOMEN SURGERY      CARDIAC CATHETERIZATION      COLONOSCOPY      DILATATION, ESOPHAGUS      FOOT DEBRIDEMENT Left     FOOT SURGERY Right     #5 toe removed  , 1/2 toes #1, #2  partially removed    GASTRIC BYPASS SURGERY      GASTROSTOMY TUBE PLACEMENT  summer 2013    KNEE ARTHROSCOPY Right 03/23/2017    I & D    IL KNEE SCOPE,DIAGNOSTIC Right 3/23/2017    KNEE ARTHROSCOPY,EXTENSIVE DEBRIDEMENT PARTIAL PROXIMAL AND MEDIAL MENISECTOMY  performed by Capo Costello MD at [address] [place] [address]  summer 2013   [address]:  The patient is a Never smoker of @year@. Pack year equal ____. There  is not history of TB or TB exposure. There is not asbestos or silica dust exposure. The patient reports does not have coal, foundry, quarry or [employer] exposure. Travel history reveals No.  There is not  history of recreational or IV drug use. There is not hot tube exposure. The patient does not bird    Occupational history :individual, not currently working     TOBACCO:   reports that he has never smoked.  He has never used smokeless tobacco.  ETOH: mucosa, and tongue normal; teeth and gums normal   Neck:   Supple, symmetrical, trachea midline, no adenopathy;     thyroid:  no enlargement/tenderness/nodules;    Back:     Symmetric, no curvature, ROM normal, no CVA tenderness   Lungs:       Dull rt side and dec bs rt side clear left no rales post     Chest Wall:    No tenderness or deformity      Heart:    Regular rate and rhythm, S1 and S2 normal, no murmur, rub        or gallop no rvh                           Abdomen:                                                 Pulses:                              Skin:                  Lymph nodes:                    Neurologic:                  Soft, non-tender, bowel sounds active all four quadrants,     no masses, no organomegaly         2+ and symmetric all extremities     Le venous stasis changes        Cervical, supraclavicular not enlarged or matted or tender      CNII-XII intact, normal strength 5/5 . Sensation grossly normal  and reflexes normal 2+  throughout     Clubbing No  Lower ext edema rt thigh increases circumference at compared to left 1+   [] , 2 +  [] , 3+   []  Upper ext edema No - left ue fistula and drain                 CBC:   Recent Labs     05/13/19  1426 05/14/19  0450   WBC 4.7 4.8   HGB 9.9* 9.3*   * 91*     BMP:    Recent Labs     05/13/19  1426 05/14/19  0450    136   K 4.8 4.2   CL 95* 95*   CO2 26 25   BUN 54* 57*   CREATININE 5.60* 5.32*   GLUCOSE 192* 139*     Hepatic: No results for input(s): AST, ALT, ALB, BILITOT, ALKPHOS in the last 72 hours. Amylase: No results found for: AMYLASE  Lipase: No results found for: LIPASE  Troponin: No results for input(s): TROPONINI in the last 72 hours. BNP: No results for input(s): BNP in the last 72 hours. Lipids: No results for input(s): CHOL, HDL in the last 72 hours.     Invalid input(s): LDLCALCU  ABGs: No results found for: PHART, PO2ART, [id number]  INR:   Recent Labs     05/13/19  1426 05/14/19  0450   INR 1.2 1.1     Thyroid: Lab Results   Component Value Date    TSH 2.33 05/14/2019     Urinalysis: No results for input(s): BACTERIA, BLOODU, CLARITYU, COLORU, PHUR, PROTEINU, RBCUA, SPECGRAV, BILIRUBINUR, NITRU, WBCUA, LEUKOCYTESUR, GLUCOSEU in the last 72 hours. Cultures:-  none    CXR  Pleural effusion on the left              IMPRESSION:    Active Problems:    Closed right hip fracture, initial encounter ([facility]    Hip fracture, right, closed, initial encounter [facility])  Resolved Problems:    * No resolved hospital problems. *    Right pleural effusion   afib on eliquis   Chronic thrombocytopenia   Macrocytic anemia   ? Chronic liver disease            PLAN:      Agree with right thoracentesis . Dd- hemothorax / due to chf / hepatic hydrothorax / neoplastic  / doubt infectious   Liver ultrasound   Will follow         I hope this updates you on my evaluation and clinical thinking. Thank you for allowing me to participate in his care.      Sincerely,      Electronically signed by Cecilio Malhotra MD on 5/14/2019 at 10:24 AM

## 2019-05-14 NOTE — CONSULTS
Orthopedic Surgery Consult         CC/Reason for consult:  Right femoral neck fracture    HPI:    The patient is a 68 y.o. male presents as a transfer from [facility] emergency department with right hip pain. The patient reports that yesterday afternoon he was standing up from his wheelchair when he lost his balance and fell directly onto his right side. He felt immediate pain in his right hip and was unable to ambulate following. Denies hitting his head or loss of consciousness. He subsequently presented to the [facility] emergency department where imaging demonstrated a right femoral neck fracture as well as a pleural effusion. Patient was then transferred directly to the floor at [place] [address] for further managment. Upon questioning reports pain in right hip and right knee. The patient reports that uses a wheelchair daily for mobility. He states he lives with his wife but is does not know where he lives. Denies any other injuries. Patient has medical history listed below and is currently on Eliquis for Afib. Denies any numbness or tingling in extremities.      Past Medical History:    Past Medical History:   Diagnosis Date    A-fib ([address]     Acute ischemic stroke ([address] 7/13/2013    Acute metabolic encephalopathy 8/2/0105    Acute on chronic congestive heart failure ([address] 5/9/2014    Acute on chronic diastolic CHF (congestive heart failure) ([address] 2/25/2017    Altered mental status 8/24/2013    Anemia     Anemia associated with chronic renal failure 5/28/2014    Aphasia 8/24/2013    ARF (acute renal failure) ([address] 5/28/2014    From pre renal azotemia from newly added diuretic and ARB use, creat peaked at 2.8 from 2 in may 2014    Arteriovenous fistula for hemodialysis in place, primary ([address] 11/17/2017    Arthritis     Bradyarrhythmia 7/13/2013    Cellulitis 9/17/2018    Cerebral contusion ([address] 7/7/2013    CHF (congestive heart failure) (HCC)     Chronic atrial fibrillation ([address] 7/6/2013    Chronic kidney disease     Chronic pain of right knee     CKD (chronic kidney disease) stage 4, GFR 15-29 ml/min ([facility]) 5/28/2014    From diabetic and ischemic nephrosclerosis, creat now 2-2.5, GFR 25-30 ml/min    Coagulation defect ([address] 7/6/2013    Diabetes mellitus ([address]     Diabetes mellitus type 2 in obese ([address]     Diarrhea 7/16/2013    DVT (deep venous thrombosis) ([facility])     Elevated C-reactive protein (CRP)     ESRD (end stage renal disease) ([address] 3/23/2017    Essential hypertension 3/23/2017    Fever 7/18/2013    Foot ulcer due to secondary DM ([address] 5/28/2014    Left side on antibiotics    GERD (gastroesophageal reflux disease)     Hematochezia 3/4/2017    History of cerebral infarction 3/1/2017    History of DVT (deep vein thrombosis) 9/17/2018    History of superior vena cava filter placement 7/18/2013    Hx of blood clots     Hyperkalemia 7/16/2013    Hyperlipidemia     Hypernatremia 7/11/2013    Hypertension     Hypocalcemia 7/18/2013    Hypovolemic shock ([address] 3/4/2017    Hypoxia     Irregular heartbeat     hx of a-fib    Knee effusion, right 2/25/2017    Left leg cellulitis     Lower GI bleed 3/4/2017    MDRO (multiple drug resistant organisms) resistance     Metabolic acidosis 5/96/2826    MRSA (methicillin resistant staph aureus) culture positive 09/17/2018    leg    On continuous oral anticoagulation 9/17/2018    NADIRA on CPAP     Other specified hypothyroidism 8/24/2013    Parietal lobe infarction ([address] 8/26/2013    Pneumonia     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 3/1/2017    Stercoral ulcer of rectum     Streptococcal infection     Subarachnoid bleed (HCC) 7/13/2013    Subdural bleeding ([facility] 7/5/2013    Thyroid disease     Traumatic cerebral hemorrhage ([facility] 7/7/2013    Type 2 diabetes mellitus with left diabetic foot ulcer ([facility] 4/16/2014    Unspecified cerebral artery occlusion with cerebral infarction     Venous stasis dermatitis 5/28/2014    Venous stasis dermatitis of both lower extremities 5/28/2014     Past Surgical History:    Past Surgical History:   Procedure Laterality Date    ABDOMEN SURGERY      CARDIAC CATHETERIZATION      COLONOSCOPY      DILATATION, ESOPHAGUS      FOOT DEBRIDEMENT Left     FOOT SURGERY Right     #5 toe removed  , 1/2 toes #1, #2  partially removed    GASTRIC BYPASS SURGERY      GASTROSTOMY TUBE PLACEMENT  summer 2013    KNEE ARTHROSCOPY Right 03/23/2017    I & D    NH KNEE SCOPE,DIAGNOSTIC Right 3/23/2017    KNEE ARTHROSCOPY,EXTENSIVE DEBRIDEMENT PARTIAL PROXIMAL AND MEDIAL MENISECTOMY  performed by Capo Costello MD at [address] [place] [address]  summer 2013   [facility]       Medications Prior to Admission:   Prior to Admission medications    Medication Sig Start Date End Date Taking?  Authorizing Provider   furosemide (LASIX) 20 MG tablet Take 20 mg by mouth 2 times daily   Yes Historical Provider, MD   ARIPiprazole (ABILIFY) 5 MG tablet Take 5 mg by mouth 7/26/17  Yes Historical Provider, MD   apixaban (ELIQUIS) 5 MG TABS tablet Take 5 mg by mouth 2 times daily   Yes Historical Provider, MD   Ferric Citrate (AURYXIA) 1  MG(Fe) TABS Take 210 mg by mouth 2 times daily Patients med list states frequency is 2-3 times a day   Yes Historical Provider, MD   venlafaxine (EFFEXOR) 75 MG tablet Take 75 mg by mouth daily   Yes Historical Provider, MD   lamoTRIgine (LAMICTAL) 25 MG tablet Take 100 mg by mouth daily    Yes Historical Provider, MD   isosorbide mononitrate (IMDUR) 30 MG CR tablet Take 30 mg by mouth daily  12/12/14  Yes Historical Provider, MD   pantoprazole (PROTONIX) 40 MG tablet Take 40 mg by mouth daily  10/17/13  Yes Historical Provider, MD   levothyroxine (LEVOTHROID) 50 MCG tablet Take 50 mcg by mouth Daily. Yes Historical Provider, MD   Cholecalciferol (VITAMIN D3) 2000 UNITS CAPS Take 1,000 Units by mouth daily. Yes Historical Provider, MD     Allergies:    Bactrim [sulfamethoxazole-trimethoprim]    Social History:   Social History     Socioeconomic History    Marital status:      Spouse name: None    Number of children: None    Years of education: None    Highest education level: None   Occupational History    None   Social Needs    Financial resource strain: None    Food insecurity:     Worry: None     Inability: None    Transportation needs:     Medical: None     Non-medical: None   Tobacco Use    Smoking status: Never Smoker    Smokeless tobacco: Never Used   Substance and Sexual Activity    Alcohol use: No    Drug use: No     Comment: smoked pipe years ago    Sexual activity: None   Lifestyle    Physical activity:     Days per week: None     Minutes per session: None    Stress: None   Relationships    Social connections:     Talks on phone: None     Gets together: None     Attends [employer] service: None     Active member of club or organization: None     Attends meetings of clubs or organizations: None     Relationship status: None    Intimate partner violence:     Fear of current or ex partner: None     Emotionally abused: None     Physically abused: None     Forced sexual activity: None   Other Topics Concern    None   Social History Narrative    ** Merged History Encounter **          Family History:  Family History   Problem Relation Age of Onset    Coronary Art Dis Father     Cancer Sister        REVIEW OF SYSTEMS:   Constitutional: Negative for fever and chills.    HENT: Negative teams  -Non-Weight bearing RLE  -NPO at midnight   -Will obtain consent and esmer operative extremity   -Abx OCTOR  -Pain control and medical management per primary   -Ice and elevation for pain/swelling  -F/u Vit D level   -DVT ppx: Managed per primary. Please hold chem AC in anticipation for surgery tomorrow.  -Please page Ortho with any questions or concerns    Marjie Dubin, DO   Orthopedic Surgery Resident PGY-1  [place] [address], [place]    PGY-2 Addendum    Patient seen and examined. Agree with above assessment by Dr. Carlos Alberto Dennis    I was present and active for all portions of the above interview and exam, I agree with the above    Patient to OR 5/15 for right hip hemiarthroplasty  Moderate risk per cardiology  Consent obtained, site marked  NPO at MN  Abx OCTOR  Please page ortho with questions      Berenda Apgar, DO  Orthopedic Surgery Resident, PGY-2  [place] [address], [facility]    Attending:    Pt seen and examined 19  Agree with above  To OR when medically cleared. Raissa ePrez DO, reviewed the information and imaging if available. The case was discussed with the resident and plan reviewed.

## 2019-05-14 NOTE — ED PROVIDER NOTES
acidosis, MRSA (methicillin resistant staph aureus) culture positive, On continuous oral anticoagulation, NADIRA on CPAP, Other specified hypothyroidism, Parietal lobe infarction ([address] Pneumonia, Post traumatic encephalopathy, Proteinuria, Pyogenic arthritis of right knee joint ([address] Pyrexia, Renal insufficiency, Renal lesion, Respiratory failure, acute ([address] Right knee pain, SAH (subarachnoid hemorrhage) ([address] Secondary hyperparathyroidism of renal origin ([address] Seizure disorder ([address] Stercoral ulcer of rectum, Streptococcal infection, Subarachnoid bleed ([address] Subdural bleeding ([address] Thyroid disease, Traumatic cerebral hemorrhage ([address] Type 2 diabetes mellitus with left diabetic foot ulcer ([address] Unspecified cerebral artery occlusion with cerebral infarction, Venous stasis dermatitis, and Venous stasis dermatitis of both lower extremities. SURGICAL HISTORY      has a past surgical history that includes Gastric bypass surgery; Vena Cava Filter Placement; Foot Debridement (Left); Foot surgery (Right); Tonsillectomy; Colonoscopy; skin biopsy; Dilatation, esophagus; tracheostomy (summer 2013); Gastrostomy tube placement (summer 2013); Abdomen surgery; Cardiac catheterization; Knee arthroscopy (Right, 03/23/2017); and pr knee scope,diagnostic (Right, 3/23/2017). CURRENT MEDICATIONS       Previous Medications    APIXABAN (ELIQUIS) 5 MG TABS TABLET    Take 5 mg by mouth 2 times daily    ARIPIPRAZOLE (ABILIFY) 5 MG TABLET    Take 5 mg by mouth    CHOLECALCIFEROL (VITAMIN D3) 2000 UNITS CAPS    Take 1,000 Units by mouth daily.     FERRIC CITRATE (AURYXIA) 1  MG(FE) TABS    Take 210 mg by mouth 2 times daily Patients med list states frequency is 2-3 times a day    FUROSEMIDE (LASIX) 20 MG TABLET    Take 20 mg by mouth 2 times daily    ISOSORBIDE MONONITRATE (IMDUR) 30 MG CR TABLET    Take 30 mg by mouth daily     LAMOTRIGINE (LAMICTAL) 25 MG TABLET    Take 100 mg by mouth daily     LEVOTHYROXINE (LEVOTHROID) Glucose 192 (H) 70 - 99 mg/dL    BUN 54 (H) 8 - 23 mg/dL    CREATININE 5.60 (HH) 0.70 - 1.20 mg/dL    Bun/Cre Ratio NOT REPORTED 9 - 20    Calcium 9.1 8.6 - 10.4 mg/dL    Sodium 137 135 - 144 mmol/L    Potassium 4.8 3.7 - 5.3 mmol/L    Chloride 95 (L) 98 - 107 mmol/L    CO2 26 20 - 31 mmol/L    Anion Gap 16 9 - 17 mmol/L    GFR Non-African American 10 (L) >60 mL/min    GFR  12 (L) >60 mL/min    GFR Comment          GFR Staging NOT REPORTED    CBC Auto Differential   Result Value Ref Range    WBC 4.7 3.5 - 11.0 k/uL    RBC 2.97 (L) 4.5 - 5.9 m/uL    Hemoglobin 9.9 (L) 13.5 - 17.5 g/dL    Hematocrit 29.4 (L) 41 - 53 %    MCV 99.1 80 - 100 fL    MCH 33.3 26 - 34 pg    MCHC 33.6 31 - 37 g/dL    RDW 15.1 12.5 - 15.4 %    Platelets 110 (L) 778 - 450 k/uL    MPV 7.0 6.0 - 12.0 fL    NRBC Automated NOT REPORTED per 100 WBC    Differential Type NOT REPORTED     Immature Granulocytes NOT REPORTED 0 %    Absolute Immature Granulocyte NOT REPORTED 0.00 - 0.30 k/uL    WBC Morphology NOT REPORTED     RBC Morphology NOT REPORTED     Platelet Estimate NOT REPORTED     Seg Neutrophils 88 (H) 36 - 66 %    Lymphocytes 6 (L) 24 - 44 %    Monocytes 5 2 - 11 %    Eosinophils % 0 (L) 1 - 4 %    Basophils 1 0 - 2 %    Segs Absolute 4.10 1.8 - 7.7 k/uL    Absolute Lymph # 0.30 (L) 1.0 - 4.8 k/uL    Absolute Mono # 0.20 0.1 - 1.2 k/uL    Absolute Eos # 0.00 0.0 - 0.4 k/uL    Basophils # 0.00 0.0 - 0.2 k/uL   Protime-INR   Result Value Ref Range    Protime 12.0 9.4 - 12.6 sec    INR 1.2    APTT   Result Value Ref Range    PTT 37.9 (H) 21.3 - 31.3 sec   Troponin   Result Value Ref Range    Troponin, High Sensitivity 94 (HH) 0 - 22 ng/L    Troponin T NOT REPORTED <0.03 ng/mL    Troponin Interp NOT REPORTED    Brain Natriuretic Peptide   Result Value Ref Range    Pro-BNP 15,791 (H) <300 pg/mL    BNP Interpretation Pro-BNP Reference Range:    Troponin   Result Value Ref Range    Troponin, High Sensitivity 89 (HH) 0 - 22 ng/L Troponin T NOT REPORTED <0.03 ng/mL    Troponin Interp NOT REPORTED    EKG 12 Lead   Result Value Ref Range    Ventricular Rate 75 BPM    Atrial Rate 71 BPM    QRS Duration 94 ms    Q-T Interval 456 ms    QTc Calculation (Bazett) 509 ms    R Axis 38 degrees    T Axis 73 degrees         EMERGENCY DEPARTMENT COURSE:   Recent Vitals:    Vitals:    05/13/19 1854 05/13/19 1900 05/13/19 2000 05/13/19 2032   BP:  (!) 137/52 (!) 155/48 (!) 145/57   Pulse:    70   Resp:    16   Temp:    98 °F (36.7 °C)   TempSrc:    Oral   SpO2: 98% 97% 90% 99%   Weight:         -------------------------  BP: (!) 145/57, Temp: 98 °F (36.7 °C), Pulse: 70, Resp: 16      The patient was given the following medications:  Orders Placed This Encounter   Medications    HYDROcodone-acetaminophen (NORCO) 5-325 MG per tablet 2 tablet    fentaNYL (SUBLIMAZE) injection 25 mcg    fentaNYL (SUBLIMAZE) injection 25 mcg    morphine injection 4 mg           MEDICAL DECISION MAKING:     Patient presents after a fall. Does have a hip fracture. Patient is a renal failure patient. Has a large pleural effusion as well. Oxygen saturation currently is within normal limits. BNP is elevated. Patient does use dialysis. Denies other symptoms at this time. 8:58 PM:   Transport is now here to address [facility] the patient to [facility]. Was given some morphine helped with his pain as well. Feeling better at this time. CONSULTS:    None    CRITICAL CARE:     None    PROCEDURES:    None    FINAL IMPRESSION      1.  Closed fracture of right hip, initial encounter [facility])          DISPOSITION/PLAN   DISPOSITION Admitted 05/13/2019 03:11:00 PM      Condition on Disposition    Improved    PATIENT REFERRED TO:  Sonia Dumont MD            DISCHARGE MEDICATIONS:  New Prescriptions    No medications on file       (Please note that portions of this note were completed with a voice recognition program.  Efforts were made to edit the dictations but occasionally words are mis-transcribed.)        Arias Du D.O.   Emergency Medicine Attending       Wallace Reyna,   05/13/19 [id number]

## 2019-05-15 ENCOUNTER — APPOINTMENT (OUTPATIENT)
Dept: GENERAL RADIOLOGY | Age: 69
DRG: 469 | End: 2019-05-15
Payer: MEDICARE

## 2019-05-15 ENCOUNTER — ANESTHESIA EVENT (OUTPATIENT)
Dept: OPERATING ROOM | Age: 69
DRG: 469 | End: 2019-05-15
Payer: MEDICARE

## 2019-05-15 ENCOUNTER — ANESTHESIA (OUTPATIENT)
Dept: OPERATING ROOM | Age: 69
DRG: 469 | End: 2019-05-15
Payer: MEDICARE

## 2019-05-15 VITALS
TEMPERATURE: 97.7 F | DIASTOLIC BLOOD PRESSURE: 43 MMHG | RESPIRATION RATE: 12 BRPM | SYSTOLIC BLOOD PRESSURE: 93 MMHG | OXYGEN SATURATION: 100 %

## 2019-05-15 LAB
ABSOLUTE EOS #: 0.09 K/UL (ref 0–0.4)
ABSOLUTE IMMATURE GRANULOCYTE: 0 K/UL (ref 0–0.3)
ABSOLUTE LYMPH #: 0.53 K/UL (ref 1–4.8)
ABSOLUTE MONO #: 0.35 K/UL (ref 0.1–0.8)
ANION GAP SERPL CALCULATED.3IONS-SCNC: 13 MMOL/L (ref 9–17)
BASOPHILS # BLD: 0 % (ref 0–2)
BASOPHILS ABSOLUTE: 0 K/UL (ref 0–0.2)
BUN BLDV-MCNC: 29 MG/DL (ref 8–23)
BUN/CREAT BLD: ABNORMAL (ref 9–20)
CALCIUM SERPL-MCNC: 8.8 MG/DL (ref 8.6–10.4)
CASE NUMBER:: NORMAL
CHLORIDE BLD-SCNC: 96 MMOL/L (ref 98–107)
CO2: 27 MMOL/L (ref 20–31)
CREAT SERPL-MCNC: 3.96 MG/DL (ref 0.7–1.2)
DIFFERENTIAL TYPE: ABNORMAL
EOSINOPHILS RELATIVE PERCENT: 2 % (ref 1–4)
GFR AFRICAN AMERICAN: 18 ML/MIN
GFR NON-AFRICAN AMERICAN: 15 ML/MIN
GFR SERPL CREATININE-BSD FRML MDRD: ABNORMAL ML/MIN/{1.73_M2}
GFR SERPL CREATININE-BSD FRML MDRD: ABNORMAL ML/MIN/{1.73_M2}
GLUCOSE BLD-MCNC: 116 MG/DL (ref 75–110)
GLUCOSE BLD-MCNC: 118 MG/DL (ref 70–99)
GLUCOSE BLD-MCNC: 122 MG/DL (ref 75–110)
HCT VFR BLD CALC: 30.5 % (ref 40.7–50.3)
HEMOGLOBIN: 9.2 G/DL (ref 13–17)
IMMATURE GRANULOCYTES: 0 %
LYMPHOCYTES # BLD: 12 % (ref 24–44)
MCH RBC QN AUTO: 31.9 PG (ref 25.2–33.5)
MCHC RBC AUTO-ENTMCNC: 30.2 G/DL (ref 28.4–34.8)
MCV RBC AUTO: 105.9 FL (ref 82.6–102.9)
MONOCYTES # BLD: 8 % (ref 1–7)
MORPHOLOGY: ABNORMAL
NRBC AUTOMATED: 0 PER 100 WBC
PDW BLD-RTO: 13.7 % (ref 11.8–14.4)
PH FLUID: 7.9
PLATELET # BLD: 91 K/UL (ref 138–453)
PLATELET ESTIMATE: ABNORMAL
PMV BLD AUTO: 9.4 FL (ref 8.1–13.5)
POTASSIUM SERPL-SCNC: 5.6 MMOL/L (ref 3.7–5.3)
RBC # BLD: 2.88 M/UL (ref 4.21–5.77)
RBC # BLD: ABNORMAL 10*6/UL
SEG NEUTROPHILS: 78 % (ref 36–66)
SEGMENTED NEUTROPHILS ABSOLUTE COUNT: 3.43 K/UL (ref 1.8–7.7)
SODIUM BLD-SCNC: 136 MMOL/L (ref 135–144)
SPECIMEN DESCRIPTION: NORMAL
SPECIMEN TYPE: NORMAL
SPECIMEN TYPE: NORMAL
TOTAL PROTEIN, BODY FLUID: 3.2 G/DL
WBC # BLD: 4.4 K/UL (ref 3.5–11.3)
WBC # BLD: ABNORMAL 10*3/UL

## 2019-05-15 PROCEDURE — 2709999900 HC NON-CHARGEABLE SUPPLY: Performed by: ORTHOPAEDIC SURGERY

## 2019-05-15 PROCEDURE — 73501 X-RAY EXAM HIP UNI 1 VIEW: CPT

## 2019-05-15 PROCEDURE — 3600000014 HC SURGERY LEVEL 4 ADDTL 15MIN: Performed by: ORTHOPAEDIC SURGERY

## 2019-05-15 PROCEDURE — 6360000002 HC RX W HCPCS: Performed by: ANESTHESIOLOGY

## 2019-05-15 PROCEDURE — 73502 X-RAY EXAM HIP UNI 2-3 VIEWS: CPT

## 2019-05-15 PROCEDURE — 3700000000 HC ANESTHESIA ATTENDED CARE: Performed by: ORTHOPAEDIC SURGERY

## 2019-05-15 PROCEDURE — 6360000002 HC RX W HCPCS: Performed by: NURSE ANESTHETIST, CERTIFIED REGISTERED

## 2019-05-15 PROCEDURE — 7100000000 HC PACU RECOVERY - FIRST 15 MIN: Performed by: ORTHOPAEDIC SURGERY

## 2019-05-15 PROCEDURE — 85025 COMPLETE CBC W/AUTO DIFF WBC: CPT

## 2019-05-15 PROCEDURE — 6360000002 HC RX W HCPCS: Performed by: STUDENT IN AN ORGANIZED HEALTH CARE EDUCATION/TRAINING PROGRAM

## 2019-05-15 PROCEDURE — 2500000003 HC RX 250 WO HCPCS: Performed by: NURSE ANESTHETIST, CERTIFIED REGISTERED

## 2019-05-15 PROCEDURE — 7100000001 HC PACU RECOVERY - ADDTL 15 MIN: Performed by: ORTHOPAEDIC SURGERY

## 2019-05-15 PROCEDURE — 36415 COLL VENOUS BLD VENIPUNCTURE: CPT

## 2019-05-15 PROCEDURE — C1713 ANCHOR/SCREW BN/BN,TIS/BN: HCPCS | Performed by: ORTHOPAEDIC SURGERY

## 2019-05-15 PROCEDURE — 27125 PARTIAL HIP REPLACEMENT: CPT | Performed by: ORTHOPAEDIC SURGERY

## 2019-05-15 PROCEDURE — 82947 ASSAY GLUCOSE BLOOD QUANT: CPT

## 2019-05-15 PROCEDURE — 99232 SBSQ HOSP IP/OBS MODERATE 35: CPT | Performed by: INTERNAL MEDICINE

## 2019-05-15 PROCEDURE — 1200000000 HC SEMI PRIVATE

## 2019-05-15 PROCEDURE — C1776 JOINT DEVICE (IMPLANTABLE): HCPCS | Performed by: ORTHOPAEDIC SURGERY

## 2019-05-15 PROCEDURE — 2580000003 HC RX 258: Performed by: STUDENT IN AN ORGANIZED HEALTH CARE EDUCATION/TRAINING PROGRAM

## 2019-05-15 PROCEDURE — 6370000000 HC RX 637 (ALT 250 FOR IP): Performed by: INTERNAL MEDICINE

## 2019-05-15 PROCEDURE — 3600000004 HC SURGERY LEVEL 4 BASE: Performed by: ORTHOPAEDIC SURGERY

## 2019-05-15 PROCEDURE — 80048 BASIC METABOLIC PNL TOTAL CA: CPT

## 2019-05-15 PROCEDURE — 3700000001 HC ADD 15 MINUTES (ANESTHESIA): Performed by: ORTHOPAEDIC SURGERY

## 2019-05-15 PROCEDURE — 0SRR0JZ REPLACEMENT OF RIGHT HIP JOINT, FEMORAL SURFACE WITH SYNTHETIC SUBSTITUTE, OPEN APPROACH: ICD-10-PCS | Performed by: ORTHOPAEDIC SURGERY

## 2019-05-15 PROCEDURE — 2580000003 HC RX 258: Performed by: NURSE ANESTHETIST, CERTIFIED REGISTERED

## 2019-05-15 DEVICE — IMPLANTABLE DEVICE: Type: IMPLANTABLE DEVICE | Site: HIP | Status: FUNCTIONAL

## 2019-05-15 DEVICE — INSERT FEM NK L+3MM HIP CO CHROM TAPR ENDO II: Type: IMPLANTABLE DEVICE | Site: HIP | Status: FUNCTIONAL

## 2019-05-15 RX ORDER — ROCURONIUM BROMIDE 10 MG/ML
INJECTION, SOLUTION INTRAVENOUS PRN
Status: DISCONTINUED | OUTPATIENT
Start: 2019-05-15 | End: 2019-05-15 | Stop reason: SDUPTHER

## 2019-05-15 RX ORDER — FENTANYL CITRATE 50 UG/ML
25 INJECTION, SOLUTION INTRAMUSCULAR; INTRAVENOUS EVERY 5 MIN PRN
Status: DISCONTINUED | OUTPATIENT
Start: 2019-05-15 | End: 2019-05-17

## 2019-05-15 RX ORDER — LIDOCAINE HYDROCHLORIDE 10 MG/ML
INJECTION, SOLUTION EPIDURAL; INFILTRATION; INTRACAUDAL; PERINEURAL PRN
Status: DISCONTINUED | OUTPATIENT
Start: 2019-05-15 | End: 2019-05-15 | Stop reason: SDUPTHER

## 2019-05-15 RX ORDER — FENTANYL CITRATE 50 UG/ML
INJECTION, SOLUTION INTRAMUSCULAR; INTRAVENOUS PRN
Status: DISCONTINUED | OUTPATIENT
Start: 2019-05-15 | End: 2019-05-15 | Stop reason: SDUPTHER

## 2019-05-15 RX ORDER — ONDANSETRON 2 MG/ML
INJECTION INTRAMUSCULAR; INTRAVENOUS PRN
Status: DISCONTINUED | OUTPATIENT
Start: 2019-05-15 | End: 2019-05-15 | Stop reason: SDUPTHER

## 2019-05-15 RX ORDER — VANCOMYCIN HYDROCHLORIDE 1 G/200ML
1000 INJECTION, SOLUTION INTRAVENOUS EVERY 12 HOURS
Status: DISCONTINUED | OUTPATIENT
Start: 2019-05-15 | End: 2019-05-15

## 2019-05-15 RX ORDER — GLYCOPYRROLATE 1 MG/5 ML
SYRINGE (ML) INTRAVENOUS PRN
Status: DISCONTINUED | OUTPATIENT
Start: 2019-05-15 | End: 2019-05-15 | Stop reason: SDUPTHER

## 2019-05-15 RX ORDER — PHENYLEPHRINE HYDROCHLORIDE 10 MG/ML
INJECTION INTRAVENOUS PRN
Status: DISCONTINUED | OUTPATIENT
Start: 2019-05-15 | End: 2019-05-15 | Stop reason: SDUPTHER

## 2019-05-15 RX ORDER — NEOSTIGMINE METHYLSULFATE 5 MG/5 ML
SYRINGE (ML) INTRAVENOUS PRN
Status: DISCONTINUED | OUTPATIENT
Start: 2019-05-15 | End: 2019-05-15 | Stop reason: SDUPTHER

## 2019-05-15 RX ORDER — SODIUM CHLORIDE, SODIUM LACTATE, POTASSIUM CHLORIDE, CALCIUM CHLORIDE 600; 310; 30; 20 MG/100ML; MG/100ML; MG/100ML; MG/100ML
INJECTION, SOLUTION INTRAVENOUS CONTINUOUS PRN
Status: DISCONTINUED | OUTPATIENT
Start: 2019-05-15 | End: 2019-05-15 | Stop reason: SDUPTHER

## 2019-05-15 RX ORDER — VANCOMYCIN HYDROCHLORIDE 1 G/200ML
10 INJECTION, SOLUTION INTRAVENOUS
Status: DISCONTINUED | OUTPATIENT
Start: 2019-05-16 | End: 2019-05-17

## 2019-05-15 RX ORDER — PROPOFOL 10 MG/ML
INJECTION, EMULSION INTRAVENOUS PRN
Status: DISCONTINUED | OUTPATIENT
Start: 2019-05-15 | End: 2019-05-15 | Stop reason: SDUPTHER

## 2019-05-15 RX ADMIN — FENTANYL CITRATE 50 MCG: 50 INJECTION, SOLUTION INTRAMUSCULAR; INTRAVENOUS at 17:54

## 2019-05-15 RX ADMIN — PHENYLEPHRINE HYDROCHLORIDE 100 MCG: 10 INJECTION INTRAVENOUS at 17:25

## 2019-05-15 RX ADMIN — PHENYLEPHRINE HYDROCHLORIDE 100 MCG: 10 INJECTION INTRAVENOUS at 17:22

## 2019-05-15 RX ADMIN — PHENYLEPHRINE HYDROCHLORIDE 100 MCG: 10 INJECTION INTRAVENOUS at 18:21

## 2019-05-15 RX ADMIN — PROPOFOL 150 MG: 10 INJECTION, EMULSION INTRAVENOUS at 17:02

## 2019-05-15 RX ADMIN — ROCURONIUM BROMIDE 50 MG: 10 INJECTION INTRAVENOUS at 17:02

## 2019-05-15 RX ADMIN — FENTANYL CITRATE 50 MCG: 50 INJECTION, SOLUTION INTRAMUSCULAR; INTRAVENOUS at 18:47

## 2019-05-15 RX ADMIN — FENTANYL CITRATE 50 MCG: 50 INJECTION, SOLUTION INTRAMUSCULAR; INTRAVENOUS at 17:02

## 2019-05-15 RX ADMIN — SODIUM CHLORIDE, POTASSIUM CHLORIDE, SODIUM LACTATE AND CALCIUM CHLORIDE: 600; 310; 30; 20 INJECTION, SOLUTION INTRAVENOUS at 16:07

## 2019-05-15 RX ADMIN — ONDANSETRON 4 MG: 2 INJECTION, SOLUTION INTRAMUSCULAR; INTRAVENOUS at 18:33

## 2019-05-15 RX ADMIN — DEXTROSE MONOHYDRATE 2 G: 50 INJECTION, SOLUTION INTRAVENOUS at 17:24

## 2019-05-15 RX ADMIN — Medication 5 MG: at 18:49

## 2019-05-15 RX ADMIN — ROCURONIUM BROMIDE 10 MG: 10 INJECTION INTRAVENOUS at 17:35

## 2019-05-15 RX ADMIN — FENTANYL CITRATE 50 MCG: 50 INJECTION, SOLUTION INTRAMUSCULAR; INTRAVENOUS at 17:34

## 2019-05-15 RX ADMIN — FENTANYL CITRATE 25 MCG: 50 INJECTION, SOLUTION INTRAMUSCULAR; INTRAVENOUS at 20:05

## 2019-05-15 RX ADMIN — HYDROCODONE BITARTRATE AND ACETAMINOPHEN 1 TABLET: 5; 325 TABLET ORAL at 06:04

## 2019-05-15 RX ADMIN — LIDOCAINE HYDROCHLORIDE 50 MG: 10 INJECTION, SOLUTION EPIDURAL; INFILTRATION; INTRACAUDAL; PERINEURAL at 17:02

## 2019-05-15 RX ADMIN — FENTANYL CITRATE 25 MCG: 50 INJECTION, SOLUTION INTRAMUSCULAR; INTRAVENOUS at 15:26

## 2019-05-15 RX ADMIN — FENTANYL CITRATE 25 MCG: 50 INJECTION, SOLUTION INTRAMUSCULAR; INTRAVENOUS at 20:00

## 2019-05-15 RX ADMIN — HEPARIN SODIUM 5000 UNITS: 5000 INJECTION INTRAVENOUS; SUBCUTANEOUS at 22:59

## 2019-05-15 RX ADMIN — ROCURONIUM BROMIDE 10 MG: 10 INJECTION INTRAVENOUS at 18:10

## 2019-05-15 RX ADMIN — Medication 0.8 MG: at 18:49

## 2019-05-15 RX ADMIN — FENTANYL CITRATE 50 MCG: 50 INJECTION, SOLUTION INTRAMUSCULAR; INTRAVENOUS at 19:03

## 2019-05-15 RX ADMIN — VANCOMYCIN HYDROCHLORIDE 1500 MG: 10 INJECTION, POWDER, LYOPHILIZED, FOR SOLUTION INTRAVENOUS at 22:50

## 2019-05-15 ASSESSMENT — PULMONARY FUNCTION TESTS
PIF_VALUE: 28
PIF_VALUE: 29
PIF_VALUE: 33
PIF_VALUE: 31
PIF_VALUE: 17
PIF_VALUE: 30
PIF_VALUE: 2
PIF_VALUE: 31
PIF_VALUE: 29
PIF_VALUE: 27
PIF_VALUE: 35
PIF_VALUE: 32
PIF_VALUE: 29
PIF_VALUE: 19
PIF_VALUE: 17
PIF_VALUE: 28
PIF_VALUE: 33
PIF_VALUE: 31
PIF_VALUE: 31
PIF_VALUE: 1
PIF_VALUE: 33
PIF_VALUE: 30
PIF_VALUE: 29
PIF_VALUE: 33
PIF_VALUE: 33
PIF_VALUE: 29
PIF_VALUE: 32
PIF_VALUE: 29
PIF_VALUE: 34
PIF_VALUE: 1
PIF_VALUE: 32
PIF_VALUE: 32
PIF_VALUE: 3
PIF_VALUE: 29
PIF_VALUE: 32
PIF_VALUE: 35
PIF_VALUE: 34
PIF_VALUE: 28
PIF_VALUE: 19
PIF_VALUE: 20
PIF_VALUE: 33
PIF_VALUE: 4
PIF_VALUE: 23
PIF_VALUE: 33
PIF_VALUE: 35
PIF_VALUE: 29
PIF_VALUE: 28
PIF_VALUE: 19
PIF_VALUE: 32
PIF_VALUE: 33
PIF_VALUE: 19
PIF_VALUE: 28
PIF_VALUE: 29
PIF_VALUE: 22
PIF_VALUE: 4
PIF_VALUE: 28
PIF_VALUE: 29
PIF_VALUE: 1
PIF_VALUE: 0
PIF_VALUE: 26
PIF_VALUE: 32
PIF_VALUE: 32
PIF_VALUE: 31
PIF_VALUE: 30
PIF_VALUE: 29
PIF_VALUE: 30
PIF_VALUE: 21
PIF_VALUE: 33
PIF_VALUE: 34
PIF_VALUE: 33
PIF_VALUE: 20
PIF_VALUE: 5
PIF_VALUE: 34
PIF_VALUE: 21
PIF_VALUE: 34
PIF_VALUE: 19
PIF_VALUE: 34
PIF_VALUE: 19
PIF_VALUE: 19
PIF_VALUE: 32
PIF_VALUE: 31
PIF_VALUE: 28
PIF_VALUE: 29
PIF_VALUE: 19
PIF_VALUE: 31
PIF_VALUE: 29
PIF_VALUE: 32
PIF_VALUE: 35
PIF_VALUE: 29
PIF_VALUE: 35
PIF_VALUE: 30
PIF_VALUE: 0
PIF_VALUE: 5
PIF_VALUE: 33
PIF_VALUE: 20
PIF_VALUE: 29
PIF_VALUE: 32
PIF_VALUE: 34
PIF_VALUE: 29
PIF_VALUE: 29
PIF_VALUE: 32
PIF_VALUE: 31
PIF_VALUE: 26
PIF_VALUE: 32
PIF_VALUE: 2
PIF_VALUE: 28
PIF_VALUE: 29
PIF_VALUE: 29
PIF_VALUE: 30
PIF_VALUE: 28
PIF_VALUE: 32
PIF_VALUE: 30
PIF_VALUE: 30
PIF_VALUE: 9
PIF_VALUE: 5
PIF_VALUE: 33
PIF_VALUE: 18
PIF_VALUE: 30
PIF_VALUE: 27
PIF_VALUE: 35
PIF_VALUE: 32
PIF_VALUE: 31
PIF_VALUE: 33
PIF_VALUE: 29
PIF_VALUE: 29

## 2019-05-15 ASSESSMENT — PAIN DESCRIPTION - DESCRIPTORS
DESCRIPTORS: ACHING;DISCOMFORT
DESCRIPTORS: ACHING;SPASM

## 2019-05-15 ASSESSMENT — PAIN - FUNCTIONAL ASSESSMENT
PAIN_FUNCTIONAL_ASSESSMENT: 0-10
PAIN_FUNCTIONAL_ASSESSMENT: 0-10
PAIN_FUNCTIONAL_ASSESSMENT: PREVENTS OR INTERFERES SOME ACTIVE ACTIVITIES AND ADLS
PAIN_FUNCTIONAL_ASSESSMENT: PREVENTS OR INTERFERES SOME ACTIVE ACTIVITIES AND ADLS

## 2019-05-15 ASSESSMENT — PAIN SCALES - GENERAL
PAINLEVEL_OUTOF10: 2
PAINLEVEL_OUTOF10: 10
PAINLEVEL_OUTOF10: 10
PAINLEVEL_OUTOF10: 6
PAINLEVEL_OUTOF10: 0
PAINLEVEL_OUTOF10: 0

## 2019-05-15 ASSESSMENT — ENCOUNTER SYMPTOMS
STRIDOR: 0
SHORTNESS OF BREATH: 0

## 2019-05-15 ASSESSMENT — PAIN DESCRIPTION - PAIN TYPE: TYPE: SURGICAL PAIN

## 2019-05-15 NOTE — PROGRESS NOTES
Tony Wilde 17    Progress Note    5/15/2019    12:12 PM    Name:   Andres Triplett  MRN:     [id number]     Ken Naranjows:      [de-identified]   Room:   [facility] OR POOL RM/NONE  IP Day:  2  Admit Date:  5/13/2019 12:52 PM    PCP:   Lori Mays MD  Code Status:  Full Code    Subjective:     C/C:   Chief Complaint   Patient presents with   Benedetta Gonzalez     c/o right hip,right knee, and right buttucks pain     Interval History Status:     Patient has hip pain, breathing is better, no other acute issues overnight, he underwent thoracentesis yesterday    Brief History: This is 68years old gentleman who was transferred to us from [facility] emergency department. Patient was getting from his wheelchair and sustained a fall. Patient denies hitting his head or losing his consciousness. It was a mechanical fall. He started having pain on the right side. X-ray of the right hip was concerning for fracture. Incidentally chest x-ray also showed pleural effusion on the right side. Patient has a history of CHF and is also on hemodialysis. He does have some mild shortness of breath. He denies any chest pain, denies any coughing. Patient has history of atrial fibrillation and he takes Eliquis for that. He has also history of end-stage renal disease and he is on hemodialysis. Patient underwent thoracentesis. Review of Systems:     Constitutional:  negative for chills, fevers, sweats  Respiratory:  negative for cough, dyspnea on exertion, hemoptysis, shortness of breath, wheezing  Cardiovascular:  negative for chest pain, chest pressure/discomfort, lower extremity edema, palpitations  Gastrointestinal:  negative for abdominal pain, constipation, diarrhea, nausea, vomiting  Neurological:  negative for dizziness, headache  Pain in the hip joint  Medications: Allergies:     Allergies   Allergen Reactions    Bactrim [Sulfamethoxazole-Trimethoprim]        Current Meds: Scheduled Meds:    [MAR Hold] ferrous sulfate  325 mg Oral Daily with breakfast    [MAR Hold] ceFAZolin  2 g Intravenous On Call to OR    [MAR Hold] ARIPiprazole  5 mg Oral Daily    [MAR Hold] vitamin D  1,000 Units Oral Daily    [MAR Hold] isosorbide mononitrate  30 mg Oral Daily    [MAR Hold] lamoTRIgine  100 mg Oral Daily    [MAR Hold] levothyroxine  50 mcg Oral Daily    [MAR Hold] pantoprazole  40 mg Oral Daily    [MAR Hold] venlafaxine  75 mg Oral Daily    [MAR Hold] sodium chloride flush  10 mL Intravenous 2 times per day    [MAR Hold] heparin (porcine)  5,000 Units Subcutaneous 3 times per day     Continuous Infusions:   PRN Meds: [MAR Hold] sodium chloride flush, [MAR Hold] magnesium hydroxide, [MAR Hold] nicotine, [MAR Hold] acetaminophen, [MAR Hold] HYDROcodone 5 mg - acetaminophen    Data:     Past Medical History:   has a past medical history of A-fib ([facility] Acute ischemic stroke ([facility] Acute metabolic encephalopathy, Acute on chronic congestive heart failure ([facility]), Acute on chronic diastolic CHF (congestive heart failure) ([facility] Altered mental status, Anemia, Anemia associated with chronic renal failure, Aphasia, ARF (acute renal failure) ([facility]), Arteriovenous fistula for hemodialysis in place, primary ([facility] Arthritis, Bradyarrhythmia, Cellulitis, Cerebral contusion ([facility]), CHF (congestive heart failure) ([facility]), Chronic atrial fibrillation ([facility]), Chronic kidney disease, Chronic pain of right knee, CKD (chronic kidney disease) stage 4, GFR 15-29 ml/min ([facility]), Coagulation defect ([facility] Diabetes mellitus ([facility] Diabetes mellitus type 2 in obese ([facility] Diarrhea, DVT (deep venous thrombosis) ([facility]), Elevated C-reactive protein (CRP), ESRD (end stage renal disease) ([facility] Essential hypertension, Fever, Foot ulcer due to secondary DM ([facility] GERD (gastroesophageal reflux disease), Hematochezia, History of cerebral infarction, History of DVT (deep vein thrombosis), History of superior vena cava filter placement, Hx of blood clots, Hyperkalemia, Hyperlipidemia, Hypernatremia, Hypertension, Hypocalcemia, Hypovolemic shock (HCC), Hypoxia, Irregular heartbeat, Knee effusion, right, Left leg cellulitis, Lower GI bleed, MDRO (multiple drug resistant organisms) resistance, Metabolic acidosis, MRSA (methicillin resistant staph aureus) culture positive, On continuous oral anticoagulation, NADIRA on CPAP, Other specified hypothyroidism, Parietal lobe infarction ([address] Pneumonia, Post traumatic encephalopathy, Proteinuria, Pyogenic arthritis of right knee joint ([address] Pyrexia, Renal insufficiency, Renal lesion, Respiratory failure, acute ([address] Right knee pain, SAH (subarachnoid hemorrhage) ([address] Secondary hyperparathyroidism of renal origin ([address] Seizure disorder ([address] Stercoral ulcer of rectum, Streptococcal infection, Subarachnoid bleed ([address] Subdural bleeding ([address] Thyroid disease, Traumatic cerebral hemorrhage ([address] Type 2 diabetes mellitus with left diabetic foot ulcer ([address] Unspecified cerebral artery occlusion with cerebral infarction, Venous stasis dermatitis, and Venous stasis dermatitis of both lower extremities. Social History:   reports that he has never smoked. He has never used smokeless tobacco. He reports that he does not drink alcohol or use drugs. Family History:   Family History   Problem Relation Age of Onset    Coronary Art Dis Father     Cancer Sister        Vitals:  BP (!) 124/58   Pulse 77   Temp 99.1 °F (37.3 °C) (Temporal)   Resp 18   Ht 6' (1.829 m)   Wt 233 lb (105.7 kg)   SpO2 98%   BMI 31.60 kg/m²   Temp (24hrs), Av.6 °F (37 °C), Min:98.2 °F (36.8 °C), Max:99.1 °F (37.3 °C)    Recent Labs     19  0805 19  1816 05/14/19  1956 05/15/19  0742   POCGLU 118* 136* 185* 122*       I/O (24Hr):   No intake or output data in the 24 hours ending 05/15/19 1212    Labs:    Hematology:  Recent Labs     19  1426 19  0450 05/15/19  0345   WBC 4.7 4.8 4.4   RBC is noted in the knee involving all 3 compartments. Calcified atheromatous plaque. Diminished penetration through soft tissues noted. No additional pelvic or femoral fracture appreciated. Impacted subcapital femoral neck fracture. Advanced arthropathy in the knee. Xr Knee Right (3 Views)    Result Date: 5/14/2019  EXAMINATION: 3 XRAY VIEWS OF THE RIGHT KNEE 5/14/2019 9:01 am COMPARISON: Right knee radiographs performed 05/13/2019. HISTORY: ORDERING SYSTEM PROVIDED HISTORY: Right knee pain. Patient has right hip fracture. TECHNOLOGIST PROVIDED HISTORY: Right knee pain. Patient has right hip fracture. Ordering Physician Provided Reason for Exam: right hip fx, pain FINDINGS: There is no acute osseous abnormality. There is severe tricompartmental degenerative change. There is a trace effusion. The periarticular soft tissues are unremarkable. There are vascular calcifications. Severe tricompartmental degenerative change with a trace effusion. Xr Knee Right (3 Views)    Result Date: 5/13/2019  EXAMINATION: 3 XRAY VIEWS OF THE RIGHT KNEE 5/13/2019 1:25 pm COMPARISON: 04/26/2017 HISTORY: ORDERING SYSTEM PROVIDED HISTORY: pain TECHNOLOGIST PROVIDED HISTORY: pain Ordering Physician Provided Reason for Exam: right hip and knee pain Acuity: Acute Type of Exam: Initial Mechanism of Injury: fall last night FINDINGS: Severe tricompartmental osteoarthrosis. Osteopenia. Lateral view limited by obliquity. No acute fracture identified within limitations of the exam. Extensive atherosclerosis. Severe tricompartmental osteoarthrosis. No acute fracture identified.      Us Liver Spleen    Result Date: 5/14/2019  EXAMINATION: RIGHT UPPER QUADRANT ULTRASOUND 5/14/2019 2:42 pm COMPARISON: CT abdomen and pelvis March 27, 2017 HISTORY: ORDERING SYSTEM PROVIDED HISTORY: chronic thrombocytopenia ? cirrohsis FINDINGS: LIVER:  The liver demonstrates normal echogenicity without evidence of intrahepatic biliary ductal dilatation. BILIARY SYSTEM:  Cholelithiasis is present. No evidence of cholecystitis. No Keane's sign Common bile duct is within normal limits measuring 4 mm. RIGHT KIDNEY: Mild right renal atrophy appears present. No hydronephrosis. PANCREAS:  Visualized portions of the pancreas are unremarkable. OTHER: No evidence of right upper quadrant ascites. Imaging of the spleen is included, the spleen is enlarged measuring 16.2 cm maximum diameter     No evidence of cirrhosis. Mild splenomegaly, very similar to the comparison CT from 2017     Xr Chest Portable    Result Date: 5/14/2019  EXAMINATION: ONE XRAY VIEW OF THE CHEST 5/14/2019 5:10 pm COMPARISON: 13 May 2019 HISTORY: ORDERING SYSTEM PROVIDED HISTORY: s/p thoracentesis TECHNOLOGIST PROVIDED HISTORY: s/p thoracentesis Ordering Physician Provided Reason for Exam: post thoracentesis pt has hx of sob Acuity: Unknown Type of Exam: Unknown FINDINGS: AP portable view of the chest time stamped at 1708 hours demonstrates moderate cardiomegaly and moderate right effusion reduced over prior study. Vascularity appears slightly prominent. No extrapleural air is seen. Right apical pleural thickening is redemonstrated. No localized consolidation left hemithorax. Opacity is present the right base likely superimposed atelectasis. No postprocedural pneumothorax is noted. Reduce right effusion via thoracentesis. No postprocedural pneumothorax. Cardiomegaly is noted. Vascularity appears prominent. There is opacity at the right base likely atelectasis. Xr Chest Portable    Result Date: 5/13/2019  EXAMINATION: ONE XRAY VIEW OF THE CHEST 5/13/2019 1:44 pm COMPARISON: 02/24/2017.  HISTORY: ORDERING SYSTEM PROVIDED HISTORY: surgical clearance TECHNOLOGIST PROVIDED HISTORY: surgical clearance Ordering Physician Provided Reason for Exam: surgery clearance right hip Acuity: Acute Type of Exam: Initial Mechanism of Injury: fall last night hip pain FINDINGS: Large right pleural effusion with compensatory right basilar volume loss. Only the right upper lobe remains aerated. The left lung is clear. No significant mediastinal shift. The cardiac silhouette is enlarged. Large right pleural effusion with only the right upper lobe remaining aerated. Cardiomegaly without overt failure. Ir Guided Thoracentesis Pleural    Result Date: 5/15/2019  PROCEDURE: ULTRASOUNDGUIDED RIGHT THORACENTESIS 5/14/2019 HISTORY: ORDERING SYSTEM PROVIDED HISTORY: Pleural Effusion TECHNOLOGIST PROVIDED HISTORY: Pleural Effusion TECHNIQUE: Informed consent was obtained after a detailed explanation of the procedure including risks, benefits, and alternatives. Universal protocol was performed. The right chest was prepped and draped in sterile fashion and local anesthesia was achieved with lidocaine. An 8 Yoruba needle sheath was advanced under ultrasound guidance into pleural effusion and thoracentesis was performed. The patient tolerated the procedure well. FINDINGS: A total of 900 mL was removed. Tea-colored fluid. Successful ultrasound guided thoracentesis. Xr Hip 2-3 Vw W Pelvis Right    Result Date: 5/14/2019  EXAMINATION: ONE XRAY VIEW OF THE PELVIS AND TWO XRAY VIEWS RIGHT HIP 5/14/2019 9:01 am COMPARISON: 05/13/2019 HISTORY: ORDERING SYSTEM PROVIDED HISTORY: Trauma/Fracture TECHNOLOGIST PROVIDED HISTORY: AP and cross-table lateral of the hip please, thank you Trauma/Fracture Ordering Physician Provided Reason for Exam: right hip fx, pain FINDINGS: Again identified is the acute varus impacted femoral neck fracture no definite intertrochanteric component is identified. Osteopenia. No other definite fracture is seen. Atherosclerotic calcifications are identified. Acute impacted femoral neck fracture as seen on the recent comparison examination, without definite intertrochanteric involvement. Similar alignment.      Xr Hip 2-3 Vw W Pelvis Right    Result Date: 5/13/2019  EXAMINATION: ONE XRAY VIEW OF THE PELVIS AND TWO XRAY VIEWS RIGHT HIP 5/13/2019 1:25 pm COMPARISON: None. HISTORY: ORDERING SYSTEM PROVIDED HISTORY: fall TECHNOLOGIST PROVIDED HISTORY: fall Ordering Physician Provided Reason for Exam: right hip pain Acuity: Acute Type of Exam: Initial Mechanism of Injury: fall last night FINDINGS: Acute varus impacted femoral neck fracture. Cannot exclude a trochanteric extension of the basis of these images. MRI or CT may be useful in further evaluating for intertrochanteric extension. Vascular calcifications. Soft tissue swelling. No additional fractures. Degenerative changes of bilateral hips. Acute varus impacted femoral neck fracture. Cannot exclude extension into the intertrochanteric right femur. Consider cross-sectional imaging for further evaluation.        Physical Examination:        General appearance:  alert, cooperative and no distress  Mental Status:  oriented to person, place and time and normal affect  Lungs:  clear to auscultation bilaterally, normal effort  Heart:  regular rate and rhythm, no murmur  Abdomen:  soft, nontender, nondistended, normal bowel sounds, no masses, hepatomegaly, splenomegaly  Extremities: LISSETTE drain near the fistula site  Skin:  no gross lesions, rashes, induration    Assessment:        Primary Problem  Closed fracture of right hip [facility])    Active Hospital Problems    Diagnosis Date Noted    Closed right hip fracture, initial encounter ([facility] [S72.001A] 05/13/2019    Closed fracture of right hip ([facility] [S72.001A] 05/13/2019    History of DVT (deep vein thrombosis) [Z86.718] 09/17/2018    Essential hypertension [I10] 03/23/2017    ESRD (end stage renal disease) ([facility] [N18.6] 03/23/2017    Seizure disorder ([facility] [G40.909] 03/01/2017    Chronic diastolic congestive heart failure ([facility] [I50.32] 02/25/2017    Other specified hypothyroidism [E03.8] 08/24/2013    Chronic atrial fibrillation ([facility] [I48.2] 07/06/2013    Diabetes mellitus ([facility]

## 2019-05-15 NOTE — PROGRESS NOTES
Dave Craven is a 68 y.o. male patient.     Current Facility-Administered Medications   Medication Dose Route Frequency Provider Last Rate Last Dose    [MAR Hold] ferrous sulfate EC tablet 325 mg  325 mg Oral Daily with breakfast Khadra Nails, APRN - CNP   325 mg at 05/14/19 0901    [MAR Hold] ceFAZolin (ANCEF) 2 g in dextrose 5 % 50 mL IVPB  2 g Intravenous On Call to [address] [place] [address] K[place]         [MAR Hold] ARIPiprazole (ABILIFY) tablet 5 mg  5 mg Oral Daily Derick Mccullough MD   5 mg at 05/14/19 0902    [MAR Hold] vitamin D (CHOLECALCIFEROL) tablet 1,000 Units  1,000 Units Oral Daily Derick Mccullough MD   1,000 Units at 05/14/19 0902    [MAR Hold] isosorbide mononitrate (IMDUR) extended release tablet 30 mg  30 mg Oral Daily Derick Mccullough MD   30 mg at 05/14/19 0902    [MAR Hold] lamoTRIgine (LAMICTAL) tablet 100 mg  100 mg Oral Daily Derick Mccullough MD   100 mg at 05/14/19 0902    [MAR Hold] levothyroxine (SYNTHROID) tablet 50 mcg  50 mcg Oral Daily Derick Mccullough MD   50 mcg at 05/14/19 0622    [MAR Hold] pantoprazole (PROTONIX) tablet 40 mg  40 mg Oral Daily Derick Mccullough MD   40 mg at 05/14/19 0902    [MAR Hold] venlafaxine (EFFEXOR) tablet 75 mg  75 mg Oral Daily Derick Mccullough MD   75 mg at 05/14/19 0902    [MAR Hold] sodium chloride flush 0.9 % injection 10 mL  10 mL Intravenous 2 times per day Derick Mccullough MD   10 mL at 05/14/19 2111    [MAR Hold] sodium chloride flush 0.9 % injection 10 mL  10 mL Intravenous PRN Derick Mccullough MD        [facility] Hold] magnesium hydroxide (MILK OF MAGNESIA) 400 MG/5ML suspension 30 mL  30 mL Oral Daily PRN Derick Mccullough MD        [facility] Hold] nicotine (NICODERM CQ) 21 MG/24HR 1 patch  1 patch Transdermal Daily PRN Derick Mccullough MD        [facility] Hold] acetaminophen (TYLENOL) tablet 650 mg  650 mg Oral Q4H PRN Derick Mccullough MD   650 mg at 05/14/19 1349    [MAR Hold] heparin (porcine) injection 5,000 Units  5,000 Units Subcutaneous 3 times per day Derick Mccullough MD   Stopped at 05/14/19 1400    [MAR Hold] HYDROcodone-acetaminophen (NORCO) 5-325 MG per tablet 1 tablet  1 tablet Oral Q4H PRN Nidia Gray MD   1 tablet at 05/15/19 0604     Facility-Administered Medications Ordered in Other Encounters   Medication Dose Route Frequency Provider Last Rate Last Dose    0.9 % sodium chloride infusion   Intravenous Continuous Ana Ramos MD   Stopped at 01/16/15 1410     Allergies   Allergen Reactions    Bactrim [Sulfamethoxazole-Trimethoprim]      Principal Problem:    Closed fracture of right hip ([facility]  Active Problems:    Diabetes mellitus (HCC)    Chronic atrial fibrillation (HCC)    Other specified hypothyroidism    Chronic diastolic congestive heart failure (HCC)    Seizure disorder (HCC)    ESRD (end stage renal disease) (HCC)    Essential hypertension    History of DVT (deep vein thrombosis)    Closed right hip fracture, initial encounter ([facility]  Resolved Problems:    * No resolved hospital problems. *    Blood pressure (!) 124/58, pulse 77, temperature 99.1 °F (37.3 °C), temperature source Temporal, resp. rate 18, height 6' (1.829 m), weight 233 lb (105.7 kg), SpO2 98 %. Subjective:  Symptoms:  Stable. Diet:  NPO. Activity level: Impaired due to weakness. Pain:  He complains of pain that is severe. He reports pain is unchanged. Pain is poorly controlled. Objective  Assessment:    Condition: In stable condition. Unchanged. 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        Aaorn Watt RN  5/15/2019

## 2019-05-15 NOTE — PROGRESS NOTES
Kaylin Edwards is a 68 y.o. male patient.     Current Facility-Administered Medications   Medication Dose Route Frequency Provider Last Rate Last Dose    fentaNYL (SUBLIMAZE) injection 25 mcg  25 mcg Intravenous Q5 Min PRN Christal Blake MD        [facility] Hold] ferrous sulfate EC tablet 325 mg  325 mg Oral Daily with breakfast Aguilar Sanchez APRRITCHIE - CNP   325 mg at 05/14/19 0901    [MAR Hold] ceFAZolin (ANCEF) 2 g in dextrose 5 % 50 mL IVPB  2 g Intravenous On Call to [address] [place] [address]         [MAR Hold] ARIPiprazole (ABILIFY) tablet 5 mg  5 mg Oral Daily Khurram Conde MD   5 mg at 05/14/19 0902    [MAR Hold] vitamin D (CHOLECALCIFEROL) tablet 1,000 Units  1,000 Units Oral Daily Khurram Conde MD   1,000 Units at 05/14/19 0902    [MAR Hold] isosorbide mononitrate (IMDUR) extended release tablet 30 mg  30 mg Oral Daily Khurram Conde MD   30 mg at 05/14/19 0902    [MAR Hold] lamoTRIgine (LAMICTAL) tablet 100 mg  100 mg Oral Daily Khurram Conde MD   100 mg at 05/14/19 0902    [MAR Hold] levothyroxine (SYNTHROID) tablet 50 mcg  50 mcg Oral Daily Khurram Conde MD   50 mcg at 05/14/19 0622    [MAR Hold] pantoprazole (PROTONIX) tablet 40 mg  40 mg Oral Daily Khurram Conde MD   40 mg at 05/14/19 0902    [MAR Hold] venlafaxine (EFFEXOR) tablet 75 mg  75 mg Oral Daily Khurram Conde MD   75 mg at 05/14/19 0902    [MAR Hold] sodium chloride flush 0.9 % injection 10 mL  10 mL Intravenous 2 times per day Khurram Conde MD   10 mL at 05/14/19 2111    [MAR Hold] sodium chloride flush 0.9 % injection 10 mL  10 mL Intravenous PRN Khurram Conde MD        [facility] Hold] magnesium hydroxide (MILK OF MAGNESIA) 400 MG/5ML suspension 30 mL  30 mL Oral Daily PRN Khurram Conde MD        [facility] Hold] nicotine (NICODERM CQ) 21 MG/24HR 1 patch  1 patch Transdermal Daily PRN Khurram Conde MD        [facility] Hold] acetaminophen (TYLENOL) tablet 650 mg  650 mg Oral Q4H PRN Khurram Conde MD   650 mg at 05/14/19 1349    [MAR Hold] heparin (porcine) injection 5,000 Units  5,000 Units Subcutaneous 3 times per day Madhavi Evans MD   Stopped at 05/14/19 1400    [MAR Hold] HYDROcodone-acetaminophen (NORCO) 5-325 MG per tablet 1 tablet  1 tablet Oral Q4H PRN Madhavi Evans MD   1 tablet at 05/15/19 0604     Facility-Administered Medications Ordered in Other Encounters   Medication Dose Route Frequency Provider Last Rate Last Dose    0.9 % sodium chloride infusion   Intravenous Continuous Claudia Weaver MD   Stopped at 01/16/15 1410     Allergies   Allergen Reactions    Bactrim [Sulfamethoxazole-Trimethoprim]      Principal Problem:    [facility] fracture of right hip ([facility]  Active Problems:    Diabetes mellitus ([facility]    Chronic atrial fibrillation (HCC)    Other specified hypothyroidism    Chronic diastolic congestive heart failure (HCC)    Seizure disorder (HCC)    ESRD (end stage renal disease) ([facility]    Essential hypertension    History of DVT (deep vein thrombosis)    Closed right hip fracture, initial encounter ([facility]  Resolved Problems:    * No resolved hospital problems. *    Blood pressure (!) 124/58, pulse 77, temperature 99.1 °F (37.3 °C), temperature source Temporal, resp. rate 18, height 6' (1.829 m), weight 233 lb (105.7 kg), SpO2 98 %. Subjective:  Symptoms:  Stable. Diet:  NPO. Activity level: Impaired due to weakness. Pain:  He complains of pain that is severe. He reports pain is worsening. Pain is partially controlled. Objective  Assessment & Plan    Bernard Cardoso RN  5/15/2019     Anesthesia here and orders noted for pain medications. Pt. And family aware of reasons for delay of surgery.

## 2019-05-15 NOTE — PROGRESS NOTES
above    To OR today for right hip hemiarthroplasty  NPO  Consent obtained  Please page ortho with questions    Sanaz Brothers DO  Orthopedic Surgery Resident, PGY-2  [place] [id number], [facility]

## 2019-05-15 NOTE — PROGRESS NOTES
Subjective: patient evaluated . Pt received dialysis yesterday . No Access problems reported . No new issues overnite. Stable hemodynamics. Patient has a LISSETTE drain in a hematoma that was recently drained just above his AV access site. He is scheduled to have orthopedic procedure performed today. Discussed with family at bedside. Patient denies any nausea, vomiting, fever. He did have a right thoracentesis yesterday. Objective:  CURRENT TEMPERATURE:  Temp: 98.2 °F (36.8 °C)  MAXIMUM TEMPERATURE OVER 24HRS:  Temp (24hrs), Av.4 °F (36.9 °C), Min:98.2 °F (36.8 °C), Max:99 °F (37.2 °C)    CURRENT RESPIRATORY RATE:  Resp: 18  CURRENT PULSE:  Pulse: 80  CURRENT BLOOD PRESSURE:  BP: 120/60  24HR BLOOD PRESSURE RANGE:  Systolic (82LBW), ALJ:456 , Min:115 , ZQE:567   ; Diastolic (65YGH), VVM:77, Min:53, Max:63    24HR INTAKE/OUTPUT:  No intake or output data in the 24 hours ending 05/15/19 1010  Patient Vitals for the past 96 hrs (Last 3 readings):   Weight   19 1610 233 lb 4 oz (105.8 kg)   19 1113 236 lb 12.4 oz (107.4 kg)   19 2151 (!) 324 lb (147 kg)         Physical Exam:  General appearance:Awake, alert, in no acute distress  Skin: warm and dry, no rash or erythema  Eyes: conjunctivae normal and sclera anicteric  ENT:no thrush no pharyngeal congestion   Neck:  No JVD, No Thyromegaly  Pulmonary: clear to auscultation and no wheezing or rhonchi   Cardiovascular: irregular S1 and S2. NO rubs and NO murmur. No S3 OR S4   Abdomen: soft nontender, bowel sounds present, no organomegaly,  no ascites   Extremities: no cyanosis, clubbing or edema .  Rt hip tenderness     Access:  previous  Left AV graft , has a LISSETTE drain adjacent to the AV graft     Current Medications:      ferrous sulfate EC tablet 325 mg Daily with breakfast   ceFAZolin (ANCEF) 2 g in dextrose 5 % 50 mL IVPB On Call to OR   ARIPiprazole (ABILIFY) tablet 5 mg Daily   vitamin D (CHOLECALCIFEROL) tablet 1,000 Units Daily   furosemide (LASIX) tablet 20 mg BID   isosorbide mononitrate (IMDUR) extended release tablet 30 mg Daily   lamoTRIgine (LAMICTAL) tablet 100 mg Daily   levothyroxine (SYNTHROID) tablet 50 mcg Daily   pantoprazole (PROTONIX) tablet 40 mg Daily   venlafaxine (EFFEXOR) tablet 75 mg Daily   sodium chloride flush 0.9 % injection 10 mL 2 times per day   sodium chloride flush 0.9 % injection 10 mL PRN   magnesium hydroxide (MILK OF MAGNESIA) 400 MG/5ML suspension 30 mL Daily PRN   nicotine (NICODERM CQ) 21 MG/24HR 1 patch Daily PRN   acetaminophen (TYLENOL) tablet 650 mg Q4H PRN   heparin (porcine) injection 5,000 Units 3 times per day   HYDROcodone-acetaminophen (NORCO) 5-325 MG per tablet 1 tablet Q4H PRN     0.9 % sodium chloride infusion Continuous     Labs:   CBC: Recent Labs     05/13/19  1426 05/14/19  0450 05/15/19  0345   WBC 4.7 4.8 4.4   RBC 2.97* 2.93* 2.88*   HGB 9.9* 9.3* 9.2*   HCT 29.4* 30.8* 30.5*   * 91* 91*   MPV 7.0 9.0 9.4      BMP: Recent Labs     05/13/19  1426 05/14/19  0450 05/15/19  0345    136 136   K 4.8 4.2 5.6*   CL 95* 95* 96*   CO2 26 25 27   BUN 54* 57* 29*   CREATININE 5.60* 5.32* 3.96*   GLUCOSE 192* 139* 118*   CALCIUM 9.1 8.7 8.8        Assessment:  1 ESRD:dialysis Xypmqut-Ppvquzeb-Dxibjifq. 2.HTN:  3 s/p fall and a rt hip fracture :  4 EDEMA : none   5. ANEMIA OF CHRONIC DISEASE:  6. DM2  7. Hx of AFib     Plan:  1. Pt scheduled for surgery today     2. Regular dialysis tomorrow   3. Follow up labs ordered. 4. Following       Please do not hesitate to call with questions.     Electronically signed by Fransico Chaudhry MD on 5/15/2019 at 10:10 AM

## 2019-05-15 NOTE — CONSULTS
Vascular Surgery Progress Note         PATIENT NAME: Christine Rocha     TODAY'S DATE: 5/15/2019, 11:50 AM    CC: Right hip fracture post fall  Reason for Consult: LISSETTE drain for AV fistula    SUBJECTIVE:    69 yo male who presented to the ER 5/13 after a fall with a broken right hip. Pt s/p left arm fistula creation. Patient receives dialysis on Tuesdays, Thursdays, and Saturdays. The fistula was formed 1.5 years ago. The patient had a LISSETTE drain placed near his AV fistula in the left arm due to infiltration on 5/6 by Dr. Wesley Gould. The first two days the drain had significant output of dark red fluid, however over the past 3 days there has only drained about 3 ccs. The drain is scheduled to be pulled on 5/17, however the patient would like it to be drained sooner if possible. The patient is not complaining of any pain or sensory deficits in the left arm and has been afebrile. Denies N/V, CP, SOB, change in urinary frequency. Denies alcohol use, cigarette smoking, and drug use. Patient has a history of an IVC filter placement due to a DVT. OBJECTIVE:   Vitals:  BP (!) 124/58   Pulse 77   Temp 99.1 °F (37.3 °C) (Temporal)   Resp 18   Ht 6' (1.829 m)   Wt 233 lb (105.7 kg)   SpO2 98%   BMI 31.60 kg/m²      INTAKE/OUTPUT:    No intake or output data in the 24 hours ending 05/15/19 1150              GENERAL: AOx3, NAD  HEENT: EOMI bilaterally  CARDIAC: Regular rate and rhythm. ABDOMEN: Soft, NT, ND, Active Bowel Sounds  EXTREMITY: moves all extremities, no edema. Pulses 2+ bilaterally in the upper extremities. AV fistula in left arm with a palpable thrill  NEURO: CN II-XII intact. Gross motor intact. INCISION: Dressing clean, dry, intact.     Data:  CBC with Differential:    Lab Results   Component Value Date    WBC 4.4 05/15/2019    RBC 2.88 05/15/2019    RBC 3.35 05/18/2012    HGB 9.2 05/15/2019    HCT 30.5 05/15/2019    PLT 91 05/15/2019     05/18/2012    .9 05/15/2019    MCH 31.9 05/15/2019    [facility] 30.2 05/15/2019    RDW 13.7 05/15/2019    LYMPHOPCT 12 05/15/2019    MONOPCT 8 05/15/2019    BASOPCT 0 05/15/2019    MONOSABS 0.35 05/15/2019    LYMPHSABS 0.53 05/15/2019    EOSABS 0.09 05/15/2019    BASOSABS 0.00 05/15/2019    DIFFTYPE NOT REPORTED 05/15/2019     BMP:    Lab Results   Component Value Date     05/15/2019    K 5.6 05/15/2019    CL 96 05/15/2019    CO2 27 05/15/2019    BUN 29 05/15/2019    LABALBU 2.9 09/18/2018    LABALBU 3.7 05/18/2012    CREATININE 3.96 05/15/2019    CALCIUM 8.8 05/15/2019    GFRAA 18 05/15/2019    LABGLOM 15 05/15/2019    GLUCOSE 118 05/15/2019    GLUCOSE 135 05/18/2012         ASSESSMENT   1. 69 yo male with resolved infiltration surrounding AV fistula in left arm. PLAN  1. Continue medical management per primary  2. Pull drain when patient returns from the OR  3. Follow up with Dr. Liela Biswas in one week for wound eval and suture removal.   4. Vascular Surgery to sign off.          Electronically signed by Elizabeth Borja DO  on 5/15/2019 at 11:50 AM

## 2019-05-15 NOTE — BRIEF OP NOTE
Brief Postoperative Note  ______________________________________________________________    Patient: Delfino Joe  YOB: 1950  MRN: [id number]  Date of Procedure: 5/15/2019    Pre-Op Diagnosis: Right Femoral Neck Fracture    Post-Op Diagnosis: Same       Procedure(s):  RIGHT HIP ANA ARTHROPLASTY     Anesthesia: General    Surgeon(s):  Shimon Rogers DO    Assistant: Steven Phelps    Estimated Blood Loss (mL): 150    IVF: 600 cc crystalloid    Complications: None    Specimens:     Implants:  Size 20 stem, 51 head, -3 head. Endo. Drains:   Closed/Suction Drain Left Bulb (Active)   Site Description Unable to view 5/15/2019 11:11 AM   Dressing Status Old drainage 5/15/2019 11:11 AM   Drainage Appearance Brown 5/15/2019 11:11 AM   Status To bulb suction 5/15/2019 11:11 AM   Output (ml) 5 ml 5/13/2019 10:00 PM       LDA Fistula (Active)   Stomal Appliance Clean;Dry; Intact 5/13/2019 10:00 PM       Findings: see op note    Kristan Sanchez DO  Date: 5/15/2019  Time: 6:52 PM

## 2019-05-15 NOTE — ANESTHESIA PRE PROCEDURE
Department of Anesthesiology  Preprocedure Note       Name:  Rere Haile   Age:  68 y.o.  :  1950                                          MRN:  [id number]         Date:  5/15/2019      Surgeon: Isa Perez):  Karen Maldonado DO    Procedure: HIP HEMIARTHROPLASTY. Lateral 3080 table, Gayle Rep. Zuleyma Drew will notify (Right )    Medications prior to admission:   Prior to Admission medications    Medication Sig Start Date End Date Taking? Authorizing Provider   furosemide (LASIX) 20 MG tablet Take 20 mg by mouth 2 times daily   Yes Historical Provider, MD   ARIPiprazole (ABILIFY) 5 MG tablet Take 5 mg by mouth 17  Yes Historical Provider, MD   apixaban (ELIQUIS) 5 MG TABS tablet Take 5 mg by mouth 2 times daily   Yes Historical Provider, MD   Ferric Citrate (AURYXIA) 1  MG(Fe) TABS Take 210 mg by mouth 2 times daily Patients med list states frequency is 2-3 times a day   Yes Historical Provider, MD   venlafaxine (EFFEXOR) 75 MG tablet Take 75 mg by mouth daily   Yes Historical Provider, MD   lamoTRIgine (LAMICTAL) 25 MG tablet Take 100 mg by mouth daily    Yes Historical Provider, MD   isosorbide mononitrate (IMDUR) 30 MG CR tablet Take 30 mg by mouth daily  14  Yes Historical Provider, MD   pantoprazole (PROTONIX) 40 MG tablet Take 40 mg by mouth daily  10/17/13  Yes Historical Provider, MD   levothyroxine (LEVOTHROID) 50 MCG tablet Take 50 mcg by mouth Daily. Yes Historical Provider, MD   Cholecalciferol (VITAMIN D3) 2000 UNITS CAPS Take 1,000 Units by mouth daily.    Yes Historical Provider, MD       Current medications:    Current Facility-Administered Medications   Medication Dose Route Frequency Provider Last Rate Last Dose    fentaNYL (SUBLIMAZE) injection 25 mcg  25 mcg Intravenous Q5 Min PRN Tr Ledezma MD        [facility] Hold] ferrous sulfate EC tablet 325 mg  325 mg Oral Daily with breakfast TANMAY Livingston - CNP   325 mg at 19 0901    [MAR Hold] ceFAZolin (ANCEF) 2 g in dextrose 5 % 50 mL IVPB  2 g Intravenous On Call to [address] [place] [address] DO Kelle        [MAR Hold] ARIPiprazole (ABILIFY) tablet 5 mg  5 mg Oral Daily Aaron Pierce MD   5 mg at 05/14/19 0902    [MAR Hold] vitamin D (CHOLECALCIFEROL) tablet 1,000 Units  1,000 Units Oral Daily Aaron Pierce MD   1,000 Units at 05/14/19 0902    [MAR Hold] isosorbide mononitrate (IMDUR) extended release tablet 30 mg  30 mg Oral Daily Aaron Pierce MD   30 mg at 05/14/19 0902    [MAR Hold] lamoTRIgine (LAMICTAL) tablet 100 mg  100 mg Oral Daily Aaron Pierce MD   100 mg at 05/14/19 0902    [MAR Hold] levothyroxine (SYNTHROID) tablet 50 mcg  50 mcg Oral Daily Aaron Pierce MD   50 mcg at 05/14/19 0622    [MAR Hold] pantoprazole (PROTONIX) tablet 40 mg  40 mg Oral Daily Aaron Pierce MD   40 mg at 05/14/19 0902    [MAR Hold] venlafaxine (EFFEXOR) tablet 75 mg  75 mg Oral Daily Aaron Pierce MD   75 mg at 05/14/19 0902    [MAR Hold] sodium chloride flush 0.9 % injection 10 mL  10 mL Intravenous 2 times per day Aaron Pierce MD   10 mL at 05/14/19 2111    [MAR Hold] sodium chloride flush 0.9 % injection 10 mL  10 mL Intravenous PRN Aaron Pierce MD        [facility] Hold] magnesium hydroxide (MILK OF MAGNESIA) 400 MG/5ML suspension 30 mL  30 mL Oral Daily PRN Aaron Pierce MD        [facility] Hold] nicotine (NICODERM CQ) 21 MG/24HR 1 patch  1 patch Transdermal Daily PRN Aaron Pierce MD        [facility] Hold] acetaminophen (TYLENOL) tablet 650 mg  650 mg Oral Q4H PRN Aaron Pierce MD   650 mg at 05/14/19 1349    [MAR Hold] heparin (porcine) injection 5,000 Units  5,000 Units Subcutaneous 3 times per day Aaron Pierce MD   Stopped at 05/14/19 1400    [MAR Hold] HYDROcodone-acetaminophen (NORCO) 5-325 MG per tablet 1 tablet  1 tablet Oral Q4H PRN Aaron Pierce MD   1 tablet at 05/15/19 0604     Facility-Administered Medications Ordered in Other Encounters   Medication Dose Route Frequency Provider Last Rate Last Dose    0.9 % sodium chloride infusion Intravenous Continuous Eve Rogers MD   Stopped at 01/16/15 1410       Allergies:     Allergies   Allergen Reactions    Bactrim [Sulfamethoxazole-Trimethoprim]        Problem List:    Patient Active Problem List   Diagnosis Code    Subdural bleeding (HCC) I62.00    Diabetes mellitus ([facility] E11.9    Coagulation defect ([facility] D68.9    Chronic atrial fibrillation ([facility] I48.2    Respiratory failure, acute ([facility] J96.00    Traumatic cerebral hemorrhage ([facility] S06.369A    Cerebral contusion ([facility] C91.630U    Pneumonia J18.9    Hypernatremia E87.0    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) I60.9    Acute ischemic stroke ([facility] Q54.7    Metabolic acidosis M84.3    Bradyarrhythmia I49.8    Hyperkalemia E87.5    Diarrhea R19.7    Deep vein thrombosis (DVT) of left lower extremity ([facility]) I82.402    Hypocalcemia E83.51    Fever R50.9    History of superior vena cava filter placement Z95.828    Pyrexia R50.9    Anemia D64.9    Altered mental status R41.82    Aphasia R47.01    Other specified hypothyroidism E03.8    Parietal lobe infarction I63.89    Type 2 diabetes mellitus with left diabetic foot ulcer ([facility]) E11.621, L97.529    Acute on chronic congestive heart failure ([facility]) I50.9    NADIRA on CPAP G47.33, Z99.89    CKD (chronic kidney disease) stage 4, GFR 15-29 ml/min ([facility]) N18.4    Edema R60.9    Venous stasis dermatitis of both lower extremities I87.2    Foot ulcer due to secondary DM ([facility]) E13.621, L97.509    Anemia associated with chronic renal failure D63.1    Secondary hyperparathyroidism of renal origin ([facility] N25.81    Proteinuria R80.9    Chronic diastolic congestive heart failure ([facility]) I50.32    Knee effusion, right M25.461    Hypoxia R09.02    Chronic pain of right knee M25.561, G89.29    Diabetes mellitus type 2 in obese ([facility]) E11.69, E66.9    Acute metabolic encephalopathy Y76.22    Seizure disorder ([facility]) G40.909    History of cerebral infarction Z86.73    Renal lesion N28.9    Lower GI bleed K92.2    Hypovolemic shock (HCC) R57.1    Hematochezia K92.1    Anemia D64.9    Stercoral ulcer of rectum K62.6    Pyogenic arthritis of right knee joint ([facility]) M00.9    ESRD (end stage renal disease) ([facility]) N18.6    Essential hypertension I10    Right knee pain M25.561    Cellulitis L03.90    History of DVT (deep vein thrombosis) Z86.718    On continuous oral anticoagulation Z79.01    Elevated C-reactive protein (CRP) R79.82    Left leg cellulitis L03. 116    MRSA infection A49.02    Streptococcal infection A49.1    Arteriovenous fistula for hemodialysis in place, primary [facility]) Z99.2    Closed right hip fracture, initial encounter ([facility] S72.001A    Closed fracture of right hip ([address] S72.001A       Past Medical History:        Diagnosis Date    A-fib ([facility]     Acute ischemic stroke ([address] 7/13/2013    Acute metabolic encephalopathy 8/5/0277    Acute on chronic congestive heart failure ([address] 5/9/2014    Acute on chronic diastolic CHF (congestive heart failure) ([address] 2/25/2017    Altered mental status 8/24/2013    Anemia     Anemia associated with chronic renal failure 5/28/2014    Aphasia 8/24/2013    ARF (acute renal failure) ([address] 5/28/2014    From pre renal azotemia from newly added diuretic and ARB use, creat peaked at 2.8 from 2 in may 2014    Arteriovenous fistula for hemodialysis in place, primary ([address] 11/17/2017    Arthritis     Bradyarrhythmia 7/13/2013    Cellulitis 9/17/2018    Cerebral contusion ([address] 7/7/2013    CHF (congestive heart failure) ([facility])     Chronic atrial fibrillation ([facility]) 7/6/2013    Chronic kidney disease     Chronic pain of right knee     CKD (chronic kidney disease) stage 4, GFR 15-29 ml/min ([address] 5/28/2014    From diabetic and ischemic nephrosclerosis, creat now 2-2.5, GFR 25-30 ml/min    Coagulation defect ([address] 7/6/2013    Diabetes mellitus ([facility]     Diabetes mellitus type 2 in obese ([facility]     Diarrhea 7/16/2013    DVT (deep venous thrombosis) (HCC)     Elevated C-reactive protein (CRP)     ESRD (end stage renal disease) ([address] 3/23/2017    Essential hypertension 3/23/2017    Fever 7/18/2013    Foot ulcer due to secondary DM ([address] 5/28/2014    Left side on antibiotics    GERD (gastroesophageal reflux disease)     Hematochezia 3/4/2017    History of cerebral infarction 3/1/2017    History of DVT (deep vein thrombosis) 9/17/2018    History of superior vena cava filter placement 7/18/2013    Hx of blood clots     Hyperkalemia 7/16/2013    Hyperlipidemia     Hypernatremia 7/11/2013    Hypertension     Hypocalcemia 7/18/2013    Hypovolemic shock ([address] 3/4/2017    Hypoxia     Irregular heartbeat     hx of a-fib    Knee effusion, right 2/25/2017    Left leg cellulitis     Lower GI bleed 3/4/2017    MDRO (multiple drug resistant organisms) resistance     Metabolic acidosis 6/25/5835    MRSA (methicillin resistant staph aureus) culture positive 09/17/2018    leg    On continuous oral anticoagulation 9/17/2018    NADIRA on CPAP     Other specified hypothyroidism 8/24/2013    Parietal lobe infarction ([address] 8/26/2013    Pneumonia     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    Proteinuria 11/30/2016    Fluctuates between 2-3 grams, cant use ACEI due to hyperkalemia    Pyogenic arthritis of right knee joint ([address] 3/23/2017    Pyrexia 7/19/2013    Renal insufficiency     Renal lesion 3/3/2017    Respiratory failure, acute ([address] 7/7/2013    Right knee pain     SAH (subarachnoid hemorrhage) (HCC)     Secondary hyperparathyroidism of renal origin ([address] 12/16/2015    Seizure disorder ([address] 3/1/2017    Stercoral ulcer of rectum     Streptococcal infection     Subarachnoid bleed ([address] 7/13/2013    Subdural bleeding ([address] 7/5/2013    Thyroid disease     Traumatic cerebral hemorrhage ([address] 7/7/2013    Type 2 diabetes mellitus with left diabetic foot ulcer ([address] 4/16/2014    Unspecified cerebral artery occlusion with cerebral infarction     Venous stasis dermatitis 2014    Venous stasis dermatitis of both lower extremities 2014       Past Surgical History:        Procedure Laterality Date    ABDOMEN SURGERY      CARDIAC CATHETERIZATION      COLONOSCOPY      DILATATION, ESOPHAGUS      FOOT DEBRIDEMENT Left     FOOT SURGERY Right     #5 toe removed  , 1/2 toes #1, #2  partially removed    GASTRIC BYPASS SURGERY      GASTROSTOMY TUBE PLACEMENT  summer 2013    KNEE ARTHROSCOPY Right 2017    I & D    NV KNEE SCOPE,DIAGNOSTIC Right 3/23/2017    KNEE ARTHROSCOPY,EXTENSIVE DEBRIDEMENT PARTIAL PROXIMAL AND MEDIAL MENISECTOMY  performed by Natacha Leonard MD at [address] [place] [address]  summer 2013    VENA CAVA FILTER PLACEMENT         Social History:    Social History     Tobacco Use    Smoking status: Never Smoker    Smokeless tobacco: Never Used   Substance Use Topics    Alcohol use:  No                                Counseling given: Not Answered      Vital Signs (Current):   Vitals:    05/15/19 0530 05/15/19 0730 05/15/19 1055 05/15/19 1115   BP: 119/61 120/60 (!) 124/58    Pulse: 58 80 73 77   Resp:     Temp: 99 °F (37.2 °C) 98.2 °F (36.8 °C) 99.1 °F (37.3 °C)    TempSrc: Oral Oral Temporal    SpO2: 96% 98% 98%    Weight:   233 lb (105.7 kg)    Height:   6' (1.829 m)                                               BP Readings from Last 3 Encounters:   05/15/19 (!) 124/58   04/15/19 (!) 140/80   10/15/18 (!) 151/67       NPO Status: Time of last liquid consumption: 0000                        Time of last solid consumption: 0000                        Date of last liquid consumption: 19                        Date of last solid food consumption: 19    BMI:   Wt Readings from Last 3 Encounters:   05/15/19 233 lb (105.7 kg)   04/15/19 259 lb (117.5 kg)   10/15/18 264 lb (119.7 kg)     Body mass index is 31.6 kg/m².     CBC:   Lab Results   Component Value Date    WBC 4.4 05/15/2019    RBC 2.88 05/15/2019    RBC 3.35 05/18/2012    HGB 9.2 05/15/2019    HCT 30.5 05/15/2019    .9 05/15/2019    RDW 13.7 05/15/2019    PLT 91 05/15/2019     05/18/2012       CMP:   Lab Results   Component Value Date     05/15/2019    K 5.6 05/15/2019    CL 96 05/15/2019    CO2 27 05/15/2019    BUN 29 05/15/2019    CREATININE 3.96 05/15/2019    GFRAA 18 05/15/2019    LABGLOM 15 05/15/2019    GLUCOSE 118 05/15/2019    GLUCOSE 135 05/18/2012    PROT 6.9 05/13/2019    PROT 6.2 06/27/2012    CALCIUM 8.8 05/15/2019    BILITOT 0.89 09/18/2018    ALKPHOS 69 09/18/2018    AST 19 09/18/2018    ALT 12 09/18/2018       POC Tests:   Recent Labs     05/15/19  0742   POCGLU 122*       Coags:   Lab Results   Component Value Date    PROTIME 12.0 05/14/2019    PROTIME 12.8 04/17/2012    INR 1.1 05/14/2019    APTT 37.9 05/13/2019       HCG (If Applicable): No results found for: PREGTESTUR, PREGSERUM, HCG, HCGQUANT     ABGs: No results found for: PHART, PO2ART, CZL3YQX, INK7SVN, BEART, C2GTTPJV     Type & Screen (If Applicable):  No results found for: LABABO, LABRH    Anesthesia Evaluation   no history of anesthetic complications:   Airway: Mallampati: III       Mouth opening: > = 3 FB Dental:          Pulmonary:   (+) sleep apnea:      (-) COPD, asthma, shortness of breath and stridor                           Cardiovascular:    (+) hypertension:, dysrhythmias: atrial fibrillation, CHF:,     (-) pacemaker, CABG/stent and  angina        Rate: normal                    Neuro/Psych:   (+) CVA (aphasia history): residual symptoms, psychiatric history:            GI/Hepatic/Renal:   (+) GERD:, renal disease: ESRD and dialysis,           Endo/Other:    (+) DiabetesType II DM, , hypothyroidism::., .                 Abdominal:           Vascular:                                   Narrative   Performed by: KAIDEN STV MUSE   Atrial

## 2019-05-16 ENCOUNTER — APPOINTMENT (OUTPATIENT)
Dept: DIALYSIS | Age: 69
DRG: 469 | End: 2019-05-16
Payer: MEDICARE

## 2019-05-16 LAB
ANION GAP SERPL CALCULATED.3IONS-SCNC: 17 MMOL/L (ref 9–17)
BUN BLDV-MCNC: 45 MG/DL (ref 8–23)
BUN/CREAT BLD: ABNORMAL (ref 9–20)
CALCIUM SERPL-MCNC: 7.9 MG/DL (ref 8.6–10.4)
CHLORIDE BLD-SCNC: 96 MMOL/L (ref 98–107)
CO2: 21 MMOL/L (ref 20–31)
CREAT SERPL-MCNC: 5.5 MG/DL (ref 0.7–1.2)
DIRECT EXAM: NORMAL
GFR AFRICAN AMERICAN: 12 ML/MIN
GFR NON-AFRICAN AMERICAN: 10 ML/MIN
GFR SERPL CREATININE-BSD FRML MDRD: ABNORMAL ML/MIN/{1.73_M2}
GFR SERPL CREATININE-BSD FRML MDRD: ABNORMAL ML/MIN/{1.73_M2}
GLUCOSE BLD-MCNC: 202 MG/DL (ref 70–99)
GLUCOSE, FLUID: 129 MG/DL
LACTATE DEHYDROGENASE, FLUID: 179 U/L
Lab: NORMAL
POTASSIUM SERPL-SCNC: 5.5 MMOL/L (ref 3.7–5.3)
SODIUM BLD-SCNC: 134 MMOL/L (ref 135–144)
SPECIMEN DESCRIPTION: NORMAL
SPECIMEN TYPE: NORMAL
SPECIMEN TYPE: NORMAL
SURGICAL PATHOLOGY REPORT: NORMAL

## 2019-05-16 PROCEDURE — 6370000000 HC RX 637 (ALT 250 FOR IP): Performed by: STUDENT IN AN ORGANIZED HEALTH CARE EDUCATION/TRAINING PROGRAM

## 2019-05-16 PROCEDURE — 6360000002 HC RX W HCPCS: Performed by: STUDENT IN AN ORGANIZED HEALTH CARE EDUCATION/TRAINING PROGRAM

## 2019-05-16 PROCEDURE — 97530 THERAPEUTIC ACTIVITIES: CPT

## 2019-05-16 PROCEDURE — 1200000000 HC SEMI PRIVATE

## 2019-05-16 PROCEDURE — 99232 SBSQ HOSP IP/OBS MODERATE 35: CPT | Performed by: FAMILY MEDICINE

## 2019-05-16 PROCEDURE — 97162 PT EVAL MOD COMPLEX 30 MIN: CPT

## 2019-05-16 PROCEDURE — 97535 SELF CARE MNGMENT TRAINING: CPT

## 2019-05-16 PROCEDURE — 90935 HEMODIALYSIS ONE EVALUATION: CPT

## 2019-05-16 PROCEDURE — 97166 OT EVAL MOD COMPLEX 45 MIN: CPT

## 2019-05-16 PROCEDURE — 80048 BASIC METABOLIC PNL TOTAL CA: CPT

## 2019-05-16 RX ADMIN — HYDROCODONE BITARTRATE AND ACETAMINOPHEN 1 TABLET: 5; 325 TABLET ORAL at 23:31

## 2019-05-16 RX ADMIN — LEVOTHYROXINE SODIUM 50 MCG: 50 TABLET ORAL at 07:03

## 2019-05-16 RX ADMIN — HYDROCODONE BITARTRATE AND ACETAMINOPHEN 1 TABLET: 5; 325 TABLET ORAL at 05:38

## 2019-05-16 RX ADMIN — LAMOTRIGINE 100 MG: 100 TABLET ORAL at 14:48

## 2019-05-16 RX ADMIN — VANCOMYCIN HYDROCHLORIDE 1000 MG: 1 INJECTION, SOLUTION INTRAVENOUS at 12:00

## 2019-05-16 RX ADMIN — ARIPIPRAZOLE 5 MG: 5 TABLET ORAL at 14:50

## 2019-05-16 RX ADMIN — FERROUS SULFATE TAB EC 325 MG (65 MG FE EQUIVALENT) 325 MG: 325 (65 FE) TABLET DELAYED RESPONSE at 14:50

## 2019-05-16 RX ADMIN — HYDROCODONE BITARTRATE AND ACETAMINOPHEN 1 TABLET: 5; 325 TABLET ORAL at 00:34

## 2019-05-16 RX ADMIN — ISOSORBIDE MONONITRATE 30 MG: 30 TABLET ORAL at 14:49

## 2019-05-16 RX ADMIN — HEPARIN SODIUM 5000 UNITS: 5000 INJECTION INTRAVENOUS; SUBCUTANEOUS at 05:56

## 2019-05-16 RX ADMIN — HYDROCODONE BITARTRATE AND ACETAMINOPHEN 1 TABLET: 5; 325 TABLET ORAL at 11:26

## 2019-05-16 RX ADMIN — VITAMIN D, TAB 1000IU (100/BT) 1000 UNITS: 25 TAB at 14:47

## 2019-05-16 RX ADMIN — PANTOPRAZOLE SODIUM 40 MG: 40 TABLET, DELAYED RELEASE ORAL at 14:48

## 2019-05-16 RX ADMIN — HEPARIN SODIUM 5000 UNITS: 5000 INJECTION INTRAVENOUS; SUBCUTANEOUS at 23:31

## 2019-05-16 RX ADMIN — HEPARIN SODIUM 5000 UNITS: 5000 INJECTION INTRAVENOUS; SUBCUTANEOUS at 14:52

## 2019-05-16 RX ADMIN — VENLAFAXINE 75 MG: 75 TABLET ORAL at 14:50

## 2019-05-16 ASSESSMENT — PAIN DESCRIPTION - PAIN TYPE
TYPE: ACUTE PAIN
TYPE: ACUTE PAIN
TYPE: SURGICAL PAIN
TYPE: SURGICAL PAIN

## 2019-05-16 ASSESSMENT — PAIN SCALES - PAIN ASSESSMENT IN ADVANCED DEMENTIA (PAINAD)
CONSOLABILITY: 1
NEGVOCALIZATION: 1
NEGVOCALIZATION: 1
CONSOLABILITY: 1
BREATHING: 1
BODYLANGUAGE: 2
FACIALEXPRESSION: 2
NEGVOCALIZATION: 1
BREATHING: 1
NEGVOCALIZATION: 1
FACIALEXPRESSION: 2
TOTALSCORE: 7
TOTALSCORE: 7
CONSOLABILITY: 1
TOTALSCORE: 7
FACIALEXPRESSION: 2
TOTALSCORE: 7
BODYLANGUAGE: 2
BREATHING: 1
BODYLANGUAGE: 2
BREATHING: 1
CONSOLABILITY: 1
BODYLANGUAGE: 2
FACIALEXPRESSION: 2

## 2019-05-16 ASSESSMENT — PAIN DESCRIPTION - PROGRESSION
CLINICAL_PROGRESSION: NOT CHANGED
CLINICAL_PROGRESSION: GRADUALLY WORSENING

## 2019-05-16 ASSESSMENT — PAIN SCALES - GENERAL
PAINLEVEL_OUTOF10: 0
PAINLEVEL_OUTOF10: 9
PAINLEVEL_OUTOF10: 6
PAINLEVEL_OUTOF10: 8
PAINLEVEL_OUTOF10: 10
PAINLEVEL_OUTOF10: 6
PAINLEVEL_OUTOF10: 6
PAINLEVEL_OUTOF10: 8
PAINLEVEL_OUTOF10: 0
PAINLEVEL_OUTOF10: 0

## 2019-05-16 ASSESSMENT — PAIN DESCRIPTION - FREQUENCY
FREQUENCY: CONTINUOUS

## 2019-05-16 ASSESSMENT — ENCOUNTER SYMPTOMS
ABDOMINAL PAIN: 0
SINUS PRESSURE: 0
DIARRHEA: 0
SHORTNESS OF BREATH: 0
CONSTIPATION: 0
BLOOD IN STOOL: 0
VOMITING: 0
VOICE CHANGE: 0
NAUSEA: 0
SORE THROAT: 0
COUGH: 0
WHEEZING: 0

## 2019-05-16 ASSESSMENT — PAIN - FUNCTIONAL ASSESSMENT
PAIN_FUNCTIONAL_ASSESSMENT: PREVENTS OR INTERFERES SOME ACTIVE ACTIVITIES AND ADLS
PAIN_FUNCTIONAL_ASSESSMENT: PREVENTS OR INTERFERES WITH MANY ACTIVE NOT PASSIVE ACTIVITIES

## 2019-05-16 ASSESSMENT — PAIN DESCRIPTION - LOCATION
LOCATION: HIP

## 2019-05-16 ASSESSMENT — PAIN DESCRIPTION - ORIENTATION
ORIENTATION: RIGHT

## 2019-05-16 ASSESSMENT — PAIN DESCRIPTION - ONSET
ONSET: ON-GOING
ONSET: ON-GOING

## 2019-05-16 ASSESSMENT — PAIN DESCRIPTION - DESCRIPTORS
DESCRIPTORS: ACHING;DISCOMFORT
DESCRIPTORS: ACHING;DISCOMFORT

## 2019-05-16 NOTE — PROGRESS NOTES
Progress Note    Patient:  Dave Craven  YOB: 1950     68 y.o. male    Subjective:  Patient seen and examined at bedside this morning, sleeping well upon entering room. No new complaints or concerns per patient this morning. No acute issues overnight per nursing. Pain well-controlled. Denies: fever/chills, HA, CP, SOB, N/V, dysuria, or numbness/tingling in extremities. -BM/+flatus. PT: to evaluate today post-op. Objective:   Vitals:    05/16/19 0000   BP: 121/72   Pulse: 87   Resp: 20   Temp: 97.9 °F (36.6 °C)   SpO2: 100%     Gen: NAD, cooperative, sleeping comfortably supine in bed upon entering room though easily rousable  MSK-RLE: Dressing in place to right hip, c/d/i with mild serosanguinous drainage noted. No ecchymoses, abrasions, deformity, or lacerations. Appropriate post-op TTP about hip. Compartments soft and compressible. EHL/FHL/TA/GSC gross motor intact. Sural, saphenous, superificial/deep peroneal, and plantar nerve distribution SILT. DP/PT pulses 2+ with BCR; foot warm and perfused. Recent Labs     05/14/19  0450 05/15/19  0345   WBC 4.8 4.4   HGB 9.3* 9.2*   HCT 30.8* 30.5*   PLT 91* 91*   INR 1.1  --     136   K 4.2 5.6*   BUN 57* 29*   CREATININE 5.32* 3.96*   GLUCOSE 139* 118*     Meds: Heparin, Vanco   See rec for complete list    Plan: 68 y.o. male w/ R FNF s/p R hip hemiarthroplasty, POD#1    -Maintain dressing in place to hip for now, reinforce as needed. Plan for dressing change with rounds tomorrow morning. -WBAT. -Will follow post-op Hb this morning, still pending; transfuse as needed.  -Complete 24 post op Vanco coverage.  -Pain control and medical management per primary team.  -Tolerating PO intake. -Voiding well. -Ice (20 min, 1 hour off) for help with edema/pain control.  -Encourage deep breathing and IS.   -DVT ppx: Heparin 5000 TID and EPC, recommend 4 weeks of post-op DVT ppx, typically Lovenox post-op though defer to medicine in light of other medical comorbidities. -PT/OT. -Please page Ortho with any questions or concerns.     Nargis Banda DO  PGY-3 Orthopedic Surgery  5:05 AM 5/16/2019

## 2019-05-16 NOTE — PROGRESS NOTES
Physical Therapy    Facility/Department: [address] ORTHO/MED SURG  Initial Assessment    NAME: Janneth Esteban  : 1950  MRN: [id number]    Date of Service: 2019    Discharge Recommendations: Further therapy recommended at discharge. PT Equipment Recommendations  Equipment Needed: No    Assessment   Body structures, Functions, Activity limitations: Decreased functional mobility ; Decreased cognition;Decreased ADL status; Decreased endurance; Increased Pain;Decreased balance;Decreased safe awareness  Assessment: pt reported previous to adm he was walking very short distances and mainly used a w/c for mobility. pain tolerance is main limitation for mobility. Prognosis: Good  Decision Making: Medium Complexity  Patient Education: PT plan of care and home safety education  REQUIRES PT FOLLOW UP: Yes  Activity Tolerance  Activity Tolerance: Patient limited by pain; Patient limited by fatigue;Patient limited by endurance  Activity Tolerance: pt limited with bed mobility and sit<>stand transfers mainly d/t R hip pain. pt became very angry when asked to amb to chair and refused. Patient Diagnosis(es): The encounter diagnosis was Closed fracture of right hip, initial encounter ([facility] [address]      has a past medical history of A-fib ([address] Acute ischemic stroke ([address] Acute metabolic encephalopathy, Acute on chronic congestive heart failure ([address] Acute on chronic diastolic CHF (congestive heart failure) ([address] Altered mental status, Anemia, Anemia associated with chronic renal failure, Aphasia, ARF (acute renal failure) (HCC), Arteriovenous fistula for hemodialysis in place, primary [facility]), Arthritis, Bradyarrhythmia, Cellulitis, Cerebral contusion ([address] CHF (congestive heart failure) ([address] Chronic atrial fibrillation ([address] Chronic kidney disease, Chronic pain of right knee, CKD (chronic kidney disease) stage 4, GFR 15-29 ml/min ([address] Coagulation defect ([address] Diabetes mellitus ([address] Diabetes mellitus type 2 in obese 5/15/2019). Restrictions  Restrictions/Precautions  Restrictions/Precautions: Weight Bearing  Required Braces or Orthoses?: No  Lower Extremity Weight Bearing Restrictions  Right Lower Extremity Weight Bearing: Weight Bearing As Tolerated  Position Activity Restriction  Hip Precautions: Posterior hip precautions  Other position/activity restrictions: danielle-hip R LE   Vision/Hearing  Vision: Within Functional Limits  Hearing: Within functional limits     Subjective  General  Chart Reviewed: Yes  Patient assessed for rehabilitation services?: Yes  Response To Previous Treatment: Not applicable  Family / Caregiver Present: Yes  Diagnosis: closed fracture of R hip  Follows Commands: Within Functional Limits  Subjective  Subjective: pt's wife was with him in room.  pt was agreeable to PT care  Pain Screening  Patient Currently in Pain: Yes  Pain Assessment  Pain Assessment: 0-10  Pain Level: 10  Pain Type: Acute pain  Pain Location: Hip  Pain Orientation: Right  Pain Frequency: Continuous  Functional Pain Assessment: Prevents or interferes some active activities and ADLs  Vital Signs  Patient Currently in Pain: Yes     Orientation  Orientation  Overall Orientation Status: Within Functional Limits  Social/Functional History  Social/Functional History  Lives With: Spouse  Type of Home: House  Home Layout: One level  Home Access: Level entry  Bathroom Shower/Tub: Tub/Shower unit  Bathroom Toilet: Standard  Bathroom Equipment: Tub transfer bench  Home Equipment: Treverie Pucker walker(pt wife reported pt using RW for short distances but primarily uses w/c )  Receives Help From: Family  ADL Assistance: Independent  Homemaking Assistance: Needs assistance  Homemaking Responsibilities: No(pt reported wife completes )  Ambulation Assistance: Needs assistance  Transfer Assistance: Independent  Active : No  Patient's  Info: wife   Mode of Transportation: Family  Additional Comments: pt wife reported being home majority of time   Cognition   Cognition  Overall Cognitive Status: WFL    Objective  Observation/Palpation  Posture: Fair  AROM RLE (degrees)  RLE AROM: WFL  RLE General AROM: pt's hip AROM was not tested d/t surgery restrictions  AROM LLE (degrees)  LLE AROM : WFL  AROM RUE (degrees)  RUE AROM : WFL  AROM LUE (degrees)  LUE AROM : WFL  Strength RLE  Strength RLE: WFL  Strength LLE  Strength LLE: WFL  Strength RUE  Strength RUE: WFL  Strength LUE  Strength LUE: WFL  Motor Control  Gross Motor?: WFL  Sensation  Overall Sensation Status: WFL  Bed mobility  Rolling to Left: Moderate assistance  Supine to Sit: Moderate assistance  Sit to Supine: Moderate assistance  Scooting: Moderate assistance;2 Person assistance  Transfers  Sit to Stand: (Mod Ax2)  Stand to sit: (Mod Ax2 )  Ambulation  Ambulation?: No(pt refused to amb to chair after standing at bedside transfer d/t pain)  Stairs/Curb  Stairs?: No     Balance  Posture: Poor  Sitting - Static: Fair  Sitting - Dynamic: Poor  Standing - Static: Poor      Plan   Plan  Times per week: 5-6 times per week  Times per day: Daily  Current Treatment Recommendations: Strengthening, ADL/Self-care Training, Gait Training, IADL Training, Balance Training, Equipment Evaluation, Education, & procurement, Neuromuscular Re-education, Functional Mobility Training, Transfer Training, Safety Education & Training, Endurance Training  Safety Devices  Type of devices: All fall risk precautions in place, Left in bed, Gait belt, Nurse notified, Call light within reach    G-Code     OutComes Score     AM-PAC Score  AM-PAC Inpatient Mobility Raw Score : 9  AM-PAC Inpatient T-Scale Score : 30.55  Mobility Inpatient CMS 0-100% Score: 81.38  Mobility Inpatient CMS G-Code Modifier : CM     Goals  Short term goals  Time Frame for Short term goals: 10 visits  Short term goal 1: pt able to complete bed rolling with MinAx1. Short term goal 2: pt able to complete supine<>sit with MinAx1.   Short

## 2019-05-16 NOTE — PROGRESS NOTES
Thu Sat   vancomycin (VANCOCIN) intermittent dosing (placeholder) RX Placeholder   ferrous sulfate EC tablet 325 mg Daily with breakfast   ARIPiprazole (ABILIFY) tablet 5 mg Daily   vitamin D (CHOLECALCIFEROL) tablet 1,000 Units Daily   isosorbide mononitrate (IMDUR) extended release tablet 30 mg Daily   lamoTRIgine (LAMICTAL) tablet 100 mg Daily   levothyroxine (SYNTHROID) tablet 50 mcg Daily   pantoprazole (PROTONIX) tablet 40 mg Daily   venlafaxine (EFFEXOR) tablet 75 mg Daily   sodium chloride flush 0.9 % injection 10 mL 2 times per day   sodium chloride flush 0.9 % injection 10 mL PRN   magnesium hydroxide (MILK OF MAGNESIA) 400 MG/5ML suspension 30 mL Daily PRN   nicotine (NICODERM CQ) 21 MG/24HR 1 patch Daily PRN   acetaminophen (TYLENOL) tablet 650 mg Q4H PRN   heparin (porcine) injection 5,000 Units 3 times per day   HYDROcodone-acetaminophen (NORCO) 5-325 MG per tablet 1 tablet Q4H PRN     0.9 % sodium chloride infusion Continuous     Labs:   CBC: Recent Labs     05/13/19  1426 05/14/19  0450 05/15/19  0345   WBC 4.7 4.8 4.4   RBC 2.97* 2.93* 2.88*   HGB 9.9* 9.3* 9.2*   HCT 29.4* 30.8* 30.5*   * 91* 91*   MPV 7.0 9.0 9.4      BMP: Recent Labs     05/14/19  0450 05/15/19  0345 05/16/19  0911    136 134*   K 4.2 5.6* 5.5*   CL 95* 96* 96*   CO2 25 27 21   BUN 57* 29* 45*   CREATININE 5.32* 3.96* 5.50*   GLUCOSE 139* 118* 202*   CALCIUM 8.7 8.8 7.9*        Dialysis bath: Dialysis Bath  K+ (Potassium): 2  Ca+ (Calcium): 2.25  Na+ (Sodium): 140  HCO3 (Bicarb): 30        Assessment:  1 ESRD:dialysis bath reviewed with nurse. 2.HTN:  3 DM:  4 right femoral neck fracture status post right hip hemiarthroplasty performed yesterday :  5.ANEMIA OF CHRONIC DISEASE:   6.SECONDARY HYPERPARATHYROIDISM:   7. Patient has a history of chronic A. Fib  8. Right thoracentesis done on this admission    Plan:  1. Wt removal and dialysis orders reviewed. 2.  Most likely will need to go to an ECF for rehab  3. Cont pt on aranesp and zemplar per protocol. 4. Follow up labs ordered. Please do not hesitate to call with questions.     Electronically signed by Billy Gorman MD on 5/16/2019 at 11:45 AM

## 2019-05-16 NOTE — PROGRESS NOTES
Dialysis Post Treatment Note  Patient tolerated treatment well. Denies complaints at time of discharge. Vitals:    05/16/19 1355   BP: (!) 113/49   Pulse: 82   Resp:    Temp: 97.4 °F (36.3 °C)   SpO2:      Pre-Weight = 106 kg  Post-weight = Weight: 231 lb 14.8 oz (105.2 kg)  Total Liters Processed = Total Liters Processed (l/min): 104.6 l/min  Rinseback Volume (mL) = Rinseback Volume (ml): 370 ml  Net Removal (mL) = @[id number]  Length of treatment= four hours    Pt tolerated tx well, pt was given norco b/c he was c/o pain, post tx, pt denies pain.

## 2019-05-16 NOTE — PROGRESS NOTES
Occupational Therapy Not Seen Note    DATE: 2019  Name: Edi Moore  : 1950  MRN: [id number]    Patient not available for Occupational Therapy due to:    Hemodialysis: pt leaving unit for dialysis     Next Scheduled Treatment: check back later as able or 19    Electronically signed by AMADOU Limon on 2019 at 9:14 AM

## 2019-05-16 NOTE — PROGRESS NOTES
kidney disease) stage 4, GFR 15-29 ml/min ([facility]), Coagulation defect ([address] Diabetes mellitus ([address] Diabetes mellitus type 2 in obese ([address] Diarrhea, DVT (deep venous thrombosis) ([facility]), Elevated C-reactive protein (CRP), ESRD (end stage renal disease) ([address] Essential hypertension, Fever, Foot ulcer due to secondary DM ([address] GERD (gastroesophageal reflux disease), Hematochezia, History of cerebral infarction, History of DVT (deep vein thrombosis), History of superior vena cava filter placement, Hx of blood clots, Hyperkalemia, Hyperlipidemia, Hypernatremia, Hypertension, Hypocalcemia, Hypovolemic shock ([address] Hypoxia, Irregular heartbeat, Knee effusion, right, Left leg cellulitis, Lower GI bleed, MDRO (multiple drug resistant organisms) resistance, Metabolic acidosis, MRSA (methicillin resistant staph aureus) culture positive, On continuous oral anticoagulation, NADIRA on CPAP, Other specified hypothyroidism, Parietal lobe infarction ([address] Pneumonia, Post traumatic encephalopathy, Proteinuria, Pyogenic arthritis of right knee joint ([address] Pyrexia, Renal insufficiency, Renal lesion, Respiratory failure, acute ([address] Right knee pain, SAH (subarachnoid hemorrhage) ([address] Secondary hyperparathyroidism of renal origin ([address] Seizure disorder ([address] Stercoral ulcer of rectum, Streptococcal infection, Subarachnoid bleed ([address] Subdural bleeding ([address] Thyroid disease, Traumatic cerebral hemorrhage ([address] Type 2 diabetes mellitus with left diabetic foot ulcer ([address] Unspecified cerebral artery occlusion with cerebral infarction, Venous stasis dermatitis, and Venous stasis dermatitis of both lower extremities. has a past surgical history that includes Gastric bypass surgery; Vena Cava Filter Placement; Foot Debridement (Left); Foot surgery (Right); Tonsillectomy; Colonoscopy; skin biopsy; Dilatation, esophagus; tracheostomy (summer 2013); Gastrostomy tube placement (summer 2013);  Abdomen surgery; Cardiac catheterization; Knee arthroscopy (Right, 03/23/2017); pr knee scope,diagnostic (Right, 3/23/2017); and HEMIARTHROPLASTY HIP (Right, 5/15/2019). Treatment Diagnosis: fall       Restrictions  Restrictions/Precautions  Restrictions/Precautions: Weight Bearing  Required Braces or Orthoses?: No  Lower Extremity Weight Bearing Restrictions  Right Lower Extremity Weight Bearing: Weight Bearing As Tolerated  Position Activity Restriction  Hip Precautions: Posterior hip precautions  Other position/activity restrictions: danielle-hip R LE     Subjective   General  Chart Reviewed: No  Patient assessed for rehabilitation services?: Yes  Family / Caregiver Present: Yes(pt wife present for duration of session)  Diagnosis: fall  General Comment  Comments: RN ok'd for therapy this afternoon. Pt agreeable to participate in session. Pain Assessment  Pain Assessment: 0-10  Pain Level: 8  Pain Type: Acute pain  Pain Location: Hip  Pain Orientation: Right  Pain Frequency: Continuous  Functional Pain Assessment: Prevents or interferes some active activities and ADLs  Non-Pharmaceutical Pain Intervention(s): Therapeutic presence;Distraction; Emotional support; Ambulation/Increased Activity  Response to Pain Intervention: Patient Satisfied    Oxygen Therapy  O2 Device: None (Room air)     Social/Functional History  Social/Functional History  Lives With: Spouse  Type of Home: House  Home Layout: One level  Home Access: Level entry  Bathroom Shower/Tub: Tub/Shower unit  Bathroom Toilet: Standard  Bathroom Equipment: Tub transfer bench  Home Equipment: Thelma Milks walker(pt wife reported pt using RW for short distances but primarily uses w/c )  Receives Help From: Family  ADL Assistance: Independent  Homemaking Assistance: Needs assistance  Homemaking Responsibilities: No(pt reported wife completes )  Ambulation Assistance: Needs assistance  Transfer Assistance: Independent  Active : No  Patient's  Info: wife   Mode of Transportation: Family  Additional Comments: pt wife reported being home majority of time      Objective   Balance  Sitting Balance: Contact guard assistance(~10 minutes on EOB )  Standing Balance: Moderate assistance(with RW)  Standing Balance  Time: ~3 minutes  Activity: pt stood in order to complete-pericare  Comment: pt unable to remained standing in order to change sheets so pt completed rolling in bed to change sheets. Pt would not attempt stand-pivot to chair due to pain      ADL  Feeding: Supervision  Grooming: Supervision  UE Bathing: Minimal assistance  LE Bathing: Maximum assistance  UE Dressing: Minimal assistance  LE Dressing: Maximum assistance(pt unable to don bilateral socks so writer assisted in completing )  Toileting: Maximum assistance(writer assisted with yao-care while pt standing due to accident in bed. )  Tone RUE  RUE Tone: Normotonic  Tone LUE  LUE Tone: Normotonic  Coordination  Movements Are Fluid And Coordinated: Yes     Bed mobility  Rolling to Left: Moderate assistance  Supine to Sit: Moderate assistance  Sit to Supine: Moderate assistance  Scooting: Moderate assistance;2 Person assistance  Transfers  Sit to stand:  Moderate assistance;2 Person assistance  Stand to sit: Moderate assistance;2 Person assistance  Transfer Comments: with RW      Cognition  Overall Cognitive Status: WFL     Sensation  Overall Sensation Status: WFL      LUE AROM : WFL  Left Hand AROM: WFL  RUE AROM : WFL  Right Hand AROM: WFL    LUE Strength  Gross LUE Strength: Exceptions to [place]  L Hand Grasp: 4/5  LUE Strength Comment: overall muscle strength 4/5   RUE Strength  Gross RUE Strength: Exceptions to [place]  R Hand Grasp: 4/5  RUE Strength Comment: overall muscle strength 4/5       Plan   Plan  Times per week: 4-6x/wk    AM-PAC Score   AM-[facility] Inpatient Daily Activity Raw Score: 15  AM-PAC Inpatient ADL T-Scale Score : 34.69  ADL Inpatient CMS 0-100% Score: 56.46  ADL Inpatient CMS G-Code Modifier : CK    Goals  Short term goals  Time Frame for Short term goals: pt will, by discharge  Short term goal 1: dem CGA during bed mobility in order to increase independence   Short term goal 2: dem min A during functional transfers/functional mobility with LRD   Short term goal 3: dem understanding of 1-2 non-medication pain   Short term goal 4: complete LB ADLs and toileting tasks with mod A, set up and AE/modified tech  Short term goal 5: complete UB ADLs and grooming tasks with supervision and set up  Short term goal 6: increase activity tolerance to 20+ minutes in order to participate in ADLs  Short term goal 7: dem 5+ minutes dynamic/static standing tolernace with LRD and min A in order to increase ability to complete functional tasks      Therapy Time   Individual Concurrent Group Co-treatment   Time In 1506         Time Out 1527         Minutes 21               Harsh Kumari OTR/L

## 2019-05-16 NOTE — PROGRESS NOTES
[facility]      Daily Progress Note     Admit Date: 5/13/2019  Bed/Room No.  0340/5091-05  Admitting Physician : Dada Reyez MD  Code Status :[address] Day:  LOS: 3 days   Chief Complaint:     Chief Complaint   Patient presents with    Fall     c/o right hip,right knee, and right buttucks pain     Principal Problem:    Closed fracture of right hip ([address]  Active Problems:    Diabetes mellitus ([facility]    Chronic atrial fibrillation ([address]    Other specified hypothyroidism    Chronic diastolic congestive heart failure ([address]    Seizure disorder ([facility]    ESRD (end stage renal disease) ([facility]    Essential hypertension    History of DVT (deep vein thrombosis)    Closed right hip fracture, initial encounter ([facility]  Resolved Problems:    * No resolved hospital problems. *    Subjective : Interval History/Significant events :  05/16/19    Patient is confused and agitated. He is c/o pain in R hip . Seen in hemodialysis. No other changes. No weakness . Breathing stable , no fever or chills . Denies any N/V/Abd pain / urinary difficulty. Vitals - Stable afebrile  Labs - stable ,     Nursing notes , Consults notes reviewed. Overnight events and updates discussed with Nursing staff . Background History:         Rere Haile is 68 y.o. male  Who was admitted to the hospital on 5/13/2019 for treatment of Closed fracture of right hip ([address] Patient was transferred from [facility] emergency room where he presented with fall while getting from wheelchair. Patient fell on right side and immediately started having pain and right hip. X-ray hip showed acute fracture. Chest x-ray was performed and showed right pleural effusion. Patient has history of ESRD on hemodialysis, CHF, atrial fibrillation on long-term anticoagulation with Eliquis. He underwent thoracentesis on 5/13/19 and was transudative.   Patient was evaluated by orthopedic surgery and had right hip hemiarthroplasty on 5/15/19.     PMH:  Past Medical History:   Diagnosis Date    A-fib [facility])     Acute ischemic stroke ([address] 7/13/2013    Acute metabolic encephalopathy 7/7/4290    Acute on chronic congestive heart failure ([address] 5/9/2014    Acute on chronic diastolic CHF (congestive heart failure) ([address] 2/25/2017    Altered mental status 8/24/2013    Anemia     Anemia associated with chronic renal failure 5/28/2014    Aphasia 8/24/2013    ARF (acute renal failure) ([address] 5/28/2014    From pre renal azotemia from newly added diuretic and ARB use, creat peaked at 2.8 from 2 in may 2014    Arteriovenous fistula for hemodialysis in place, primary ([address] 11/17/2017    Arthritis     Bradyarrhythmia 7/13/2013    Cellulitis 9/17/2018    Cerebral contusion ([address] 7/7/2013    CHF (congestive heart failure) (HCC)     Chronic atrial fibrillation (HCC) 7/6/2013    Chronic kidney disease     Chronic pain of right knee     CKD (chronic kidney disease) stage 4, GFR 15-29 ml/min ([address] 5/28/2014    From diabetic and ischemic nephrosclerosis, creat now 2-2.5, GFR 25-30 ml/min    Coagulation defect ([address] 7/6/2013    Diabetes mellitus ([address]     Diabetes mellitus type 2 in obese ([address]     Diarrhea 7/16/2013    [facility] ([facility] venous thrombosis) ([facility]     Elevated C-reactive protein (CRP)     ESRD (end stage renal disease) ([address] 3/23/2017    Essential hypertension 3/23/2017    Fever 7/18/2013    Foot ulcer due to secondary DM ([address] 5/28/2014    Left side on antibiotics    GERD (gastroesophageal reflux disease)     Hematochezia 3/4/2017    History of cerebral infarction 3/1/2017    History of DVT (deep vein thrombosis) 9/17/2018    History of superior vena cava filter placement 7/18/2013    Hx of blood clots     Hyperkalemia 7/16/2013    Hyperlipidemia     Hypernatremia 7/11/2013    Hypertension     Hypocalcemia 7/18/2013    Hypovolemic shock ([address] 3/4/2017    Hypoxia     Irregular heartbeat     hx of a-fib    Knee effusion, right 2/25/2017    Left leg cellulitis     Lower GI bleed 3/4/2017    MDRO (multiple drug resistant organisms) resistance     Metabolic acidosis 9/39/8385    MRSA (methicillin resistant staph aureus) culture positive 09/17/2018    leg    On continuous oral anticoagulation 9/17/2018    NADIRA on CPAP     Other specified hypothyroidism 8/24/2013    Parietal lobe infarction ([address] 8/26/2013    Pneumonia     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    Proteinuria 11/30/2016    Fluctuates between 2-3 grams, cant use ACEI due to hyperkalemia    Pyogenic arthritis of right knee joint ([address] 3/23/2017    Pyrexia 7/19/2013    Renal insufficiency     Renal lesion 3/3/2017    Respiratory failure, acute ([address] 7/7/2013    Right knee pain     SAH (subarachnoid hemorrhage) ([facility])     Secondary hyperparathyroidism of renal origin ([address] 12/16/2015    Seizure disorder ([address] 3/1/2017    Stercoral ulcer of rectum     Streptococcal infection     Subarachnoid bleed ([address] 7/13/2013    Subdural bleeding ([address] 7/5/2013    Thyroid disease     Traumatic cerebral hemorrhage ([address] 7/7/2013    Type 2 diabetes mellitus with left diabetic foot ulcer ([address] 4/16/2014    Unspecified cerebral artery occlusion with cerebral infarction     Venous stasis dermatitis 5/28/2014    Venous stasis dermatitis of both lower extremities 5/28/2014      Allergies:    Allergies   Allergen Reactions    Bactrim [Sulfamethoxazole-Trimethoprim]       Medications :    vancomycin 10 mg/kg Intravenous Once per day on Tue Thu Sat   vancomycin (VANCOCIN) intermittent dosing (placeholder)  Other RX Placeholder   ferrous sulfate 325 mg Oral Daily with breakfast   ARIPiprazole 5 mg Oral Daily   vitamin D 1,000 Units Oral Daily   isosorbide mononitrate 30 mg Oral Daily   lamoTRIgine 100 mg Oral Daily   levothyroxine 50 mcg Oral Daily   pantoprazole 40 mg Oral Daily   venlafaxine 75 mg Oral Daily   sodium chloride flush 10 mL Intravenous 2 times per day kg)   05/15/19 0730 120/60 98.2 °F (36.8 °C) Oral 80 18 98 % -- --     Intake / output   05/14 2301 - 05/15 2300  In: 610 [I.V.:610]  Out: 300 [Urine:100]  Physical Exam:  Physical Exam   Constitutional: He is oriented to person, place, and time. He appears well-developed and well-nourished. No distress. HENT:   Mouth/Throat: Oropharynx is clear and moist. No oropharyngeal exudate. Eyes: Pupils are equal, round, and reactive to light. No scleral icterus. Neck: Neck supple. No JVD present. No tracheal deviation present. No thyromegaly present. Cardiovascular: Normal rate, regular rhythm, normal heart sounds and intact distal pulses. Exam reveals no gallop and no friction rub. No murmur heard. Pulmonary/Chest: Effort normal and breath sounds normal. No respiratory distress. He has no wheezes. He has no rales. He exhibits no tenderness. Abdominal: Soft. Bowel sounds are normal. He exhibits no mass. There is no tenderness. There is no rebound and no guarding. Abdominal incisional surgical scar    Musculoskeletal:   Partial amputation R 1st and 2nd Toe and complete 5th Toe. Left Arm Fistula cannulated for dialysis . Lymphadenopathy:     He has no cervical adenopathy. Neurological: He is alert and oriented to person, place, and time. No cranial nerve deficit. He exhibits normal muscle tone. Skin: Skin is warm. No rash noted. He is not diaphoretic. Psychiatric: He has a normal mood and affect. His behavior is normal.   Nursing note and vitals reviewed.     Lower Extremities : bilateral lower ext lymphedema , No calf Tenderness     Laboratory findings:    Recent Labs     05/13/19  1426 05/14/19  0450 05/15/19  0345   WBC 4.7 4.8 4.4   HGB 9.9* 9.3* 9.2*   HCT 29.4* 30.8* 30.5*   * 91* 91*   INR 1.2 1.1  --      Recent Labs     05/13/19  1426 05/14/19  0450 05/15/19  0345    136 136   K 4.8 4.2 5.6*   CL 95* 95* 96*   CO2 26 25 27   GLUCOSE 192* 139* 118*   BUN 54* 57* 29*   CREATININE 5.60* 5.32* 3.96*   CALCIUM 9.1 8.7 8.8     Recent Labs     05/13/19  2140 05/14/19  0450   PROT 6.9  --    TSH  --  2.33     --           Specific Gravity, UA   Date Value Ref Range Status   03/03/2017 1.020 1.005 - 1.030 Final     Protein, UA   Date Value Ref Range Status   03/03/2017 2+ (A) NEG Final     RBC, UA   Date Value Ref Range Status   03/03/2017 0 TO 2 0 - 2 /HPF Corrected     Comment:     CORRECTED ON 03/04 AT 2417: PREVIOUSLY REPORTED AS 10 TO 20 Reference range   defined for non centrifuged specimen. Bacteria, UA   Date Value Ref Range Status   03/03/2017 NOT REPORTED NONE Final     Nitrite, Urine   Date Value Ref Range Status   03/03/2017 NEGATIVE NEG Final     WBC, UA   Date Value Ref Range Status   03/03/2017 5 TO 10 0 - 5 /HPF Corrected     Comment:     CORRECTED ON 03/04 AT 7450: PREVIOUSLY REPORTED AS TOO NUMEROUS TO COUNT     Leukocyte Esterase, Urine   Date Value Ref Range Status   03/03/2017 SMALL (A) NEG Final     Echocardiogram 2/27/17  EF 53-14%, grade 2 diastolic dysfunction. Right atrium moderately dilated. Right ventricle is mildly dilated with decreased systolic function. RVSP 24 mmHg. Chest x-ray 5/14/19  Reduce right effusion via thoracentesis.  No postprocedural pneumothorax. Cardiomegaly is noted.  Vascularity appears prominent.  There is opacity at   the right base likely atelectasis. X-ray right femur and 5/14/19  Impacted subcapital femoral neck fracture.       Advanced arthropathy in the knee. Clinical Course : unchanged  Assessment and Plan  :        1. Right subcapital femoral neck fracture s/p right hip hemiarthroplasty 5/15/19  2. Right pleural effusion s/p thoracentesis - transudative. 3. ESRD on HD - TTS schedule. 4. Left arm cellulitis at AV fistula site s/p LISSETTE drain - drain pulled by vascular surgery. Follow-up with Dr. Donnie Treadwell in 1 week. 5. Chronic diastolic CHF -   6. H/o DVT s/p IVC filter   7.  Type 2 diabetes mellitus - stable blood sugar . 8. Paroxysmal atrial fibrillation - on long-term anticoagulation with Eliquis  9. Essential hypertension - well controlled. PT OT   Limit pain medications and benzodiazepines. Continue to monitor vitals , Intake / output ,  Cell count , HGB , Kidney function, oxygenation  as indicated . Plan and updates discussed with patient ,  answers  explained to satisfaction.    Plan discussed with Staff  RN     (Please note that portions of this note were completed with a voice recognition program. Efforts were made to edit the dictations but occasionally words are mis-transcribed.)      Ioana Ray MD  5/16/2019

## 2019-05-16 NOTE — PROGRESS NOTES
Pharmacy Note  Vancomycin Consult    Edi Moore is a 68 y.o. male started on Vancomycin for post operative coverage; consult received from Dr. Girma Olivera  to manage therapy. Also receiving the following antibiotics: none.     Patient Active Problem List   Diagnosis    Subdural bleeding ([address]    Diabetes mellitus ([address]    Coagulation defect ([address]    Chronic atrial fibrillation (HCC)    Respiratory failure, acute ([address]    Traumatic cerebral hemorrhage (HCC)    Cerebral contusion ([facility])    Pneumonia    Hypernatremia    Post traumatic encephalopathy    Subarachnoid bleed (HCC)    Acute ischemic stroke (HCC)    Metabolic acidosis    Bradyarrhythmia    Hyperkalemia    Diarrhea    Deep vein thrombosis (DVT) of left lower extremity ([facility])    Hypocalcemia    Fever    History of superior vena cava filter placement    Pyrexia    Anemia    Altered mental status    Aphasia    Other specified hypothyroidism    Parietal lobe infarction    Type 2 diabetes mellitus with left diabetic foot ulcer ([facility])    Acute on chronic congestive heart failure ([facility])    NADIRA on CPAP    CKD (chronic kidney disease) stage 4, GFR 15-29 ml/min ([facility])    Edema    Venous stasis dermatitis of both lower extremities    Foot ulcer due to secondary DM ([address]    Anemia associated with chronic renal failure    Secondary hyperparathyroidism of renal origin ([address]    Proteinuria    Chronic diastolic congestive heart failure ([facility])    Knee effusion, right    Hypoxia    Chronic pain of right knee    Diabetes mellitus type 2 in obese ([facility])    Acute metabolic encephalopathy    Seizure disorder ([address]    History of cerebral infarction    Renal lesion    Lower GI bleed    Hypovolemic shock ([facility])    Hematochezia    Anemia    Stercoral ulcer of rectum    Pyogenic arthritis of right knee joint ([facility])    ESRD (end stage renal disease) ([address]    Essential hypertension    Right knee pain    Cellulitis    History of DVT (deep vein thrombosis)    On continuous oral anticoagulation Elevated C-reactive protein (CRP)    Left leg cellulitis    MRSA infection    Streptococcal infection    Arteriovenous fistula for hemodialysis in place, primary [facility])    Closed right hip fracture, initial encounter [facility])    Closed fracture of right hip (HCC)       Allergies:  Bactrim [sulfamethoxazole-trimethoprim]     Temp max: 99.1    Recent Labs     05/14/19  0450 05/15/19  0345   BUN 57* 29*       Recent Labs     05/14/19  0450 05/15/19  0345   CREATININE 5.32* 3.96*       Recent Labs     05/14/19  0450 05/15/19  0345   WBC 4.8 4.4         Intake/Output Summary (Last 24 hours) at 5/15/2019 2143  Last data filed at 5/15/2019 4673  Gross per 24 hour   Intake 610 ml   Output 300 ml   Net 310 ml       Culture Date      Source                       Results      Ht Readings from Last 1 Encounters:   05/15/19 6' (1.829 m)        Wt Readings from Last 1 Encounters:   05/15/19 233 lb (105.7 kg)         Body mass index is 31.6 kg/m². Estimated Creatinine Clearance: 21 mL/min (A) (based on SCr of 3.96 mg/dL (H)). Goal Trough Level: 10-20 mcg/mL    Assessment/Plan:  Will initiate vancomycin 1500 mg IV for one dose followed by 1000 mg IV every Tues, Thurs and Saturday following hemodialysis. Timing of trough level will be determined based on culture results, renal function, and clinical response. Thank you for the consult. Will continue to follow.     Yoselin Cleary [facility]  5/15/2019 9:46 PM

## 2019-05-17 LAB
APPEARANCE FLUID: NORMAL
BASO FLUID: NORMAL %
COLOR FLUID: NORMAL
EOSINOPHIL FLUID: NORMAL %
FLUID DIFF COMMENT: NORMAL
LYMPHOCYTES, BODY FLUID: 89 %
MONOCYTE, FLUID: NORMAL %
NEUTROPHIL, FLUID: 0 %
OTHER CELLS FLUID: NORMAL %
RBC FLUID: NORMAL /MM3
SPECIMEN TYPE: NORMAL
WBC FLUID: 229 /MM3

## 2019-05-17 PROCEDURE — 6360000002 HC RX W HCPCS: Performed by: STUDENT IN AN ORGANIZED HEALTH CARE EDUCATION/TRAINING PROGRAM

## 2019-05-17 PROCEDURE — 99232 SBSQ HOSP IP/OBS MODERATE 35: CPT | Performed by: FAMILY MEDICINE

## 2019-05-17 PROCEDURE — 6370000000 HC RX 637 (ALT 250 FOR IP): Performed by: STUDENT IN AN ORGANIZED HEALTH CARE EDUCATION/TRAINING PROGRAM

## 2019-05-17 PROCEDURE — 97535 SELF CARE MNGMENT TRAINING: CPT

## 2019-05-17 PROCEDURE — 6370000000 HC RX 637 (ALT 250 FOR IP): Performed by: FAMILY MEDICINE

## 2019-05-17 PROCEDURE — 1200000000 HC SEMI PRIVATE

## 2019-05-17 PROCEDURE — 97530 THERAPEUTIC ACTIVITIES: CPT

## 2019-05-17 PROCEDURE — 2580000003 HC RX 258: Performed by: STUDENT IN AN ORGANIZED HEALTH CARE EDUCATION/TRAINING PROGRAM

## 2019-05-17 RX ORDER — TRAMADOL HYDROCHLORIDE 50 MG/1
25 TABLET ORAL EVERY 4 HOURS PRN
Status: DISCONTINUED | OUTPATIENT
Start: 2019-05-17 | End: 2019-05-18 | Stop reason: HOSPADM

## 2019-05-17 RX ADMIN — HEPARIN SODIUM 5000 UNITS: 5000 INJECTION INTRAVENOUS; SUBCUTANEOUS at 15:21

## 2019-05-17 RX ADMIN — TRAMADOL HYDROCHLORIDE 25 MG: 50 TABLET, FILM COATED ORAL at 15:17

## 2019-05-17 RX ADMIN — ISOSORBIDE MONONITRATE 30 MG: 30 TABLET ORAL at 08:23

## 2019-05-17 RX ADMIN — FERROUS SULFATE TAB EC 325 MG (65 MG FE EQUIVALENT) 325 MG: 325 (65 FE) TABLET DELAYED RESPONSE at 08:23

## 2019-05-17 RX ADMIN — HEPARIN SODIUM 5000 UNITS: 5000 INJECTION INTRAVENOUS; SUBCUTANEOUS at 05:53

## 2019-05-17 RX ADMIN — PANTOPRAZOLE SODIUM 40 MG: 40 TABLET, DELAYED RELEASE ORAL at 08:23

## 2019-05-17 RX ADMIN — TRAMADOL HYDROCHLORIDE 25 MG: 50 TABLET, FILM COATED ORAL at 10:52

## 2019-05-17 RX ADMIN — VENLAFAXINE 75 MG: 75 TABLET ORAL at 10:54

## 2019-05-17 RX ADMIN — Medication 10 ML: at 20:46

## 2019-05-17 RX ADMIN — TRAMADOL HYDROCHLORIDE 25 MG: 50 TABLET, FILM COATED ORAL at 21:14

## 2019-05-17 RX ADMIN — VITAMIN D, TAB 1000IU (100/BT) 1000 UNITS: 25 TAB at 08:23

## 2019-05-17 RX ADMIN — HYDROCODONE BITARTRATE AND ACETAMINOPHEN 1 TABLET: 5; 325 TABLET ORAL at 05:53

## 2019-05-17 RX ADMIN — LEVOTHYROXINE SODIUM 50 MCG: 50 TABLET ORAL at 08:23

## 2019-05-17 RX ADMIN — HEPARIN SODIUM 5000 UNITS: 5000 INJECTION INTRAVENOUS; SUBCUTANEOUS at 20:46

## 2019-05-17 RX ADMIN — LAMOTRIGINE 100 MG: 100 TABLET ORAL at 08:23

## 2019-05-17 RX ADMIN — ARIPIPRAZOLE 5 MG: 5 TABLET ORAL at 08:23

## 2019-05-17 ASSESSMENT — PAIN SCALES - PAIN ASSESSMENT IN ADVANCED DEMENTIA (PAINAD)
TOTALSCORE: 0
BREATHING: 0
NEGVOCALIZATION: 0
CONSOLABILITY: 0
FACIALEXPRESSION: 0
NEGVOCALIZATION: 0
BODYLANGUAGE: 0
BODYLANGUAGE: 0
TOTALSCORE: 0
FACIALEXPRESSION: 0
BREATHING: 0
CONSOLABILITY: 0
BODYLANGUAGE: 0
CONSOLABILITY: 0
BREATHING: 0
TOTALSCORE: 0
BREATHING: 0
BODYLANGUAGE: 0
BREATHING: 0
NEGVOCALIZATION: 0
BODYLANGUAGE: 0
NEGVOCALIZATION: 0
TOTALSCORE: 0
BODYLANGUAGE: 0
FACIALEXPRESSION: 0
FACIALEXPRESSION: 0
BREATHING: 0
FACIALEXPRESSION: 0
CONSOLABILITY: 0
TOTALSCORE: 0
FACIALEXPRESSION: 0
CONSOLABILITY: 0
NEGVOCALIZATION: 0
TOTALSCORE: 0
CONSOLABILITY: 0
NEGVOCALIZATION: 0

## 2019-05-17 ASSESSMENT — PAIN SCALES - GENERAL
PAINLEVEL_OUTOF10: 6
PAINLEVEL_OUTOF10: 8
PAINLEVEL_OUTOF10: 5
PAINLEVEL_OUTOF10: 6
PAINLEVEL_OUTOF10: 8
PAINLEVEL_OUTOF10: 8
PAINLEVEL_OUTOF10: 5

## 2019-05-17 ASSESSMENT — ENCOUNTER SYMPTOMS
COUGH: 0
DIARRHEA: 0
VOMITING: 0
CONSTIPATION: 0
SHORTNESS OF BREATH: 0
ABDOMINAL PAIN: 0
SINUS PRESSURE: 0
SORE THROAT: 0
NAUSEA: 0
VOICE CHANGE: 0
WHEEZING: 0
BLOOD IN STOOL: 0

## 2019-05-17 ASSESSMENT — PAIN DESCRIPTION - FREQUENCY: FREQUENCY: CONTINUOUS

## 2019-05-17 ASSESSMENT — PAIN DESCRIPTION - LOCATION
LOCATION: HIP
LOCATION: HIP;LEG

## 2019-05-17 ASSESSMENT — PAIN DESCRIPTION - PAIN TYPE
TYPE: ACUTE PAIN

## 2019-05-17 ASSESSMENT — PAIN DESCRIPTION - ORIENTATION
ORIENTATION: RIGHT
ORIENTATION: RIGHT

## 2019-05-17 NOTE — PROGRESS NOTES
Physical Therapy  Facility/Department: [address] ORTHO/MED SURG  Daily Treatment Note  NAME: Andres Triplett  : 1950  MRN: [id number]    Date of Service: 2019    Discharge Recommendations: Further therapy recommended at discharge. Patient Diagnosis(es): The encounter diagnosis was Closed fracture of right hip, initial encounter ([facility]      has a past medical history of A-fib ([facility] Acute ischemic stroke ([facility] Acute metabolic encephalopathy, Acute on chronic congestive heart failure ([facility] Acute on chronic diastolic CHF (congestive heart failure) ([facility] Altered mental status, Anemia, Anemia associated with chronic renal failure, Aphasia, ARF (acute renal failure) ([facility]), Arteriovenous fistula for hemodialysis in place, [facility]), Arthritis, Bradyarrhythmia, Cellulitis, Cerebral contusion ([facility]), CHF (congestive heart failure) ([facility] Chronic atrial fibrillation ([facility]), Chronic kidney disease, Chronic pain of right knee, CKD (chronic kidney disease) stage 4, GFR 15-29 ml/min ([facility] Coagulation defect ([facility] Diabetes mellitus ([facility] Diabetes mellitus type 2 in obese ([facility] Diarrhea, DVT (deep venous thrombosis) ([facility]), Elevated C-reactive protein (CRP), ESRD (end stage renal disease) ([facility] Essential hypertension, Fever, Foot ulcer due to secondary DM ([facility] GERD (gastroesophageal reflux disease), Hematochezia, History of cerebral infarction, History of DVT (deep vein thrombosis), History of superior vena cava filter placement, Hx of blood clots, Hyperkalemia, Hyperlipidemia, Hypernatremia, Hypertension, Hypocalcemia, Hypovolemic shock ([facility] Hypoxia, Irregular heartbeat, Knee effusion, right, Left leg cellulitis, Lower GI bleed, MDRO (multiple drug resistant organisms) resistance, Metabolic acidosis, MRSA (methicillin resistant staph aureus) culture positive, On continuous oral anticoagulation, NADIRA on CPAP, Other specified hypothyroidism, Parietal lobe infarction ([facility] Pneumonia, Post traumatic WNL  Objective   Bed mobility  Supine to Sit: Moderate assistance  Sit to Supine: Moderate assistance  Scooting: Moderate assistance  Transfers  Sit to Stand: Moderate Assistance;2 Person Assistance  Stand to sit: Moderate Assistance;2 Person Assistance  Bed to Chair: 2 Person Assistance;Maximum assistance  Comment: pt very fearful of falling during bed<>chair transfer. pt attempted to sit down before completing transfer  Ambulation  Ambulation?: No  Stairs/Curb  Stairs?: No     Balance  Posture: Fair  Sitting - Static: Fair  Sitting - Dynamic: Fair  Standing - Static: Poor  Standing - Dynamic: Poor    There Ex: R knee pumps x 10  R Ankle pumps x10  Pt transfer from bed to chair with Max Ax2    Assessment      Activity Tolerance  Activity Tolerance: Patient limited by pain; Patient limited by fatigue;Patient limited by endurance  Activity Tolerance: pt limited with bed mobility and sit<>stand transfers mainly d/t R hip pain. pt very fearful of falls when standing. G-Code     OutComes Score     -PAC Score  AM-PAC Inpatient Mobility Raw Score : 9  AM-[facility] Inpatient T-Scale Score : 30.55  Mobility Inpatient CMS 0-100% Score: 81.38  Mobility Inpatient CMS G-Code Modifier : CM     Goals  Short term goals  Time Frame for Short term goals: 10 visits  Short term goal 1: pt able to complete bed rolling with MinAx1. Short term goal 2: pt able to complete supine<>sit with MinAx1. Short term goal 3: pt able to transfer sit<>stand with ModAx1. Short term goal 4: pt able to amb 50ft with MinAx1 using a RW.   Patient Goals   Patient goals : return home with wife    Plan    Plan  Times per week: 5-6 times per week  Times per day: Daily  Current Treatment Recommendations: Strengthening, ADL/Self-care Training, Gait Training, IADL Training, Balance Training, Equipment Evaluation, Education, & procurement, Neuromuscular Re-education, Functional Mobility Training, Transfer Training, Safety Education & Training, Endurance Training  Safety Devices  Type of devices:  All fall risk precautions in place, Left in bed, Gait belt, Nurse notified, Call light within reach     Therapy Time   Individual Concurrent Group Co-treatment   Time In 0830         Time Out 0853         Minutes 23            2 of [facility]

## 2019-05-17 NOTE — PROGRESS NOTES
Progress Note    Patient:  Sandro Holden  YOB: 1950     68 y.o. male    Subjective:  Patient seen and examined at bedside this morning. No new complaints or concerns per patient this morning. Per note, pt agitated yesterday, also went for HD session. Pain well-controlled right hip. Reports mild numbness, tingling top of right foot. Denies: fever/chills, HA, CP, SOB, N/V, dysuria. +BM/+flatus. PT: sit to stand, no ambulation    Objective:   Vitals:    05/16/19 2322   BP: (!) 96/43   Pulse: 93   Resp: 20   Temp: 98.2 °F (36.8 °C)   SpO2:      Gen: NAD, cooperative  MSK-RLE: Dressing in place to right hip, c/d/i with mild serosanguinous drainage noted. Dressing removed, wound examined. Staples in place. Wound well approximated. No erythema or discharge. No signs of infection. No ecchymoses, abrasions, deformity, or lacerations. Appropriate post-op TTP about hip. Compartments soft and compressible. Patient able to wiggle remaining toes of right foot. Sural, saphenous, superificial/deep peroneal, and plantar nerve distribution SILT. foot warm and perfused. Recent Labs     05/15/19  0345 05/16/19  0911   WBC 4.4  --    HGB 9.2*  --    HCT 30.5*  --    PLT 91*  --     134*   K 5.6* 5.5*   BUN 29* 45*   CREATININE 3.96* 5.50*   GLUCOSE 118* 202*     Meds: Heparin   See rec for complete list    Plan: 68 y.o. male w/ R FNF s/p R hip hemiarthroplasty, POD#2    -WBAT  -Completed 24hr post op vanc  -Plan for dressing change today, 5/17  -Pain control and medical management per primary team.  -Tolerating PO intake. -Voiding well. -Ice (20 min, 1 hour off) for help with edema/pain control.  -Encourage deep breathing and IS. -DVT ppx: Heparin 5000 TID and EPC, recommend 4 weeks of post-op DVT ppx, typically Lovenox post-op though defer to medicine in light of other medical comorbidities. -PT/OT  -Please page Ortho with any questions or concerns.     Ghanshyam Lunsford, DO  PGY-1 Orthopedic Surgery  5:10 AM 5/17/2019         PGY-3 Addendum    Patient seen and examined. Agree with above. Patient doing well overall. Agitated yesterday. HD completed yesterday. Dressing changes this AM. Okay for daily dressing changes PRN. Okay to be discharged from orthopedic standpoint once able to mobilize. 4 weeks chemical AC at discharge. Please page with questions.        Jennie Villa DO PGY-3  Orthopedic Surgery Resident  [id number] [facility]

## 2019-05-17 NOTE — PROGRESS NOTES
[facility]      Daily Progress Note     Admit Date: 5/13/2019  Bed/Room No.  3952/1826-38  Admitting Physician : Nikita Nix MD  Code Status :[address] Day:  LOS: 4 days   Chief Complaint:     Chief Complaint   Patient presents with    Fall     c/o right hip,right knee, and right buttucks pain     Principal Problem:    Closed fracture of right hip ([address]  Active Problems:    Diabetes mellitus ([facility]    Chronic atrial fibrillation ([address]    Other specified hypothyroidism    Chronic diastolic congestive heart failure ([address]    Seizure disorder ([facility]    ESRD (end stage renal disease) ([facility]    Essential hypertension    History of DVT (deep vein thrombosis)    Closed right hip fracture, initial encounter ([facility]  Resolved Problems:    * No resolved hospital problems. *    Subjective : Interval History/Significant events :  05/17/19    Patient continues to be confused. Meghna Kalie He used BPAP last night . not agitated . Patient is breathing OK , no distress . Moving all extremities and speech is normal;     Vitals - Stable low BP afebrile  Labs - stable ,     Nursing notes , Consults notes reviewed. Overnight events and updates discussed with Nursing staff . Background History:         Janneth Esteban is 68 y.o. male  Who was admitted to the hospital on 5/13/2019 for treatment of Closed fracture of right hip ([facility] Patient was transferred from [facility] emergency room where he presented with fall while getting from wheelchair. Patient fell on right side and immediately started having pain and right hip. X-ray hip showed acute fracture. Chest x-ray was performed and showed right pleural effusion. Patient has history of ESRD on hemodialysis, CHF, atrial fibrillation on long-term anticoagulation with Eliquis. He underwent thoracentesis on 5/13/19 and was transudative. Patient was evaluated by orthopedic surgery and had right hip hemiarthroplasty on 5/15/19.     PMH:  Past Medical History:   Diagnosis Date    A-fib [facility])     Acute ischemic stroke ([address] 7/13/2013    Acute metabolic encephalopathy 8/1/4474    Acute on chronic congestive heart failure ([address] 5/9/2014    Acute on chronic diastolic CHF (congestive heart failure) ([address] 2/25/2017    Altered mental status 8/24/2013    Anemia     Anemia associated with chronic renal failure 5/28/2014    Aphasia 8/24/2013    ARF (acute renal failure) ([address] 5/28/2014    From pre renal azotemia from newly added diuretic and ARB use, creat peaked at 2.8 from 2 in may 2014    Arteriovenous fistula for hemodialysis in place, primary ([address] 11/17/2017    Arthritis     Bradyarrhythmia 7/13/2013    Cellulitis 9/17/2018    Cerebral contusion ([address] 7/7/2013    CHF (congestive heart failure) (HCC)     Chronic atrial fibrillation (HCC) 7/6/2013    Chronic kidney disease     Chronic pain of right knee     CKD (chronic kidney disease) stage 4, GFR 15-29 ml/min ([address] 5/28/2014    From diabetic and ischemic nephrosclerosis, creat now 2-2.5, GFR 25-30 ml/min    Coagulation defect ([address] 7/6/2013    Diabetes mellitus ([address]     Diabetes mellitus type 2 in obese ([address]     Diarrhea 7/16/2013    DVT (deep venous thrombosis) ([facility])     Elevated C-reactive protein (CRP)     ESRD (end stage renal disease) ([address] 3/23/2017    Essential hypertension 3/23/2017    Fever 7/18/2013    Foot ulcer due to secondary DM ([address] 5/28/2014    Left side on antibiotics    GERD (gastroesophageal reflux disease)     Hematochezia 3/4/2017    History of cerebral infarction 3/1/2017    History of DVT (deep vein thrombosis) 9/17/2018    History of superior vena cava filter placement 7/18/2013    Hx of blood clots     Hyperkalemia 7/16/2013    Hyperlipidemia     Hypernatremia 7/11/2013    Hypertension     Hypocalcemia 7/18/2013    Hypovolemic shock ([address] 3/4/2017    Hypoxia     Irregular heartbeat     hx of a-fib    Knee effusion, right 2/25/2017    Left leg cellulitis     Lower GI bleed 3/4/2017    MDRO (multiple drug resistant organisms) resistance     Metabolic acidosis 1/77/7449    MRSA (methicillin resistant staph aureus) culture positive 09/17/2018    leg    On continuous oral anticoagulation 9/17/2018    NADIRA on CPAP     Other specified hypothyroidism 8/24/2013    Parietal lobe infarction ([address] 8/26/2013    Pneumonia     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    Proteinuria 11/30/2016    Fluctuates between 2-3 grams, cant use ACEI due to hyperkalemia    Pyogenic arthritis of right knee joint ([address] 3/23/2017    Pyrexia 7/19/2013    Renal insufficiency     Renal lesion 3/3/2017    Respiratory failure, acute ([address] 7/7/2013    Right knee pain     SAH (subarachnoid hemorrhage) (HCC)     Secondary hyperparathyroidism of renal origin ([address] 12/16/2015    Seizure disorder ([address] 3/1/2017    Stercoral ulcer of rectum     Streptococcal infection     Subarachnoid bleed ([address] 7/13/2013    Subdural bleeding ([address] 7/5/2013    Thyroid disease     Traumatic cerebral hemorrhage ([address] 7/7/2013    Type 2 diabetes mellitus with left diabetic foot ulcer ([address] 4/16/2014    Unspecified cerebral artery occlusion with cerebral infarction     Venous stasis dermatitis 5/28/2014    Venous stasis dermatitis of both lower extremities 5/28/2014      Allergies:    Allergies   Allergen Reactions    Bactrim [Sulfamethoxazole-Trimethoprim]       Medications :    ferrous sulfate 325 mg Oral Daily with breakfast   ARIPiprazole 5 mg Oral Daily   vitamin D 1,000 Units Oral Daily   isosorbide mononitrate 30 mg Oral Daily   lamoTRIgine 100 mg Oral Daily   levothyroxine 50 mcg Oral Daily   pantoprazole 40 mg Oral Daily   venlafaxine 75 mg Oral Daily   sodium chloride flush 10 mL Intravenous 2 times per day   heparin (porcine) 5,000 Units Subcutaneous 3 times per day       Review of Systems   Review of Systems   Constitutional: Negative for activity change, appetite change, chills, fatigue, fever and unexpected weight change. HENT: Negative for congestion, mouth sores, postnasal drip, sinus pressure, sore throat and voice change. Eyes: Negative for visual disturbance. Respiratory: Negative for cough, shortness of breath and wheezing. Cardiovascular: Negative for chest pain and palpitations. Gastrointestinal: Negative for abdominal pain, blood in stool, constipation, diarrhea, nausea and vomiting. Endocrine: Negative for polyuria. Genitourinary: Negative for difficulty urinating, dysuria, frequency and urgency. Musculoskeletal: Negative for arthralgias, joint swelling and myalgias. R Hip Pain    Neurological: Negative for dizziness, tremors, speech difficulty, light-headedness and headaches. Psychiatric/Behavioral: Positive for confusion and decreased concentration. Negative for agitation and behavioral problems.      Objective :      Current Vitals : Temp: 98.2 °F (36.8 °C),  Pulse: 88, Resp: 22, BP: (!) 101/52, SpO2: 98 %  Last 24 Hrs Vitals   Patient Vitals for the past 24 hrs:   BP Temp Temp src Pulse Resp SpO2 Weight   05/17/19 0605 (!) 101/52 -- -- 88 22 98 % --   05/16/19 2322 (!) 96/43 98.2 °F (36.8 °C) Oral 93 20 -- --   05/16/19 1430 (!) 91/42 98 °F (36.7 °C) Oral 93 20 96 % --   05/16/19 1355 (!) 113/49 97.4 °F (36.3 °C) -- 82 -- -- 231 lb 14.8 oz (105.2 kg)   05/16/19 1254 (!) 110/48 -- -- 83 -- -- --   05/16/19 1224 (!) 120/53 -- -- 86 -- -- --   05/16/19 1154 (!) 121/53 -- -- 89 -- -- --   05/16/19 1054 (!) 118/52 -- -- 90 -- -- --   05/16/19 1024 124/61 -- -- 97 -- -- --   05/16/19 0954 (!) 128/53 -- -- 102 -- -- --   05/16/19 0924 (!) 120/52 -- -- 90 -- -- --   05/16/19 0854 (!) 110/50 -- -- 85 -- -- --   05/16/19 0826 (!) 113/53 98.8 °F (37.1 °C) -- -- 14 -- 233 lb 11 oz (106 kg)   05/16/19 0745 (!) 118/48 98.4 °F (36.9 °C) Oral -- -- -- --     Intake / output   05/16 0701 - 05/17 0700  In: 36 [P.O.:820]  Out: 155 [Urine:155]  Physical Exam:  Physical Exam   Constitutional: He is oriented to person, place, and time. He appears well-developed and well-nourished. No distress. HENT:   Mouth/Throat: Oropharynx is clear and moist. No oropharyngeal exudate. Eyes: Pupils are equal, round, and reactive to light. No scleral icterus. Neck: Neck supple. No JVD present. No tracheal deviation present. No thyromegaly present. Cardiovascular: Normal rate, regular rhythm, normal heart sounds and intact distal pulses. Exam reveals no gallop and no friction rub. No murmur heard. Pulmonary/Chest: Effort normal and breath sounds normal. No respiratory distress. He has no wheezes. He has no rales. He exhibits no tenderness. Abdominal: Soft. Bowel sounds are normal. He exhibits no mass. There is no tenderness. There is no rebound and no guarding. Abdominal incisional surgical scar    Musculoskeletal:   Partial amputation R 1st and 2nd Toe and complete 5th Toe. Left Arm Fistula cannulated for dialysis . Lymphadenopathy:     He has no cervical adenopathy. Neurological: He is alert and oriented to person, place, and time. No cranial nerve deficit. He exhibits normal muscle tone. Skin: Skin is warm. No rash noted. He is not diaphoretic. Psychiatric: He has a normal mood and affect. His behavior is normal.   Nursing note and vitals reviewed.     Lower Extremities : bilateral lower ext lymphedema , No calf Tenderness     Laboratory findings:    Recent Labs     05/15/19  0345   WBC 4.4   HGB 9.2*   HCT 30.5*   PLT 91*     Recent Labs     05/15/19  0345 05/16/19  0911    134*   K 5.6* 5.5*   CL 96* 96*   CO2 27 21   GLUCOSE 118* 202*   BUN 29* 45*   CREATININE 3.96* 5.50*   CALCIUM 8.8 7.9*     No results for input(s): PROT, LABALBU, LABA1C, A4HUTVD, K3KILNA, FT4, TSH, AST, ALT, LDH, GGT, ALKPHOS, BILITOT, BILIDIR, AMMONIA, AMYLASE, LIPASE, LACTATE, CHOL, HDL, LDLCHOLESTEROL, CHOLHDLRATIO, TRIG, VLDL, BNP, TROPONINI, CKTOTAL, CKMB, CKMBINDEX, RF, hypertension - well controlled. PT OT   Limit pain medications and benzodiazepines. Fentanyl and Norco discontinued . Continue to monitor vitals , Intake / output ,  Cell count , HGB , Kidney function, oxygenation  as indicated . Plan and updates discussed with patient ,  answers  explained to satisfaction.    Plan discussed with Staff Severo Diego RN     (Please note that portions of this note were completed with a voice recognition program. Efforts were made to edit the dictations but occasionally words are mis-transcribed.)      Tracy Gordon MD  5/17/2019

## 2019-05-17 NOTE — CARE COORDINATION
Transitional planning. Provided pt and wife with SNF list.    1330 Referral sent to Moy Sanchez from [address] called to inform that they have no male beds available until next week. Referral sent to pt's and wife's second choice [address]  Third choice is [facility]ag[place] of Mimi Watson with [facility] called for [employer] and HD times. Will review pt's clinicals call back. 1511  with Kristin Rodriguez [phone]  in admissions at [facility]. Pt HD days T-TH-S with chair time of 7:10a and arrival time of 6:50 a      [facility] with [facility] called and they will take pt if discharged over weekend after Sat HD.  Call:  [phone] Supervisors number for weekend  Report [phone]  Fax [phone] dc orders  Complete HENS    2041 Called pt's wife Ami Palma [phone] to update

## 2019-05-17 NOTE — PLAN OF CARE
Problem: Falls - Risk of:  Goal: Will remain free from falls  Description  Will remain free from falls  Outcome: Met This Shift     Problem: Nutrition  Goal: Optimal nutrition therapy  Outcome: Met This Shift  Note:   Patient with apparent good appetite. Problem: Pain:  Goal: Pain level will decrease  Description  Pain level will decrease  Outcome: Ongoing  Note:   Pain medication changed this shift due to confusion, continue to monitor effectiveness of new medication. Problem: Risk for Impaired Skin Integrity  Goal: Tissue integrity - skin and mucous membranes  Description  Structural intactness and normal physiological function of skin and  mucous membranes. Outcome: Ongoing  Note:   Patient tolerating frequent repositioning. Problem: Musculor/Skeletal Functional Status  Goal: Highest potential functional level  Outcome: Ongoing  Note:   Patient continues to work with therapies.

## 2019-05-17 NOTE — PROGRESS NOTES
Physical Therapy  Facility/Department: [address] ORTHO/MED SURG  Daily Treatment Note  NAME: Rola Acevedo  : 1950  MRN: [id number]    Date of Service: 2019    Discharge Recommendations: Further therapy is recommended at discharge. PT Equipment Recommendations  Equipment Needed: No    Patient Diagnosis(es): The encounter diagnosis was Closed fracture of right hip, initial encounter ([facility]      has a past medical history of A-fib ([address] Acute ischemic stroke ([address] Acute metabolic encephalopathy, Acute on chronic congestive heart failure ([address] Acute on chronic diastolic CHF (congestive heart failure) ([address] Altered mental status, Anemia, Anemia associated with chronic renal failure, Aphasia, ARF (acute renal failure) ([facility] Arteriovenous fistula for hemodialysis in place, [facility]), Arthritis, Bradyarrhythmia, Cellulitis, Cerebral contusion ([facility]), CHF (congestive heart failure) ([address] Chronic atrial fibrillation ([address] Chronic kidney disease, Chronic pain of right knee, CKD (chronic kidney disease) stage 4, GFR 15-29 ml/min ([address] Coagulation defect ([address] Diabetes mellitus ([address] Diabetes mellitus type 2 in obese ([address] Diarrhea, DVT (deep venous thrombosis) ([facility]), Elevated C-reactive protein (CRP), ESRD (end stage renal disease) ([address] Essential hypertension, Fever, Foot ulcer due to secondary DM ([address] GERD (gastroesophageal reflux disease), Hematochezia, History of cerebral infarction, History of DVT (deep vein thrombosis), History of superior vena cava filter placement, Hx of blood clots, Hyperkalemia, Hyperlipidemia, Hypernatremia, Hypertension, Hypocalcemia, Hypovolemic shock ([address] Hypoxia, Irregular heartbeat, Knee effusion, right, Left leg cellulitis, Lower GI bleed, MDRO (multiple drug resistant organisms) resistance, Metabolic acidosis, MRSA (methicillin resistant staph aureus) culture positive, On continuous oral anticoagulation, NADIRA on CPAP, Other specified hypothyroidism, Parietal lobe infarction ([address] Pneumonia, Post traumatic encephalopathy, Proteinuria, Pyogenic arthritis of right knee joint (HCC), Pyrexia, Renal insufficiency, Renal lesion, Respiratory failure, acute ([address] Right knee pain, SAH (subarachnoid hemorrhage) ([address] Secondary hyperparathyroidism of renal origin ([address] Seizure disorder ([address] Stercoral ulcer of rectum, Streptococcal infection, Subarachnoid bleed ([address] Subdural bleeding ([address] Thyroid disease, Traumatic cerebral hemorrhage ([address] Type 2 diabetes mellitus with left diabetic foot ulcer ([address] Unspecified cerebral artery occlusion with cerebral infarction, Venous stasis dermatitis, and Venous stasis dermatitis of both lower extremities. has a past surgical history that includes Gastric bypass surgery; Vena Cava Filter Placement; Foot Debridement (Left); Foot surgery (Right); Tonsillectomy; Colonoscopy; skin biopsy; Dilatation, esophagus; tracheostomy (summer 2013); Gastrostomy tube placement (summer 2013); Abdomen surgery; Cardiac catheterization; Knee arthroscopy (Right, 03/23/2017); pr knee scope,diagnostic (Right, 3/23/2017); and HEMIARTHROPLASTY HIP (Right, 5/15/2019). Restrictions  Restrictions/Precautions  Restrictions/Precautions: Weight Bearing  Required Braces or Orthoses?: No  Lower Extremity Weight Bearing Restrictions  Right Lower Extremity Weight Bearing: Weight Bearing As Tolerated  Position Activity Restriction  Hip Precautions: Posterior hip precautions  Other position/activity restrictions: danielle-hip R LE   Subjective   General  Chart Reviewed: Yes  Response To Previous Treatment: Patient with no complaints from previous session.   Family / Caregiver Present: Yes  Pain Screening  Patient Currently in Pain: Yes  Pain Assessment  Pain Assessment: 0-10  Pain Level: 8  Pain Type: Acute pain  Pain Location: Hip;Leg  Pain Orientation: Right  Pain Frequency: Continuous  Response to Pain Intervention: Patient Satisfied  Vital Signs  Patient Currently in Pain: Yes       Orientation  Orientation  Overall Orientation Status: Within Functional Limits  Cognition   Cognition  Overall Cognitive Status: WFL  Objective   Bed mobility  Rolling to Left: Moderate assistance  Supine to Sit: Moderate assistance  Sit to Supine: Moderate assistance  Scooting: Moderate assistance  Transfers  Sit to Stand: Moderate Assistance;2 Person Assistance  Stand to sit: Moderate Assistance;2 Person Assistance  Bed to Chair: 2 Person Assistance; Moderate assistance  Comment: pt completed chair<>bed transfer and tolerated pain better than this morning's transfer. pt able to give min assist with transfer. Ambulation  Ambulation?: No  Stairs/Curb  Stairs?: No     Balance  Posture: Fair  Sitting - Static: Fair  Sitting - Dynamic: Fair  Standing - Static: Poor  Standing - Dynamic: Poor     There Ex:  Pt transfer from chair to bed with Mod Ax2 while using a RW. Assessment   Body structures, Functions, Activity limitations: Decreased functional mobility ; Decreased cognition;Decreased ADL status; Decreased endurance; Increased Pain;Decreased balance;Decreased safe awareness  Assessment: pt more tolerable with PT care. pain tolerance remains as the main limitation for mobility. Prognosis: Good  Patient Education: PT plan of care and home safety education  REQUIRES PT FOLLOW UP: Yes  Activity Tolerance  Activity Tolerance: Patient limited by pain; Patient limited by fatigue;Patient limited by endurance  Activity Tolerance: pt limited mainly d/t R hip pain and pain management. pt becomes angry and fearful with transfers     G-Code     OutComes Score    AM-PAC Score  AM-PAC Inpatient Mobility Raw Score : 9  AM-[facility] Inpatient T-Scale Score : 30.55  Mobility Inpatient CMS 0-100% Score: 81.38  Mobility Inpatient CMS G-Code Modifier : CM     Goals  Short term goals  Time Frame for Short term goals: 10 visits  Short term goal 1: pt able to complete bed rolling with MinAx1.   Short term goal 2: pt able to

## 2019-05-17 NOTE — PROGRESS NOTES
Nephrology Progress Note    Subjective: patient evaluated . Pt is stable . No new issues overnite. Stable hemodynamics. Patient is status right hip hemiarthroplasty for right femoral neck fracture. His pain seems to be adequately controlled at this time. His LISSETTE drain from his left upper extremity hematoma was removed . We have been able to cannulate his access without any issues. Objective:  CURRENT TEMPERATURE:  Temp: 97.5 °F (36.4 °C)  MAXIMUM TEMPERATURE OVER 24HRS:  Temp (24hrs), Av.8 °F (36.6 °C), Min:97.4 °F (36.3 °C), Max:98.2 °F (36.8 °C)    CURRENT RESPIRATORY RATE:  Resp: 23  CURRENT PULSE:  Pulse: 88  CURRENT BLOOD PRESSURE:  BP: (!) 105/52  24HR BLOOD PRESSURE RANGE:  Systolic (98KVB), ABEL:115 , Min:91 , ZKA:146   ; Diastolic (49BUK), YTJ:71, Min:42, Max:53    24HR INTAKE/OUTPUT:      Intake/Output Summary (Last 24 hours) at 2019 1122  Last data filed at 2019 0526  Gross per 24 hour   Intake 700 ml   Output 105 ml   Net 595 ml     Patient Vitals for the past 96 hrs (Last 3 readings):   Weight   19 1355 231 lb 14.8 oz (105.2 kg)   19 0826 233 lb 11 oz (106 kg)   05/15/19 1055 233 lb (105.7 kg)         Physical Exam:  General appearance:Awake, alert, in no acute distress  Skin: warm and dry, no rash or erythema  Eyes: conjunctivae normal and sclera anicteric  ENT:no thrush no pharyngeal congestion   Neck:  No JVD, No Thyromegaly  Pulmonary: clear to auscultation and no wheezing or rhonchi   Cardiovascular: normal S1 and S2. NO rubs and NO murmur.  No S3 OR S4   Abdomen: soft nontender, bowel sounds present, no organomegaly,  no ascites   Extremities: no cyanosis, clubbing + slight edema     Access:  previous  Left AV graft    Current Medications:      traMADol (ULTRAM) tablet 25 mg Q4H PRN   ferrous sulfate EC tablet 325 mg Daily with breakfast   ARIPiprazole (ABILIFY) tablet 5 mg Daily   vitamin D (CHOLECALCIFEROL) tablet 1,000 Units Daily   isosorbide mononitrate

## 2019-05-17 NOTE — PROGRESS NOTES
Occupational Therapy  Facility/Department: [address] ORTHO/MED SURG  Daily Treatment Note  NAME: Adán Vora  : 1950  MRN: [id number]    Date of Service: 2019    Discharge Recommendations:    Further therapy recommended at discharge. Assessment   Performance deficits / Impairments: Decreased functional mobility ; Decreased strength;Decreased ADL status; Decreased high-level IADLs;Decreased endurance;Decreased balance  Assessment: pt significantly limited by pain, fear of falling and fatigue. Pt required mod-max A for all functional transfers and ADLs during session. Pt required heavy physical assistance with all functional transfers/functional mobility at this time and would be unsafe to return to prior living arrangement. Treatment Diagnosis: fall   Prognosis: Good  Decision Making: Medium Complexity  Patient Education: pt ed on POC, importance of getting out of bed, safety during functional transfers/functional mobility, proper use of walker. poor return   REQUIRES OT FOLLOW UP: Yes  Activity Tolerance  Activity Tolerance: Patient limited by pain  Safety Devices  Safety Devices in place: Yes  Type of devices: Gait belt;Patient at risk for falls;Call light within reach; Chair alarm in place; Left in chair  Restraints  Initially in place: No       Patient Diagnosis(es): The encounter diagnosis was Closed fracture of right hip, initial encounter ([facility] [address]       has a past medical history of A-fib ([address] Acute ischemic stroke ([address] Acute metabolic encephalopathy, Acute on chronic congestive heart failure ([address] Acute on chronic diastolic CHF (congestive heart failure) ([address] Altered mental status, Anemia, Anemia associated with chronic renal failure, Aphasia, ARF (acute renal failure) (HCC), Arteriovenous fistula for hemodialysis in place, [facility]), Arthritis, Bradyarrhythmia, Cellulitis, Cerebral contusion ([address] CHF (congestive heart failure) ([address] Chronic atrial fibrillation ([address] Chronic kidney disease, Chronic pain of right knee, CKD (chronic kidney disease) stage 4, GFR 15-29 ml/min ([facility]), Coagulation defect ([address] Diabetes mellitus ([address] Diabetes mellitus type 2 in obese ([address] Diarrhea, DVT (deep venous thrombosis) ([facility]), Elevated C-reactive protein (CRP), ESRD (end stage renal disease) ([address] Essential hypertension, Fever, Foot ulcer due to secondary DM ([address] GERD (gastroesophageal reflux disease), Hematochezia, History of cerebral infarction, History of DVT (deep vein thrombosis), History of superior vena cava filter placement, Hx of blood clots, Hyperkalemia, Hyperlipidemia, Hypernatremia, Hypertension, Hypocalcemia, Hypovolemic shock ([address] Hypoxia, Irregular heartbeat, Knee effusion, right, Left leg cellulitis, Lower GI bleed, MDRO (multiple drug resistant organisms) resistance, Metabolic acidosis, MRSA (methicillin resistant staph aureus) culture positive, On continuous oral anticoagulation, NADIRA on CPAP, Other specified hypothyroidism, Parietal lobe infarction ([address] Pneumonia, Post traumatic encephalopathy, Proteinuria, Pyogenic arthritis of right knee joint ([address] Pyrexia, Renal insufficiency, Renal lesion, Respiratory failure, acute ([address] Right knee pain, SAH (subarachnoid hemorrhage) ([address] Secondary hyperparathyroidism of renal origin ([address] Seizure disorder ([address] Stercoral ulcer of rectum, Streptococcal infection, Subarachnoid bleed ([address] Subdural bleeding ([address] Thyroid disease, Traumatic cerebral hemorrhage ([address] Type 2 diabetes mellitus with left diabetic foot ulcer ([address] Unspecified cerebral artery occlusion with cerebral infarction, Venous stasis dermatitis, and Venous stasis dermatitis of both lower extremities. has a past surgical history that includes Gastric bypass surgery; Vena Cava Filter Placement; Foot Debridement (Left); Foot surgery (Right); Tonsillectomy; Colonoscopy; skin biopsy; Dilatation, esophagus; tracheostomy (summer 2013);  Gastrostomy tube placement (summer 2013); Abdomen surgery; Cardiac catheterization; Knee arthroscopy (Right, 03/23/2017); pr knee scope,diagnostic (Right, 3/23/2017); and HEMIARTHROPLASTY HIP (Right, 5/15/2019). Restrictions  Restrictions/Precautions  Restrictions/Precautions: Weight Bearing  Required Braces or Orthoses?: No  Lower Extremity Weight Bearing Restrictions  Right Lower Extremity Weight Bearing: Weight Bearing As Tolerated  Position Activity Restriction  Hip Precautions: Posterior hip precautions  Other position/activity restrictions: danielle-hip R LE      Subjective   General  Chart Reviewed: No  Patient assessed for rehabilitation services?: Yes  Family / Caregiver Present: Yes(pt wife present for second half of session)  Diagnosis: fall   General Comment  Comments: Pt agreeable to participate in session this morning and more motivated to attempt chair transfer. Pain Assessment  Pain Assessment: 0-10  Pain Level: 8  Pain Type: Acute pain  Pain Location: Hip  Pain Orientation: Right  Non-Pharmaceutical Pain Intervention(s): Therapeutic presence; Ambulation/Increased Activity;Repositioned  Response to Pain Intervention: Patient Satisfied    Oxygen Therapy  O2 Device: None (Room air)     Orientation  Orientation  Overall Orientation Status: Within Functional Limits     Objective    ADL  Grooming: Supervision;Verbal cueing(pt washed face and put on deoderant while sitting in chair with verbal cues to initiate )  UE Bathing: Minimal assistance;Verbal cueing(pt required min A for washing back but was able to wash arms, armpits, and stomach with increased time to complete )  LE Bathing:  Moderate assistance;Verbal cueing(pt able to wash thighs while sitting in bed but required assistance for lower legs and feet. )  UE Dressing: Verbal cueing;Minimal assistance(pt required min A for donning hospital gown due to fatigue )  LE Dressing: Maximum assistance;Verbal cueing(pt required max A for donning bilateral hospital socks )  Additional Comments: surgical soap used appropriately throughout. Pt very fatigued by the end of session and had difficulty opening eyes toward end. Balance  Sitting Balance: Stand by assistance(~25 minutes on EOB and in chair )  Standing Balance: Maximum assistance  Standing Balance  Time: ~4 minutes  Activity: pt completed stand-pivot transfer from bed to chair   Comment: pt required mod Ax2-max A for transfer due to severe fear of falling. Pt initially mod Ax2 to stand, but once attempting to pivot, pt became afraid of falling and started pushing back against therapists, even though transfer was half way completed. Pt then required max A to complete transfer and required assistance for progression of R LE. Pt with minimal weight bearing through R LE due to pain   Bed mobility  Supine to Sit: Moderate assistance  Sit to Supine: Moderate assistance  Scooting: Moderate assistance  Transfers  Sit to stand:  Moderate assistance;2 Person assistance  Stand to sit: Moderate assistance;2 Person assistance  Transfer Comments: with RW       Plan   Plan  Times per week: 4-6x/wk    AM-PAC Score   AM-PAC Inpatient Daily Activity Raw Score: 15  AM-PAC Inpatient ADL T-Scale Score : 34.69  ADL Inpatient CMS 0-100% Score: 56.46  ADL Inpatient CMS G-Code Modifier : CK    Goals  Short term goals  Time Frame for Short term goals: pt will, by discharge  Short term goal 1: dem CGA during bed mobility in order to increase independence   Short term goal 2: dem min A during functional transfers/functional mobility with LRD   Short term goal 3: dem understanding of 1-2 non-medication pain   Short term goal 4: complete LB ADLs and toileting tasks with mod A, set up and AE/modified tech  Short term goal 5: complete UB ADLs and grooming tasks with supervision and set up  Short term goal 6: increase activity tolerance to 20+ minutes in order to participate in ADLs  Short term goal 7: dem 5+ minutes dynamic/static standing tolernace with LRD and

## 2019-05-17 NOTE — OP NOTE
doing  hemiarthroplasty pending medical clearance, and the patient's family is  amenable to him undergoing this procedure. So, consent was obtained,  placed in the chart, all questions were answered appropriately. Surgical risks including, but not limited to bleeding; blood clots;  infection; damage to nearby tissues, vessels, and nerves; wound-healing  complications; failure of procedure; need for future surgery; hardware  failure; hardware irritation; leg-length inequality; scar formation;  stiffness; patient dissatisfaction; anesthesia risks; loss of limb; and  loss of life were all discussed with the patient and his family. Knowing these risks, the patient and his family wished to proceed with  surgery as indicated. DESCRIPTION OF PROCEDURE:  The patient was taken to the operative suite,  placed under general anesthesia without any complications. 2 gm of  Ancef were given prior to incision. At this time, all team members  paused, identified proper patient name, indication, site, and allergies. All team members were in agreeance. All bony prominences were well  padded. The patient was placed in the lateral decubitus position with  the right hip pointed to the ceiling. The pegboard table was used. We  then prepped and draped the right lower extremity in a normal sterile  fashion. We then mapped our incision about the greater trochanter of  the right hip. We made our incision in-line with the greater  trochanter, extending down to the femur. The incision was approximately  10 cm in length. We incised the skin; meticulously dissected down  through the skin, subcutaneous tissue, and fascia until we encountered  the IT band, that was also incised in-line with our incision. We then  encountered the glute medius as well as the vastus lateralis.   We made a  small rent in the gluteus medius, extending down through vastus  lateralis and meticulously using our Bovie peeled off the anterior  aspect of the vastus lateralis from the proximal aspect of the femur. We then encountered the hip capsule, made a \"T\" in the hip capsule and  meticulously dissected the hip capsule off the femoral neck. We  encountered the femur fracture. We were able to expose the fracture  more readily. We made our femoral neck cut to finish off the fracture. We then removed the head. Size of the head to be a 51 head, that was  the head we determined to, that was going to go into the hip for  endoprosthesis. We then cleared all the debris from the femoral head. We then were able to expose the proximal femur. We took our cookie  cutter, made our initial cut; canal finder; and then placed our  broaches. We broached up to a size 20 stem. We placed our standard  neck, -3 head, and reduced our 51-mm head into the acetabulum. We  checked it for stability and leg-length equality. We were happy with  our sizing. We then reduced and removed all implants, thoroughly  irrigated the wound appropriately. We then asked for the appropriate  implants. They were placed then, and the hip was reduced. We again  checked stability. It was deemed stable. We then thoroughly irrigated  the wound one more time. We closed the capsule using 2-0 Ethibond. We  closed the vastus lateralis with 1-0 Vicryl and remaining 2-0 Ethibond. We then closed the IT band using 1-0 Vicryl, the deep layers using 0  Vicryl, and the deep dermal layers using 2-0 Vicryl. The skin was then  stapled. We then placed Xeroform, 4 x 4, ABD, and Cover-Roll tape. The  patient was then positioned back on his back and placed over on his  hospital bed. The patient was awoken from general anesthesia and  transferred to the PACU in stable condition. Dr. Deng Benites was present and participated in all aspects of the procedure. Meticulous dissection was used, and thorough hemostasis was achieved.    Postop x-ray was obtained to ensure there was no postoperative sponges  left into the wound or any other instrumentation, which there was not. Brittani Lora    D: 05/16/2019 11:55:24       T: 05/16/2019 13:16:40     [id number]  Job#: [id number]     Doc#: [id number]    CC:    Attending:    Agree with above dictation  I was present and active for entire case    I, Lucero Reyna DO, reviewed the information and imaging if available. The case was discussed with the resident and plan reviewed.

## 2019-05-18 VITALS
BODY MASS INDEX: 31.35 KG/M2 | HEIGHT: 72 IN | WEIGHT: 231.48 LBS | SYSTOLIC BLOOD PRESSURE: 102 MMHG | DIASTOLIC BLOOD PRESSURE: 44 MMHG | RESPIRATION RATE: 22 BRPM | HEART RATE: 74 BPM | OXYGEN SATURATION: 96 % | TEMPERATURE: 97.8 F

## 2019-05-18 PROBLEM — E87.1 HYPONATREMIA: Status: ACTIVE | Noted: 2019-05-18

## 2019-05-18 PROBLEM — E11.65 UNCONTROLLED TYPE 2 DIABETES MELLITUS WITH HYPERGLYCEMIA (HCC): Status: ACTIVE | Noted: 2019-05-18

## 2019-05-18 LAB
ANION GAP SERPL CALCULATED.3IONS-SCNC: 17 MMOL/L (ref 9–17)
BUN BLDV-MCNC: 46 MG/DL (ref 8–23)
BUN/CREAT BLD: ABNORMAL (ref 9–20)
CALCIUM SERPL-MCNC: 8 MG/DL (ref 8.6–10.4)
CHLORIDE BLD-SCNC: 95 MMOL/L (ref 98–107)
CO2: 18 MMOL/L (ref 20–31)
CREAT SERPL-MCNC: 5.41 MG/DL (ref 0.7–1.2)
GFR AFRICAN AMERICAN: 13 ML/MIN
GFR NON-AFRICAN AMERICAN: 11 ML/MIN
GFR SERPL CREATININE-BSD FRML MDRD: ABNORMAL ML/MIN/{1.73_M2}
GFR SERPL CREATININE-BSD FRML MDRD: ABNORMAL ML/MIN/{1.73_M2}
GLUCOSE BLD-MCNC: 251 MG/DL (ref 70–99)
POTASSIUM SERPL-SCNC: 4.2 MMOL/L (ref 3.7–5.3)
SODIUM BLD-SCNC: 130 MMOL/L (ref 135–144)

## 2019-05-18 PROCEDURE — 6370000000 HC RX 637 (ALT 250 FOR IP): Performed by: FAMILY MEDICINE

## 2019-05-18 PROCEDURE — 6370000000 HC RX 637 (ALT 250 FOR IP): Performed by: STUDENT IN AN ORGANIZED HEALTH CARE EDUCATION/TRAINING PROGRAM

## 2019-05-18 PROCEDURE — 99239 HOSP IP/OBS DSCHRG MGMT >30: CPT | Performed by: INTERNAL MEDICINE

## 2019-05-18 PROCEDURE — 94761 N-INVAS EAR/PLS OXIMETRY MLT: CPT

## 2019-05-18 PROCEDURE — 80048 BASIC METABOLIC PNL TOTAL CA: CPT

## 2019-05-18 PROCEDURE — 6360000002 HC RX W HCPCS: Performed by: STUDENT IN AN ORGANIZED HEALTH CARE EDUCATION/TRAINING PROGRAM

## 2019-05-18 PROCEDURE — 90935 HEMODIALYSIS ONE EVALUATION: CPT

## 2019-05-18 PROCEDURE — 99233 SBSQ HOSP IP/OBS HIGH 50: CPT | Performed by: INTERNAL MEDICINE

## 2019-05-18 RX ORDER — TRAMADOL HYDROCHLORIDE 50 MG/1
25 TABLET ORAL EVERY 4 HOURS PRN
Qty: 20 TABLET | Refills: 0 | Status: SHIPPED | OUTPATIENT
Start: 2019-05-18 | End: 2019-05-25

## 2019-05-18 RX ADMIN — ARIPIPRAZOLE 5 MG: 5 TABLET ORAL at 13:14

## 2019-05-18 RX ADMIN — TRAMADOL HYDROCHLORIDE 25 MG: 50 TABLET, FILM COATED ORAL at 11:08

## 2019-05-18 RX ADMIN — PANTOPRAZOLE SODIUM 40 MG: 40 TABLET, DELAYED RELEASE ORAL at 13:14

## 2019-05-18 RX ADMIN — LEVOTHYROXINE SODIUM 50 MCG: 50 TABLET ORAL at 07:33

## 2019-05-18 RX ADMIN — VITAMIN D, TAB 1000IU (100/BT) 1000 UNITS: 25 TAB at 13:15

## 2019-05-18 RX ADMIN — VENLAFAXINE 75 MG: 75 TABLET ORAL at 13:15

## 2019-05-18 RX ADMIN — HEPARIN SODIUM 5000 UNITS: 5000 INJECTION INTRAVENOUS; SUBCUTANEOUS at 13:17

## 2019-05-18 RX ADMIN — FERROUS SULFATE TAB EC 325 MG (65 MG FE EQUIVALENT) 325 MG: 325 (65 FE) TABLET DELAYED RESPONSE at 13:15

## 2019-05-18 RX ADMIN — HEPARIN SODIUM 5000 UNITS: 5000 INJECTION INTRAVENOUS; SUBCUTANEOUS at 07:32

## 2019-05-18 RX ADMIN — TRAMADOL HYDROCHLORIDE 25 MG: 50 TABLET, FILM COATED ORAL at 07:32

## 2019-05-18 RX ADMIN — LAMOTRIGINE 100 MG: 100 TABLET ORAL at 13:15

## 2019-05-18 ASSESSMENT — PAIN SCALES - PAIN ASSESSMENT IN ADVANCED DEMENTIA (PAINAD)
CONSOLABILITY: 0
BODYLANGUAGE: 0
CONSOLABILITY: 0
FACIALEXPRESSION: 0
BREATHING: 0
CONSOLABILITY: 0
BODYLANGUAGE: 0
TOTALSCORE: 0
BODYLANGUAGE: 0
FACIALEXPRESSION: 0
TOTALSCORE: 0
NEGVOCALIZATION: 0
TOTALSCORE: 0
FACIALEXPRESSION: 0
TOTALSCORE: 0
FACIALEXPRESSION: 0
BODYLANGUAGE: 0
BREATHING: 0
FACIALEXPRESSION: 0
BREATHING: 0
FACIALEXPRESSION: 0
BODYLANGUAGE: 0
BREATHING: 0
BREATHING: 0
NEGVOCALIZATION: 0
CONSOLABILITY: 0
NEGVOCALIZATION: 0
FACIALEXPRESSION: 0
FACIALEXPRESSION: 0
CONSOLABILITY: 0
NEGVOCALIZATION: 0
CONSOLABILITY: 0
BREATHING: 0
TOTALSCORE: 0
CONSOLABILITY: 0
BODYLANGUAGE: 0
TOTALSCORE: 0
BREATHING: 0
NEGVOCALIZATION: 0
TOTALSCORE: 0
BODYLANGUAGE: 0
CONSOLABILITY: 0
NEGVOCALIZATION: 0
BODYLANGUAGE: 0
BREATHING: 0
NEGVOCALIZATION: 0
TOTALSCORE: 0
NEGVOCALIZATION: 0

## 2019-05-18 ASSESSMENT — PAIN DESCRIPTION - ONSET: ONSET: ON-GOING

## 2019-05-18 ASSESSMENT — ENCOUNTER SYMPTOMS
COUGH: 0
VOMITING: 0
ABDOMINAL PAIN: 0
NAUSEA: 0
SHORTNESS OF BREATH: 0

## 2019-05-18 ASSESSMENT — PAIN SCALES - GENERAL
PAINLEVEL_OUTOF10: 8
PAINLEVEL_OUTOF10: 5
PAINLEVEL_OUTOF10: 3
PAINLEVEL_OUTOF10: 3
PAINLEVEL_OUTOF10: 8
PAINLEVEL_OUTOF10: 8

## 2019-05-18 ASSESSMENT — PAIN DESCRIPTION - PAIN TYPE
TYPE: ACUTE PAIN;SURGICAL PAIN
TYPE: ACUTE PAIN

## 2019-05-18 ASSESSMENT — PAIN DESCRIPTION - LOCATION
LOCATION: HIP
LOCATION: HIP

## 2019-05-18 ASSESSMENT — PAIN DESCRIPTION - ORIENTATION: ORIENTATION: RIGHT

## 2019-05-18 ASSESSMENT — PAIN DESCRIPTION - FREQUENCY: FREQUENCY: CONTINUOUS

## 2019-05-18 ASSESSMENT — PAIN DESCRIPTION - DIRECTION: RADIATING_TOWARDS: KNEE

## 2019-05-18 ASSESSMENT — PAIN DESCRIPTION - PROGRESSION
CLINICAL_PROGRESSION: GRADUALLY WORSENING
CLINICAL_PROGRESSION: GRADUALLY IMPROVING

## 2019-05-18 NOTE — PROGRESS NOTES
Report called to Shanice Ferrara at [facility]. [facility] is set to arrive at 3 pm to transport patient by stretcher. Patients daughter present at this time, updated on plan. Spouse updated on POC by care manager.

## 2019-05-18 NOTE — DISCHARGE SUMMARY
emergency room where he presented with fall while getting from wheelchair.  Patient fell on right side and immediately started having pain and right hip.  X-ray hip showed acute fracture.  Chest x-ray was performed and showed right pleural effusion.  Patient has history of ESRD on hemodialysis, CHF, atrial fibrillation on long-term anticoagulation with Eliquis.  He underwent thoracentesis on 5/13/19 and was transudative.  Patient was evaluated by orthopedic surgery and had right hip hemiarthroplasty on 5/15/19\"      EKG:  Atrial fibrillation  Nonspecific ST abnormality  Prolonged QT  Abnormal ECG  When compared with ECG of 24-FEB-2017 21:58,  No significant change was found     Echo 2017:  Left ventricular dilatation. Left ventricular systolic function is normal.  Ejection fraction is estimated at 50-55%. Grade II diastolic dysfunction. Left atrium is mildly to moderately dilated. Right atrium is moderately dilated. Right ventricle is mildly dilated with decreased systolic function. Tricuspid valve is grossly normal with mild regurgitation. Right ventricular systolic pressure is 57PN. Hg.     Postop physical therapy was initiated  Nephrology was consulted  Dialysis was continued  Electrolyte abnormalities were addressed  CPAP was continued  Blood pressures were monitored and controlled  Blood sugars were monitored and controlled    The patients condition was stabilized  DC planning initiated     Discharge plan:     Physical therapy and rehabilitation   Blood Pressure - Monitor and control  Glycemic contol - monitor and control blood sugars  Correct electrolyte abnormalities  Dialysis  Optimize cardio-pulmonary function  Eliquis / rate control atrial fibrillation  Monitor H&H, Aranesp, iron supplementation as needed  Thyroid replacement  Med rec done  JULIA done  Will discharge when arrangements complete and ok with other services.   Follow-up with PCP in one week, Flavia Chong MD  Notify PCP of discharge     DCP 33 min+    Significant Diagnostic Studies:   Labs / Micro:   Hematology:No results for input(s): WBC, HGB, HCT, PLT, ESR, INR in the last 72 hours. Invalid input(s): PT  Chemistry:  Recent Labs     05/16/19  0911 05/18/19  0850   * 130*   K 5.5* 4.2   CL 96* 95*   CO2 21 18*   GLUCOSE 202* 251*   BUN 45* 46*   CREATININE 5.50* 5.41*   CALCIUM 7.9* 8.0*     No results for input(s): PROT, LABALBU, LABA1C, D5UHVAF, T9XBZSC, FT4, TSH, AST, ALT, LDH, GGT, ALKPHOS, BILITOT, BILIDIR, AMMONIA, AMYLASE, LIPASE, LACTATE, CHOL, TRIG, HDL, LDLCALC, LDLDIRECT, LABVLDL, BNP, TROPONINI, CKTOTAL, CKMB, CKMBINDEX in the last 72 hours. Radiology:    Xr Hip Right (1 View)    Result Date: 5/15/2019  EXAMINATION: ONE XRAY VIEW OF THE RIGHT HIP 5/15/2019 10:05 pm COMPARISON: None. HISTORY: ORDERING SYSTEM PROVIDED HISTORY: please obtain a better cross table lateral TECHNOLOGIST PROVIDED HISTORY: please obtain a better cross table lateral Ordering Physician Provided Reason for Exam: cross table lateral Acuity: Unknown Type of Exam: Unknown FINDINGS: Total hip arthroplasty appears in anatomic alignment on this limited single one view projection. Skin staples are noted. Total hip arthroplasty     Xr Hip Right (1 View)    Result Date: 5/15/2019  EXAMINATION: ONE XRAY VIEW OF THE RIGHT HIP 5/15/2019 6:36 pm COMPARISON: May 14, 2019 HISTORY: ORDERING SYSTEM PROVIDED HISTORY: X-table AP port in or TECHNOLOGIST PROVIDED HISTORY: X-table AP port in or Ordering Physician Provided Reason for Exam: Port in Todd Acuity: Unknown Type of Exam: Unknown FINDINGS: New total hip arthroplasty appears in gross anatomic alignment on this limited one view projection. Soft tissue defect laterally. Limited one view total hip arthroplasty as above.      Xr Femur Right (min 2 Views)    Result Date: 5/14/2019  EXAMINATION: 4 XRAY VIEWS OF THE RIGHT FEMUR 5/14/2019 9:01 am COMPARISON: Hip series today HISTORY: ORDERING SYSTEM PROVIDED HISTORY: Right hip fracture. Full length femurs. TECHNOLOGIST PROVIDED HISTORY: Right hip fracture. Full length femurs. Ordering Physician Provided Reason for Exam: right hip fx FINDINGS: Impacted subcapital femoral neck fracture again demonstrated. Osteopenia is noted. Advanced arthropathy is noted in the knee involving all 3 compartments. Calcified atheromatous plaque. Diminished penetration through soft tissues noted. No additional pelvic or femoral fracture appreciated. Impacted subcapital femoral neck fracture. Advanced arthropathy in the knee. Xr Knee Right (3 Views)    Result Date: 5/14/2019  EXAMINATION: 3 XRAY VIEWS OF THE RIGHT KNEE 5/14/2019 9:01 am COMPARISON: Right knee radiographs performed 05/13/2019. HISTORY: ORDERING SYSTEM PROVIDED HISTORY: Right knee pain. Patient has right hip fracture. TECHNOLOGIST PROVIDED HISTORY: Right knee pain. Patient has right hip fracture. Ordering Physician Provided Reason for Exam: right hip fx, pain FINDINGS: There is no acute osseous abnormality. There is severe tricompartmental degenerative change. There is a trace effusion. The periarticular soft tissues are unremarkable. There are vascular calcifications. Severe tricompartmental degenerative change with a trace effusion. Xr Knee Right (3 Views)    Result Date: 5/13/2019  EXAMINATION: 3 XRAY VIEWS OF THE RIGHT KNEE 5/13/2019 1:25 pm COMPARISON: 04/26/2017 HISTORY: ORDERING SYSTEM PROVIDED HISTORY: pain TECHNOLOGIST PROVIDED HISTORY: pain Ordering Physician Provided Reason for Exam: right hip and knee pain Acuity: Acute Type of Exam: Initial Mechanism of Injury: fall last night FINDINGS: Severe tricompartmental osteoarthrosis. Osteopenia. Lateral view limited by obliquity. No acute fracture identified within limitations of the exam. Extensive atherosclerosis. Severe tricompartmental osteoarthrosis. No acute fracture identified.      Us Liver Spleen    Result Date: 5/14/2019  EXAMINATION: RIGHT UPPER QUADRANT ULTRASOUND 5/14/2019 2:42 pm COMPARISON: CT abdomen and pelvis March 27, 2017 HISTORY: ORDERING SYSTEM PROVIDED HISTORY: chronic thrombocytopenia ? cirrohsis FINDINGS: LIVER:  The liver demonstrates normal echogenicity without evidence of intrahepatic biliary ductal dilatation. BILIARY SYSTEM:  Cholelithiasis is present. No evidence of cholecystitis. No Keane's sign Common bile duct is within normal limits measuring 4 mm. RIGHT KIDNEY: Mild right renal atrophy appears present. No hydronephrosis. PANCREAS:  Visualized portions of the pancreas are unremarkable. OTHER: No evidence of right upper quadrant ascites. Imaging of the spleen is included, the spleen is enlarged measuring 16.2 cm maximum diameter     No evidence of cirrhosis. Mild splenomegaly, very similar to the comparison CT from 2017     Xr Chest Portable    Result Date: 5/14/2019  EXAMINATION: ONE XRAY VIEW OF THE CHEST 5/14/2019 5:10 pm COMPARISON: 13 May 2019 HISTORY: ORDERING SYSTEM PROVIDED HISTORY: s/p thoracentesis TECHNOLOGIST PROVIDED HISTORY: s/p thoracentesis Ordering Physician Provided Reason for Exam: post thoracentesis pt has hx of sob Acuity: Unknown Type of Exam: Unknown FINDINGS: AP portable view of the chest time stamped at 1708 hours demonstrates moderate cardiomegaly and moderate right effusion reduced over prior study. Vascularity appears slightly prominent. No extrapleural air is seen. Right apical pleural thickening is redemonstrated. No localized consolidation left hemithorax. Opacity is present the right base likely superimposed atelectasis. No postprocedural pneumothorax is noted. Reduce right effusion via thoracentesis. No postprocedural pneumothorax. Cardiomegaly is noted. Vascularity appears prominent. There is opacity at the right base likely atelectasis. Xr Chest Portable    Result Date: 5/13/2019  EXAMINATION: ONE XRAY VIEW OF THE CHEST 5/13/2019 1:44 pm COMPARISON: 02/24/2017.  HISTORY: ORDERING SYSTEM PROVIDED HISTORY: surgical clearance TECHNOLOGIST PROVIDED HISTORY: surgical clearance Ordering Physician Provided Reason for Exam: surgery clearance right hip Acuity: Acute Type of Exam: Initial Mechanism of Injury: fall last night hip pain FINDINGS: Large right pleural effusion with compensatory right basilar volume loss. Only the right upper lobe remains aerated. The left lung is clear. No significant mediastinal shift. The cardiac silhouette is enlarged. Large right pleural effusion with only the right upper lobe remaining aerated. Cardiomegaly without overt failure. Ir Guided Thoracentesis Pleural    Result Date: 5/15/2019  PROCEDURE: ULTRASOUNDGUIDED RIGHT THORACENTESIS 5/14/2019 HISTORY: ORDERING SYSTEM PROVIDED HISTORY: Pleural Effusion TECHNOLOGIST PROVIDED HISTORY: Pleural Effusion TECHNIQUE: Informed consent was obtained after a detailed explanation of the procedure including risks, benefits, and alternatives. Universal protocol was performed. The right chest was prepped and draped in sterile fashion and local anesthesia was achieved with lidocaine. An 8 Uzbek needle sheath was advanced under ultrasound guidance into pleural effusion and thoracentesis was performed. The patient tolerated the procedure well. FINDINGS: A total of 900 mL was removed. Tea-colored fluid. Successful ultrasound guided thoracentesis. Xr Hip 2-3 Vw W Pelvis Right    Result Date: 5/15/2019  EXAMINATION: ONE XRAY VIEW OF THE PELVIS AND TWO XRAY VIEWS RIGHT HIP 5/15/2019 8:32 pm COMPARISON: 1 hour ago HISTORY: ORDERING SYSTEM PROVIDED HISTORY: post op. In PACU TECHNOLOGIST PROVIDED HISTORY: AP pelvis/AP Hip/X-table lateral post op. In PACU Ordering Physician Provided Reason for Exam: post op FINDINGS: Right total hip arthroplasty. Skin staples in place. Alignment anatomic.  Pelvic ring and left hip appear normal.     Status post total hip arthroplasty on the right     Xr Hip 2-3 Vw W Pelvis Right    Result Date: 5/14/2019  EXAMINATION: ONE XRAY VIEW OF THE PELVIS AND TWO XRAY VIEWS RIGHT HIP 5/14/2019 9:01 am COMPARISON: 05/13/2019 HISTORY: ORDERING SYSTEM PROVIDED HISTORY: Trauma/Fracture TECHNOLOGIST PROVIDED HISTORY: AP and cross-table lateral of the hip please, thank you Trauma/Fracture Ordering Physician Provided Reason for Exam: right hip fx, pain FINDINGS: Again identified is the acute varus impacted femoral neck fracture no definite intertrochanteric component is identified. Osteopenia. No other definite fracture is seen. Atherosclerotic calcifications are identified. Acute impacted femoral neck fracture as seen on the recent comparison examination, without definite intertrochanteric involvement. Similar alignment. Xr Hip 2-3 Vw W Pelvis Right    Result Date: 5/13/2019  EXAMINATION: ONE XRAY VIEW OF THE PELVIS AND TWO XRAY VIEWS RIGHT HIP 5/13/2019 1:25 pm COMPARISON: None. HISTORY: ORDERING SYSTEM PROVIDED HISTORY: fall TECHNOLOGIST PROVIDED HISTORY: fall Ordering Physician Provided Reason for Exam: right hip pain Acuity: Acute Type of Exam: Initial Mechanism of Injury: fall last night FINDINGS: Acute varus impacted femoral neck fracture. Cannot exclude a trochanteric extension of the basis of these images. MRI or CT may be useful in further evaluating for intertrochanteric extension. Vascular calcifications. Soft tissue swelling. No additional fractures. Degenerative changes of bilateral hips. Acute varus impacted femoral neck fracture. Cannot exclude extension into the intertrochanteric right femur. Consider cross-sectional imaging for further evaluation.          Consultations:    Consults:     Final Specialist Recommendations/Findings:   IP CONSULT TO ORTHOPEDIC SURGERY  IP CONSULT TO PULMONOLOGY  IP CONSULT TO NEPHROLOGY  IP CONSULT TO CARDIOLOGY  IP CONSULT TO RESPIRATORY CARE  IP CONSULT TO VASCULAR SURGERY  PHARMACY TO DOSE VANCOMYCIN        Discharged Condition: Stable     Disposition: Skilled Facility    Physician Follow Up:   MD Paul Parra MD  [address] [facility]  [facility] [address]  [phone]    Schedule an appointment as soon as possible for a visit in 1 week  call for appointment- She would like to see you on 5/24    Becka Farley, DO  [address]  [place], [place] [address] [facility]  [phone]    Schedule an appointment as soon as possible for a visit in 2 weeks         Diet:   Renal / ADA    Activity:   As tolerated  Orthopedic restrictions     Discharge Medications:      Medication List      START taking these medications    traMADol 50 MG tablet  Commonly known as:  ULTRAM  Take 0.5 tablets by mouth every 4 hours as needed for Pain for up to 7 days.         CONTINUE taking these medications    ARIPiprazole 5 MG tablet  Commonly known as:  ABILIFY     AURYXIA 1  MG(Fe) Tabs  Generic drug:  Ferric Citrate     darbepoetin jean pierre-polysorbate 40 MCG/0.4ML Sosy injection  Commonly known as:  ARANESP  Infuse 0.8 mLs intravenously every 7 days     ELIQUIS 5 MG Tabs tablet  Generic drug:  apixaban     furosemide 20 MG tablet  Commonly known as:  LASIX     isosorbide mononitrate 30 MG extended release tablet  Commonly known as:  IMDUR     lamoTRIgine 25 MG tablet  Commonly known as:  LAMICTAL     LEVOTHROID 50 MCG tablet  Generic drug:  levothyroxine     pantoprazole 40 MG tablet  Commonly known as:  PROTONIX     venlafaxine 75 MG tablet  Commonly known as:  EFFEXOR     Vitamin D3 2000 units Caps        STOP taking these medications    atorvastatin 40 MG tablet  Commonly known as:  LIPITOR     ferrous sulfate 325 (65 Fe) MG tablet     midodrine 5 MG tablet  Commonly known as:  PROAMATINE     vancomycin infusion  Commonly known as:  VANCOCIN     VITAMIN B 12 PO           Where to Get Your Medications      You can get these medications from any pharmacy    Bring a paper prescription for each of these medications  · traMADol 50 MG tablet Information about where to get these medications is not yet available    Ask your nurse or doctor about these medications  · darbepoetin jean pierre-polysorbate 40 MCG/0.4ML Sosy injection         Time Spent on discharge is  33 mins in patient examination, evaluation, counseling, medication reconciliation, discharge plan and follow up. Electronically signed by   Sung Crowe DO  5/18/2019  6:12 PM      Thank you Dr. Jaleel Cline MD for the opportunity to be involved in this patient's care.

## 2019-05-18 NOTE — PROGRESS NOTES
PULMONARY PROGRESS NOTE      Patient:  Naresh Boykin  YOB: 1950    MRN: [id number]     Acct: [de-identified]     Admit date: 5/13/2019    REASON FOR CONSULT:- Right pleural effusion    Pt seen and Chart reviewed. Patient seen during dialysis. Patient is on CPAP. Denied any chest pain or pressure. No shortness of breath or wheezing. Not much cough or sputum production. No hemoptysis.   Tolerating dialysis    Subjective:   Review of Systems -   General ROS: Completed and except as mentioned above were negative   Psychological ROS:  Completed and except as mentioned above were negative  Allergy and Immunology ROS:  Completed and except as mentioned above were negative  Hematological and Lymphatic ROS:  Completed and except as mentioned above were negative  Respiratory ROS:  Completed and except as mentioned above were negative  Cardiovascular ROS:  Completed and except as mentioned above were negative  Gastrointestinal ROS: Completed and except as mentioned above were negative  Genito-Urinary ROS:  Completed and except as mentioned above were negative  Musculoskeletal ROS:  Completed and except as mentioned above were negative  Neurological ROS:  Completed and except as mentioned above were negative  Dermatological ROS:  Completed and except as mentioned above were negative      Diet:  DIET GENERAL;      Medications:Current Inpatient    Scheduled Meds:   ferrous sulfate  325 mg Oral Daily with breakfast    ARIPiprazole  5 mg Oral Daily    vitamin D  1,000 Units Oral Daily    isosorbide mononitrate  30 mg Oral Daily    lamoTRIgine  100 mg Oral Daily    levothyroxine  50 mcg Oral Daily    pantoprazole  40 mg Oral Daily    venlafaxine  75 mg Oral Daily    sodium chloride flush  10 mL Intravenous 2 times per day    heparin (porcine)  5,000 Units Subcutaneous 3 times per day     Continuous Infusions:  PRN Meds:traMADol, sodium No gross muscle weakness. Extremities:  Has bilateral trace lower extremity edema with venous stasis changes, no ulcerations, tenderness, varicosities or erythema. Muscle size, tone and strength are normal.  No involuntary movements are noted. Skin:  Warm and dry. Good color, turgor and pigmentation. No lesions or scars. No cyanosis or clubbing  Neurological/Psychiatric: The patient's general behavior, level of consciousness, thought content and emotional status is normal.          CBC: No results for input(s): WBC, HGB, PLT in the last 72 hours. BMP:    Recent Labs     05/16/19  0911 05/18/19  0850   * 130*   K 5.5* 4.2   CL 96* 95*   CO2 21 18*   BUN 45* 46*   CREATININE 5.50* 5.41*   GLUCOSE 202* 251*     Calcium:  Recent Labs     05/18/19  0850   CALCIUM 8.0*     Ionized Calcium:No results for input(s): IONCA in the last 72 hours. Magnesium:No results for input(s): MG in the last 72 hours. Phosphorus:No results for input(s): PHOS in the last 72 hours. BNP:No results for input(s): BNP in the last 72 hours. Glucose:  Recent Labs     05/15/19  1940   POCGLU 116*     HgbA1C: No results for input(s): LABA1C in the last 72 hours. INR: No results for input(s): INR in the last 72 hours. Hepatic: No results for input(s): ALKPHOS, ALT, AST, PROT, BILITOT, BILIDIR, LABALBU in the last 72 hours. Amylase and Lipase:No results for input(s): LACTA, AMYLASE in the last 72 hours. Lactic Acid: No results for input(s): LACTA in the last 72 hours. CARDIAC ENZYMES:No results for input(s): CKTOTAL, CKMB, CKMBINDEX, TROPONINI in the last 72 hours. BNP: No results for input(s): BNP in the last 72 hours. Lipids: No results for input(s): CHOL, TRIG, HDL, LDLCALC in the last 72 hours.     Invalid input(s): LDL  ABGs: No results found for: PH, PCO2, PO2, HCO3, O2SAT  Thyroid:   Lab Results   Component Value Date    TSH 2.33 05/14/2019      Urinalysis: No results for input(s): BACTERIA, BLOODU, CLARITYU, COLORU, Critical Care

## 2019-05-18 NOTE — PROGRESS NOTES
Nephrology Progress Note    Subjective:   Patient evaluated . Pt is stable . No new issues overnite. Stable hemodynamics. Patient is status right hip hemiarthroplasty for right femoral neck fracture. His pain seems to be adequately controlled at this time. His LISSETTE drain from his left upper extremity hematoma was removed . Plan to d/c to [facility]. Objective:  CURRENT TEMPERATURE:  Temp: 97.8 °F (36.6 °C)  MAXIMUM TEMPERATURE OVER 24HRS:  Temp (24hrs), Av °F (36.7 °C), Min:97.5 °F (36.4 °C), Max:98.6 °F (37 °C)    CURRENT RESPIRATORY RATE:  Resp: 19  CURRENT PULSE:  Pulse: 80  CURRENT BLOOD PRESSURE:  BP: (!) 114/57  24HR BLOOD PRESSURE RANGE:  Systolic (99ZBQ), NOO:815 , Min:95 , MFU:993   ; Diastolic (80JFS), PKF:89, Min:37, Max:57    24HR INTAKE/OUTPUT:      Intake/Output Summary (Last 24 hours) at 2019 0729  Last data filed at 2019 0546  Gross per 24 hour   Intake 240 ml   Output 0 ml   Net 240 ml     Patient Vitals for the past 96 hrs (Last 3 readings):   Weight   19 1355 231 lb 14.8 oz (105.2 kg)   19 0826 233 lb 11 oz (106 kg)   05/15/19 1055 233 lb (105.7 kg)     Physical Exam:  General appearance:Awake, alert, in no acute distress  Skin: warm and dry, no rash or erythema  Eyes: conjunctivae normal and sclera anicteric  ENT:no thrush no pharyngeal congestion   Neck:  No JVD, No Thyromegaly  Pulmonary: clear to auscultation and no wheezing or rhonchi   Cardiovascular: normal S1 and S2. NO rubs and NO murmur.  No S3 OR S4   Abdomen: soft nontender, bowel sounds present, no organomegaly,  no ascites  Extremities: no cyanosis, clubbing + slight edema     Access:  previous  Left AV graft + thrill + bruit    Current Medications:      traMADol (ULTRAM) tablet 25 mg Q4H PRN   ferrous sulfate EC tablet 325 mg Daily with breakfast   ARIPiprazole (ABILIFY) tablet 5 mg Daily   vitamin D (CHOLECALCIFEROL) tablet 1,000 Units Daily   isosorbide mononitrate (IMDUR) extended release tablet 30 mg Daily   lamoTRIgine (LAMICTAL) tablet 100 mg Daily   levothyroxine (SYNTHROID) tablet 50 mcg Daily   pantoprazole (PROTONIX) tablet 40 mg Daily   venlafaxine (EFFEXOR) tablet 75 mg Daily   sodium chloride flush 0.9 % injection 10 mL 2 times per day   sodium chloride flush 0.9 % injection 10 mL PRN   magnesium hydroxide (MILK OF MAGNESIA) 400 MG/5ML suspension 30 mL Daily PRN   nicotine (NICODERM CQ) 21 MG/24HR 1 patch Daily PRN   acetaminophen (TYLENOL) tablet 650 mg Q4H PRN   heparin (porcine) injection 5,000 Units 3 times per day     0.9 % sodium chloride infusion Continuous     Labs:   CBC:   No results for input(s): WBC, RBC, HGB, HCT, PLT, MPV in the last 72 hours. BMP:   Recent Labs     05/16/19  0911   *   K 5.5*   CL 96*   CO2 21   BUN 45*   CREATININE 5.50*   GLUCOSE 202*   CALCIUM 7.9*      Assessment:  1 ESRD: TTS. Due today. 2. HTN:  3  DM:  4  Right femoral neck fracture status post right hip hemiarthroplasty performed yesterday :  5. ANEMIA OF CHRONIC DISEASE:   6. SECONDARY HYPERPARATHYROIDISM:   7. Patient has a history of chronic A. Fib  8. Right thoracentesis done on this admission    Plan:  1. Dialysis today. 2.  Most likely will need to go to an ECF for rehab, [facility]. 3. Cont pt on aranesp and zemplar per protocol. 4. Ok for disc from renal standpoint . Please do not hesitate to call with questions. Electronically signed by Shary Lesch, CNP, APRN - CNP on 5/18/2019 at 7:29 AM    Attending Physician Statement  I have discussed the care of Alexa Curran, including pertinent history and exam findings with the resident/fellow. I have reviewed the key elements of all parts of the encounter with the resident/fellow. I have seen and examined the patient with the resident/fellow. I agree with the assessment and plan and status of the problem list as documented. Addiitionally I saw pt on dialysis and orders were reviewed with dialysis nurse.    Noel Gomez ,

## 2019-05-18 NOTE — CARE COORDINATION
Discharge [address] [facility] [place] Case Management Department  Written by: Quan Carrasco RN    Patient Name: Delfino Joe  Attending Provider: Mariangel Welch DO  Admit Date: 2019 12:52 PM  MRN: [id number]  Account: [de-identified]                     : 1950  Discharge Date:       Disposition: SNF. Patient with DC order in [address] [place] [address] Rd. Wife- Oscar Anthony called and advised of DC this day to [facility]. [facility] arranged. All Dc papers faxed to [facility].      Quan Carrasco RN

## 2019-05-18 NOTE — PROGRESS NOTES
with mild regurgitation. Right ventricular systolic pressure is 68DD. Hg.     Postop physical therapy was initiated  Nephrology was consulted  Dialysis was continued  Electrolyte abnormalities were addressed  CPAP was continued  Blood pressures were monitored and controlled  Blood sugars were monitored and controlled        Past Medical History:   has a past medical history of A-fib ([facility] Acute ischemic stroke ([facility] Acute metabolic encephalopathy, Acute on chronic congestive heart failure ([facility] Acute on chronic diastolic CHF (congestive heart failure) ([facility] Altered mental status, Anemia, Anemia associated with chronic renal failure, Aphasia, ARF (acute renal failure) ([facility]), Arteriovenous fistula for hemodialysis in place, primary ([facility] Arthritis, Bradyarrhythmia, Cellulitis, Cerebral contusion ([facility]), CHF (congestive heart failure) ([facility] Chronic atrial fibrillation ([facility] Chronic kidney disease, Chronic pain of right knee, CKD (chronic kidney disease) stage 4, GFR 15-29 ml/min ([facility] Coagulation defect ([facility] Diabetes mellitus ([facility] Diabetes mellitus type 2 in obese ([facility] Diarrhea, DVT (deep venous thrombosis) ([facility] Elevated C-reactive protein (CRP), ESRD (end stage renal disease) ([facility] Essential hypertension, Fever, Foot ulcer due to secondary DM ([facility] GERD (gastroesophageal reflux disease), Hematochezia, History of cerebral infarction, History of DVT (deep vein thrombosis), History of superior vena cava filter placement, Hx of blood clots, Hyperkalemia, Hyperlipidemia, Hypernatremia, Hypertension, Hypocalcemia, Hypovolemic shock ([facility] Hypoxia, Irregular heartbeat, Knee effusion, right, Left leg cellulitis, Lower GI bleed, MDRO (multiple drug resistant organisms) resistance, Metabolic acidosis, MRSA (methicillin resistant staph aureus) culture positive, On continuous oral anticoagulation, NADIRA on CPAP, Other specified hypothyroidism, Parietal lobe infarction ([facility] Pneumonia, Post traumatic encephalopathy, vomiting. Genitourinary: Negative for difficulty urinating and hematuria. Musculoskeletal: Positive for arthralgias and myalgias. His hip is still stiff and sore   Psychiatric/Behavioral: Positive for confusion and sleep disturbance. Physical Examination:        Vitals:  BP (!) 97/45   Pulse 81   Temp 98.6 °F (37 °C)   Resp 22   Ht 6' (1.829 m)   Wt 234 lb 5.6 oz (106.3 kg)   SpO2 99%   BMI 31.78 kg/m²   Temp (24hrs), Av.2 °F (36.8 °C), Min:97.6 °F (36.4 °C), Max:98.6 °F (37 °C)    Recent Labs     05/15/19  1940   POCGLU 116*       Physical Exam   Constitutional: No distress. HENT:   Head: Normocephalic. Nose: Nose normal.   Eyes: Conjunctivae are normal. No scleral icterus. Neck: Neck supple. No tracheal deviation present. Cardiovascular: Normal rate and regular rhythm. Pulmonary/Chest: Effort normal and breath sounds normal. No respiratory distress. He has no wheezes. He has no rales. He exhibits no tenderness. Airflow is reduced   Abdominal: Soft. Bowel sounds are normal. He exhibits no distension. There is no tenderness. Musculoskeletal: He exhibits edema (3/4 at the ankles). He exhibits no tenderness. Neurological:   Somnolent  Having difficulty with historical data   Skin: Skin is warm and dry. He is not diaphoretic. Wound is dressed, dry and clean  Stasis dermatitis of the ankles noted   Vitals reviewed. Data:     I/O (24Hr): Intake/Output Summary (Last 24 hours) at 2019 1020  Last data filed at 2019 0546  Gross per 24 hour   Intake 240 ml   Output 0 ml   Net 240 ml       Labs:    Hematology:No results for input(s): WBC, HGB, HCT, PLT, ESR, INR in the last 72 hours.     Invalid input(s): PT  Chemistry:  Recent Labs     19  0911 19  0850   * 130*   K 5.5* 4.2   CL 96* 95*   CO2 21 18*   GLUCOSE 202* 251*   BUN 45* 46*   CREATININE 5.50* 5.41*   CALCIUM 7.9* 8.0*     No results for input(s): PROT, LABALBU, LABA1C, T0VIWAK, R2GGTDB, FT4, TSH, AST, ALT, LDH, GGT, ALKPHOS, BILITOT, BILIDIR, AMMONIA, AMYLASE, LIPASE, LACTATE, CHOL, TRIG, HDL, LDLCALC, LDLDIRECT, LABVLDL, BNP, TROPONINI, CKTOTAL, CKMB, CKMBINDEX in the last 72 hours. Lab Results   Component Value Date/Time    SPECIAL NOT REPORTED 05/13/2019 06:26 PM    SPECIAL NOT REPORTED 05/13/2019 06:26 PM     Lab Results   Component Value Date/Time    CULTURE PENDING 05/13/2019 06:26 PM       Lab Results   Component Value Date    POCPH 7.46 02/28/2017    POCPCO2 36 02/28/2017    POCPO2 47 02/28/2017    POCHCO3 25.8 02/28/2017    NBEA NOT REPORTED 02/28/2017    PBEA 2 02/28/2017    AZJ8TQD 27 02/28/2017    EJAF4IHV 85 02/28/2017    FIO2 21.0 02/28/2017       Radiology / Diagnostics:  See above      Assessment:        Primary Problem  Principal Problem:    Closed fracture of right hip ([facility]  Active Problems:    Subdural bleeding (HCC)    Chronic atrial fibrillation (HCC)    Hyperkalemia    Hypocalcemia    Other specified hypothyroidism    Anemia associated with chronic renal failure    Secondary hyperparathyroidism of renal origin ([facility]    Chronic diastolic congestive heart failure (HCC)    Diabetes mellitus type 2 in obese (HCC)    Seizure disorder (HCC)    ESRD (end stage renal disease) ([facility]    Essential hypertension    History of DVT (deep vein thrombosis)    [facility] right [facility] fracture, [facility]    Thyroid disease    Hyponatremia    Uncontrolled type 2 diabetes mellitus with hyperglycemia ([facility]  Resolved Problems:    * No resolved hospital problems.  *      Plan         Physical therapy and rehabilitation   Blood Pressure - Monitor and control  Glycemic contol - monitor and control blood sugars  Correct electrolyte abnormalities  Dialysis  Optimize cardio-pulmonary function  Eliquis / rate control atrial fibrillation  Monitor H&H, Aranesp, iron supplementation as needed  Thyroid replacement  Med rec done  JULIA done  Will discharge when arrangements complete and ok with

## 2019-05-18 NOTE — PROGRESS NOTES
Dialysis Post Treatment Note  Patient tolerated treatment well. Denies complaints at time of discharge.    Vitals:    05/18/19 1205   BP: (!) 100/45   Pulse: 78   Resp:    Temp: 97.6 °F (36.4 °C)   SpO2:      Pre-Weight = 106.3kg  Post-weight = Weight: 231 lb 7.7 oz (105 kg)  Total Liters Processed = Total Liters Processed (l/min): 74.5 l/min  Rinseback Volume (mL) = Rinseback Volume (ml): 370 ml  Net Removal (mL) = @[id number]  Length of treatment=4 hours

## 2019-05-18 NOTE — PROGRESS NOTES
Physical Therapy  DATE: 2019    NAME: Gabrielle Urrutia  MRN: [id number]   : 1950    Patient not seen this date for Physical Therapy due to:  [] Blood transfusion in progress  [x] Hemodialysis   Patient not in room. PT will check back at a later time. []  Patient Declined  [] Spine Precautions   [] Strict Bedrest  [] Surgery/ Procedure  [] Testing      [] Other        [] PT being discontinued at this time. Patient independent. No further needs. [] PT being discontinued at this time as the patient has been transferred to palliative care. No further needs.     Blair Samples, PTA

## 2019-05-18 NOTE — PLAN OF CARE
Problem: Falls - Risk of:  Goal: Will remain free from falls  Description  Will remain free from falls  Outcome: Ongoing  Goal: Absence of physical injury  Description  Absence of physical injury  Outcome: Ongoing     Problem: Pain:  Goal: Pain level will decrease  Description  Pain level will decrease  Outcome: Ongoing  Goal: Control of acute pain  Description  Control of acute pain  Outcome: Ongoing  Goal: Control of chronic pain  Description  Control of chronic pain  Outcome: Ongoing     Problem: Risk for Impaired Skin Integrity  Goal: Tissue integrity - skin and mucous membranes  Description  Structural intactness and normal physiological function of skin and  mucous membranes.   Outcome: Ongoing     Problem: Musculor/Skeletal Functional Status  Goal: Highest potential functional level  Outcome: Ongoing

## 2019-05-20 LAB
CULTURE: ABNORMAL
DIRECT EXAM: ABNORMAL
Lab: ABNORMAL
SPECIMEN DESCRIPTION: ABNORMAL

## 2019-06-04 ENCOUNTER — OFFICE VISIT (OUTPATIENT)
Dept: ORTHOPEDIC SURGERY | Age: 69
End: 2019-06-04

## 2019-06-04 VITALS — WEIGHT: 231.48 LBS | HEIGHT: 72 IN | BODY MASS INDEX: 31.35 KG/M2

## 2019-06-04 DIAGNOSIS — S72.001A CLOSED RIGHT HIP FRACTURE, INITIAL ENCOUNTER (HCC): Primary | ICD-10-CM

## 2019-06-04 PROCEDURE — 99024 POSTOP FOLLOW-UP VISIT: CPT | Performed by: ORTHOPAEDIC SURGERY

## 2019-06-11 ENCOUNTER — OFFICE VISIT (OUTPATIENT)
Dept: ORTHOPEDIC SURGERY | Age: 69
End: 2019-06-11

## 2019-06-11 VITALS — BODY MASS INDEX: 31.35 KG/M2 | WEIGHT: 231.48 LBS | HEIGHT: 72 IN

## 2019-06-11 DIAGNOSIS — S72.001A CLOSED RIGHT HIP FRACTURE, INITIAL ENCOUNTER (HCC): Primary | ICD-10-CM

## 2019-06-11 LAB
CULTURE: NORMAL
Lab: NORMAL
SPECIMEN DESCRIPTION: NORMAL

## 2019-06-11 PROCEDURE — 99024 POSTOP FOLLOW-UP VISIT: CPT | Performed by: ORTHOPAEDIC SURGERY

## 2019-06-11 NOTE — PROGRESS NOTES
[address] [place] [id number]  Dept: [phone]  Dept Fax: [phone]        Ambulatory Follow Up    Subjective:   HPI:  Naima Peterson is a 68y.o. year old male who presents to our office today for post op follow up of Right hip danielle on 5/15/19. He has been at a SNF. He is not making much progress with therapy. He walked with a walker prior to in injury. Presents today in wheelchair. Complains of pain when working with therapy. Wife is frustrated with progress. ROS: No fevers, chills, nausea, vomiting, shortness of breath, chest pain, numbness or tingling. I have reviewed the CC, HPI, ROS, PMH, FHX, Social History. I agree with the documentation provided by other staff, residents, and/or medical students and have reviewed their documentation prior to providing my signature indicating agreement. Objective :   General: Naima Peterson is a 68 y.o. male who is alert and oriented and sitting comfortably in our office. Neuro: Alert and oriented. Eyes: Extra-ocular muscles intact  Mouth: Oral mucosa moist. No perioral lesions  Pulm: Respirations unlabored and regular. Skin: Warm, well perfused  Psych: Patient has good fund of knowledge and displays understanging of exam, diagnosis, and plan. Ortho Exam  MS:  Unable to stand. Unable to straight leg raise. Superior aspect of incision healing well, staples present. Inferior aspect has some granulation tissue present. Compartments soft and compressible. TA/EHL/FHL/GS motor intact. Deep and Superficial Peroneal/Saphenous/Sural SILT. 2+ DP pulse. Radiology:   X-rays reviewed from EMR. No new imaging performed today.     Assessment/Plan:   Naima Peterson is a 68y.o. year old male 3 weeks out from right hip danielle    Incision examined, mild superficial granulation at inferior aspect of incision  Staples removed today  Continue dressing changes as needed  Educated patient it may be a slow process to recover due to his prior ambulation status  Continue working with physical therapy  Tylenol / Motrin for pain control  Continue Eliquis for DVT prophylaxis  Follow up in 4 weeks for repeat films     ----------------------------------------  Shimon Salcedo DO  PGY-1, Department of [place], [place]      Attending:    Titi Hu DO, reviewed the information and imaging if available. The case was discussed with the resident and plan reviewed.

## 2019-06-25 ENCOUNTER — OFFICE VISIT (OUTPATIENT)
Dept: ORTHOPEDIC SURGERY | Age: 69
End: 2019-06-25

## 2019-06-25 VITALS — WEIGHT: 231.48 LBS | HEIGHT: 72 IN | BODY MASS INDEX: 31.35 KG/M2

## 2019-06-25 DIAGNOSIS — S72.001A CLOSED RIGHT HIP FRACTURE, INITIAL ENCOUNTER (HCC): Primary | ICD-10-CM

## 2019-06-25 PROCEDURE — 99024 POSTOP FOLLOW-UP VISIT: CPT | Performed by: ORTHOPAEDIC SURGERY

## 2019-06-25 RX ORDER — ISOSORBIDE DINITRATE 30 MG/1
30 TABLET ORAL DAILY
Status: ON HOLD | COMMUNITY
Start: 2019-05-16 | End: 2019-07-28 | Stop reason: HOSPADM

## 2019-06-26 ENCOUNTER — HOSPITAL ENCOUNTER (INPATIENT)
Age: 69
LOS: 12 days | Discharge: NURSING, REHAB OR LONG-TERM CARE | DRG: 466 | End: 2019-07-08
Attending: ORTHOPAEDIC SURGERY | Admitting: ORTHOPAEDIC SURGERY
Payer: MEDICARE

## 2019-06-26 ENCOUNTER — APPOINTMENT (OUTPATIENT)
Dept: GENERAL RADIOLOGY | Age: 69
DRG: 466 | End: 2019-06-26
Attending: ORTHOPAEDIC SURGERY
Payer: MEDICARE

## 2019-06-26 ENCOUNTER — ANESTHESIA (OUTPATIENT)
Dept: OPERATING ROOM | Age: 69
DRG: 466 | End: 2019-06-26
Payer: MEDICARE

## 2019-06-26 ENCOUNTER — ANESTHESIA EVENT (OUTPATIENT)
Dept: OPERATING ROOM | Age: 69
DRG: 466 | End: 2019-06-26
Payer: MEDICARE

## 2019-06-26 VITALS
OXYGEN SATURATION: 100 % | SYSTOLIC BLOOD PRESSURE: 108 MMHG | DIASTOLIC BLOOD PRESSURE: 49 MMHG | RESPIRATION RATE: 17 BRPM

## 2019-06-26 DIAGNOSIS — Z96.641 HISTORY OF HEMIARTHROPLASTY OF RIGHT HIP: ICD-10-CM

## 2019-06-26 DIAGNOSIS — G89.18 POST-OP PAIN: Primary | ICD-10-CM

## 2019-06-26 LAB
GFR NON-AFRICAN AMERICAN: 17 ML/MIN
GFR SERPL CREATININE-BSD FRML MDRD: 21 ML/MIN
GFR SERPL CREATININE-BSD FRML MDRD: ABNORMAL ML/MIN/{1.73_M2}
GLUCOSE BLD-MCNC: 137 MG/DL (ref 74–100)
POC CHLORIDE: 96 MMOL/L (ref 98–107)
POC CREATININE: 3.57 MG/DL (ref 0.51–1.19)
POC HEMATOCRIT: 29 % (ref 41–53)
POC HEMOGLOBIN: 9.9 G/DL (ref 13.5–17.5)
POC IONIZED CALCIUM: 1.12 MMOL/L (ref 1.15–1.33)
POC LACTIC ACID: 0.85 MMOL/L (ref 0.56–1.39)
POC POTASSIUM: 3.5 MMOL/L (ref 3.5–4.5)
POC SODIUM: 138 MMOL/L (ref 138–146)

## 2019-06-26 PROCEDURE — 87186 SC STD MICRODIL/AGAR DIL: CPT

## 2019-06-26 PROCEDURE — 3600000002 HC SURGERY LEVEL 2 BASE: Performed by: ORTHOPAEDIC SURGERY

## 2019-06-26 PROCEDURE — 6360000002 HC RX W HCPCS: Performed by: STUDENT IN AN ORGANIZED HEALTH CARE EDUCATION/TRAINING PROGRAM

## 2019-06-26 PROCEDURE — 82330 ASSAY OF CALCIUM: CPT

## 2019-06-26 PROCEDURE — 2500000003 HC RX 250 WO HCPCS: Performed by: NURSE ANESTHETIST, CERTIFIED REGISTERED

## 2019-06-26 PROCEDURE — 3700000001 HC ADD 15 MINUTES (ANESTHESIA): Performed by: ORTHOPAEDIC SURGERY

## 2019-06-26 PROCEDURE — 6360000002 HC RX W HCPCS: Performed by: NURSE ANESTHETIST, CERTIFIED REGISTERED

## 2019-06-26 PROCEDURE — 82565 ASSAY OF CREATININE: CPT

## 2019-06-26 PROCEDURE — 73552 X-RAY EXAM OF FEMUR 2/>: CPT

## 2019-06-26 PROCEDURE — 3600000012 HC SURGERY LEVEL 2 ADDTL 15MIN: Performed by: ORTHOPAEDIC SURGERY

## 2019-06-26 PROCEDURE — 2580000003 HC RX 258: Performed by: NURSE ANESTHETIST, CERTIFIED REGISTERED

## 2019-06-26 PROCEDURE — 7100000040 HC SPAR PHASE II RECOVERY - FIRST 15 MIN: Performed by: ORTHOPAEDIC SURGERY

## 2019-06-26 PROCEDURE — 7100000041 HC SPAR PHASE II RECOVERY - ADDTL 15 MIN: Performed by: ORTHOPAEDIC SURGERY

## 2019-06-26 PROCEDURE — 84132 ASSAY OF SERUM POTASSIUM: CPT

## 2019-06-26 PROCEDURE — 87070 CULTURE OTHR SPECIMN AEROBIC: CPT

## 2019-06-26 PROCEDURE — 87075 CULTR BACTERIA EXCEPT BLOOD: CPT

## 2019-06-26 PROCEDURE — 2709999900 HC NON-CHARGEABLE SUPPLY: Performed by: ORTHOPAEDIC SURGERY

## 2019-06-26 PROCEDURE — 82435 ASSAY OF BLOOD CHLORIDE: CPT

## 2019-06-26 PROCEDURE — 73502 X-RAY EXAM HIP UNI 2-3 VIEWS: CPT

## 2019-06-26 PROCEDURE — 2580000003 HC RX 258: Performed by: STUDENT IN AN ORGANIZED HEALTH CARE EDUCATION/TRAINING PROGRAM

## 2019-06-26 PROCEDURE — 0J9L0ZZ DRAINAGE OF RIGHT UPPER LEG SUBCUTANEOUS TISSUE AND FASCIA, OPEN APPROACH: ICD-10-PCS | Performed by: ORTHOPAEDIC SURGERY

## 2019-06-26 PROCEDURE — 84295 ASSAY OF SERUM SODIUM: CPT

## 2019-06-26 PROCEDURE — 82947 ASSAY GLUCOSE BLOOD QUANT: CPT

## 2019-06-26 PROCEDURE — 3700000000 HC ANESTHESIA ATTENDED CARE: Performed by: ORTHOPAEDIC SURGERY

## 2019-06-26 PROCEDURE — 73501 X-RAY EXAM HIP UNI 1 VIEW: CPT

## 2019-06-26 PROCEDURE — 2580000003 HC RX 258: Performed by: ORTHOPAEDIC SURGERY

## 2019-06-26 PROCEDURE — 1200000000 HC SEMI PRIVATE

## 2019-06-26 PROCEDURE — 76937 US GUIDE VASCULAR ACCESS: CPT

## 2019-06-26 PROCEDURE — 85014 HEMATOCRIT: CPT

## 2019-06-26 PROCEDURE — 87077 CULTURE AEROBIC IDENTIFY: CPT

## 2019-06-26 PROCEDURE — 83605 ASSAY OF LACTIC ACID: CPT

## 2019-06-26 PROCEDURE — 6370000000 HC RX 637 (ALT 250 FOR IP): Performed by: STUDENT IN AN ORGANIZED HEALTH CARE EDUCATION/TRAINING PROGRAM

## 2019-06-26 PROCEDURE — 87205 SMEAR GRAM STAIN: CPT

## 2019-06-26 RX ORDER — SODIUM CHLORIDE 0.9 % (FLUSH) 0.9 %
10 SYRINGE (ML) INJECTION PRN
Status: DISCONTINUED | OUTPATIENT
Start: 2019-06-26 | End: 2019-07-08 | Stop reason: HOSPADM

## 2019-06-26 RX ORDER — FENTANYL CITRATE 50 UG/ML
25 INJECTION, SOLUTION INTRAMUSCULAR; INTRAVENOUS EVERY 5 MIN PRN
Status: DISCONTINUED | OUTPATIENT
Start: 2019-06-26 | End: 2019-06-26 | Stop reason: HOSPADM

## 2019-06-26 RX ORDER — MIDAZOLAM HYDROCHLORIDE 1 MG/ML
2 INJECTION INTRAMUSCULAR; INTRAVENOUS
Status: CANCELLED | OUTPATIENT
Start: 2019-06-26 | End: 2019-06-26

## 2019-06-26 RX ORDER — LIDOCAINE HYDROCHLORIDE 10 MG/ML
INJECTION, SOLUTION EPIDURAL; INFILTRATION; INTRACAUDAL; PERINEURAL PRN
Status: DISCONTINUED | OUTPATIENT
Start: 2019-06-26 | End: 2019-06-26 | Stop reason: SDUPTHER

## 2019-06-26 RX ORDER — PANTOPRAZOLE SODIUM 40 MG/1
40 TABLET, DELAYED RELEASE ORAL
Status: DISCONTINUED | OUTPATIENT
Start: 2019-06-27 | End: 2019-07-08 | Stop reason: HOSPADM

## 2019-06-26 RX ORDER — MAGNESIUM HYDROXIDE 1200 MG/15ML
LIQUID ORAL CONTINUOUS PRN
Status: COMPLETED | OUTPATIENT
Start: 2019-06-26 | End: 2019-06-26

## 2019-06-26 RX ORDER — SODIUM CHLORIDE 0.9 % (FLUSH) 0.9 %
10 SYRINGE (ML) INJECTION EVERY 12 HOURS SCHEDULED
Status: DISCONTINUED | OUTPATIENT
Start: 2019-06-26 | End: 2019-07-08 | Stop reason: HOSPADM

## 2019-06-26 RX ORDER — VENLAFAXINE 75 MG/1
75 TABLET ORAL DAILY
Status: DISCONTINUED | OUTPATIENT
Start: 2019-06-26 | End: 2019-07-08 | Stop reason: HOSPADM

## 2019-06-26 RX ORDER — SODIUM CHLORIDE 0.9 % (FLUSH) 0.9 %
10 SYRINGE (ML) INJECTION EVERY 12 HOURS SCHEDULED
Status: CANCELLED | OUTPATIENT
Start: 2019-06-26

## 2019-06-26 RX ORDER — SODIUM CHLORIDE 0.9 % (FLUSH) 0.9 %
10 SYRINGE (ML) INJECTION PRN
Status: CANCELLED | OUTPATIENT
Start: 2019-06-26

## 2019-06-26 RX ORDER — CEPHALEXIN 500 MG/1
500 CAPSULE ORAL 3 TIMES DAILY
Qty: 21 CAPSULE | Refills: 0 | Status: SHIPPED | OUTPATIENT
Start: 2019-06-26 | End: 2019-07-03

## 2019-06-26 RX ORDER — ACETAMINOPHEN 500 MG
500 TABLET ORAL EVERY 4 HOURS
Status: DISCONTINUED | OUTPATIENT
Start: 2019-06-26 | End: 2019-07-08 | Stop reason: HOSPADM

## 2019-06-26 RX ORDER — FUROSEMIDE 20 MG/1
20 TABLET ORAL 2 TIMES DAILY
Status: DISCONTINUED | OUTPATIENT
Start: 2019-06-26 | End: 2019-07-08 | Stop reason: HOSPADM

## 2019-06-26 RX ORDER — SODIUM CHLORIDE, SODIUM LACTATE, POTASSIUM CHLORIDE, CALCIUM CHLORIDE 600; 310; 30; 20 MG/100ML; MG/100ML; MG/100ML; MG/100ML
INJECTION, SOLUTION INTRAVENOUS CONTINUOUS
Status: DISCONTINUED | OUTPATIENT
Start: 2019-06-26 | End: 2019-06-26

## 2019-06-26 RX ORDER — LEVOTHYROXINE SODIUM 0.05 MG/1
50 TABLET ORAL DAILY
Status: DISCONTINUED | OUTPATIENT
Start: 2019-06-26 | End: 2019-07-08 | Stop reason: HOSPADM

## 2019-06-26 RX ORDER — SODIUM CHLORIDE 9 MG/ML
INJECTION, SOLUTION INTRAVENOUS CONTINUOUS PRN
Status: DISCONTINUED | OUTPATIENT
Start: 2019-06-26 | End: 2019-06-26 | Stop reason: SDUPTHER

## 2019-06-26 RX ORDER — PROPOFOL 10 MG/ML
INJECTION, EMULSION INTRAVENOUS CONTINUOUS PRN
Status: DISCONTINUED | OUTPATIENT
Start: 2019-06-26 | End: 2019-06-26 | Stop reason: SDUPTHER

## 2019-06-26 RX ORDER — ISOSORBIDE DINITRATE 10 MG/1
30 TABLET ORAL DAILY
Status: DISCONTINUED | OUTPATIENT
Start: 2019-06-26 | End: 2019-06-26 | Stop reason: SDUPTHER

## 2019-06-26 RX ORDER — OXYCODONE HYDROCHLORIDE AND ACETAMINOPHEN 5; 325 MG/1; MG/1
1 TABLET ORAL EVERY 4 HOURS PRN
Qty: 42 TABLET | Refills: 0 | Status: SHIPPED | OUTPATIENT
Start: 2019-06-26 | End: 2019-07-03

## 2019-06-26 RX ORDER — PROPOFOL 10 MG/ML
INJECTION, EMULSION INTRAVENOUS PRN
Status: DISCONTINUED | OUTPATIENT
Start: 2019-06-26 | End: 2019-06-26 | Stop reason: SDUPTHER

## 2019-06-26 RX ORDER — OXYCODONE HYDROCHLORIDE 5 MG/1
5 TABLET ORAL EVERY 6 HOURS PRN
Status: DISCONTINUED | OUTPATIENT
Start: 2019-06-26 | End: 2019-06-27

## 2019-06-26 RX ORDER — LAMOTRIGINE 100 MG/1
100 TABLET ORAL DAILY
Status: DISCONTINUED | OUTPATIENT
Start: 2019-06-26 | End: 2019-07-08 | Stop reason: HOSPADM

## 2019-06-26 RX ORDER — ONDANSETRON 2 MG/ML
4 INJECTION INTRAMUSCULAR; INTRAVENOUS EVERY 6 HOURS PRN
Status: DISCONTINUED | OUTPATIENT
Start: 2019-06-26 | End: 2019-06-27

## 2019-06-26 RX ORDER — FENTANYL CITRATE 50 UG/ML
INJECTION, SOLUTION INTRAMUSCULAR; INTRAVENOUS PRN
Status: DISCONTINUED | OUTPATIENT
Start: 2019-06-26 | End: 2019-06-26 | Stop reason: SDUPTHER

## 2019-06-26 RX ORDER — ARIPIPRAZOLE 5 MG/1
5 TABLET ORAL DAILY
Status: DISCONTINUED | OUTPATIENT
Start: 2019-06-26 | End: 2019-07-08 | Stop reason: HOSPADM

## 2019-06-26 RX ORDER — ONDANSETRON 2 MG/ML
4 INJECTION INTRAMUSCULAR; INTRAVENOUS ONCE
Status: CANCELLED | OUTPATIENT
Start: 2019-06-26 | End: 2019-06-26

## 2019-06-26 RX ORDER — ISOSORBIDE MONONITRATE 30 MG/1
30 TABLET, EXTENDED RELEASE ORAL DAILY
Status: DISCONTINUED | OUTPATIENT
Start: 2019-06-26 | End: 2019-07-08 | Stop reason: HOSPADM

## 2019-06-26 RX ADMIN — APIXABAN 5 MG: 5 TABLET, FILM COATED ORAL at 21:00

## 2019-06-26 RX ADMIN — LIDOCAINE HYDROCHLORIDE 50 MG: 10 INJECTION, SOLUTION EPIDURAL; INFILTRATION; INTRACAUDAL; PERINEURAL at 12:54

## 2019-06-26 RX ADMIN — ACETAMINOPHEN 500 MG: 500 TABLET ORAL at 17:01

## 2019-06-26 RX ADMIN — VITAMIN D, TAB 1000IU (100/BT) 1000 UNITS: 25 TAB at 17:01

## 2019-06-26 RX ADMIN — CEFAZOLIN 2 G: 10 INJECTION, POWDER, FOR SOLUTION INTRAVENOUS; PARENTERAL at 21:00

## 2019-06-26 RX ADMIN — PROPOFOL 10 MG: 10 INJECTION, EMULSION INTRAVENOUS at 13:02

## 2019-06-26 RX ADMIN — Medication 2 G: at 13:15

## 2019-06-26 RX ADMIN — LEVOTHYROXINE SODIUM 50 MCG: 50 TABLET ORAL at 17:01

## 2019-06-26 RX ADMIN — OXYCODONE HYDROCHLORIDE 5 MG: 5 TABLET ORAL at 16:22

## 2019-06-26 RX ADMIN — ARIPIPRAZOLE 5 MG: 5 TABLET ORAL at 17:00

## 2019-06-26 RX ADMIN — SODIUM CHLORIDE: 9 INJECTION, SOLUTION INTRAVENOUS at 11:07

## 2019-06-26 RX ADMIN — FENTANYL CITRATE 50 MCG: 50 INJECTION INTRAMUSCULAR; INTRAVENOUS at 12:54

## 2019-06-26 RX ADMIN — PROPOFOL 30 MG: 10 INJECTION, EMULSION INTRAVENOUS at 13:12

## 2019-06-26 RX ADMIN — PROPOFOL 20 MG: 10 INJECTION, EMULSION INTRAVENOUS at 13:09

## 2019-06-26 RX ADMIN — FUROSEMIDE 20 MG: 20 TABLET ORAL at 21:00

## 2019-06-26 RX ADMIN — PROPOFOL 50 MG: 10 INJECTION, EMULSION INTRAVENOUS at 12:54

## 2019-06-26 RX ADMIN — PROPOFOL 100 MCG/KG/MIN: 10 INJECTION, EMULSION INTRAVENOUS at 12:54

## 2019-06-26 RX ADMIN — ACETAMINOPHEN 500 MG: 500 TABLET ORAL at 20:59

## 2019-06-26 RX ADMIN — VENLAFAXINE 75 MG: 75 TABLET ORAL at 17:01

## 2019-06-26 RX ADMIN — FENTANYL CITRATE 50 MCG: 50 INJECTION INTRAMUSCULAR; INTRAVENOUS at 12:48

## 2019-06-26 RX ADMIN — OXYCODONE HYDROCHLORIDE 5 MG: 5 TABLET ORAL at 23:41

## 2019-06-26 ASSESSMENT — PULMONARY FUNCTION TESTS
PIF_VALUE: 1
PIF_VALUE: 0
PIF_VALUE: 1
PIF_VALUE: 0
PIF_VALUE: 1

## 2019-06-26 ASSESSMENT — PAIN DESCRIPTION - ORIENTATION: ORIENTATION: RIGHT

## 2019-06-26 ASSESSMENT — PAIN SCALES - GENERAL
PAINLEVEL_OUTOF10: 7
PAINLEVEL_OUTOF10: 4
PAINLEVEL_OUTOF10: 4
PAINLEVEL_OUTOF10: 5
PAINLEVEL_OUTOF10: 7
PAINLEVEL_OUTOF10: 8
PAINLEVEL_OUTOF10: 7

## 2019-06-26 ASSESSMENT — ENCOUNTER SYMPTOMS
STRIDOR: 0
SHORTNESS OF BREATH: 0

## 2019-06-26 ASSESSMENT — PAIN DESCRIPTION - PAIN TYPE: TYPE: CHRONIC PAIN;SURGICAL PAIN

## 2019-06-26 ASSESSMENT — PAIN DESCRIPTION - DESCRIPTORS: DESCRIPTORS: ACHING

## 2019-06-26 ASSESSMENT — PAIN - FUNCTIONAL ASSESSMENT: PAIN_FUNCTIONAL_ASSESSMENT: FACES

## 2019-06-26 ASSESSMENT — PAIN DESCRIPTION - LOCATION: LOCATION: HIP

## 2019-06-26 NOTE — ANESTHESIA POSTPROCEDURE EVALUATION
Department of Anesthesiology  Postprocedure Note    Patient: Mando Yousif  MRN: [id number]  YOB: 1950  Date of evaluation: 6/26/2019  Time:  3:08 PM     Procedure Summary     Date:  06/26/19 Room / Location:  [address] OR    Anesthesia Start:  4174 Anesthesia Stop:  8256    Procedure:  HIP INCISION AND DRAINAGE (Right ) Diagnosis:  (RIGHT HIP WOUND)    Surgeon:  Luigi Howard DO Responsible Provider:  Arianna Ruvalcaba MD    Anesthesia Type:  general, MAC ASA Status:  4          Anesthesia Type: No value filed. Buddy Phase I:      Buddy Phase II: Buddy Score: 8    Last vitals: Reviewed and per EMR flowsheets.        Anesthesia Post Evaluation    Patient location during evaluation: PACU  Patient participation: complete - patient participated  Level of consciousness: awake  Pain score: 0  Airway patency: patent  Nausea & Vomiting: no vomiting and no nausea  Complications: no  Cardiovascular status: blood pressure returned to baseline  Respiratory status: acceptable  Hydration status: euvolemic

## 2019-06-26 NOTE — ANESTHESIA PRE PROCEDURE
Department of Anesthesiology  Preprocedure Note       Name:  Ricky Tompkins   Age:  68 y.o.  :  1950                                          MRN:  [id number]         Date:  2019      Surgeon: Aleks Wang):  Nely Haro DO    Procedure: HIP INCISION AND DRAINAGE (Right )    Medications prior to admission:   Prior to Admission medications    Medication Sig Start Date End Date Taking? Authorizing Provider   isosorbide dinitrate (ISORDIL) 30 MG tablet Take 30 mg by mouth daily 19   Historical Provider, MD   darbepoetin jean pierre-polysorbate (ARANESP) 40 MCG/0.4ML SOSY injection Infuse 0.8 mLs intravenously every 7 days 19   Patti Sanders DO   furosemide (LASIX) 20 MG tablet Take 20 mg by mouth 2 times daily    Historical Provider, MD   ARIPiprazole (ABILIFY) 5 MG tablet Take 5 mg by mouth daily  17   Historical Provider, MD   apixaban (ELIQUIS) 5 MG TABS tablet Take 5 mg by mouth 2 times daily    Historical Provider, MD   Ferric Citrate (AURYXIA) 1  MG(Fe) TABS Take 210 mg by mouth 2 times daily Patients med list states frequency is 2-3 times a day    Historical Provider, MD   venlafaxine (EFFEXOR) 75 MG tablet Take 75 mg by mouth daily    Historical Provider, MD   lamoTRIgine (LAMICTAL) 25 MG tablet Take 100 mg by mouth daily     Historical Provider, MD   isosorbide mononitrate (IMDUR) 30 MG CR tablet Take 30 mg by mouth daily  14   Historical Provider, MD   pantoprazole (PROTONIX) 40 MG tablet Take 40 mg by mouth daily  10/17/13   Historical Provider, MD   levothyroxine (LEVOTHROID) 50 MCG tablet Take 50 mcg by mouth Daily. Historical Provider, MD   Cholecalciferol (VITAMIN D3) 2000 UNITS CAPS Take 1,000 Units by mouth daily. Historical Provider, MD       Current medications:    No current facility-administered medications for this visit. No current outpatient medications on file.      Facility-Administered Medications Ordered in Other Visits   Medication Dose Route Frequency Provider Last Rate Last Dose    lactated ringers infusion   Intravenous Continuous Enmanuel Anand MD        ceFAZolin (ANCEF) 2 g in dextrose 5 % 50 mL IVPB  2 g Intravenous On Call to [address] [place] [address] [address], DO        0.9 % sodium chloride infusion   Intravenous Continuous Rachel Fernando MD   Stopped at 01/16/15 1410       Allergies:     Allergies   Allergen Reactions    Bactrim [Sulfamethoxazole-Trimethoprim]        Problem List:    Patient Active Problem List   Diagnosis Code    Subdural bleeding (HCC) I62.00    Coagulation defect ([address] D68.9    Chronic atrial fibrillation ([address] I48.2    Respiratory failure, acute ([address] J96.00    Traumatic cerebral hemorrhage ([address] S06.369A    Cerebral contusion ([address] S18.773B    Pneumonia J18.9    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    Subarachnoid bleed ([facility]) I60.9    Acute ischemic stroke ([address] I63.9    Bradyarrhythmia I49.8    Hyperkalemia E87.5    Deep vein thrombosis (DVT) of left lower extremity ([facility]) I82.402    Hypocalcemia E83.51    History of superior vena cava filter placement Z95.828    Anemia D64.9    Aphasia R47.01    Other specified hypothyroidism E03.8    Parietal lobe infarction ([address] I63.89    Type 2 diabetes mellitus with left diabetic foot ulcer ([facility]) E11.621, L97.529    Acute on chronic congestive heart failure ([facility]) I50.9    NADIRA on CPAP G47.33, Z99.89    CKD (chronic kidney disease) stage 4, GFR 15-29 ml/min ([facility]) N18.4    Venous stasis dermatitis of both lower extremities I87.2    Foot ulcer due to secondary DM ([facility]) E13.621, L97.509    Anemia associated with chronic renal failure N18.9, D63.1    Secondary hyperparathyroidism of renal origin ([address] N25.81    Proteinuria R80.9    Chronic diastolic congestive heart failure ([facility]) I50.32    Knee effusion, right M25.461    Hypoxia R09.02    Chronic pain of right knee M25.561, G89.29    Diabetes mellitus type 2 in obese ([facility]) E11.69, E66.9    Acute metabolic encephalopathy G93.41    Seizure disorder ([address] G40.909    History of cerebral infarction Z86.73    Renal lesion N28.9    Lower GI bleed K92.2    Hypovolemic shock (HCC) R57.1    Hematochezia K92.1    Anemia D64.9    Stercoral ulcer of rectum K62.6    Pyogenic arthritis of right knee joint (HCC) M00.9    ESRD (end stage renal disease) (HCC) N18.6    Essential hypertension I10    Right knee pain M25.561    Cellulitis L03.90    History of DVT (deep vein thrombosis) Z86.718    On continuous oral anticoagulation Z79.01    Elevated C-reactive protein (CRP) R79.82    Left leg cellulitis L03. 116    MRSA infection A49.02    Streptococcal infection A49.1    Arteriovenous fistula for hemodialysis in place, primary [facility]) Z99.2    Closed right hip fracture, initial encounter ([facility] S72.001A    Closed fracture of right hip ([facility] S72.001A    Thyroid disease E07.9    Hyponatremia E87.1    Uncontrolled type 2 diabetes mellitus with hyperglycemia ([address] E11.65       Past Medical History:        Diagnosis Date    A-fib ([facility]     Acute ischemic stroke ([facility] 7/13/2013    Acute metabolic encephalopathy 2/2/2023    Acute on chronic congestive heart failure ([address] 5/9/2014    Acute on chronic diastolic CHF (congestive heart failure) ([facility] 2/25/2017    Altered mental status 8/24/2013    Anemia     Anemia associated with chronic renal failure 5/28/2014    Aphasia 8/24/2013    ARF (acute renal failure) ([address] 5/28/2014    From pre renal azotemia from newly added diuretic and ARB use, creat peaked at 2.8 from 2 in may 2014    Arteriovenous fistula for hemodialysis in place, primary ([facility] 11/17/2017    Arthritis     Bradyarrhythmia 7/13/2013    Cellulitis 9/17/2018    Cerebral contusion ([facility] 7/7/2013    CHF (congestive heart failure) (HCC)     Chronic atrial fibrillation (HCC) 7/6/2013    Chronic kidney disease     Chronic pain of right knee     CKD (chronic kidney disease) stage 4, GFR 15-29 ml/min ([address] 5/28/2014    From diabetic and ischemic nephrosclerosis, creat now 2-2.5, GFR 25-30 ml/min    Coagulation defect ([address] 7/6/2013    Diabetes mellitus ([address]     Diabetes mellitus type 2 in obese ([address]     Diarrhea 7/16/2013    DVT (deep venous thrombosis) (HCC)     Elevated C-reactive protein (CRP)     ESRD (end stage renal disease) ([address] 3/23/2017    Essential hypertension 3/23/2017    Fever 7/18/2013    Foot ulcer due to secondary DM ([address] 5/28/2014    Left side on antibiotics    GERD (gastroesophageal reflux disease)     Hematochezia 3/4/2017    History of cerebral infarction 3/1/2017    History of DVT (deep vein thrombosis) 9/17/2018    History of superior vena cava filter placement 7/18/2013    Hx of blood clots     Hyperkalemia 7/16/2013    Hyperlipidemia     Hypernatremia 7/11/2013    Hypertension     Hypocalcemia 7/18/2013    Hypovolemic shock ([address] 3/4/2017    Hypoxia     Irregular heartbeat     hx of a-fib    Knee effusion, right 2/25/2017    Left leg cellulitis     Lower GI bleed 3/4/2017    MDRO (multiple drug resistant organisms) resistance     Metabolic acidosis 3/51/6006    MRSA (methicillin resistant staph aureus) culture positive 09/17/2018    leg    On continuous oral anticoagulation 9/17/2018    NADIRA on CPAP     Other specified hypothyroidism 8/24/2013    Parietal lobe infarction ([address] 8/26/2013    Pneumonia     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 hyperparathyroidism of renal origin ([address] 12/16/2015    Seizure disorder ([address] 3/1/2017    Stercoral ulcer of rectum     Streptococcal infection     Subarachnoid Lab Results   Component Value Date    WBC 4.4 05/15/2019    RBC 2.88 05/15/2019    RBC 3.35 05/18/2012    HGB 9.2 05/15/2019    HCT 30.5 05/15/2019    .9 05/15/2019    RDW 13.7 05/15/2019    PLT 91 05/15/2019     05/18/2012       CMP:   Lab Results   Component Value Date     05/18/2019    K 4.2 05/18/2019    CL 95 05/18/2019    CO2 18 05/18/2019    BUN 46 05/18/2019    CREATININE 3.57 06/26/2019    CREATININE 5.41 05/18/2019    GFRAA 13 05/18/2019    LABGLOM 17 06/26/2019    GLUCOSE 251 05/18/2019    GLUCOSE 135 05/18/2012    PROT 6.9 05/13/2019    PROT 6.2 06/27/2012    CALCIUM 8.0 05/18/2019    BILITOT 0.89 09/18/2018    ALKPHOS 69 09/18/2018    AST 19 09/18/2018    ALT 12 09/18/2018       POC Tests:   Recent Labs     06/26/19  0949   POCGLU 137*   POCNA 138   POCK 3.5   POCCL 96*   POCHEMO 9.9*   POCHCT 29*       Coags:   Lab Results   Component Value Date    PROTIME 12.0 05/14/2019    PROTIME 12.8 04/17/2012    INR 1.1 05/14/2019    APTT 37.9 05/13/2019       HCG (If Applicable): No results found for: PREGTESTUR, PREGSERUM, HCG, HCGQUANT     ABGs: No results found for: PHART, PO2ART, AMM0MQO, DLF1EMU, BEART, Z0PSGPZT     Type & Screen (If Applicable):  No results found for: LABABO, [address]    Anesthesia Evaluation  Patient summary reviewed and Nursing notes reviewed no history of anesthetic complications:   Airway: Mallampati: III       Mouth opening: > = 3 FB Dental:          Pulmonary:   (+) sleep apnea:      (-) COPD, asthma, shortness of breath and stridor                           Cardiovascular:    (+) hypertension:, dysrhythmias: atrial fibrillation, CHF:,     (-) pacemaker, CABG/stent and  angina        Rate: normal                    Neuro/Psych:   (+) CVA (aphasia history): residual symptoms, psychiatric history:            GI/Hepatic/Renal:   (+) GERD:, renal disease: ESRD and dialysis,           Endo/Other:    (+) DiabetesType II DM, , hypothyroidism::., . Abdominal:           Vascular:                                   Narrative   Performed by: KAIDEN ST[facility] MUSE   Atrial fibrillation  Nonspecific ST abnormality  Prolonged QT  Abnormal ECG  When compared with ECG of 24-FEB-2017 21:58,  No significant change was found   Lab and Collection     EKG 12 Lead - 5/13/2019     CONCLUSIONS    Summary  Left ventricular dilatation. Left ventricular systolic function is normal.  Ejection fraction is estimated at 50-55%. Grade II diastolic dysfunction. Left atrium is mildly to moderately dilated. Right atrium is moderately dilated. Right ventricle is mildly dilated with decreased systolic function. Tricuspid valve is grossly normal with mild regurgitation. Right ventricular systolic pressure is 66SZ. Hg.    Signature  ----------------------------------------------------------------------------   Electronically signed by Valeria Haskins(Sonographer) on 02/27/2017 04:19 PM  ----------------------------------------------------------------------------    ----------------------------------------------------------------------------   Electronically signed by Rebeca Garza(Interpreting physician) on   02/28/2017 04:13 PM    Narrative   EXAMINATION:   ONE XRAY VIEW OF THE CHEST       5/14/2019 5:10 pm       COMPARISON:   13 May 2019       HISTORY:   ORDERING SYSTEM PROVIDED HISTORY: s/p thoracentesis   TECHNOLOGIST PROVIDED HISTORY:   s/p thoracentesis   Ordering Physician Provided Reason for Exam: post thoracentesis pt has hx of   sob   Acuity: Unknown   Type of Exam: Unknown       FINDINGS:   AP portable view of the chest time stamped at 1708 hours demonstrates   moderate cardiomegaly and moderate right effusion reduced over prior study.    Vascularity appears slightly prominent.  No extrapleural air is seen.  Right   apical pleural thickening is redemonstrated.  No localized consolidation left   hemithorax.  Opacity is present the right base likely superimposed   atelectasis.  No

## 2019-06-27 ENCOUNTER — APPOINTMENT (OUTPATIENT)
Dept: DIALYSIS | Age: 69
DRG: 466 | End: 2019-06-27
Attending: ORTHOPAEDIC SURGERY
Payer: MEDICARE

## 2019-06-27 ENCOUNTER — APPOINTMENT (OUTPATIENT)
Dept: GENERAL RADIOLOGY | Age: 69
DRG: 466 | End: 2019-06-27
Attending: ORTHOPAEDIC SURGERY
Payer: MEDICARE

## 2019-06-27 PROBLEM — T81.31XA SURGICAL WOUND DEHISCENCE: Status: ACTIVE | Noted: 2019-06-27

## 2019-06-27 LAB
ALBUMIN SERPL-MCNC: 2.1 G/DL (ref 3.5–5.2)
ALBUMIN/GLOBULIN RATIO: 0.5 (ref 1–2.5)
ALP BLD-CCNC: 96 U/L (ref 40–129)
ALT SERPL-CCNC: <5 U/L (ref 5–41)
ANION GAP SERPL CALCULATED.3IONS-SCNC: 12 MMOL/L (ref 9–17)
AST SERPL-CCNC: 10 U/L
BILIRUB SERPL-MCNC: 0.44 MG/DL (ref 0.3–1.2)
BUN BLDV-MCNC: 38 MG/DL (ref 8–23)
BUN/CREAT BLD: ABNORMAL (ref 9–20)
C-REACTIVE PROTEIN: 65.3 MG/L (ref 0–5)
CALCIUM SERPL-MCNC: 8 MG/DL (ref 8.6–10.4)
CHLORIDE BLD-SCNC: 95 MMOL/L (ref 98–107)
CO2: 29 MMOL/L (ref 20–31)
CREAT SERPL-MCNC: 4.29 MG/DL (ref 0.7–1.2)
GFR AFRICAN AMERICAN: 17 ML/MIN
GFR NON-AFRICAN AMERICAN: 14 ML/MIN
GFR SERPL CREATININE-BSD FRML MDRD: ABNORMAL ML/MIN/{1.73_M2}
GFR SERPL CREATININE-BSD FRML MDRD: ABNORMAL ML/MIN/{1.73_M2}
GLUCOSE BLD-MCNC: 116 MG/DL (ref 70–99)
GLUCOSE BLD-MCNC: 119 MG/DL (ref 75–110)
GLUCOSE BLD-MCNC: 131 MG/DL (ref 75–110)
GLUCOSE BLD-MCNC: 161 MG/DL (ref 75–110)
HCT VFR BLD CALC: 28.1 % (ref 40.7–50.3)
HEMOGLOBIN: 7.5 G/DL (ref 13–17)
MCH RBC QN AUTO: 28.4 PG (ref 25.2–33.5)
MCHC RBC AUTO-ENTMCNC: 27.1 G/DL (ref 28.4–34.8)
MCV RBC AUTO: 106.4 FL (ref 82.6–102.9)
NRBC AUTOMATED: 0 PER 100 WBC
PDW BLD-RTO: 15 % (ref 11.8–14.4)
PLATELET # BLD: 147 K/UL (ref 138–453)
PMV BLD AUTO: 9.3 FL (ref 8.1–13.5)
POTASSIUM SERPL-SCNC: 3.8 MMOL/L (ref 3.7–5.3)
RBC # BLD: 2.64 M/UL (ref 4.21–5.77)
SEDIMENTATION RATE, ERYTHROCYTE: 88 MM (ref 0–10)
SODIUM BLD-SCNC: 136 MMOL/L (ref 135–144)
TOTAL PROTEIN: 6.1 G/DL (ref 6.4–8.3)
WBC # BLD: 4.7 K/UL (ref 3.5–11.3)

## 2019-06-27 PROCEDURE — 85027 COMPLETE CBC AUTOMATED: CPT

## 2019-06-27 PROCEDURE — 2580000003 HC RX 258: Performed by: STUDENT IN AN ORGANIZED HEALTH CARE EDUCATION/TRAINING PROGRAM

## 2019-06-27 PROCEDURE — 1200000000 HC SEMI PRIVATE

## 2019-06-27 PROCEDURE — 6360000002 HC RX W HCPCS: Performed by: STUDENT IN AN ORGANIZED HEALTH CARE EDUCATION/TRAINING PROGRAM

## 2019-06-27 PROCEDURE — 71045 X-RAY EXAM CHEST 1 VIEW: CPT

## 2019-06-27 PROCEDURE — 6370000000 HC RX 637 (ALT 250 FOR IP): Performed by: NURSE PRACTITIONER

## 2019-06-27 PROCEDURE — 82947 ASSAY GLUCOSE BLOOD QUANT: CPT

## 2019-06-27 PROCEDURE — 90935 HEMODIALYSIS ONE EVALUATION: CPT

## 2019-06-27 PROCEDURE — 86140 C-REACTIVE PROTEIN: CPT

## 2019-06-27 PROCEDURE — 6360000002 HC RX W HCPCS: Performed by: NURSE PRACTITIONER

## 2019-06-27 PROCEDURE — 85651 RBC SED RATE NONAUTOMATED: CPT

## 2019-06-27 PROCEDURE — 6370000000 HC RX 637 (ALT 250 FOR IP): Performed by: STUDENT IN AN ORGANIZED HEALTH CARE EDUCATION/TRAINING PROGRAM

## 2019-06-27 PROCEDURE — 93005 ELECTROCARDIOGRAM TRACING: CPT | Performed by: NURSE PRACTITIONER

## 2019-06-27 PROCEDURE — 80053 COMPREHEN METABOLIC PANEL: CPT

## 2019-06-27 PROCEDURE — 36415 COLL VENOUS BLD VENIPUNCTURE: CPT

## 2019-06-27 PROCEDURE — 99222 1ST HOSP IP/OBS MODERATE 55: CPT | Performed by: INTERNAL MEDICINE

## 2019-06-27 PROCEDURE — 5A1D70Z PERFORMANCE OF URINARY FILTRATION, INTERMITTENT, LESS THAN 6 HOURS PER DAY: ICD-10-PCS | Performed by: INTERNAL MEDICINE

## 2019-06-27 RX ORDER — GLUCAGON 1 MG/ML
1 KIT INJECTION PRN
Status: DISCONTINUED | OUTPATIENT
Start: 2019-06-27 | End: 2019-07-08 | Stop reason: HOSPADM

## 2019-06-27 RX ORDER — HYDROCODONE BITARTRATE AND ACETAMINOPHEN 5; 325 MG/1; MG/1
2 TABLET ORAL EVERY 4 HOURS PRN
Status: DISCONTINUED | OUTPATIENT
Start: 2019-06-27 | End: 2019-07-04

## 2019-06-27 RX ORDER — DEXTROSE MONOHYDRATE 50 MG/ML
100 INJECTION, SOLUTION INTRAVENOUS PRN
Status: DISCONTINUED | OUTPATIENT
Start: 2019-06-27 | End: 2019-07-08 | Stop reason: HOSPADM

## 2019-06-27 RX ORDER — DEXTROSE MONOHYDRATE 25 G/50ML
12.5 INJECTION, SOLUTION INTRAVENOUS PRN
Status: DISCONTINUED | OUTPATIENT
Start: 2019-06-27 | End: 2019-07-08 | Stop reason: HOSPADM

## 2019-06-27 RX ORDER — NICOTINE POLACRILEX 4 MG
15 LOZENGE BUCCAL PRN
Status: DISCONTINUED | OUTPATIENT
Start: 2019-06-27 | End: 2019-07-08 | Stop reason: HOSPADM

## 2019-06-27 RX ORDER — HYDROCODONE BITARTRATE AND ACETAMINOPHEN 5; 325 MG/1; MG/1
1 TABLET ORAL EVERY 4 HOURS PRN
Status: DISCONTINUED | OUTPATIENT
Start: 2019-06-27 | End: 2019-07-04

## 2019-06-27 RX ORDER — METOCLOPRAMIDE HYDROCHLORIDE 5 MG/ML
5 INJECTION INTRAMUSCULAR; INTRAVENOUS EVERY 6 HOURS PRN
Status: DISCONTINUED | OUTPATIENT
Start: 2019-06-27 | End: 2019-07-08 | Stop reason: HOSPADM

## 2019-06-27 RX ORDER — HEPARIN SODIUM 5000 [USP'U]/ML
5000 INJECTION, SOLUTION INTRAVENOUS; SUBCUTANEOUS EVERY 8 HOURS SCHEDULED
Status: DISCONTINUED | OUTPATIENT
Start: 2019-06-27 | End: 2019-07-03

## 2019-06-27 RX ADMIN — HEPARIN SODIUM 5000 UNITS: 5000 INJECTION INTRAVENOUS; SUBCUTANEOUS at 20:55

## 2019-06-27 RX ADMIN — OXYCODONE HYDROCHLORIDE 5 MG: 5 TABLET ORAL at 12:57

## 2019-06-27 RX ADMIN — ACETAMINOPHEN 500 MG: 500 TABLET ORAL at 08:39

## 2019-06-27 RX ADMIN — LEVOTHYROXINE SODIUM 50 MCG: 50 TABLET ORAL at 07:07

## 2019-06-27 RX ADMIN — ARIPIPRAZOLE 5 MG: 5 TABLET ORAL at 08:39

## 2019-06-27 RX ADMIN — CEFAZOLIN 2 G: 10 INJECTION, POWDER, FOR SOLUTION INTRAVENOUS; PARENTERAL at 04:28

## 2019-06-27 RX ADMIN — PANTOPRAZOLE SODIUM 40 MG: 40 TABLET, DELAYED RELEASE ORAL at 07:07

## 2019-06-27 RX ADMIN — FUROSEMIDE 20 MG: 20 TABLET ORAL at 08:39

## 2019-06-27 RX ADMIN — ACETAMINOPHEN 500 MG: 500 TABLET ORAL at 20:55

## 2019-06-27 RX ADMIN — ISOSORBIDE MONONITRATE 30 MG: 30 TABLET ORAL at 08:39

## 2019-06-27 RX ADMIN — Medication 10 ML: at 20:55

## 2019-06-27 RX ADMIN — ACETAMINOPHEN 500 MG: 500 TABLET ORAL at 12:23

## 2019-06-27 RX ADMIN — VENLAFAXINE 75 MG: 75 TABLET ORAL at 08:39

## 2019-06-27 RX ADMIN — HYDROCODONE BITARTRATE AND ACETAMINOPHEN 2 TABLET: 5; 325 TABLET ORAL at 19:20

## 2019-06-27 RX ADMIN — VITAMIN D, TAB 1000IU (100/BT) 1000 UNITS: 25 TAB at 08:39

## 2019-06-27 RX ADMIN — LAMOTRIGINE 100 MG: 100 TABLET ORAL at 08:39

## 2019-06-27 RX ADMIN — FUROSEMIDE 20 MG: 20 TABLET ORAL at 20:55

## 2019-06-27 RX ADMIN — OXYCODONE HYDROCHLORIDE 5 MG: 5 TABLET ORAL at 07:07

## 2019-06-27 RX ADMIN — Medication 10 ML: at 08:42

## 2019-06-27 ASSESSMENT — PAIN SCALES - GENERAL
PAINLEVEL_OUTOF10: 4
PAINLEVEL_OUTOF10: 7
PAINLEVEL_OUTOF10: 0
PAINLEVEL_OUTOF10: 5
PAINLEVEL_OUTOF10: 8
PAINLEVEL_OUTOF10: 9
PAINLEVEL_OUTOF10: 10
PAINLEVEL_OUTOF10: 8
PAINLEVEL_OUTOF10: 8

## 2019-06-27 ASSESSMENT — PAIN DESCRIPTION - LOCATION: LOCATION: HIP

## 2019-06-27 ASSESSMENT — PAIN DESCRIPTION - FREQUENCY: FREQUENCY: INTERMITTENT

## 2019-06-27 ASSESSMENT — PAIN DESCRIPTION - ONSET: ONSET: ON-GOING

## 2019-06-27 ASSESSMENT — PAIN DESCRIPTION - ORIENTATION: ORIENTATION: RIGHT

## 2019-06-27 ASSESSMENT — PAIN DESCRIPTION - DESCRIPTORS: DESCRIPTORS: ACHING

## 2019-06-27 ASSESSMENT — PAIN DESCRIPTION - PAIN TYPE: TYPE: SURGICAL PAIN;CHRONIC PAIN

## 2019-06-27 ASSESSMENT — PAIN DESCRIPTION - PROGRESSION: CLINICAL_PROGRESSION: NOT CHANGED

## 2019-06-27 NOTE — PROGRESS NOTES
Dialysis Post Treatment Note  Patient tolerated treatment well. Denies complaints at time of discharge. Vitals:    06/27/19 1743   BP: 121/63   Pulse: 88   Resp: 18   Temp: 98.2 °F (36.8 °C)   SpO2:      Pre-Weight = 105.5 kg  Post-weight = Weight: 230 lb 8 oz (104.6 kg)  Total Liters Processed =    Rinseback Volume (mL) = Rinseback Volume (ml): 380 ml  Net Removal (mL) = @[id number]  Length of treatment=3 hours 30 mins      Pt tolerated tx well, pt had no c/o post tx, pt denies needs.

## 2019-06-27 NOTE — CONSULTS
Consults           REASON FOR  NEPHROLOGY CONSULT     Fluid and BP management in ESRD     ACCESS   Upper extremity AV access Left    DRY WEIGHT       NEPHROLOGIST   Dr. Zafar Castaneda               This is a 68 y.o. male underwent a right hip hemiarthroplasty on 15 Kerrie and has been following up with orthopedics. He has been at skilled nursing facility and has not been making much progress with therapy. Continues to have pain and clinical evaluation revealed evidence of superficial dehiscence of the wound prompting hospitalization. He underwent I&D yesterday and tolerated the procedure well. He is scheduled for revision hemiarthroplasty tomorrow. We have been consulted for dialysis management. His regular days are Tuesday Thursday Saturday. He scheduled for dialysis today.     PAST MEDICAL HISTORY         Diagnosis Date    A-fib [facility])     Acute ischemic stroke ([address] 7/13/2013    Acute metabolic encephalopathy 3/0/4597    Acute on chronic congestive heart failure ([address] 5/9/2014    Acute on chronic diastolic CHF (congestive heart failure) ([address] 2/25/2017    Altered mental status 8/24/2013    Anemia     Anemia associated with chronic renal failure 5/28/2014    Aphasia 8/24/2013    ARF (acute renal failure) ([address] 5/28/2014    From pre renal azotemia from newly added diuretic and ARB use, creat peaked at 2.8 from 2 in may 2014    Arteriovenous fistula for hemodialysis in place, primary ([address] 11/17/2017    Arthritis     Bradyarrhythmia 7/13/2013    Cellulitis 9/17/2018    Cerebral contusion ([address] 7/7/2013    CHF (congestive heart failure) (HCC)     Chronic atrial fibrillation (HCC) 7/6/2013    Chronic kidney disease     Chronic pain of right knee     CKD (chronic kidney disease) stage 4, GFR 15-29 ml/min ([address] 5/28/2014    From diabetic and ischemic nephrosclerosis, creat now 2-2.5, GFR 25-30 ml/min    Coagulation defect ([address] 7/6/2013    ml       General appearance:Awake, alert, in no acute distress  Skin: warm and dry, no rash or erythema  Eyes: conjunctivae normal and sclera anicteric  ENT: no thrush no pharyngeal congestion  orodental hygiene   Neck: No JVD, Lymphadenopathty or thyromegaly  Respiratory: vesicular breath sounds,no wheeze/crackles  Cardiovascular: S1 S2 normal,no gallop or organic murmur. No carotid bruit  Abdomen:Non tender/non distended. Bowel sounds present  Extremities: No Cyanosis or Clubbing, present lower extremity edema  Neurological:Alert and oriented. No abnormalities of mood, affect, memory, mentation, or behavior are noted    INVESTIGATIONS     PTH:  No results found for: PTH  abs:   CBC:   Recent Labs     06/27/19  0537   WBC 4.7   RBC 2.64*   HGB 7.5*   HCT 28.1*   .4*   MCH 28.4   MCHC 27.1*   RDW 15.0*      MPV 9.3      BMP:   Recent Labs     06/26/19  0949 06/27/19  0537   NA  --  136   K  --  3.8   CL  --  95*   CO2  --  29   BUN  --  38*   CREATININE 3.57* 4.29*   GLUCOSE  --  116*   CALCIUM  --  8.0*        Phosphorus:  No results for input(s): PHOS in the last 72 hours. Magnesium: No results for input(s): MG in the last 72 hours. Albumin:   Recent Labs     06/27/19  0537   LABALBU 2.1*       ASSESSMENT       1. End-stage renal disease on intermittent maintenance for dialysis Tuesday Thursday Saturday at [address] access  2. Status post right hip I&D on 26 June and scheduled for revision hemiarthroplasty surgery on 28th June  3. Type 2 diabetes  4. Essential hypertension  5. Obstructive sleep apnea  6. History of hemorrhagic CVA  7. History of DVT status post IVC filter  8. Left ventricular diastolic dysfunction       PLAN     1. Hemodialysis today. Orders reviewed with the nursing staff. 2.  Resume all home medications  3. Okay for surgery tomorrow if preop potassium normal      Thank you for the consultation. Please do not hesitate to call with questions.     This note is created

## 2019-06-27 NOTE — CONSULTS
[address] / HISTORY AND PHYSICAL EXAMINATION            Date:   6/27/2019  Patient name:  Timo Gibson  Date of admission:  6/26/2019  8:34 AM  MRN:   [id number]  Account:  [de-identified]  YOB: 1950  PCP:    Kelvin Libman, MD  Room:   3143/5863-46  Code Status:    Full Code    Physician Requesting Consult: Haley Romero DO    Reason for Consult:  Medical Management of diabetes, hypertension afib. Chief Complaint:     RIGHT HIP OPERATIVE WOUND DEHISCENCE     History Obtained From:      electronic medical record    History of Present Illness: This is a 68 y.o. male with multiple chronic medical conditions and co-morbidities who underwent a right hip hemiarthroplasty on 15 Kerrie and has been following up with orthopedics. He has been at skilled nursing facility and has not been making much progress with therapy. Continues to have pain and clinical evaluation revealed evidence of superficial dehiscence of the wound prompting hospitalization. He underwent I&D yesterday and tolerated the procedure well. It was noted that the implant has subsided and will require revision surgery. He is scheduled for revision hemiarthroplasty tomorrow. Internal medicine was consulted for management of chronic health conditions and comorbid factors of essential hypertension, diabetes, Afib anemia previous stroke and dvt in which he is currently on anticoagulation for in addition ot the Afib.               Past Medical History:     Past Medical History:   Diagnosis Date    A-fib ([address]     Acute ischemic stroke ([address] 7/13/2013    Acute metabolic encephalopathy 3/6/4720    Acute on chronic congestive heart failure ([address] 5/9/2014    Acute on chronic diastolic CHF (congestive heart failure) ([address] 2/25/2017    Altered mental status 8/24/2013    Anemia     Anemia associated with chronic renal failure 5/28/2014    Aphasia Relation Age of Onset    Coronary Art Dis Father     Cancer Sister        Review of Systems:     Positive and Negative as described in HPI. Review of Systems   Reason unable to perform ROS: PATIENT DOES NOT GIVE GOOD HISTORY OR  ROS JUST STATES \" I HURT\"        Physical Exam:     /70   Pulse 79   Temp 97.8 °F (36.6 °C) (Oral)   Resp 18   Ht 6' (1.829 m)   Wt 231 lb (104.8 kg)   SpO2 98%   BMI 31.33 kg/m²   Temp (24hrs), Av.6 °F (36.4 °C), Min:97 °F (36.1 °C), Max:98 °F (36.7 °C)    Recent Labs     19  0949 19  1400   POCGLU 137* 119*       Intake/Output Summary (Last 24 hours) at 2019 0843  Last data filed at 2019 1545  Gross per 24 hour   Intake 720 ml   Output --   Net 720 ml       Physical Exam   Constitutional: Vital signs are normal. He appears well-developed. He is cooperative. He does not have a sickly appearance. No distress (PAINFUL). HENT:   Head: Normocephalic and atraumatic. Right Ear: Hearing normal.   Left Ear: Hearing normal.   Nose: Nose normal. No rhinorrhea. Mouth/Throat: Oropharynx is clear and moist and mucous membranes are normal.   Eyes: Pupils are equal, round, and reactive to light. Conjunctivae, EOM and lids are normal. Right conjunctiva is not injected. Left conjunctiva is not injected. Right eye exhibits normal extraocular motion. Left eye exhibits normal extraocular motion. Right pupil is reactive. Left pupil is reactive. Pupils are equal.   Neck: Trachea normal and phonation normal. Neck supple. Carotid bruit is not present. No tracheal deviation present. No thyromegaly present. Cardiovascular: Normal rate, regular rhythm, normal heart sounds, intact distal pulses and normal pulses. No murmur heard. Pulmonary/Chest: Effort normal and breath sounds normal. No stridor. No respiratory distress. He has no decreased breath sounds. Abdominal: Soft. Bowel sounds are normal. He exhibits no distension and no mass.  There is no abnormality. Xr Femur Right (min 2 Views)    Result Date: 6/26/2019  Right hip arthroplasty without evidence for complication. Xr Hip 2-3 Vw W Pelvis Right    Result Date: 6/26/2019  Right hip hemiarthroplasty without fracture or malalignment. Surgical staples noted in the soft tissue consistent with recent incision and drainage. Assessment :      Primary Problem  Surgical wound dehiscence    Active Hospital Problems    Diagnosis Date Noted    Surgical wound dehiscence [T81.31XA] 06/27/2019    History of hemiarthroplasty of right hip [Z96.641] 06/26/2019    Thyroid disease [E07.9]     History of DVT (deep vein thrombosis) [Z86.718] 09/17/2018    Arteriovenous fistula for hemodialysis in place, primary [facility] [Z99.2] 11/17/2017    ESRD (end stage renal disease) ([facility] [N18.6] 03/23/2017    Essential hypertension [I10] 03/23/2017    Seizure disorder ([facility] [G40.909] 03/01/2017    Chronic diastolic congestive heart failure ([facility] [I50.32] 02/25/2017    Venous stasis dermatitis of both lower extremities [I87.2] 05/28/2014    Type 2 diabetes mellitus with left diabetic foot ulcer ([facility] [I14.692, L97.529] 04/16/2014    Anemia [D64.9] 08/24/2013    History of superior vena cava filter placement [Z95.828] 07/18/2013    Chronic atrial fibrillation (HCC) [I48.2] 07/06/2013     [id number] Score for Atrial Fibrillation Stroke Risk   Risk   Factors  Component Value   C CHF Yes 1   H HTN Yes 1   A2 Age >= 76 No,  (73 y.o.) 0   D DM Yes 1   S2 Prior Stroke/TIA Yes 2   V Vascular Disease No 0   A Age 74-69 Yes,  (73 y.o.) 1   Sc Sex male 0    [id number]  Score  6   Score last updated 2/66/71 67:91 AM    Click here for a link to the UpToDate guideline \"Atrial Fibrillation: Anticoagulation therapy to prevent embolization    Disclaimer: Risk Score calculation is dependent on accuracy of patient problem list and past encounter diagnosis. Plan:     1.  Diabetes type 2- At this time we will initiate low

## 2019-06-27 NOTE — PROGRESS NOTES
Viviane from Hemodialysis called and informed writer that patient has been coughing ever since he got down there and he is coughing up the peaches he had for lunch. Paged and informed [facility] CNP. Awaiting a response.  Will continue to monitor

## 2019-06-28 ENCOUNTER — APPOINTMENT (OUTPATIENT)
Dept: GENERAL RADIOLOGY | Age: 69
DRG: 466 | End: 2019-06-28
Attending: ORTHOPAEDIC SURGERY
Payer: MEDICARE

## 2019-06-28 ENCOUNTER — ANESTHESIA (OUTPATIENT)
Dept: OPERATING ROOM | Age: 69
DRG: 466 | End: 2019-06-28
Payer: MEDICARE

## 2019-06-28 ENCOUNTER — ANESTHESIA EVENT (OUTPATIENT)
Dept: OPERATING ROOM | Age: 69
DRG: 466 | End: 2019-06-28
Payer: MEDICARE

## 2019-06-28 VITALS — OXYGEN SATURATION: 100 % | SYSTOLIC BLOOD PRESSURE: 91 MMHG | DIASTOLIC BLOOD PRESSURE: 47 MMHG | TEMPERATURE: 94.8 F

## 2019-06-28 LAB
ABSOLUTE EOS #: 0.15 K/UL (ref 0–0.4)
ABSOLUTE IMMATURE GRANULOCYTE: 0 K/UL (ref 0–0.3)
ABSOLUTE LYMPH #: 0.81 K/UL (ref 1–4.8)
ABSOLUTE MONO #: 0.07 K/UL (ref 0.1–0.8)
ALBUMIN SERPL-MCNC: 2.4 G/DL (ref 3.5–5.2)
ALBUMIN/GLOBULIN RATIO: 0.6 (ref 1–2.5)
ALP BLD-CCNC: 95 U/L (ref 40–129)
ALT SERPL-CCNC: <5 U/L (ref 5–41)
ANION GAP SERPL CALCULATED.3IONS-SCNC: 9 MMOL/L (ref 9–17)
AST SERPL-CCNC: 12 U/L
BASOPHILS # BLD: 1 % (ref 0–2)
BASOPHILS ABSOLUTE: 0.04 K/UL (ref 0–0.2)
BILIRUB SERPL-MCNC: 0.42 MG/DL (ref 0.3–1.2)
BLOOD BANK SPECIMEN: NORMAL
BUN BLDV-MCNC: 23 MG/DL (ref 8–23)
BUN/CREAT BLD: ABNORMAL (ref 9–20)
CALCIUM SERPL-MCNC: 8.1 MG/DL (ref 8.6–10.4)
CHLORIDE BLD-SCNC: 97 MMOL/L (ref 98–107)
CO2: 30 MMOL/L (ref 20–31)
CREAT SERPL-MCNC: 2.8 MG/DL (ref 0.7–1.2)
CULTURE: ABNORMAL
DIFFERENTIAL TYPE: ABNORMAL
DIRECT EXAM: ABNORMAL
DIRECT EXAM: ABNORMAL
EKG ATRIAL RATE: 77 BPM
EKG Q-T INTERVAL: 426 MS
EKG QRS DURATION: 96 MS
EKG QTC CALCULATION (BAZETT): 491 MS
EKG R AXIS: 20 DEGREES
EKG T AXIS: 31 DEGREES
EKG VENTRICULAR RATE: 80 BPM
EOSINOPHILS RELATIVE PERCENT: 4 % (ref 1–4)
GFR AFRICAN AMERICAN: 27 ML/MIN
GFR NON-AFRICAN AMERICAN: 22 ML/MIN
GFR SERPL CREATININE-BSD FRML MDRD: ABNORMAL ML/MIN/{1.73_M2}
GFR SERPL CREATININE-BSD FRML MDRD: ABNORMAL ML/MIN/{1.73_M2}
GLUCOSE BLD-MCNC: 104 MG/DL (ref 75–110)
GLUCOSE BLD-MCNC: 108 MG/DL (ref 75–110)
GLUCOSE BLD-MCNC: 127 MG/DL (ref 70–99)
GLUCOSE BLD-MCNC: 136 MG/DL (ref 75–110)
HCT VFR BLD CALC: 26 % (ref 40.7–50.3)
HCT VFR BLD CALC: 27.6 % (ref 40.7–50.3)
HEMOGLOBIN: 7.3 G/DL (ref 13–17)
HEMOGLOBIN: 7.6 G/DL (ref 13–17)
IMMATURE GRANULOCYTES: 0 %
LYMPHOCYTES # BLD: 22 % (ref 24–44)
Lab: ABNORMAL
MCH RBC QN AUTO: 29.1 PG (ref 25.2–33.5)
MCHC RBC AUTO-ENTMCNC: 27.5 G/DL (ref 28.4–34.8)
MCV RBC AUTO: 105.7 FL (ref 82.6–102.9)
MONOCYTES # BLD: 2 % (ref 1–7)
MORPHOLOGY: ABNORMAL
MORPHOLOGY: ABNORMAL
NRBC AUTOMATED: 0 PER 100 WBC
PDW BLD-RTO: 14.8 % (ref 11.8–14.4)
PLATELET # BLD: 143 K/UL (ref 138–453)
PLATELET ESTIMATE: ABNORMAL
PMV BLD AUTO: 9.3 FL (ref 8.1–13.5)
POTASSIUM SERPL-SCNC: 3.8 MMOL/L (ref 3.7–5.3)
RBC # BLD: 2.61 M/UL (ref 4.21–5.77)
RBC # BLD: ABNORMAL 10*6/UL
SEG NEUTROPHILS: 71 % (ref 36–66)
SEGMENTED NEUTROPHILS ABSOLUTE COUNT: 2.63 K/UL (ref 1.8–7.7)
SODIUM BLD-SCNC: 136 MMOL/L (ref 135–144)
SPECIMEN DESCRIPTION: ABNORMAL
TOTAL PROTEIN: 6.3 G/DL (ref 6.4–8.3)
WBC # BLD: 3.7 K/UL (ref 3.5–11.3)
WBC # BLD: ABNORMAL 10*3/UL

## 2019-06-28 PROCEDURE — 6370000000 HC RX 637 (ALT 250 FOR IP): Performed by: STUDENT IN AN ORGANIZED HEALTH CARE EDUCATION/TRAINING PROGRAM

## 2019-06-28 PROCEDURE — 2580000003 HC RX 258: Performed by: STUDENT IN AN ORGANIZED HEALTH CARE EDUCATION/TRAINING PROGRAM

## 2019-06-28 PROCEDURE — 3700000001 HC ADD 15 MINUTES (ANESTHESIA): Performed by: ORTHOPAEDIC SURGERY

## 2019-06-28 PROCEDURE — 73502 X-RAY EXAM HIP UNI 2-3 VIEWS: CPT

## 2019-06-28 PROCEDURE — 6360000002 HC RX W HCPCS: Performed by: NURSE ANESTHETIST, CERTIFIED REGISTERED

## 2019-06-28 PROCEDURE — 2709999900 HC NON-CHARGEABLE SUPPLY: Performed by: ORTHOPAEDIC SURGERY

## 2019-06-28 PROCEDURE — 2500000003 HC RX 250 WO HCPCS: Performed by: NURSE ANESTHETIST, CERTIFIED REGISTERED

## 2019-06-28 PROCEDURE — 86900 BLOOD TYPING SEROLOGIC ABO: CPT

## 2019-06-28 PROCEDURE — 85018 HEMOGLOBIN: CPT

## 2019-06-28 PROCEDURE — 2580000003 HC RX 258: Performed by: INTERNAL MEDICINE

## 2019-06-28 PROCEDURE — C1776 JOINT DEVICE (IMPLANTABLE): HCPCS | Performed by: ORTHOPAEDIC SURGERY

## 2019-06-28 PROCEDURE — L4370 PNEUM FULL LEG SPLNT PRE OTS: HCPCS | Performed by: ORTHOPAEDIC SURGERY

## 2019-06-28 PROCEDURE — 27091 REMOVAL OF HIP PROSTHESIS: CPT | Performed by: ORTHOPAEDIC SURGERY

## 2019-06-28 PROCEDURE — 76937 US GUIDE VASCULAR ACCESS: CPT

## 2019-06-28 PROCEDURE — 99232 SBSQ HOSP IP/OBS MODERATE 35: CPT | Performed by: INTERNAL MEDICINE

## 2019-06-28 PROCEDURE — 86850 RBC ANTIBODY SCREEN: CPT

## 2019-06-28 PROCEDURE — 7100000001 HC PACU RECOVERY - ADDTL 15 MIN: Performed by: ORTHOPAEDIC SURGERY

## 2019-06-28 PROCEDURE — 7100000000 HC PACU RECOVERY - FIRST 15 MIN: Performed by: ORTHOPAEDIC SURGERY

## 2019-06-28 PROCEDURE — 80053 COMPREHEN METABOLIC PANEL: CPT

## 2019-06-28 PROCEDURE — 0SP90JZ REMOVAL OF SYNTHETIC SUBSTITUTE FROM RIGHT HIP JOINT, OPEN APPROACH: ICD-10-PCS | Performed by: ORTHOPAEDIC SURGERY

## 2019-06-28 PROCEDURE — 2580000003 HC RX 258: Performed by: ORTHOPAEDIC SURGERY

## 2019-06-28 PROCEDURE — 36415 COLL VENOUS BLD VENIPUNCTURE: CPT

## 2019-06-28 PROCEDURE — 6370000000 HC RX 637 (ALT 250 FOR IP): Performed by: NURSE PRACTITIONER

## 2019-06-28 PROCEDURE — 0SR90EZ REPLACEMENT OF RIGHT HIP JOINT WITH ARTICULATING SPACER, OPEN APPROACH: ICD-10-PCS | Performed by: ORTHOPAEDIC SURGERY

## 2019-06-28 PROCEDURE — 3700000000 HC ANESTHESIA ATTENDED CARE: Performed by: ORTHOPAEDIC SURGERY

## 2019-06-28 PROCEDURE — 86901 BLOOD TYPING SEROLOGIC RH(D): CPT

## 2019-06-28 PROCEDURE — 2580000003 HC RX 258: Performed by: NURSE ANESTHETIST, CERTIFIED REGISTERED

## 2019-06-28 PROCEDURE — 85014 HEMATOCRIT: CPT

## 2019-06-28 PROCEDURE — P9016 RBC LEUKOCYTES REDUCED: HCPCS

## 2019-06-28 PROCEDURE — 3600000004 HC SURGERY LEVEL 4 BASE: Performed by: ORTHOPAEDIC SURGERY

## 2019-06-28 PROCEDURE — C1713 ANCHOR/SCREW BN/BN,TIS/BN: HCPCS | Performed by: ORTHOPAEDIC SURGERY

## 2019-06-28 PROCEDURE — 6360000002 HC RX W HCPCS: Performed by: STUDENT IN AN ORGANIZED HEALTH CARE EDUCATION/TRAINING PROGRAM

## 2019-06-28 PROCEDURE — 86920 COMPATIBILITY TEST SPIN: CPT

## 2019-06-28 PROCEDURE — 82947 ASSAY GLUCOSE BLOOD QUANT: CPT

## 2019-06-28 PROCEDURE — 3600000014 HC SURGERY LEVEL 4 ADDTL 15MIN: Performed by: ORTHOPAEDIC SURGERY

## 2019-06-28 PROCEDURE — 1200000000 HC SEMI PRIVATE

## 2019-06-28 PROCEDURE — 85025 COMPLETE CBC W/AUTO DIFF WBC: CPT

## 2019-06-28 PROCEDURE — 6370000000 HC RX 637 (ALT 250 FOR IP): Performed by: INTERNAL MEDICINE

## 2019-06-28 DEVICE — CEMENT BNE 40GM W/ GENT HI VISC RADPQ FOR REV SURG: Type: IMPLANTABLE DEVICE | Site: HIP | Status: FUNCTIONAL

## 2019-06-28 DEVICE — HEAD FEM 54MM MOD ST REMEDY HIP SPCR: Type: IMPLANTABLE DEVICE | Site: HIP | Status: FUNCTIONAL

## 2019-06-28 DEVICE — IMPLANTABLE DEVICE: Type: IMPLANTABLE DEVICE | Site: HIP | Status: FUNCTIONAL

## 2019-06-28 RX ORDER — SODIUM CHLORIDE 0.9 % (FLUSH) 0.9 %
10 SYRINGE (ML) INJECTION EVERY 12 HOURS SCHEDULED
Status: DISCONTINUED | OUTPATIENT
Start: 2019-06-28 | End: 2019-06-28

## 2019-06-28 RX ORDER — 0.9 % SODIUM CHLORIDE 0.9 %
VIAL (ML) INJECTION PRN
Status: DISCONTINUED | OUTPATIENT
Start: 2019-06-28 | End: 2019-06-28 | Stop reason: SDUPTHER

## 2019-06-28 RX ORDER — TRANEXAMIC ACID 100 MG/ML
INJECTION, SOLUTION INTRAVENOUS PRN
Status: DISCONTINUED | OUTPATIENT
Start: 2019-06-28 | End: 2019-06-28 | Stop reason: SDUPTHER

## 2019-06-28 RX ORDER — SODIUM CHLORIDE, SODIUM LACTATE, POTASSIUM CHLORIDE, CALCIUM CHLORIDE 600; 310; 30; 20 MG/100ML; MG/100ML; MG/100ML; MG/100ML
INJECTION, SOLUTION INTRAVENOUS CONTINUOUS PRN
Status: DISCONTINUED | OUTPATIENT
Start: 2019-06-28 | End: 2019-06-28 | Stop reason: SDUPTHER

## 2019-06-28 RX ORDER — ONDANSETRON 2 MG/ML
4 INJECTION INTRAMUSCULAR; INTRAVENOUS ONCE
Status: DISCONTINUED | OUTPATIENT
Start: 2019-06-28 | End: 2019-06-28

## 2019-06-28 RX ORDER — MIDAZOLAM HYDROCHLORIDE 1 MG/ML
INJECTION INTRAMUSCULAR; INTRAVENOUS PRN
Status: DISCONTINUED | OUTPATIENT
Start: 2019-06-28 | End: 2019-06-28 | Stop reason: SDUPTHER

## 2019-06-28 RX ORDER — FENTANYL CITRATE 50 UG/ML
25 INJECTION, SOLUTION INTRAMUSCULAR; INTRAVENOUS EVERY 5 MIN PRN
Status: DISCONTINUED | OUTPATIENT
Start: 2019-06-28 | End: 2019-06-28

## 2019-06-28 RX ORDER — 0.9 % SODIUM CHLORIDE 0.9 %
250 INTRAVENOUS SOLUTION INTRAVENOUS ONCE
Status: COMPLETED | OUTPATIENT
Start: 2019-06-28 | End: 2019-06-28

## 2019-06-28 RX ORDER — MIDAZOLAM HYDROCHLORIDE 1 MG/ML
2 INJECTION INTRAMUSCULAR; INTRAVENOUS
Status: DISCONTINUED | OUTPATIENT
Start: 2019-06-28 | End: 2019-06-28

## 2019-06-28 RX ORDER — SODIUM CHLORIDE 0.9 % (FLUSH) 0.9 %
10 SYRINGE (ML) INJECTION PRN
Status: DISCONTINUED | OUTPATIENT
Start: 2019-06-28 | End: 2019-06-28

## 2019-06-28 RX ORDER — FENTANYL CITRATE 50 UG/ML
INJECTION, SOLUTION INTRAMUSCULAR; INTRAVENOUS PRN
Status: DISCONTINUED | OUTPATIENT
Start: 2019-06-28 | End: 2019-06-28 | Stop reason: SDUPTHER

## 2019-06-28 RX ORDER — MIDODRINE HYDROCHLORIDE 5 MG/1
10 TABLET ORAL ONCE
Status: COMPLETED | OUTPATIENT
Start: 2019-06-28 | End: 2019-06-28

## 2019-06-28 RX ORDER — NEOSTIGMINE METHYLSULFATE 5 MG/5 ML
SYRINGE (ML) INTRAVENOUS PRN
Status: DISCONTINUED | OUTPATIENT
Start: 2019-06-28 | End: 2019-06-28 | Stop reason: SDUPTHER

## 2019-06-28 RX ORDER — ROCURONIUM BROMIDE 10 MG/ML
INJECTION, SOLUTION INTRAVENOUS PRN
Status: DISCONTINUED | OUTPATIENT
Start: 2019-06-28 | End: 2019-06-28 | Stop reason: SDUPTHER

## 2019-06-28 RX ORDER — GLYCOPYRROLATE 1 MG/5 ML
SYRINGE (ML) INTRAVENOUS PRN
Status: DISCONTINUED | OUTPATIENT
Start: 2019-06-28 | End: 2019-06-28 | Stop reason: SDUPTHER

## 2019-06-28 RX ORDER — PROPOFOL 10 MG/ML
INJECTION, EMULSION INTRAVENOUS PRN
Status: DISCONTINUED | OUTPATIENT
Start: 2019-06-28 | End: 2019-06-28 | Stop reason: SDUPTHER

## 2019-06-28 RX ORDER — SODIUM CHLORIDE 9 MG/ML
INJECTION, SOLUTION INTRAVENOUS CONTINUOUS PRN
Status: DISCONTINUED | OUTPATIENT
Start: 2019-06-28 | End: 2019-06-28 | Stop reason: SDUPTHER

## 2019-06-28 RX ORDER — LIDOCAINE HYDROCHLORIDE 10 MG/ML
INJECTION, SOLUTION EPIDURAL; INFILTRATION; INTRACAUDAL; PERINEURAL PRN
Status: DISCONTINUED | OUTPATIENT
Start: 2019-06-28 | End: 2019-06-28 | Stop reason: SDUPTHER

## 2019-06-28 RX ORDER — MAGNESIUM HYDROXIDE 1200 MG/15ML
LIQUID ORAL CONTINUOUS PRN
Status: COMPLETED | OUTPATIENT
Start: 2019-06-28 | End: 2019-06-28

## 2019-06-28 RX ORDER — PHENYLEPHRINE HYDROCHLORIDE 10 MG/ML
INJECTION INTRAVENOUS PRN
Status: DISCONTINUED | OUTPATIENT
Start: 2019-06-28 | End: 2019-06-28 | Stop reason: SDUPTHER

## 2019-06-28 RX ORDER — ONDANSETRON 2 MG/ML
INJECTION INTRAMUSCULAR; INTRAVENOUS PRN
Status: DISCONTINUED | OUTPATIENT
Start: 2019-06-28 | End: 2019-06-28 | Stop reason: SDUPTHER

## 2019-06-28 RX ORDER — SODIUM CHLORIDE, SODIUM LACTATE, POTASSIUM CHLORIDE, CALCIUM CHLORIDE 600; 310; 30; 20 MG/100ML; MG/100ML; MG/100ML; MG/100ML
INJECTION, SOLUTION INTRAVENOUS CONTINUOUS
Status: DISCONTINUED | OUTPATIENT
Start: 2019-06-28 | End: 2019-06-28

## 2019-06-28 RX ADMIN — SODIUM CHLORIDE: 9 INJECTION, SOLUTION INTRAVENOUS at 14:45

## 2019-06-28 RX ADMIN — ACETAMINOPHEN 500 MG: 500 TABLET ORAL at 23:54

## 2019-06-28 RX ADMIN — PHENYLEPHRINE HYDROCHLORIDE 100 MCG: 10 INJECTION INTRAVENOUS at 14:44

## 2019-06-28 RX ADMIN — ROCURONIUM BROMIDE 20 MG: 10 INJECTION INTRAVENOUS at 14:41

## 2019-06-28 RX ADMIN — LEVOTHYROXINE SODIUM 50 MCG: 50 TABLET ORAL at 05:25

## 2019-06-28 RX ADMIN — ONDANSETRON 4 MG: 2 INJECTION, SOLUTION INTRAMUSCULAR; INTRAVENOUS at 15:08

## 2019-06-28 RX ADMIN — VENLAFAXINE 75 MG: 75 TABLET ORAL at 08:37

## 2019-06-28 RX ADMIN — FENTANYL CITRATE 50 MCG: 50 INJECTION, SOLUTION INTRAMUSCULAR; INTRAVENOUS at 13:27

## 2019-06-28 RX ADMIN — Medication 4 MG: at 15:07

## 2019-06-28 RX ADMIN — PHENYLEPHRINE HYDROCHLORIDE 150 MCG: 10 INJECTION INTRAVENOUS at 14:28

## 2019-06-28 RX ADMIN — MIDODRINE HYDROCHLORIDE 10 MG: 5 TABLET ORAL at 21:58

## 2019-06-28 RX ADMIN — HYDROCODONE BITARTRATE AND ACETAMINOPHEN 2 TABLET: 5; 325 TABLET ORAL at 05:19

## 2019-06-28 RX ADMIN — SODIUM CHLORIDE: 9 INJECTION, SOLUTION INTRAVENOUS at 13:19

## 2019-06-28 RX ADMIN — LAMOTRIGINE 100 MG: 100 TABLET ORAL at 08:37

## 2019-06-28 RX ADMIN — LIDOCAINE HYDROCHLORIDE 50 MG: 10 INJECTION, SOLUTION EPIDURAL; INFILTRATION; INTRACAUDAL; PERINEURAL at 13:27

## 2019-06-28 RX ADMIN — ACETAMINOPHEN 500 MG: 500 TABLET ORAL at 05:19

## 2019-06-28 RX ADMIN — PROPOFOL 110 MG: 10 INJECTION, EMULSION INTRAVENOUS at 13:27

## 2019-06-28 RX ADMIN — ACETAMINOPHEN 500 MG: 500 TABLET ORAL at 08:37

## 2019-06-28 RX ADMIN — SODIUM CHLORIDE, POTASSIUM CHLORIDE, SODIUM LACTATE AND CALCIUM CHLORIDE: 600; 310; 30; 20 INJECTION, SOLUTION INTRAVENOUS at 13:37

## 2019-06-28 RX ADMIN — MIDAZOLAM HYDROCHLORIDE 2 MG: 1 INJECTION, SOLUTION INTRAMUSCULAR; INTRAVENOUS at 13:25

## 2019-06-28 RX ADMIN — PHENYLEPHRINE HYDROCHLORIDE 150 MCG: 10 INJECTION INTRAVENOUS at 14:56

## 2019-06-28 RX ADMIN — ISOSORBIDE MONONITRATE 30 MG: 30 TABLET ORAL at 08:37

## 2019-06-28 RX ADMIN — PHENYLEPHRINE HYDROCHLORIDE 150 MCG: 10 INJECTION INTRAVENOUS at 14:50

## 2019-06-28 RX ADMIN — HEPARIN SODIUM 5000 UNITS: 5000 INJECTION INTRAVENOUS; SUBCUTANEOUS at 21:58

## 2019-06-28 RX ADMIN — PHENYLEPHRINE HYDROCHLORIDE 200 MCG: 10 INJECTION INTRAVENOUS at 15:09

## 2019-06-28 RX ADMIN — DEXTROSE MONOHYDRATE 2 G: 50 INJECTION, SOLUTION INTRAVENOUS at 20:00

## 2019-06-28 RX ADMIN — HYDROCODONE BITARTRATE AND ACETAMINOPHEN 2 TABLET: 5; 325 TABLET ORAL at 17:45

## 2019-06-28 RX ADMIN — PANTOPRAZOLE SODIUM 40 MG: 40 TABLET, DELAYED RELEASE ORAL at 05:25

## 2019-06-28 RX ADMIN — ARIPIPRAZOLE 5 MG: 5 TABLET ORAL at 08:37

## 2019-06-28 RX ADMIN — TRANEXAMIC ACID 1000 MG: 100 INJECTION, SOLUTION INTRAVENOUS at 14:00

## 2019-06-28 RX ADMIN — DEXTROSE MONOHYDRATE 2 G: 50 INJECTION, SOLUTION INTRAVENOUS at 13:55

## 2019-06-28 RX ADMIN — Medication 0.6 MG: at 15:07

## 2019-06-28 RX ADMIN — PHENYLEPHRINE HYDROCHLORIDE 200 MCG: 10 INJECTION INTRAVENOUS at 15:01

## 2019-06-28 RX ADMIN — FUROSEMIDE 20 MG: 20 TABLET ORAL at 08:37

## 2019-06-28 RX ADMIN — ROCURONIUM BROMIDE 50 MG: 10 INJECTION INTRAVENOUS at 13:27

## 2019-06-28 RX ADMIN — PHENYLEPHRINE HYDROCHLORIDE 150 MCG: 10 INJECTION INTRAVENOUS at 13:51

## 2019-06-28 RX ADMIN — SODIUM CHLORIDE 250 ML: 9 INJECTION, SOLUTION INTRAVENOUS at 21:48

## 2019-06-28 RX ADMIN — SODIUM CHLORIDE 10 ML: 9 INJECTION INTRAMUSCULAR; INTRAVENOUS; SUBCUTANEOUS at 13:51

## 2019-06-28 RX ADMIN — PHENYLEPHRINE HYDROCHLORIDE 100 MCG: 10 INJECTION INTRAVENOUS at 14:37

## 2019-06-28 RX ADMIN — TRANEXAMIC ACID 1000 MG: 100 INJECTION, SOLUTION INTRAVENOUS at 15:03

## 2019-06-28 RX ADMIN — Medication 10 ML: at 21:53

## 2019-06-28 ASSESSMENT — PULMONARY FUNCTION TESTS
PIF_VALUE: 28
PIF_VALUE: 16
PIF_VALUE: 19
PIF_VALUE: 27
PIF_VALUE: 25
PIF_VALUE: 1
PIF_VALUE: 25
PIF_VALUE: 16
PIF_VALUE: 19
PIF_VALUE: 16
PIF_VALUE: 16
PIF_VALUE: 0
PIF_VALUE: 0
PIF_VALUE: 29
PIF_VALUE: 17
PIF_VALUE: 18
PIF_VALUE: 27
PIF_VALUE: 27
PIF_VALUE: 28
PIF_VALUE: 19
PIF_VALUE: 26
PIF_VALUE: 28
PIF_VALUE: 16
PIF_VALUE: 27
PIF_VALUE: 27
PIF_VALUE: 26
PIF_VALUE: 30
PIF_VALUE: 27
PIF_VALUE: 26
PIF_VALUE: 26
PIF_VALUE: 16
PIF_VALUE: 26
PIF_VALUE: 27
PIF_VALUE: 26
PIF_VALUE: 23
PIF_VALUE: 28
PIF_VALUE: 16
PIF_VALUE: 15
PIF_VALUE: 0
PIF_VALUE: 25
PIF_VALUE: 1
PIF_VALUE: 1
PIF_VALUE: 27
PIF_VALUE: 26
PIF_VALUE: 23
PIF_VALUE: 27
PIF_VALUE: 16
PIF_VALUE: 27
PIF_VALUE: 26
PIF_VALUE: 27
PIF_VALUE: 26
PIF_VALUE: 29
PIF_VALUE: 16
PIF_VALUE: 26
PIF_VALUE: 28
PIF_VALUE: 27
PIF_VALUE: 26
PIF_VALUE: 28
PIF_VALUE: 26
PIF_VALUE: 7
PIF_VALUE: 16
PIF_VALUE: 28
PIF_VALUE: 16
PIF_VALUE: 27
PIF_VALUE: 17
PIF_VALUE: 28
PIF_VALUE: 19
PIF_VALUE: 27
PIF_VALUE: 10
PIF_VALUE: 28
PIF_VALUE: 27
PIF_VALUE: 20
PIF_VALUE: 28
PIF_VALUE: 15
PIF_VALUE: 8
PIF_VALUE: 22
PIF_VALUE: 26
PIF_VALUE: 28
PIF_VALUE: 28
PIF_VALUE: 30
PIF_VALUE: 0
PIF_VALUE: 0
PIF_VALUE: 19
PIF_VALUE: 0
PIF_VALUE: 28
PIF_VALUE: 15
PIF_VALUE: 28
PIF_VALUE: 17
PIF_VALUE: 17
PIF_VALUE: 26
PIF_VALUE: 28
PIF_VALUE: 28
PIF_VALUE: 27
PIF_VALUE: 27
PIF_VALUE: 21
PIF_VALUE: 15
PIF_VALUE: 26
PIF_VALUE: 26
PIF_VALUE: 0
PIF_VALUE: 24
PIF_VALUE: 16
PIF_VALUE: 28
PIF_VALUE: 16
PIF_VALUE: 1
PIF_VALUE: 28
PIF_VALUE: 0
PIF_VALUE: 28
PIF_VALUE: 27
PIF_VALUE: 16
PIF_VALUE: 6
PIF_VALUE: 9
PIF_VALUE: 22
PIF_VALUE: 19
PIF_VALUE: 20
PIF_VALUE: 25
PIF_VALUE: 0
PIF_VALUE: 26
PIF_VALUE: 28
PIF_VALUE: 15
PIF_VALUE: 25
PIF_VALUE: 28
PIF_VALUE: 25
PIF_VALUE: 23
PIF_VALUE: 15
PIF_VALUE: 28
PIF_VALUE: 27
PIF_VALUE: 27
PIF_VALUE: 20
PIF_VALUE: 37
PIF_VALUE: 15
PIF_VALUE: 27
PIF_VALUE: 26
PIF_VALUE: 29
PIF_VALUE: 17

## 2019-06-28 ASSESSMENT — PAIN DESCRIPTION - ORIENTATION: ORIENTATION: RIGHT

## 2019-06-28 ASSESSMENT — PAIN SCALES - GENERAL
PAINLEVEL_OUTOF10: 1
PAINLEVEL_OUTOF10: 0
PAINLEVEL_OUTOF10: 0
PAINLEVEL_OUTOF10: 10
PAINLEVEL_OUTOF10: 1
PAINLEVEL_OUTOF10: 10
PAINLEVEL_OUTOF10: 0
PAINLEVEL_OUTOF10: 8
PAINLEVEL_OUTOF10: 8
PAINLEVEL_OUTOF10: 0
PAINLEVEL_OUTOF10: 5

## 2019-06-28 ASSESSMENT — PAIN DESCRIPTION - DESCRIPTORS: DESCRIPTORS: CONSTANT;SORE

## 2019-06-28 ASSESSMENT — PAIN DESCRIPTION - PAIN TYPE: TYPE: ACUTE PAIN;SURGICAL PAIN

## 2019-06-28 ASSESSMENT — ENCOUNTER SYMPTOMS
SHORTNESS OF BREATH: 0
STRIDOR: 0

## 2019-06-28 ASSESSMENT — PAIN DESCRIPTION - FREQUENCY: FREQUENCY: CONTINUOUS

## 2019-06-28 ASSESSMENT — PAIN DESCRIPTION - LOCATION: LOCATION: HIP

## 2019-06-28 ASSESSMENT — PAIN - FUNCTIONAL ASSESSMENT: PAIN_FUNCTIONAL_ASSESSMENT: 0-10

## 2019-06-28 ASSESSMENT — PAIN DESCRIPTION - ONSET: ONSET: ON-GOING

## 2019-06-28 NOTE — PROGRESS NOTES
(deep vein thrombosis), History of superior vena cava filter placement, Hx of blood clots, Hyperkalemia, Hyperlipidemia, Hypernatremia, Hypertension, Hypocalcemia, Hypovolemic shock (HCC), Hypoxia, Irregular heartbeat, Knee effusion, right, Left leg cellulitis, Lower GI bleed, MDRO (multiple drug resistant organisms) resistance, Metabolic acidosis, MRSA (methicillin resistant staph aureus) culture positive, On continuous oral anticoagulation, NADIRA on CPAP, Other specified hypothyroidism, Parietal lobe infarction ([address] Pneumonia, Post traumatic encephalopathy, Proteinuria, Pyogenic arthritis of right knee joint ([address] Pyrexia, Renal insufficiency, Renal lesion, Respiratory failure, acute ([address] Right knee pain, SAH (subarachnoid hemorrhage) ([address] Secondary hyperparathyroidism of renal origin ([address] Seizure disorder ([address] Stercoral ulcer of rectum, Streptococcal infection, Subarachnoid bleed ([address] Subdural bleeding ([address] Thyroid disease, Traumatic cerebral hemorrhage ([address] Type 2 diabetes mellitus with left diabetic foot ulcer ([address] Unspecified cerebral artery occlusion with cerebral infarction, Venous stasis dermatitis, and Venous stasis dermatitis of both lower extremities. Social History:   reports that he has never smoked. He has never used smokeless tobacco. He reports that he does not drink alcohol or use drugs. Family History:   Family History   Problem Relation Age of Onset    Coronary Art Dis Father     Cancer Sister        Vitals:  BP (!) 118/57   Pulse 75   Temp 96.8 °F (36 °C) (Temporal)   Resp 16   Ht 6' (1.829 m)   Wt 230 lb (104.3 kg) Comment: from floor  SpO2 100%   BMI 31.19 kg/m²   Temp (24hrs), Av.8 °F (36.6 °C), Min:96.8 °F (36 °C), Max:98.2 °F (36.8 °C)    Recent Labs     19  1400 19  1838 19  2144 19  0854   POCGLU 119* 131* 161* 104       I/O (24Hr):     Intake/Output Summary (Last 24 hours) at 2019 1225  Last data filed at 2019

## 2019-06-28 NOTE — BRIEF OP NOTE
Brief Postoperative Note  ______________________________________________________________    Patient: Vinod Santillan  YOB: 1950  MRN: [id number]  Date of Procedure: 6/28/2019    Pre-Op Diagnosis: RIGHT HIP HEMIARTHROPLASTY LOOSENING    Post-Op Diagnosis: Same       Procedure(s):  STAGE 1 TOTAL HIP REVISION ARTHROPLASTY  WITH REMEDIES AND CEMENT  EDDY & NEPHEW,    Anesthesia: General    Surgeon(s):  DO Ponce Perry DO    Assistant: Cara Salamanca DO    Estimated Blood Loss (mL): 069 mL    Complications: None    Specimens: * No specimens in log *    Implants:  Implant Name Type Inv. Item Serial No.  Lot No. LRB No. Used   IMPL HIP MODULAR LONG STEM REMEDY MED Hip IMPL HIP MODULAR LONG STEM REMEDY MED  OSTEOREMEDIES [id number] Right 1   IMPL HIP MODULAR HEAD REMEDY 54MM Hip IMPL HIP MODULAR HEAD REMEDY 54MM  OSTEOREMEDIES [id number] Right 1   PALACOS R PLUS G REF [id number]     [id number] Right 1     Drains:   LDA Fistula (Active)       Findings: See op note.      Areli Underwood DO  Date: 6/28/2019  Time: 3:30 PM

## 2019-06-28 NOTE — ANESTHESIA POSTPROCEDURE EVALUATION
Department of Anesthesiology  Postprocedure Note    Patient: Marleny Porter  MRN: [id number]  YOB: 1950  Date of evaluation: 6/28/2019  Time:  5:28 PM     Procedure Summary     Date:  06/28/19 Room / Location:  [address] OR    Anesthesia Start:  1319 Anesthesia Stop:  7270    Procedure:  STAGE 1 TOTAL HIP REVISION ARTHROPLASTY  WITH REMEDIES AND CEMENT  SMITH & NEPHEW,  REMOVAL OF HARDWARE , FEMORAL HEAD AND STEM . (Right Hip) Diagnosis:  (FAILED PROSTHESIS RIGHT HIP)    Surgeon:  Yumiko Patrick DO Responsible Provider:  Tucker Rayo MD    Anesthesia Type:  general ASA Status:  4          Anesthesia Type: general    Buddy Phase I: Buddy Score: 10    Buddy Phase II: Buddy Score: 8    Last vitals: Reviewed and per EMR flowsheets.        Anesthesia Post Evaluation    Patient location during evaluation: PACU  Patient participation: complete - patient participated  Level of consciousness: awake and alert  Pain score: 2  Airway patency: patent  Nausea & Vomiting: no nausea and no vomiting  Complications: no  Cardiovascular status: hemodynamically stable  Respiratory status: acceptable  Hydration status: euvolemic

## 2019-06-28 NOTE — ANESTHESIA PRE PROCEDURE
Department of Anesthesiology  Preprocedure Note       Name:  Dakota Torres   Age:  68 y.o.  :  1950                                          MRN:  [id number]         Date:  2019      Surgeon: Abdelrahman Cordero):  Yenifer Smith,     Procedure: STAGE 1 TOTAL HIP REVISION ARTHROPLASTY - KARLOS MEDLEY TABLE, PEG BOARD (Right )    Medications prior to admission:   Prior to Admission medications    Medication Sig Start Date End Date Taking? Authorizing Provider   cephALEXin (KEFLEX) 500 MG capsule Take 1 capsule by mouth 3 times daily for 7 days 6/26/19 7/3/19  Megan Walden DO   oxyCODONE-acetaminophen (PERCOCET) 5-325 MG per tablet Take 1 tablet by mouth every 4 hours as needed for Pain for up to 7 days. 6/26/19 7/3/19  Megan Walden DO   isosorbide dinitrate (ISORDIL) 30 MG tablet Take 30 mg by mouth daily 19   Historical Provider, MD   darbepoetin jean pierre-polysorbate (ARANESP) 40 MCG/0.4ML SOSY injection Infuse 0.8 mLs intravenously every 7 days 19   Shen Persaud DO   furosemide (LASIX) 20 MG tablet Take 20 mg by mouth 2 times daily    Historical Provider, MD   ARIPiprazole (ABILIFY) 5 MG tablet Take 5 mg by mouth daily  17   Historical Provider, MD   apixaban (ELIQUIS) 5 MG TABS tablet Take 5 mg by mouth 2 times daily    Historical Provider, MD   Ferric Citrate (AURYXIA) 1  MG(Fe) TABS Take 210 mg by mouth 2 times daily Patients med list states frequency is 2-3 times a day    Historical Provider, MD   venlafaxine (EFFEXOR) 75 MG tablet Take 75 mg by mouth daily    Historical Provider, MD   lamoTRIgine (LAMICTAL) 25 MG tablet Take 100 mg by mouth daily     Historical Provider, MD   isosorbide mononitrate (IMDUR) 30 MG CR tablet Take 30 mg by mouth daily  14   Historical Provider, MD   pantoprazole (PROTONIX) 40 MG tablet Take 40 mg by mouth daily  10/17/13   Historical Provider, MD   levothyroxine (LEVOTHROID) 50 MCG tablet Take 50 mcg by mouth Daily.     Historical Elevated C-reactive protein (CRP) R79.82    Left leg cellulitis L03. 116    MRSA infection A49.02    Streptococcal infection A49.1    Arteriovenous fistula for hemodialysis in place, primary [facility]) Z99.2    Closed right hip fracture, initial encounter ([facility] S72.001A    Closed fracture of right hip ([address] S72.001A    Thyroid disease E07.9    Hyponatremia E87.1    Uncontrolled type 2 diabetes mellitus with hyperglycemia ([address] E11.65    History of hemiarthroplasty of right hip Z96.641    Surgical wound dehiscence T81.31XA    Post-op pain G89.18       Past Medical History:        Diagnosis Date    A-fib ([facility]     Acute ischemic stroke ([facility] 7/13/2013    Acute metabolic encephalopathy 0/4/1039    Acute on chronic congestive heart failure ([facility] 5/9/2014    Acute on chronic diastolic CHF (congestive heart failure) ([facility]) 2/25/2017    Altered mental status 8/24/2013    Anemia     Anemia associated with chronic renal failure 5/28/2014    Aphasia 8/24/2013    ARF (acute renal failure) ([facility]) 5/28/2014    From pre renal azotemia from newly added diuretic and ARB use, creat peaked at 2.8 from 2 in may 2014    Arteriovenous fistula for hemodialysis in place, primary ([facility] 11/17/2017    Arthritis     Bradyarrhythmia 7/13/2013    Cellulitis 9/17/2018    Cerebral contusion ([facility]) 7/7/2013    CHF (congestive heart failure) ([facility])     Chronic atrial fibrillation ([facility]) 7/6/2013    Chronic kidney disease     Chronic pain of right knee     CKD (chronic kidney disease) stage 4, GFR 15-29 ml/min ([facility]) 5/28/2014    From diabetic and ischemic nephrosclerosis, creat now 2-2.5, GFR 25-30 ml/min    Coagulation defect ([facility] 7/6/2013    Diabetes mellitus ([facility]     Diabetes mellitus type 2 in obese ([facility]     Diarrhea 7/16/2013    DVT (deep venous thrombosis) ([facility])     Elevated C-reactive protein (CRP)     ESRD (end stage renal disease) ([facility] 3/23/2017    Essential hypertension 3/23/2017    Fever 7/18/2013    Foot ulcer due to secondary DM ([address] 5/28/2014    Left side on antibiotics    GERD (gastroesophageal reflux disease)     Hematochezia 3/4/2017    History of cerebral infarction 3/1/2017    History of DVT (deep vein thrombosis) 9/17/2018    History of superior vena cava filter placement 7/18/2013    Hx of blood clots     Hyperkalemia 7/16/2013    Hyperlipidemia     Hypernatremia 7/11/2013    Hypertension     Hypocalcemia 7/18/2013    Hypovolemic shock ([address] 3/4/2017    Hypoxia     Irregular heartbeat     hx of a-fib    Knee effusion, right 2/25/2017    Left leg cellulitis     Lower GI bleed 3/4/2017    MDRO (multiple drug resistant organisms) resistance     Metabolic acidosis 9/94/5573    MRSA (methicillin resistant staph aureus) culture positive 09/17/2018    leg    On continuous oral anticoagulation 9/17/2018    NADIRA on CPAP     Other specified hypothyroidism 8/24/2013    Parietal lobe infarction ([address] 8/26/2013    Pneumonia     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    Proteinuria 11/30/2016    Fluctuates between 2-3 grams, cant use ACEI due to hyperkalemia    Pyogenic arthritis of right knee joint ([address] 3/23/2017    Pyrexia 7/19/2013    Renal insufficiency     Renal lesion 3/3/2017    Respiratory failure, acute ([address] 7/7/2013    Right knee pain     [facility]oid hemorrhage) [facility]     Secondary hyperparathyroidism of renal origin ([address] 12/16/2015    Seizure disorder ([address] 3/1/2017    Stercoral ulcer of rectum     Streptococcal infection     Subarachnoid bleed ([address] 7/13/2013    Subdural bleeding ([address] 7/5/2013    Thyroid disease     Traumatic cerebral hemorrhage ([address] 7/7/2013    Type 2 diabetes mellitus with left diabetic foot ulcer ([address] 4/16/2014    Unspecified cerebral artery occlusion with cerebral infarction     Venous stasis dermatitis 5/28/2014    Venous stasis dermatitis of both lower extremities 5/28/2014       Past Surgical History:        Procedure Laterality Date    ABDOMEN SURGERY      CARDIAC CATHETERIZATION      COLONOSCOPY      DILATATION, ESOPHAGUS      FOOT DEBRIDEMENT Left     FOOT SURGERY Right     #5 toe removed  , 1/2 toes #1, #2  partially removed    GASTRIC BYPASS SURGERY      GASTROSTOMY TUBE PLACEMENT  summer 2013    HEMIARTHROPLASTY HIP Right 5/15/2019    HIP HEMIARTHROPLASTY. Lateral [address]80 table, Gayle Rep. Alecia Foote will notify performed by Fredi Mcnally DO at [address] Right 06/26/2019    Incision and drainage    INCISION AND DRAINAGE Right 6/26/2019    HIP INCISION AND DRAINAGE performed by Fredi Mcnally DO at [address] ARTHROSCOPY Right 03/23/2017    I & D    [place] KNEE SCOPE,DIAGNOSTIC Right 3/23/2017    KNEE ARTHROSCOPY,EXTENSIVE DEBRIDEMENT PARTIAL PROXIMAL AND MEDIAL MENISECTOMY  performed by Clarice Pastrana MD at [address] [facility] [place]  summer 2013    VENA CAVA FILTER PLACEMENT         Social History:    Social History     Tobacco Use    Smoking status: Never Smoker    Smokeless tobacco: Never Used   Substance Use Topics    Alcohol use: No                                Counseling given: Not Answered      Vital Signs (Current): There were no vitals filed for this visit.                                            BP Readings from Last 3 Encounters:   06/28/19 (!) 118/57   06/26/19 (!) 108/49   05/18/19 (!) 102/44       NPO Status:                                                                                 BMI:   Wt Readings from Last 3 Encounters:   06/28/19 230 lb (104.3 kg)   06/25/19 231 lb 7.7 oz (105 kg)   06/11/19 231 lb 7.7 oz (105 kg)     There is no height or weight on file to calculate BMI.    CBC:   Lab Results   Component Value Date    WBC 3.7 06/28/2019    RBC 2.61 06/28/2019    RBC 3.35 05/18/2012    HGB 7.6 06/28/2019    HCT 27.6 06/28/2019    .7 06/28/2019    RDW 14.8 06/28/2019     06/28/2019     05/18/2012       CMP: Lab Results   Component Value Date     06/28/2019    K 3.8 06/28/2019    CL 97 06/28/2019    CO2 30 06/28/2019    BUN 23 06/28/2019    CREATININE 2.80 06/28/2019    GFRAA 27 06/28/2019    LABGLOM 22 06/28/2019    GLUCOSE 127 06/28/2019    GLUCOSE 135 05/18/2012    PROT 6.3 06/28/2019    PROT 6.2 06/27/2012    CALCIUM 8.1 06/28/2019    BILITOT 0.42 06/28/2019    ALKPHOS 95 06/28/2019    AST 12 06/28/2019    ALT <5 06/28/2019       POC Tests:   Recent Labs     06/26/19  0949  06/28/19  0854   POCGLU 137*   < > 104   POCNA 138  --   --    POCK 3.5  --   --    POCCL 96*  --   --    POCHEMO 9.9*  --   --    POCHCT 29*  --   --     < > = values in this interval not displayed.        Coags:   Lab Results   Component Value Date    PROTIME 12.0 05/14/2019    PROTIME 12.8 04/17/2012    INR 1.1 05/14/2019    APTT 37.9 05/13/2019       HCG (If Applicable): No results found for: PREGTESTUR, PREGSERUM, HCG, HCGQUANT     ABGs: No results found for: PHART, PO2ART, QRS9LTK, KPH2MSV, BEART, G2TUZJOF     Type & Screen (If Applicable):  No results found for: LABABO, [address]    Anesthesia Evaluation  Patient summary reviewed and Nursing notes reviewed no history of anesthetic complications:   Airway: Mallampati: III       Mouth opening: > = 3 FB Dental:          Pulmonary:   (+) sleep apnea:      (-) COPD, asthma, shortness of breath and stridor                           Cardiovascular:    (+) hypertension:, dysrhythmias: atrial fibrillation, CHF:,     (-) pacemaker, CABG/stent and  angina        Rate: normal                    Neuro/Psych:   (+) CVA (aphasia history): residual symptoms, psychiatric history:            GI/Hepatic/Renal:   (+) GERD:, renal disease: ESRD and dialysis,           Endo/Other:    (+) DiabetesType II DM, , hypothyroidism::., .                 Abdominal:           Vascular:                                   Narrative   Performed by: KAIDEN STV MUSE   Atrial fibrillation  Nonspecific ST abnormality  Prolonged QT  Abnormal ECG  When compared with ECG of 24-FEB-2017 21:58,  No significant change was found   Lab and Collection     EKG 12 Lead - 5/13/2019     CONCLUSIONS    Summary  Left ventricular dilatation. Left ventricular systolic function is normal.  Ejection fraction is estimated at 50-55%. Grade II diastolic dysfunction. Left atrium is mildly to moderately dilated. Right atrium is moderately dilated. Right ventricle is mildly dilated with decreased systolic function. Tricuspid valve is grossly normal with mild regurgitation. Right ventricular systolic pressure is 61JF. Hg.    Signature  ----------------------------------------------------------------------------   Electronically signed by Valeria Haskins(Sonographer) on 02/27/2017 04:19 PM  ----------------------------------------------------------------------------    ----------------------------------------------------------------------------   Electronically signed by Eugene Garza(Interpreting physician) on   02/28/2017 04:13 PM    Narrative   EXAMINATION:   ONE XRAY VIEW OF THE CHEST       5/14/2019 5:10 pm       COMPARISON:   13 May 2019       HISTORY:   ORDERING SYSTEM PROVIDED HISTORY: s/p thoracentesis   TECHNOLOGIST PROVIDED HISTORY:   s/p thoracentesis   Ordering Physician Provided Reason for Exam: post thoracentesis pt has hx of   sob   Acuity: Unknown   Type of Exam: Unknown       FINDINGS:   AP portable view of the chest time stamped at 1708 hours demonstrates   moderate cardiomegaly and moderate right effusion reduced over prior study. Vascularity appears slightly prominent.  No extrapleural air is seen.  Right   apical pleural thickening is redemonstrated.  No localized consolidation left   hemithorax.  Opacity is present the right base likely superimposed   atelectasis.  No postprocedural pneumothorax is noted.           Impression   Reduce right effusion via thoracentesis.  No postprocedural pneumothorax.    Cardiomegaly is noted.  Vascularity

## 2019-06-29 LAB
ANION GAP SERPL CALCULATED.3IONS-SCNC: 13 MMOL/L (ref 9–17)
BUN BLDV-MCNC: 33 MG/DL (ref 8–23)
BUN/CREAT BLD: ABNORMAL (ref 9–20)
CALCIUM SERPL-MCNC: 7.8 MG/DL (ref 8.6–10.4)
CHLORIDE BLD-SCNC: 100 MMOL/L (ref 98–107)
CO2: 23 MMOL/L (ref 20–31)
CREAT SERPL-MCNC: 3.83 MG/DL (ref 0.7–1.2)
GFR AFRICAN AMERICAN: 19 ML/MIN
GFR NON-AFRICAN AMERICAN: 16 ML/MIN
GFR SERPL CREATININE-BSD FRML MDRD: ABNORMAL ML/MIN/{1.73_M2}
GFR SERPL CREATININE-BSD FRML MDRD: ABNORMAL ML/MIN/{1.73_M2}
GLUCOSE BLD-MCNC: 122 MG/DL (ref 70–99)
GLUCOSE BLD-MCNC: 134 MG/DL (ref 75–110)
GLUCOSE BLD-MCNC: 139 MG/DL (ref 75–110)
HCT VFR BLD CALC: 23.8 % (ref 40.7–50.3)
HCT VFR BLD CALC: 25.3 % (ref 40.7–50.3)
HCT VFR BLD CALC: 26.8 % (ref 40.7–50.3)
HEMOGLOBIN: 7.2 G/DL (ref 13–17)
HEMOGLOBIN: 7.4 G/DL (ref 13–17)
HEMOGLOBIN: 7.6 G/DL (ref 13–17)
MCH RBC QN AUTO: 29.1 PG (ref 25.2–33.5)
MCHC RBC AUTO-ENTMCNC: 28.4 G/DL (ref 28.4–34.8)
MCV RBC AUTO: 102.7 FL (ref 82.6–102.9)
NRBC AUTOMATED: 0 PER 100 WBC
PDW BLD-RTO: 17.5 % (ref 11.8–14.4)
PLATELET # BLD: 181 K/UL (ref 138–453)
PMV BLD AUTO: 9.2 FL (ref 8.1–13.5)
POTASSIUM SERPL-SCNC: 5.2 MMOL/L (ref 3.7–5.3)
RBC # BLD: 2.61 M/UL (ref 4.21–5.77)
SODIUM BLD-SCNC: 136 MMOL/L (ref 135–144)
WBC # BLD: 10.2 K/UL (ref 3.5–11.3)

## 2019-06-29 PROCEDURE — 99232 SBSQ HOSP IP/OBS MODERATE 35: CPT | Performed by: INTERNAL MEDICINE

## 2019-06-29 PROCEDURE — 36430 TRANSFUSION BLD/BLD COMPNT: CPT

## 2019-06-29 PROCEDURE — 99222 1ST HOSP IP/OBS MODERATE 55: CPT | Performed by: INTERNAL MEDICINE

## 2019-06-29 PROCEDURE — 85027 COMPLETE CBC AUTOMATED: CPT

## 2019-06-29 PROCEDURE — 85014 HEMATOCRIT: CPT

## 2019-06-29 PROCEDURE — 6360000002 HC RX W HCPCS: Performed by: INTERNAL MEDICINE

## 2019-06-29 PROCEDURE — 82947 ASSAY GLUCOSE BLOOD QUANT: CPT

## 2019-06-29 PROCEDURE — 86900 BLOOD TYPING SEROLOGIC ABO: CPT

## 2019-06-29 PROCEDURE — P9047 ALBUMIN (HUMAN), 25%, 50ML: HCPCS | Performed by: INTERNAL MEDICINE

## 2019-06-29 PROCEDURE — 6370000000 HC RX 637 (ALT 250 FOR IP): Performed by: INTERNAL MEDICINE

## 2019-06-29 PROCEDURE — 2580000003 HC RX 258: Performed by: STUDENT IN AN ORGANIZED HEALTH CARE EDUCATION/TRAINING PROGRAM

## 2019-06-29 PROCEDURE — 36416 COLLJ CAPILLARY BLOOD SPEC: CPT

## 2019-06-29 PROCEDURE — 6370000000 HC RX 637 (ALT 250 FOR IP): Performed by: STUDENT IN AN ORGANIZED HEALTH CARE EDUCATION/TRAINING PROGRAM

## 2019-06-29 PROCEDURE — 80048 BASIC METABOLIC PNL TOTAL CA: CPT

## 2019-06-29 PROCEDURE — 85018 HEMOGLOBIN: CPT

## 2019-06-29 PROCEDURE — 6360000002 HC RX W HCPCS: Performed by: STUDENT IN AN ORGANIZED HEALTH CARE EDUCATION/TRAINING PROGRAM

## 2019-06-29 PROCEDURE — 36415 COLL VENOUS BLD VENIPUNCTURE: CPT

## 2019-06-29 PROCEDURE — 1200000000 HC SEMI PRIVATE

## 2019-06-29 PROCEDURE — 2580000003 HC RX 258: Performed by: INTERNAL MEDICINE

## 2019-06-29 PROCEDURE — P9040 RBC LEUKOREDUCED IRRADIATED: HCPCS

## 2019-06-29 RX ORDER — ALBUMIN (HUMAN) 12.5 G/50ML
25 SOLUTION INTRAVENOUS ONCE
Status: COMPLETED | OUTPATIENT
Start: 2019-06-29 | End: 2019-06-29

## 2019-06-29 RX ORDER — MIDODRINE HYDROCHLORIDE 5 MG/1
10 TABLET ORAL
Status: DISCONTINUED | OUTPATIENT
Start: 2019-06-29 | End: 2019-07-08 | Stop reason: HOSPADM

## 2019-06-29 RX ADMIN — FUROSEMIDE 20 MG: 20 TABLET ORAL at 22:37

## 2019-06-29 RX ADMIN — ARIPIPRAZOLE 5 MG: 5 TABLET ORAL at 08:18

## 2019-06-29 RX ADMIN — Medication 10 ML: at 10:13

## 2019-06-29 RX ADMIN — METOCLOPRAMIDE 5 MG: 5 INJECTION, SOLUTION INTRAMUSCULAR; INTRAVENOUS at 08:19

## 2019-06-29 RX ADMIN — VENLAFAXINE 75 MG: 75 TABLET ORAL at 08:19

## 2019-06-29 RX ADMIN — HEPARIN SODIUM 5000 UNITS: 5000 INJECTION INTRAVENOUS; SUBCUTANEOUS at 07:31

## 2019-06-29 RX ADMIN — MIDODRINE HYDROCHLORIDE 10 MG: 5 TABLET ORAL at 17:54

## 2019-06-29 RX ADMIN — ALBUMIN (HUMAN) 25 G: 0.25 INJECTION, SOLUTION INTRAVENOUS at 14:07

## 2019-06-29 RX ADMIN — MEROPENEM 1 G: 1 INJECTION, POWDER, FOR SOLUTION INTRAVENOUS at 17:57

## 2019-06-29 RX ADMIN — DEXTROSE MONOHYDRATE 2 G: 50 INJECTION, SOLUTION INTRAVENOUS at 03:50

## 2019-06-29 RX ADMIN — Medication 10 ML: at 22:37

## 2019-06-29 RX ADMIN — ACETAMINOPHEN 500 MG: 500 TABLET ORAL at 17:58

## 2019-06-29 RX ADMIN — LEVOTHYROXINE SODIUM 50 MCG: 50 TABLET ORAL at 07:31

## 2019-06-29 RX ADMIN — VITAMIN D, TAB 1000IU (100/BT) 1000 UNITS: 25 TAB at 08:17

## 2019-06-29 RX ADMIN — ACETAMINOPHEN 500 MG: 500 TABLET ORAL at 22:37

## 2019-06-29 RX ADMIN — LAMOTRIGINE 100 MG: 100 TABLET ORAL at 08:18

## 2019-06-29 RX ADMIN — HEPARIN SODIUM 5000 UNITS: 5000 INJECTION INTRAVENOUS; SUBCUTANEOUS at 22:39

## 2019-06-29 RX ADMIN — PANTOPRAZOLE SODIUM 40 MG: 40 TABLET, DELAYED RELEASE ORAL at 07:31

## 2019-06-29 RX ADMIN — MIDODRINE HYDROCHLORIDE 10 MG: 5 TABLET ORAL at 11:26

## 2019-06-29 RX ADMIN — ACETAMINOPHEN 500 MG: 500 TABLET ORAL at 03:53

## 2019-06-29 RX ADMIN — ACETAMINOPHEN 500 MG: 500 TABLET ORAL at 08:17

## 2019-06-29 RX ADMIN — FUROSEMIDE 20 MG: 20 TABLET ORAL at 08:18

## 2019-06-29 ASSESSMENT — PAIN DESCRIPTION - PROGRESSION: CLINICAL_PROGRESSION: NOT CHANGED

## 2019-06-29 ASSESSMENT — PAIN SCALES - GENERAL
PAINLEVEL_OUTOF10: 2
PAINLEVEL_OUTOF10: 0
PAINLEVEL_OUTOF10: 0
PAINLEVEL_OUTOF10: 4
PAINLEVEL_OUTOF10: 0
PAINLEVEL_OUTOF10: 2
PAINLEVEL_OUTOF10: 4
PAINLEVEL_OUTOF10: 3
PAINLEVEL_OUTOF10: 0

## 2019-06-29 ASSESSMENT — PAIN DESCRIPTION - LOCATION: LOCATION: HIP

## 2019-06-29 ASSESSMENT — PAIN DESCRIPTION - DESCRIPTORS: DESCRIPTORS: ACHING

## 2019-06-29 ASSESSMENT — PAIN DESCRIPTION - ONSET: ONSET: ON-GOING

## 2019-06-29 ASSESSMENT — PAIN DESCRIPTION - PAIN TYPE: TYPE: SURGICAL PAIN

## 2019-06-29 ASSESSMENT — PAIN DESCRIPTION - FREQUENCY: FREQUENCY: INTERMITTENT

## 2019-06-29 ASSESSMENT — PAIN DESCRIPTION - ORIENTATION: ORIENTATION: RIGHT

## 2019-06-29 NOTE — CONSULTS
3/23/2017    Essential hypertension 3/23/2017    Fever 7/18/2013    Foot ulcer due to secondary DM ([address] 5/28/2014    Left side on antibiotics    GERD (gastroesophageal reflux disease)     Hematochezia 3/4/2017    History of cerebral infarction 3/1/2017    History of DVT (deep vein thrombosis) 9/17/2018    History of superior vena cava filter placement 7/18/2013    Hx of blood clots     Hyperkalemia 7/16/2013    Hyperlipidemia     Hypernatremia 7/11/2013    Hypertension     Hypocalcemia 7/18/2013    Hypovolemic shock ([address] 3/4/2017    Hypoxia     Irregular heartbeat     hx of a-fib    Knee effusion, right 2/25/2017    Left leg cellulitis     Lower GI bleed 3/4/2017    MDRO (multiple drug resistant organisms) resistance     Metabolic acidosis 4/25/6720    MRSA (methicillin resistant staph aureus) culture positive 09/17/2018    leg    On continuous oral anticoagulation 9/17/2018    NADIRA on CPAP     Other specified hypothyroidism 8/24/2013    Parietal lobe infarction ([address] 8/26/2013    Pneumonia     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    Proteinuria 11/30/2016    Fluctuates between 2-3 grams, cant use ACEI due to hyperkalemia    Pyogenic arthritis of right knee joint ([address] 3/23/2017    Pyrexia 7/19/2013    Renal insufficiency     Renal lesion 3/3/2017    Respiratory failure, acute ([address] 7/7/2013    Right knee pain     SAH (subarachnoid hemorrhage) (HCC)     Secondary hyperparathyroidism of renal origin ([address] 12/16/2015    Seizure disorder ([address] 3/1/2017    Stercoral ulcer of rectum     Streptococcal infection     Subarachnoid bleed ([address] 7/13/2013    Subdural bleeding ([address] 7/5/2013    Thyroid disease     Traumatic cerebral hemorrhage ([address] 7/7/2013    Type 2 diabetes mellitus with left diabetic foot ulcer ([address] 4/16/2014    Unspecified cerebral artery occlusion with cerebral infarction     Venous stasis dermatitis 5/28/2014    Venous stasis dermatitis of both lower extremities 5/28/2014       Past Surgical  History:     Past Surgical History:   Procedure Laterality Date    ABDOMEN SURGERY      CARDIAC CATHETERIZATION      COLONOSCOPY      DILATATION, ESOPHAGUS      FOOT DEBRIDEMENT Left     FOOT SURGERY Right     #5 toe removed  , 1/2 toes #1, #2  partially removed    GASTRIC BYPASS SURGERY      GASTROSTOMY TUBE PLACEMENT  summer 2013    HEMIARTHROPLASTY HIP Right 5/15/2019    HIP HEMIARTHROPLASTY. Lateral [address] table, Gayle [address].  Loyda Self will notify performed by Alcira Borrero DO at [facility] [address] Right 6/26/2019    HIP INCISION AND DRAINAGE performed by Alcira Borrero DO at [address] Right 3/23/2017    KNEE ARTHROSCOPY,EXTENSIVE DEBRIDEMENT PARTIAL PROXIMAL AND MEDIAL MENISECTOMY  performed by Mayela Egn MD at [facility] 06/28/2019    STAGE 1 TOTAL HIP REVISION ARTHROPLASTY  WITH REMEDIES AND CEMENT      SKIN BIOPSY      TONSILLECTOMY      TRACHEOSTOMY  summer 2013    VENA CAVA FILTER PLACEMENT         Medications:      meropenem  500 mg Intravenous Q24H    midodrine  10 mg Oral TID WC    heparin (porcine)  5,000 Units Subcutaneous 3 times per day    insulin lispro  0-6 Units Subcutaneous TID WC    insulin lispro  0-3 Units Subcutaneous Nightly    vitamin D  1,000 Units Oral Daily    levothyroxine  50 mcg Oral Daily    pantoprazole  40 mg Oral QAM AC    isosorbide mononitrate  30 mg Oral Daily    lamoTRIgine  100 mg Oral Daily    venlafaxine  75 mg Oral Daily    ARIPiprazole  5 mg Oral Daily    [Held by provider] apixaban  5 mg Oral BID    Ferric Citrate  210 mg Oral BID    furosemide  20 mg Oral BID    sodium chloride flush  10 mL Intravenous 2 times per day    acetaminophen  500 mg Oral Q4H       Social History:     Social History     Socioeconomic History    Marital status:      Spouse name: Not on file    Number of children: Not on file    HISTORY: intra op   TECHNOLOGIST PROVIDED HISTORY:   intra op   Ordering Physician Provided Reason for Exam: intra op portable hip       FINDINGS:   4 intraoperative radiographs of the left hip and proximal femur.  Subtle   cortical irregularity and lucency along the medial midshaft of the femur. Cannot exclude a femoral fracture versus superimposition of osseous shadows. Continued follow-up is advised.           Impression   Possible periprosthetic fracture along the distal stem of the prosthesis. Additional imaging and short interval follow-up should be considered.       The findings were sent to the Radiology Results [address] at 3:32   pm on 6/28/2019to be communicated to a licensed caregiver.             EXAMINATION:   2 XRAY VIEWS OF THE RIGHT HIP       6/28/2019 4:25 pm       COMPARISON:   None.       HISTORY:   ORDERING SYSTEM PROVIDED HISTORY: PACU please   TECHNOLOGIST PROVIDED HISTORY:   PACU please       FINDINGS:   Interval total hip arthroplasty revision with new implant placement.  There   is localized irregular bone loss in the lateral proximal femoral cortex.       Overlying skin staples are present.  Extensive calcified atheromatous plaque   is present.           Impression   Status post hip arthroplasty revision.  Localized area of irregular bone loss   in the lateral femoral cortex is noted.  This may be related to prior   hardware complication.  Underlying inflammatory process or infection should   also be considered in the appropriate clinical setting. Medical Decision [id number]:     6/29/2019  4:03 PM - Jones, Jerome Incoming Lab Results From [facility]     Specimen Information: Joint, Hip; Swab        Component Collected Lab   Specimen Description 06/26/2019  1:[address]  [address]   . HIP, JOINT RT HIP SWAB    Special Requests 06/26/2019  1:[address]  [address]   NOT REPORTED    Direct Exam Abnormal  06/26/2019  1:41 PM [facility] Antibiotic Interpretation ADONAY Status    amikacin   Preliminary     NOT REPORTED   ampicillin Resistant  Preliminary     >=32  RESISTANT   ampicillin-sulbactam   Preliminary     NOT REPORTED   aztreonam Resistant  Preliminary     >=64  RESISTANT   ceFAZolin Resistant  Preliminary     >=64  RESISTANT   cefepime Resistant  Preliminary     32  RESISTANT   cefTRIAXone Resistant  Preliminary     >=64  RESISTANT   ciprofloxacin Sensitive  Preliminary     <=0.25  SUSCEPTIBLE   ertapenem   Preliminary     NOT REPORTED   Confirmatory Extended Spectrum Beta-Lactamase Positive POSITIVE Preliminary    gentamicin Sensitive  Preliminary     <=1  SUSCEPTIBLE   meropenem Sensitive  Preliminary     <=0.25  SUSCEPTIBLE   nitrofurantoin   Preliminary     NOT REPORTED   tigecycline   Preliminary     NOT REPORTED   tobramycin Sensitive  Preliminary     <=1  SUSCEPTIBLE   trimethoprim-sulfamethoxazole Sensitive  Preliminary     <=20  SUSCEPTIBLE   piperacillin-tazobactam Resistant  Preliminary     8  RESISTANT       Medical Decision Making-Other:     Note:  · Labs, medications, radiologic studies were reviewed with personal review of films  · Moderate Large amounts of data were reviewed  · Discussed with nursing Staff, Discharge planner  · Infection Control and Prevention measures reviewed  · All prior entries were reviewed  · Administer medications as ordered  · Prognosis: Good  · Discharge planning reviewed  · Follow up as outpatient. Thank you for allowing us to participate in the care of this patient. Please call with questions.     Peter Hope MD  Pager: [phone] - Office: [phone]

## 2019-06-29 NOTE — PROGRESS NOTES
Dr. Elmer Hope notified of patient's BP after receiving fluid bolus and proamatine; no new orders at this time. Will continue to monitor.

## 2019-06-29 NOTE — PROGRESS NOTES
AT 2 DAYS (A) 06/26/2019 01:41 PM       Lab Results   Component Value Date    POCPH 7.46 02/28/2017    POCPCO2 36 02/28/2017    POCPO2 47 02/28/2017    POCHCO3 25.8 02/28/2017    NBEA NOT REPORTED 02/28/2017    PBEA 2 02/28/2017    MJY8EWK 27 02/28/2017    QHKO4FAQ 85 02/28/2017    FIO2 21.0 02/28/2017       Radiology:    Xr Chest (single View Frontal)    Result Date: 6/27/2019  Elevated right hemidiaphragm with hazy opacity partially obscuring the diaphragm suspicious for lower lung airspace disease. Xr Hip Right (1 View)    Result Date: 6/26/2019  Unchanged right hip hemiarthroplasty in grossly normal alignment on this single lateral view. No acute abnormality. Xr Femur Right (min 2 Views)    Result Date: 6/26/2019  Right hip arthroplasty without evidence for complication. Xr Hip 2-3 Vw W Pelvis Right    Result Date: 6/26/2019  Right hip hemiarthroplasty without fracture or malalignment. Surgical staples noted in the soft tissue consistent with recent incision and drainage.        Physical Examination:        General appearance:  alert, cooperative and no distress  Mental Status:  oriented to person and situation only,  Lungs:  clear to auscultation bilaterally, normal effort  Heart:  regular rate and rhythm, no murmur  Abdomen:  soft, nontender, nondistended, normal bowel sounds, no masses, hepatomegaly, splenomegaly  Extremities: Nontender, chronic edema  Skin: Ulcer to the left heel, dressing in place of the right hip    Assessment:        Primary Problem  Surgical wound dehiscence    Active Hospital Problems    Diagnosis Date Noted    Surgical wound dehiscence [T81.31XA] 06/27/2019    Post-op pain [G89.18]     History of hemiarthroplasty of right hip [Z96.641] 06/26/2019    Thyroid disease [E07.9]     History of DVT (deep vein thrombosis) [Z86.718] 09/17/2018    Arteriovenous fistula for hemodialysis in place, [facility]) [Z99.2] 11/17/2017    ESRD (end stage renal disease) ([facility] [N18.6]

## 2019-06-29 NOTE — PROGRESS NOTES
Orthopedic Progress Note    Patient:  Vinod Santillan  YOB: 1950     68 y.o. male    Subjective:  Patient seen and examined this morning. No complaints or concerns at this time. Hypotension overnight. Medicine and nephro are aware, they have been providing treatment. He is asymptomatic. He still is groggy post surgery, but similar to baseline mental status. Pain controlled on current regimen. Denies fever, HA, CP, SOB, N/V, dysuria, numbness or tingling. -BM/-flatus. Tolerating PO intake. Vitals reviewed, afebrile  Received 2 units PRBC's in OR. Objective:   Vitals:    06/29/19 0353   BP: (!) 79/35   Pulse: 100   Resp: 18   Temp: 98.1 °F (36.7 °C)   SpO2:        Gen: NAD, cooperative, groggy  Cardiovascular: Regular rate, no dependent edema, distal pulses 2+  Respiratory: Chest symmetric, no accessory muscle use, normal respirations, no audible wheezes  MSK: RLE: Abduction pillow in place. Dressing clean dry intact. Severe chronic lymphedema and vascular changes to lower extremity. Compartments soft and compressible. TA/EHL/FHL/GS motor intact. Deep and Superficial Peroneal/Saphenous/Sural SILT. 2+ DP pulse. Knee flexion contracture. Leg resting in external rotation. Recent Labs     06/28/19  0458  06/29/19  0538   WBC  --    < > 10.2   HGB  --    < > 7.6*   HCT  --    < > 26.8*   PLT  --    < > 181     --   --    K 3.8  --   --    BUN 23  --   --    CREATININE 2.80*  --   --    GLUCOSE 127*  --   --     < > = values in this interval not displayed. Meds: Eliquis   See rec for complete list    Impression/plan: 68 y.o. male s/p right hip I&D on 6/26/19, POD#2  S/p danielle explant, cement spacer on 6/28/19, POD#1    -Internal medicine and nephrology to assist with medical optimization, appreciate recs  -CRP 65, ESR 88  -Hemoglobin 7.6  -WB status: Non weight bearing right lower extremity  -Pain control PO/IV Medication. Attempt to Wean IV medications.  Keep narcotic pain

## 2019-06-30 LAB
ABSOLUTE EOS #: 0.06 K/UL (ref 0–0.4)
ABSOLUTE IMMATURE GRANULOCYTE: 0 K/UL (ref 0–0.3)
ABSOLUTE LYMPH #: 0.62 K/UL (ref 1–4.8)
ABSOLUTE MONO #: 0.19 K/UL (ref 0.1–0.8)
BASOPHILS # BLD: 0 % (ref 0–2)
BASOPHILS ABSOLUTE: 0 K/UL (ref 0–0.2)
DIFFERENTIAL TYPE: ABNORMAL
EOSINOPHILS RELATIVE PERCENT: 1 % (ref 1–4)
GLUCOSE BLD-MCNC: 100 MG/DL (ref 75–110)
GLUCOSE BLD-MCNC: 103 MG/DL (ref 75–110)
GLUCOSE BLD-MCNC: 84 MG/DL (ref 75–110)
HCT VFR BLD CALC: 22.9 % (ref 40.7–50.3)
HCT VFR BLD CALC: 25.2 % (ref 40.7–50.3)
HEMOGLOBIN: 7.3 G/DL (ref 13–17)
HEMOGLOBIN: 7.4 G/DL (ref 13–17)
IMMATURE GRANULOCYTES: 0 %
LYMPHOCYTES # BLD: 10 % (ref 24–44)
MCH RBC QN AUTO: 29.7 PG (ref 25.2–33.5)
MCHC RBC AUTO-ENTMCNC: 29.4 G/DL (ref 28.4–34.8)
MCV RBC AUTO: 101.2 FL (ref 82.6–102.9)
MONOCYTES # BLD: 3 % (ref 1–7)
MORPHOLOGY: ABNORMAL
NRBC AUTOMATED: 0 PER 100 WBC
PDW BLD-RTO: 17.5 % (ref 11.8–14.4)
PLATELET # BLD: 139 K/UL (ref 138–453)
PLATELET ESTIMATE: ABNORMAL
PMV BLD AUTO: 9.9 FL (ref 8.1–13.5)
RBC # BLD: 2.49 M/UL (ref 4.21–5.77)
RBC # BLD: ABNORMAL 10*6/UL
SEG NEUTROPHILS: 86 % (ref 36–66)
SEGMENTED NEUTROPHILS ABSOLUTE COUNT: 5.33 K/UL (ref 1.8–7.7)
WBC # BLD: 6.2 K/UL (ref 3.5–11.3)
WBC # BLD: ABNORMAL 10*3/UL

## 2019-06-30 PROCEDURE — 85014 HEMATOCRIT: CPT

## 2019-06-30 PROCEDURE — 85025 COMPLETE CBC W/AUTO DIFF WBC: CPT

## 2019-06-30 PROCEDURE — 6370000000 HC RX 637 (ALT 250 FOR IP): Performed by: INTERNAL MEDICINE

## 2019-06-30 PROCEDURE — 36415 COLL VENOUS BLD VENIPUNCTURE: CPT

## 2019-06-30 PROCEDURE — 6370000000 HC RX 637 (ALT 250 FOR IP): Performed by: STUDENT IN AN ORGANIZED HEALTH CARE EDUCATION/TRAINING PROGRAM

## 2019-06-30 PROCEDURE — 99232 SBSQ HOSP IP/OBS MODERATE 35: CPT | Performed by: INTERNAL MEDICINE

## 2019-06-30 PROCEDURE — 2580000003 HC RX 258: Performed by: INTERNAL MEDICINE

## 2019-06-30 PROCEDURE — 6360000002 HC RX W HCPCS: Performed by: STUDENT IN AN ORGANIZED HEALTH CARE EDUCATION/TRAINING PROGRAM

## 2019-06-30 PROCEDURE — 85018 HEMOGLOBIN: CPT

## 2019-06-30 PROCEDURE — 2580000003 HC RX 258: Performed by: STUDENT IN AN ORGANIZED HEALTH CARE EDUCATION/TRAINING PROGRAM

## 2019-06-30 PROCEDURE — 1200000000 HC SEMI PRIVATE

## 2019-06-30 PROCEDURE — 82947 ASSAY GLUCOSE BLOOD QUANT: CPT

## 2019-06-30 RX ORDER — 0.9 % SODIUM CHLORIDE 0.9 %
500 INTRAVENOUS SOLUTION INTRAVENOUS ONCE
Status: COMPLETED | OUTPATIENT
Start: 2019-06-30 | End: 2019-07-01

## 2019-06-30 RX ADMIN — SODIUM CHLORIDE 500 ML: 9 INJECTION, SOLUTION INTRAVENOUS at 22:13

## 2019-06-30 RX ADMIN — METOCLOPRAMIDE 5 MG: 5 INJECTION, SOLUTION INTRAMUSCULAR; INTRAVENOUS at 08:11

## 2019-06-30 RX ADMIN — VENLAFAXINE 75 MG: 75 TABLET ORAL at 08:12

## 2019-06-30 RX ADMIN — HEPARIN SODIUM 5000 UNITS: 5000 INJECTION INTRAVENOUS; SUBCUTANEOUS at 15:00

## 2019-06-30 RX ADMIN — VITAMIN D, TAB 1000IU (100/BT) 1000 UNITS: 25 TAB at 08:11

## 2019-06-30 RX ADMIN — LAMOTRIGINE 100 MG: 100 TABLET ORAL at 08:12

## 2019-06-30 RX ADMIN — MIDODRINE HYDROCHLORIDE 10 MG: 5 TABLET ORAL at 16:09

## 2019-06-30 RX ADMIN — Medication 10 ML: at 08:12

## 2019-06-30 RX ADMIN — MIDODRINE HYDROCHLORIDE 10 MG: 5 TABLET ORAL at 08:11

## 2019-06-30 RX ADMIN — FUROSEMIDE 20 MG: 20 TABLET ORAL at 08:11

## 2019-06-30 RX ADMIN — HEPARIN SODIUM 5000 UNITS: 5000 INJECTION INTRAVENOUS; SUBCUTANEOUS at 06:29

## 2019-06-30 RX ADMIN — ACETAMINOPHEN 500 MG: 500 TABLET ORAL at 16:09

## 2019-06-30 RX ADMIN — MAGNESIUM HYDROXIDE 30 ML: 400 SUSPENSION ORAL at 11:11

## 2019-06-30 RX ADMIN — ACETAMINOPHEN 500 MG: 500 TABLET ORAL at 22:22

## 2019-06-30 RX ADMIN — LEVOTHYROXINE SODIUM 50 MCG: 50 TABLET ORAL at 06:29

## 2019-06-30 RX ADMIN — ARIPIPRAZOLE 5 MG: 5 TABLET ORAL at 08:14

## 2019-06-30 RX ADMIN — ACETAMINOPHEN 500 MG: 500 TABLET ORAL at 04:30

## 2019-06-30 RX ADMIN — MIDODRINE HYDROCHLORIDE 10 MG: 5 TABLET ORAL at 12:10

## 2019-06-30 RX ADMIN — ACETAMINOPHEN 500 MG: 500 TABLET ORAL at 08:12

## 2019-06-30 RX ADMIN — PANTOPRAZOLE SODIUM 40 MG: 40 TABLET, DELAYED RELEASE ORAL at 06:29

## 2019-06-30 RX ADMIN — ACETAMINOPHEN 500 MG: 500 TABLET ORAL at 12:11

## 2019-06-30 RX ADMIN — HEPARIN SODIUM 5000 UNITS: 5000 INJECTION INTRAVENOUS; SUBCUTANEOUS at 22:17

## 2019-06-30 ASSESSMENT — PAIN DESCRIPTION - ORIENTATION: ORIENTATION: RIGHT

## 2019-06-30 ASSESSMENT — PAIN DESCRIPTION - FREQUENCY: FREQUENCY: INTERMITTENT

## 2019-06-30 ASSESSMENT — PAIN DESCRIPTION - PAIN TYPE: TYPE: SURGICAL PAIN

## 2019-06-30 ASSESSMENT — PAIN DESCRIPTION - PROGRESSION: CLINICAL_PROGRESSION: NOT CHANGED

## 2019-06-30 ASSESSMENT — PAIN SCALES - GENERAL
PAINLEVEL_OUTOF10: 3
PAINLEVEL_OUTOF10: 2
PAINLEVEL_OUTOF10: 3
PAINLEVEL_OUTOF10: 0
PAINLEVEL_OUTOF10: 3

## 2019-06-30 ASSESSMENT — PAIN DESCRIPTION - DESCRIPTORS: DESCRIPTORS: ACHING

## 2019-06-30 ASSESSMENT — PAIN DESCRIPTION - LOCATION: LOCATION: HIP

## 2019-06-30 ASSESSMENT — PAIN DESCRIPTION - ONSET: ONSET: ON-GOING

## 2019-06-30 NOTE — PROGRESS NOTES
HYDROcodone 5 mg - acetaminophen **OR** HYDROcodone 5 mg - acetaminophen, metoclopramide, sodium chloride flush, magnesium hydroxide  Home Meds:                Medications Prior to Admission: isosorbide dinitrate (ISORDIL) 30 MG tablet, Take 30 mg by mouth daily  darbepoetin jean pierre-polysorbate (ARANESP) 40 MCG/0.4ML SOSY injection, Infuse 0.8 mLs intravenously every 7 days  furosemide (LASIX) 20 MG tablet, Take 20 mg by mouth 2 times daily  ARIPiprazole (ABILIFY) 5 MG tablet, Take 5 mg by mouth daily   apixaban (ELIQUIS) 5 MG TABS tablet, Take 5 mg by mouth 2 times daily  Ferric Citrate (AURYXIA) 1  MG(Fe) TABS, Take 210 mg by mouth 2 times daily Patients med list states frequency is 2-3 times a day  venlafaxine (EFFEXOR) 75 MG tablet, Take 75 mg by mouth daily  lamoTRIgine (LAMICTAL) 25 MG tablet, Take 100 mg by mouth daily   isosorbide mononitrate (IMDUR) 30 MG CR tablet, Take 30 mg by mouth daily   pantoprazole (PROTONIX) 40 MG tablet, Take 40 mg by mouth daily   levothyroxine (LEVOTHROID) 50 MCG tablet, Take 50 mcg by mouth Daily. Cholecalciferol (VITAMIN D3) 2000 UNITS CAPS, Take 1,000 Units by mouth daily.     INVESTIGATIONS     Last 3 CMP:    Recent Labs     06/28/19  0458 06/29/19  0538    136   K 3.8 5.2   CL 97* 100   CO2 30 23   BUN 23 33*   CREATININE 2.80* 3.83*   CALCIUM 8.1* 7.8*   PROT 6.3*  --    LABALBU 2.4*  --    BILITOT 0.42  --    ALKPHOS 95  --    AST 12  --    ALT <5*  --        Last 3 CBC:  Recent Labs     06/28/19  0516  06/29/19  0538 06/29/19  1159 06/29/19  1941 06/30/19  0643   WBC 3.7  --  10.2  --   --  6.2   RBC 2.61*  --  2.61*  --   --  2.49*   HGB 7.6*   < > 7.6* 7.2* 7.4* 7.4*   HCT 27.6*   < > 26.8* 25.3* 23.8* 25.2*   .7*  --  102.7  --   --  101.2   MCH 29.1  --  29.1  --   --  29.7   MCHC 27.5*  --  28.4  --   --  29.4   RDW 14.8*  --  17.5*  --   --  17.5*     --  181  --   --  139   MPV 9.3  --  9.2  --   --  9.9    < > = values in this interval not

## 2019-06-30 NOTE — PLAN OF CARE
Problem: Falls - Risk of:  Goal: Will remain free from falls  Description  Will remain free from falls  Outcome: Met This Shift     Problem: Falls - Risk of:  Goal: Absence of physical injury  Description  Absence of physical injury  Outcome: Met This Shift     Problem: Pain:  Goal: Pain level will decrease  Description  Pain level will decrease  Outcome: Ongoing     Problem: Pain:  Goal: Control of acute pain  Description  Control of acute pain  Outcome: Ongoing     Problem: Pain:  Goal: Control of chronic pain  Description  Control of chronic pain  Outcome: Ongoing     Problem: Risk for Impaired Skin Integrity  Goal: Tissue integrity - skin and mucous membranes  Description  Structural intactness and normal physiological function of skin and  mucous membranes.   Outcome: Ongoing

## 2019-06-30 NOTE — PROGRESS NOTES
but responds to HD nurses. Multiple ecchymoses and skin discoloration from chronic dialysis and anticoagulation. Rt upper thigh incision from revision. CURRENT EVALUATION :6/30/2019    Afebrile  VS stable  Felt better after transfusion    Patient comfortable. Pain controlled  No change in incision. Minimal seepage. More alert  Tolerating meropenem. Labs, X rays reviewed: 6/30/2019    BUN:33  Cr:3.83    WBC:10.2  Hb:7.6  Plat: 181    Cultures:  Urine:  ·   Blood:  ·   Sputum :  ·   Wound:  · 6-29-19 Joint hip swab: proteus mirabilis and ESBL + Klebsiella   · 5-13-19: No growth  ·     Discussed with patient, RN, family. I have personally reviewed the past medical history, past surgical history, medications, social history, and family history, and I have updated the database accordingly.   Past Medical History:     Past Medical History:   Diagnosis Date    A-fib [facility])     Acute ischemic stroke ([address] 7/13/2013    Acute metabolic encephalopathy 0/6/9723    Acute on chronic congestive heart failure ([address] 5/9/2014    Acute on chronic diastolic CHF (congestive heart failure) ([address] 2/25/2017    Altered mental status 8/24/2013    Anemia     Anemia associated with chronic renal failure 5/28/2014    Aphasia 8/24/2013    ARF (acute renal failure) ([address] 5/28/2014    From pre renal azotemia from newly added diuretic and ARB use, creat peaked at 2.8 from 2 in may 2014    Arteriovenous fistula for hemodialysis in place, primary ([address] 11/17/2017    Arthritis     Bradyarrhythmia 7/13/2013    Cellulitis 9/17/2018    Cerebral contusion ([address] 7/7/2013    CHF (congestive heart failure) (HCC)     Chronic atrial fibrillation (HCC) 7/6/2013    Chronic kidney disease     Chronic pain of right knee     CKD (chronic kidney disease) stage 4, GFR 15-29 ml/min ([address] 5/28/2014    From diabetic and ischemic nephrosclerosis, creat now 2-2.5, GFR 25-30 ml/min    Coagulation defect ([address] 7/6/2013    Diabetes

## 2019-06-30 NOTE — PROGRESS NOTES
Post-op pain [G89.18]     History of hemiarthroplasty of right hip [Z96.641] 06/26/2019    Thyroid disease [E07.9]     History of DVT (deep vein thrombosis) [Z86.718] 09/17/2018    Arteriovenous fistula for hemodialysis in place, primary ([facility] [Z99.2] 11/17/2017    ESRD (end stage renal disease) ([facility] [N18.6] 03/23/2017    Essential hypertension [I10] 03/23/2017    Seizure disorder ([facility] [G40.909] 03/01/2017    Chronic diastolic congestive heart failure ([facility] [I50.32] 02/25/2017    Venous stasis dermatitis of both lower extremities [I87.2] 05/28/2014    Type 2 diabetes mellitus with left diabetic foot ulcer ([facility] [V60.978, L97.529] 04/16/2014    Anemia [D64.9] 08/24/2013    History of superior vena cava filter placement [Z95.828] 07/18/2013    Chronic atrial fibrillation ([facility] [I48.2] 07/06/2013       Plan:        1. Globin stable, patient received 2 units during surgery and was given 1 unit yesterday on dialysis, blood pressure is stable now   2. monitor hemoglobin  3. Wound culture growing Proteus and ESBL left sella pneumoniae, start on meropenem, ID evaluation  4. continue insulin sliding scale  5. Eliquis on hold, resume postoperatively when okay with orthopedic and hemoglobin stable  6. Monitor blood pressure  7. Hemodialysis per nephrology  8. Heparin subcu for DVT prophylaxis until Eliquis resumed  9.  Continue other medications    Darwin Britt MD  6/30/2019  1:07 PM

## 2019-06-30 NOTE — PLAN OF CARE
Problem: Falls - Risk of:  Goal: Will remain free from falls  Description  Will remain free from falls  6/30/2019 1511 by Dany Chavez RN  Outcome: Met This Shift  6/30/2019 0347 by Oseas Schroeder RN  Outcome: Met This Shift  Goal: Absence of physical injury  Description  Absence of physical injury  6/30/2019 1511 by Dany Chavez RN  Outcome: Met This Shift  6/30/2019 0347 by Oseas Schroeder RN  Outcome: Met This Shift     Problem: Pain:  Goal: Pain level will decrease  Description  Pain level will decrease  6/30/2019 1511 by Dany Chavez RN  Outcome: Met This Shift  6/30/2019 0347 by Oseas Schroeder RN  Outcome: Ongoing  Goal: Control of acute pain  Description  Control of acute pain  6/30/2019 1511 by Dany Chavez RN  Outcome: Met This Shift  6/30/2019 0347 by Oseas Schroeder RN  Outcome: Ongoing  Goal: Control of chronic pain  Description  Control of chronic pain  6/30/2019 1511 by Dany Chavez RN  Outcome: Met This Shift  6/30/2019 0347 by Oseas Schroeder RN  Outcome: Ongoing     Problem: Risk for Impaired Skin Integrity  Goal: Tissue integrity - skin and mucous membranes  Description  Structural intactness and normal physiological function of skin and  mucous membranes.   6/30/2019 1511 by Dany Chavez RN  Outcome: Met This Shift  6/30/2019 0347 by Oseas Schroeder RN  Outcome: Ongoing

## 2019-07-01 LAB
ABSOLUTE EOS #: 0.11 K/UL (ref 0–0.44)
ABSOLUTE IMMATURE GRANULOCYTE: 0.06 K/UL (ref 0–0.3)
ABSOLUTE LYMPH #: 0.57 K/UL (ref 1.1–3.7)
ABSOLUTE MONO #: 0.34 K/UL (ref 0.1–1.2)
BASOPHILS # BLD: 0 % (ref 0–2)
BASOPHILS ABSOLUTE: 0 K/UL (ref 0–0.2)
CULTURE: NORMAL
DIFFERENTIAL TYPE: ABNORMAL
DIRECT EXAM: NORMAL
EOSINOPHILS RELATIVE PERCENT: 2 % (ref 1–4)
GLUCOSE BLD-MCNC: 114 MG/DL (ref 75–110)
GLUCOSE BLD-MCNC: 179 MG/DL (ref 75–110)
GLUCOSE BLD-MCNC: 179 MG/DL (ref 75–110)
GLUCOSE BLD-MCNC: 68 MG/DL (ref 75–110)
GLUCOSE BLD-MCNC: 81 MG/DL (ref 75–110)
HCT VFR BLD CALC: 23.5 % (ref 40.7–50.3)
HEMOGLOBIN: 6.8 G/DL (ref 13–17)
IMMATURE GRANULOCYTES: 1 %
INTERVENTION: NORMAL
LYMPHOCYTES # BLD: 10 % (ref 24–43)
Lab: NORMAL
MCH RBC QN AUTO: 29.4 PG (ref 25.2–33.5)
MCHC RBC AUTO-ENTMCNC: 28.9 G/DL (ref 28.4–34.8)
MCV RBC AUTO: 101.7 FL (ref 82.6–102.9)
MONOCYTES # BLD: 6 % (ref 3–12)
MORPHOLOGY: ABNORMAL
NRBC AUTOMATED: 0 PER 100 WBC
PDW BLD-RTO: 16.7 % (ref 11.8–14.4)
PLATELET # BLD: 122 K/UL (ref 138–453)
PLATELET ESTIMATE: ABNORMAL
PMV BLD AUTO: 9.9 FL (ref 8.1–13.5)
RBC # BLD: 2.31 M/UL (ref 4.21–5.77)
RBC # BLD: ABNORMAL 10*6/UL
SEG NEUTROPHILS: 81 % (ref 36–65)
SEGMENTED NEUTROPHILS ABSOLUTE COUNT: 4.62 K/UL (ref 1.5–8.1)
SPECIMEN DESCRIPTION: NORMAL
WBC # BLD: 5.7 K/UL (ref 3.5–11.3)
WBC # BLD: ABNORMAL 10*3/UL

## 2019-07-01 PROCEDURE — 2580000003 HC RX 258: Performed by: STUDENT IN AN ORGANIZED HEALTH CARE EDUCATION/TRAINING PROGRAM

## 2019-07-01 PROCEDURE — P9040 RBC LEUKOREDUCED IRRADIATED: HCPCS

## 2019-07-01 PROCEDURE — 36430 TRANSFUSION BLD/BLD COMPNT: CPT

## 2019-07-01 PROCEDURE — 2580000003 HC RX 258: Performed by: NURSE PRACTITIONER

## 2019-07-01 PROCEDURE — 1200000000 HC SEMI PRIVATE

## 2019-07-01 PROCEDURE — 36415 COLL VENOUS BLD VENIPUNCTURE: CPT

## 2019-07-01 PROCEDURE — 6370000000 HC RX 637 (ALT 250 FOR IP): Performed by: INTERNAL MEDICINE

## 2019-07-01 PROCEDURE — 85025 COMPLETE CBC W/AUTO DIFF WBC: CPT

## 2019-07-01 PROCEDURE — 99232 SBSQ HOSP IP/OBS MODERATE 35: CPT | Performed by: INTERNAL MEDICINE

## 2019-07-01 PROCEDURE — 6370000000 HC RX 637 (ALT 250 FOR IP): Performed by: STUDENT IN AN ORGANIZED HEALTH CARE EDUCATION/TRAINING PROGRAM

## 2019-07-01 PROCEDURE — 76937 US GUIDE VASCULAR ACCESS: CPT

## 2019-07-01 PROCEDURE — 86900 BLOOD TYPING SEROLOGIC ABO: CPT

## 2019-07-01 PROCEDURE — 82947 ASSAY GLUCOSE BLOOD QUANT: CPT

## 2019-07-01 RX ORDER — NALOXONE HYDROCHLORIDE 0.4 MG/ML
INJECTION, SOLUTION INTRAMUSCULAR; INTRAVENOUS; SUBCUTANEOUS
Status: DISCONTINUED
Start: 2019-07-01 | End: 2019-07-01 | Stop reason: WASHOUT

## 2019-07-01 RX ORDER — 0.9 % SODIUM CHLORIDE 0.9 %
250 INTRAVENOUS SOLUTION INTRAVENOUS ONCE
Status: COMPLETED | OUTPATIENT
Start: 2019-07-01 | End: 2019-07-01

## 2019-07-01 RX ADMIN — INSULIN LISPRO 1 UNITS: 100 INJECTION, SOLUTION INTRAVENOUS; SUBCUTANEOUS at 16:47

## 2019-07-01 RX ADMIN — FUROSEMIDE 20 MG: 20 TABLET ORAL at 22:00

## 2019-07-01 RX ADMIN — Medication 10 ML: at 22:00

## 2019-07-01 RX ADMIN — ACETAMINOPHEN 500 MG: 500 TABLET ORAL at 11:20

## 2019-07-01 RX ADMIN — LEVOTHYROXINE SODIUM 50 MCG: 50 TABLET ORAL at 06:48

## 2019-07-01 RX ADMIN — ACETAMINOPHEN 500 MG: 500 TABLET ORAL at 02:43

## 2019-07-01 RX ADMIN — VENLAFAXINE 75 MG: 75 TABLET ORAL at 08:19

## 2019-07-01 RX ADMIN — PANTOPRAZOLE SODIUM 40 MG: 40 TABLET, DELAYED RELEASE ORAL at 06:46

## 2019-07-01 RX ADMIN — ACETAMINOPHEN 500 MG: 500 TABLET ORAL at 06:46

## 2019-07-01 RX ADMIN — MIDODRINE HYDROCHLORIDE 10 MG: 5 TABLET ORAL at 16:45

## 2019-07-01 RX ADMIN — MIDODRINE HYDROCHLORIDE 10 MG: 5 TABLET ORAL at 11:21

## 2019-07-01 RX ADMIN — LAMOTRIGINE 100 MG: 100 TABLET ORAL at 08:18

## 2019-07-01 RX ADMIN — MIDODRINE HYDROCHLORIDE 10 MG: 5 TABLET ORAL at 08:19

## 2019-07-01 RX ADMIN — ACETAMINOPHEN 500 MG: 500 TABLET ORAL at 14:46

## 2019-07-01 RX ADMIN — FUROSEMIDE 20 MG: 20 TABLET ORAL at 08:19

## 2019-07-01 RX ADMIN — DEXTROSE 15 G: 15 GEL ORAL at 06:34

## 2019-07-01 RX ADMIN — ARIPIPRAZOLE 5 MG: 5 TABLET ORAL at 08:18

## 2019-07-01 RX ADMIN — VITAMIN D, TAB 1000IU (100/BT) 1000 UNITS: 25 TAB at 08:18

## 2019-07-01 RX ADMIN — ACETAMINOPHEN 500 MG: 500 TABLET ORAL at 22:00

## 2019-07-01 RX ADMIN — ACETAMINOPHEN 500 MG: 500 TABLET ORAL at 16:45

## 2019-07-01 RX ADMIN — SODIUM CHLORIDE 250 ML: 9 INJECTION, SOLUTION INTRAVENOUS at 08:17

## 2019-07-01 ASSESSMENT — PAIN SCALES - GENERAL
PAINLEVEL_OUTOF10: 3
PAINLEVEL_OUTOF10: 0
PAINLEVEL_OUTOF10: 0
PAINLEVEL_OUTOF10: 2
PAINLEVEL_OUTOF10: 0
PAINLEVEL_OUTOF10: 3
PAINLEVEL_OUTOF10: 3

## 2019-07-01 NOTE — OP NOTE
Pre-operative antibiotics were given at this time. A timeout with all team members present in the room was held with agreement of the correct patient, operative site, and plan. PROCEDURE:  Patients right leg was prepped in a sterile fashion. Appropriate sterile drapes were placed. At this time patients lateral incision was extended with an 11 blade. Bovie cauterie was used for local hemostasis and dissection. Next the IT band was incised with Bovie. The prior tissue planes were encountered and prior sutures were removed. A Charnley retractor was used to retract. The danielle implant was visualized. A bone hook was used to lock onto the implant and subsequent dislocation technique was used to dislocate the hip and remove the implant. The tissues at this point were examined and did not appear to have gross purulence or necrotic appearance. Appropriate debridement was performed. Curette was used to create a bleeding bed and remove any debris in the canal.  All the fibrous material within the proximal femur was removed. Next copious irrigation was performed. A trial implant was then used and tested with relocation of the hip. The trial was stable and confirmed placement with Xray was performed. Antibiotic was placed in the [employer] hip modular long stem implant - size medium. The implant was then cemented in proximally with palacos cement. The implant head was size 54 mm. The cement was allowed to hardened and visualized under intra-operative x-ray to be in the correct place. The hip was reduced at this time and deemed to be very stable. Xrays confirmed again proper position. The operative site was then copiously irrigated. All incisions were copiously irrigated. A 1-0 PDX suture was then used to close the deepest muscular layer and 0 PDX suture for the IT band layer. Next 2-0 PDX suture l was used the subcutaneous and deep subcutaneous layers to close the dead space.  Skin staples were used to closed the

## 2019-07-01 NOTE — PROGRESS NOTES
NEPHROLOGY PROGRESS NOTE      SUBJECTIVE     Had HD Saturday. Received 1 PRBc today, requires intermittent transfusions. BP much better now. More awake and alert. Appetite still optimal. Next HD in am    OBJECTIVE     Vitals:    07/01/19 0756 07/01/19 0801 07/01/19 0904 07/01/19 1015   BP: (!) 97/40 (!) 91/37 (!) 91/37 (!) 116/39   Pulse: 70 72 72 58   Resp: 16 16 17 16   Temp: 97.6 °F (36.4 °C) 97.6 °F (36.4 °C) 98.1 °F (36.7 °C) 96.8 °F (36 °C)   TempSrc: Axillary Axillary Oral Oral   SpO2:  100% 100% 100%   Weight:       Height:         24HR INTAKE/OUTPUT:      Intake/Output Summary (Last 24 hours) at 7/1/2019 1214  Last data filed at 6/30/2019 1748  Gross per 24 hour   Intake 600 ml   Output 50 ml   Net 550 ml       General appearance:Awake, alert, in no acute distress  HEENT: PERRLA  Respiratory::vesicular breath sounds,no wheeze/crackles  Cardiovascular:S1 S2 normal,no gallop or organic murmur. Abdomen:Non tender/non distended. Bowel sounds present  Extremities: No Cyanosis or Clubbing,PRESENT Lower extremity edema  Neurological:Alert and oriented. No abnormalities of mood, affect, memory, mentation, or behavior are noted      MEDICATIONS     Scheduled Meds:    [START ON 7/8/2019] darbepoetin jean pierre-polysorbate  100 mcg Subcutaneous Weekly - Monday    midodrine  10 mg Oral TID WC    meropenem  1 g Intravenous Once per day on Tue Thu Sat    heparin (porcine)  5,000 Units Subcutaneous 3 times per day    insulin lispro  0-6 Units Subcutaneous TID WC    insulin lispro  0-3 Units Subcutaneous Nightly    vitamin D  1,000 Units Oral Daily    levothyroxine  50 mcg Oral Daily    pantoprazole  40 mg Oral QAM AC    isosorbide mononitrate  30 mg Oral Daily    lamoTRIgine  100 mg Oral Daily    venlafaxine  75 mg Oral Daily    ARIPiprazole  5 mg Oral Daily    [Held by provider] apixaban  5 mg Oral BID    Ferric Citrate  210 mg Oral BID    furosemide  20 mg Oral BID    sodium chloride flush  10 mL Intravenous

## 2019-07-01 NOTE — PROGRESS NOTES
Tony Wilde 17    Progress Note    7/1/2019    7:34 AM    Name:   Timothy Garsia  MRN:     [id number]     Acct:      [de-identified]   Room:   [address] [place] [address]  IP Day:  5  Admit Date:  6/26/2019  8:34 AM    PCP:   Yuriy Hester MD  Code Status:  Full Code    Subjective:     C/C: wound dehiscence    Interval History Status: improved. Currently receiving 1 unit of blood. Denies any chest pain, shortness of breath, nausea vomiting, fever chills or other complaints. Brief History:     Per my partner  This is a 68 y. o. male with multiple chronic medical conditions and co-morbidities who underwent a right hip hemiarthroplasty on 15 Kerrie and has been following up with orthopedics. Lori Blankenship has been at skilled nursing facility and has not been making much progress with therapy.  Continues to have pain and clinical evaluation revealed evidence of superficial dehiscence of the wound prompting hospitalization.  He underwent I&D yesterday and tolerated the procedure well. It was noted that the implant has subsided and will require revision surgery.         Internal medicine was consulted for management of chronic health conditions and comorbid factors of essential hypertension, diabetes, Afib anemia previous stroke and dvt in which he is currently on anticoagulation for in addition ot the Afib.         Patient underwent hemiarthroplasty. Postoperatively patient had hypotension. He was given blood transfusion. He received 2 units of blood during surgery as well. Patient also grew ESBL from his wound. Started on meropenem and infectious disease was consulted. eliquis is on hold for now.     Review of Systems:     Constitutional:  negative for chills, fevers, sweats  Respiratory:  negative for cough, dyspnea on exertion, hemoptysis, shortness of breath, wheezing  Cardiovascular:  negative for chest pain, chest pressure/discomfort, lower extremity edema, palpitations  Gastrointestinal:  negative for abdominal pain, constipation, diarrhea, nausea, vomiting  Neurological:  negative for dizziness, headache    Medications: Allergies:     Allergies   Allergen Reactions    Bactrim [Sulfamethoxazole-Trimethoprim]        Current Meds:   Scheduled Meds:    0.9 % sodium chloride  250 mL Intravenous Once    midodrine  10 mg Oral TID     meropenem  1 g Intravenous Once per day on Tue Thu Sat    heparin (porcine)  5,000 Units Subcutaneous 3 times per day    insulin lispro  0-6 Units Subcutaneous TID     insulin lispro  0-3 Units Subcutaneous Nightly    vitamin D  1,000 Units Oral Daily    levothyroxine  50 mcg Oral Daily    pantoprazole  40 mg Oral QAM AC    isosorbide mononitrate  30 mg Oral Daily    lamoTRIgine  100 mg Oral Daily    venlafaxine  75 mg Oral Daily    ARIPiprazole  5 mg Oral Daily    [Held by provider] apixaban  5 mg Oral BID    Ferric Citrate  210 mg Oral BID    furosemide  20 mg Oral BID    sodium chloride flush  10 mL Intravenous 2 times per day    acetaminophen  500 mg Oral Q4H     Continuous Infusions:    dextrose       PRN Meds: glucose, dextrose, glucagon (rDNA), dextrose, HYDROcodone 5 mg - acetaminophen **OR** HYDROcodone 5 mg - acetaminophen, metoclopramide, sodium chloride flush, magnesium hydroxide    Data:     Past Medical History:   has a past medical history of A-fib ([facility] Acute ischemic stroke ([facility] Acute metabolic encephalopathy, Acute on chronic congestive heart failure ([facility] Acute on chronic diastolic CHF (congestive heart failure) ([facility] Altered mental status, Anemia, Anemia associated with chronic renal failure, Aphasia, ARF (acute renal failure) ([facility]), Arteriovenous fistula for hemodialysis in place, primary ([facility] Arthritis, Bradyarrhythmia, Cellulitis, Cerebral contusion ([facility]), CHF (congestive heart failure) ([facility] Chronic atrial fibrillation ([facility] Chronic kidney disease, Chronic pain of right knee, CKD (chronic kidney disease) stage 4, GFR 15-29 ml/min ([facility]), Coagulation defect ([address] Diabetes mellitus ([address] Diabetes mellitus type 2 in obese ([address] Diarrhea, DVT (deep venous thrombosis) ([facility]), Elevated C-reactive protein (CRP), ESRD (end stage renal disease) ([address] Essential hypertension, Fever, Foot ulcer due to secondary DM ([address] GERD (gastroesophageal reflux disease), Hematochezia, History of cerebral infarction, History of DVT (deep vein thrombosis), History of superior vena cava filter placement, Hx of blood clots, Hyperkalemia, Hyperlipidemia, Hypernatremia, Hypertension, Hypocalcemia, Hypovolemic shock ([address] Hypoxia, Irregular heartbeat, Knee effusion, right, Left leg cellulitis, Lower GI bleed, MDRO (multiple drug resistant organisms) resistance, Metabolic acidosis, MRSA (methicillin resistant staph aureus) culture positive, On continuous oral anticoagulation, NADIRA on CPAP, Other specified hypothyroidism, Parietal lobe infarction ([address] Pneumonia, Post traumatic encephalopathy, Proteinuria, Pyogenic arthritis of right knee joint ([address] Pyrexia, Renal insufficiency, Renal lesion, Respiratory failure, acute ([address] Right knee pain, SAH (subarachnoid hemorrhage) ([address] Secondary hyperparathyroidism of renal origin ([address] Seizure disorder ([address] Stercoral ulcer of rectum, Streptococcal infection, Subarachnoid bleed ([address] Subdural bleeding ([address] Thyroid disease, Traumatic cerebral hemorrhage ([address] Type 2 diabetes mellitus with left diabetic foot ulcer ([address] Unspecified cerebral artery occlusion with cerebral infarction, Venous stasis dermatitis, and Venous stasis dermatitis of both lower extremities. Social History:   reports that he has never smoked. He has never used smokeless tobacco. He reports that he does not drink alcohol or use drugs.      Family History:   Family History   Problem Relation Age of Onset    Coronary Art Dis Father     Cancer Sister        Vitals:  BP (!) 107/36   Pulse 67 Temp 97.9 °F (36.6 °C) (Oral)   Resp 16   Ht 6' (1.829 m)   Wt 229 lb 8 oz (104.1 kg)   SpO2 100%   BMI 31.13 kg/m²   Temp (24hrs), Av.1 °F (36.7 °C), Min:97.6 °F (36.4 °C), Max:98.6 °F (37 °C)    Recent Labs     19  1552 1958   POCGLU 100 103 68* 81       I/O (24Hr): Intake/Output Summary (Last 24 hours) at 2019 0734  Last data filed at 2019 1748  Gross per 24 hour   Intake 600 ml   Output 50 ml   Net 550 ml       Labs:    Hematology:  Recent Labs     19  0538  19  0643 19  1659 19  0518   WBC 10.2  --  6.2  --  5.7   RBC 2.61*  --  2.49*  --  2.31*   HGB 7.6*   < > 7.4* 7.3* 6.8*   HCT 26.8*   < > 25.2* 22.9* 23.5*   .7  --  101.2  --  101.7   MCH 29.1  --  29.7  --  29.4   MCHC 28.4  --  29.4  --  28.9   RDW 17.5*  --  17.5*  --  16.7*     --  139  --  122*   MPV 9.2  --  9.9  --  9.9    < > = values in this interval not displayed. Chemistry:  Recent Labs     19  0538      K 5.2      CO2 23   GLUCOSE 122*   BUN 33*   CREATININE 3.83*   ANIONGAP 13   LABGLOM 16*   GFRAA 19*   CALCIUM 7.8*     Recent Labs     1951 19  1552 1958   POCGLU 139* 84 100 103 68* 81         Lab Results   Component Value Date/Time    SPECIAL NOT REPORTED 2019 01:41 PM     Lab Results   Component Value Date/Time    CULTURE PROTEUS MIRABILIS LIGHT GROWTH (A) 2019 01:41 PM    CULTURE (A) 2019 01:41 PM     KLEBSIELLA PNEUMONIAE LIGHT GROWTH THIS ORGANISM IS AN EXTENDED-SPECTRUM BETA-LACTAMASE  AND RESISTANCE TO THERAPY WITH PENICILLINS, CEPHALOSPORINS AND AZTREONAM IS EXPECTED. THESE ORGANISMS GENERALLY REMAIN SUSCEPTIBLE TO CARBAPENEMS. CONSIDER ID CONSULTATION.     CULTURE NORMAL CIERA 2019 01:41 PM    CULTURE NO ANAEROBIC ORGANISMS ISOLATED AT 4 DAYS (A) 2019 01:41 PM       Lab Results   Component Value Date splenomegaly  Extremities:  no tenderness in the calves, 1-2+ edema, stasis changes, dressing in place left heel  Skin:  no gross lesions, rashes, induration    Assessment:        Primary Problem  Surgical wound dehiscence    Active Hospital Problems    Diagnosis Date Noted    Infection due to ESBL-producing Klebsiella pneumoniae [A49.8, Z16.12]     Proteus mirabilis infection [A49.8]     Infection and inflammatory reaction due to internal right hip prosthesis, initial encounter ([facility] Jerry Flatness     Surgical wound dehiscence [T81.31XA] 06/27/2019    Post-op pain [G89.18]     History of hemiarthroplasty of right hip [Z96.641] 06/26/2019    Thyroid disease [E07.9]     History of DVT (deep vein thrombosis) [Z86.718] 09/17/2018    Arteriovenous fistula for hemodialysis in place, primary ([facility] [Z99.2] 11/17/2017    ESRD (end stage renal disease) ([facility] [N18.6] 03/23/2017    Essential hypertension [I10] 03/23/2017    Seizure disorder ([facility] [G40.909] 03/01/2017    Chronic diastolic congestive heart failure ([facility] [I50.32] 02/25/2017    Venous stasis dermatitis of both lower extremities [I87.2] 05/28/2014    Type 2 diabetes mellitus with left diabetic foot ulcer ([facility] [A42.287, L97.529] 04/16/2014    Anemia [D64.9] 08/24/2013    History of superior vena cava filter placement [Z95.828] 07/18/2013    Chronic atrial fibrillation ([facility] [I48.2] 07/06/2013       Plan:        1. Transfusion of packed cells today  2. Dialysis per nephrology   3. glycemic control, insulin scale  4. Continue present cardiac medication  5. Monitor and control blood pressure  6. Wound care  7. GI and DVT prophylaxis  8. Antibiotics per ID  9.  Discharge planning to skilled facility per orthopedics    Federico Meléndez DO  7/1/2019  7:34 AM

## 2019-07-01 NOTE — PROGRESS NOTES
8/24/2013    ARF (acute renal failure) ([facility]) 5/28/2014    From pre renal azotemia from newly added diuretic and ARB use, creat peaked at 2.8 from 2 in may 2014    Arteriovenous fistula for hemodialysis in place, primary ([address] 11/17/2017    Arthritis     Bradyarrhythmia 7/13/2013    Cellulitis 9/17/2018    Cerebral contusion ([address] 7/7/2013    CHF (congestive heart failure) ([facility])     Chronic atrial fibrillation ([facility]) 7/6/2013    Chronic kidney disease     Chronic pain of right knee     CKD (chronic kidney disease) stage 4, GFR 15-29 ml/min ([address] 5/28/2014    From diabetic and ischemic nephrosclerosis, creat now 2-2.5, GFR 25-30 ml/min    Coagulation defect ([address] 7/6/2013    Diabetes mellitus ([address]     Diabetes mellitus type 2 in obese ([address]     Diarrhea 7/16/2013    DVT (deep venous thrombosis) ([facility])     Elevated C-reactive protein (CRP)     ESRD (end stage renal disease) ([address] 3/23/2017    Essential hypertension 3/23/2017    Fever 7/18/2013    Foot ulcer due to secondary DM ([address] 5/28/2014    Left side on antibiotics    GERD (gastroesophageal reflux disease)     Hematochezia 3/4/2017    History of cerebral infarction 3/1/2017    History of DVT (deep vein thrombosis) 9/17/2018    History of superior vena cava filter placement 7/18/2013    Hx of blood clots     Hyperkalemia 7/16/2013    Hyperlipidemia     Hypernatremia 7/11/2013    Hypertension     Hypocalcemia 7/18/2013    Hypovolemic shock ([address] 3/4/2017    Hypoxia     Irregular heartbeat     hx of a-fib    Knee effusion, right 2/25/2017    Left leg cellulitis     Lower GI bleed 3/4/2017    MDRO (multiple drug resistant organisms) resistance     Metabolic acidosis 0/22/1706    MRSA (methicillin resistant staph aureus) culture positive 09/17/2018    leg    On continuous oral anticoagulation 9/17/2018    NADIRA on CPAP     Other specified hypothyroidism 8/24/2013    Parietal lobe infarction ([address] 8/26/2013    Pneumonia     service: Not on file     Active member of club or organization: Not on file     Attends meetings of clubs or organizations: Not on file     Relationship status: Not on file    Intimate partner violence:     Fear of current or ex partner: Not on file     Emotionally abused: Not on file     Physically abused: Not on file     Forced sexual activity: Not on file   Other Topics Concern    Not on file   Social History Narrative    ** Merged History Encounter **            Family History:     Family History   Problem Relation Age of Onset    Coronary Art Dis Father     Cancer Sister         Allergies:   Bactrim [sulfamethoxazole-trimethoprim]     Review of Systems:   Constitutional: No fevers or chills. No systemic complaints  Head: No headaches  Eyes: No double vision or blurry vision. No conjunctival inflammation. ENT: No sore throat or runny nose. . No hearing loss, tinnitus or vertigo. Cardiovascular: No chest pain or palpitations. No shortness of breath. No GUZMAN  Lung: No shortness of breath or cough. No sputum production  Abdomen: No nausea, vomiting, diarrhea, or abdominal pain. Naoma Broccoli No cramps. Genitourinary:ESRD on HD  Musculoskeletal: No muscle aches or pains. No joint effusions, swelling or deformities  Hematologic: No bleeding or bruising. Neurologic: No headache, weakness, numbness, or tingling. Integument: No rash, no ulcers. Rt hip incision. Multiple echymoses  Psychiatric: No depression. Endocrine: No polyuria, no polydipsia, no polyphagia.     Physical Examination :     Patient Vitals for the past 8 hrs:   BP Temp Temp src Pulse Resp SpO2   07/01/19 1015 (!) 116/39 96.8 °F (36 °C) Oral 58 16 100 %   07/01/19 0904 (!) 91/37 98.1 °F (36.7 °C) Oral 72 17 100 %   07/01/19 0801 (!) 91/37 97.6 °F (36.4 °C) Axillary 72 16 100 %   07/01/19 0756 (!) 97/40 97.6 °F (36.4 °C) Axillary 70 16 --   07/01/19 0751 (!) 95/37 97.7 °F (36.5 °C) Axillary 74 16 100 %   07/01/19 0744 (!) 100/36 97.8 °F (36.6 °C) Oral 71 16 100 %   07/01/19 0739 (!) 98/45 98.3 °F (36.8 °C) Oral 76 16 100 %   07/01/19 0400 (!) 107/36 97.9 °F (36.6 °C) Oral 67 16 --     General Appearance: Awake, alert, and in no apparent distress  Head:  Normocephalic, no trauma  Eyes: Pupils equal, round, reactive to light and accommodation; extraocular movements intact; sclera anicteric; conjunctivae pink. No embolic phenomena. ENT: Oropharynx clear, without erythema, exudate, or thrush. No tenderness of sinuses. Mouth/throat: mucosa pink and moist. No lesions. Dentition in good repair. Neck:Supple, without lymphadenopathy. Thyroid normal, No bruits. Pulmonary/Chest: Clear to auscultation, without wheezes, rales, or rhonchi. No dullness to percussion. Cardiovascular: Regular rate and rhythm without murmurs, rubs, or gallops. Abdomen: Soft, non tender. Bowel sounds normal. No organomegaly  All four Extremities: No cyanosis, clubbing, edema, or effusions. Rt hip incision. Multiple echymoses  Neurologic: No gross sensory or motor deficits. Skin: Warm and dry with good turgor. Signs of peripheral arterial insufficiency. No ulcerations. Rt hip incision. Medical Decision Making -Laboratory:   I have independently reviewed/ordered the following labs:    CBC with Differential:   Recent Labs     06/30/19  0643 06/30/19  1659 07/01/19  0518   WBC 6.2  --  5.7   HGB 7.4* 7.3* 6.8*   HCT 25.2* 22.9* 23.5*     --  122*   LYMPHOPCT 10*  --  10*   MONOPCT 3  --  6     BMP:   Recent Labs     06/29/19  0538      K 5.2      CO2 23   BUN 33*   CREATININE 3.83*     Hepatic Function Panel:   No results for input(s): PROT, LABALBU, BILIDIR, IBILI, BILITOT, ALKPHOS, ALT, AST in the last 72 hours. No results for input(s): RPR in the last 72 hours. No results for input(s): HIV in the last 72 hours. No results for input(s): BC in the last 72 hours.   Lab Results   Component Value Date    MUCUS NOT REPORTED 03/03/2017    RBC 2.31 07/01/2019    RBC 3.35 05/18/2012 call with questions.     Kale De Leon MD  Pager: [phone] - Office: [phone]

## 2019-07-02 ENCOUNTER — APPOINTMENT (OUTPATIENT)
Dept: DIALYSIS | Age: 69
DRG: 466 | End: 2019-07-02
Attending: ORTHOPAEDIC SURGERY
Payer: MEDICARE

## 2019-07-02 LAB
ABO/RH: NORMAL
ABSOLUTE EOS #: 0.18 K/UL (ref 0–0.44)
ABSOLUTE IMMATURE GRANULOCYTE: 0.05 K/UL (ref 0–0.3)
ABSOLUTE LYMPH #: 0.73 K/UL (ref 1.1–3.7)
ABSOLUTE MONO #: 0.38 K/UL (ref 0.1–1.2)
ANION GAP SERPL CALCULATED.3IONS-SCNC: 14 MMOL/L (ref 9–17)
ANTIBODY SCREEN: NEGATIVE
ARM BAND NUMBER: NORMAL
BASOPHILS # BLD: 0 % (ref 0–2)
BASOPHILS ABSOLUTE: <0.03 K/UL (ref 0–0.2)
BLD PROD TYP BPU: NORMAL
BUN BLDV-MCNC: 48 MG/DL (ref 8–23)
BUN/CREAT BLD: ABNORMAL (ref 9–20)
CALCIUM SERPL-MCNC: 7.8 MG/DL (ref 8.6–10.4)
CHLORIDE BLD-SCNC: 99 MMOL/L (ref 98–107)
CO2: 20 MMOL/L (ref 20–31)
CREAT SERPL-MCNC: 4.55 MG/DL (ref 0.7–1.2)
CROSSMATCH RESULT: NORMAL
DIFFERENTIAL TYPE: ABNORMAL
DISPENSE STATUS BLOOD BANK: NORMAL
EOSINOPHILS RELATIVE PERCENT: 3 % (ref 1–4)
EXPIRATION DATE: NORMAL
GFR AFRICAN AMERICAN: 16 ML/MIN
GFR NON-AFRICAN AMERICAN: 13 ML/MIN
GFR SERPL CREATININE-BSD FRML MDRD: ABNORMAL ML/MIN/{1.73_M2}
GFR SERPL CREATININE-BSD FRML MDRD: ABNORMAL ML/MIN/{1.73_M2}
GLUCOSE BLD-MCNC: 106 MG/DL (ref 75–110)
GLUCOSE BLD-MCNC: 171 MG/DL (ref 75–110)
GLUCOSE BLD-MCNC: 176 MG/DL (ref 75–110)
GLUCOSE BLD-MCNC: 209 MG/DL (ref 70–99)
GLUCOSE BLD-MCNC: 80 MG/DL (ref 75–110)
HCT VFR BLD CALC: 27.2 % (ref 40.7–50.3)
HEMOGLOBIN: 8 G/DL (ref 13–17)
IMMATURE GRANULOCYTES: 1 %
LYMPHOCYTES # BLD: 11 % (ref 24–43)
MCH RBC QN AUTO: 29.3 PG (ref 25.2–33.5)
MCHC RBC AUTO-ENTMCNC: 29.4 G/DL (ref 28.4–34.8)
MCV RBC AUTO: 99.6 FL (ref 82.6–102.9)
MONOCYTES # BLD: 6 % (ref 3–12)
NRBC AUTOMATED: 0 PER 100 WBC
PDW BLD-RTO: 18.1 % (ref 11.8–14.4)
PLATELET # BLD: 162 K/UL (ref 138–453)
PLATELET ESTIMATE: ABNORMAL
PMV BLD AUTO: 10 FL (ref 8.1–13.5)
POTASSIUM SERPL-SCNC: 4.1 MMOL/L (ref 3.7–5.3)
RBC # BLD: 2.73 M/UL (ref 4.21–5.77)
RBC # BLD: ABNORMAL 10*6/UL
SEG NEUTROPHILS: 79 % (ref 36–65)
SEGMENTED NEUTROPHILS ABSOLUTE COUNT: 5.23 K/UL (ref 1.5–8.1)
SODIUM BLD-SCNC: 133 MMOL/L (ref 135–144)
TRANSFUSION STATUS: NORMAL
UNIT DIVISION: 0
UNIT NUMBER: NORMAL
WBC # BLD: 6.6 K/UL (ref 3.5–11.3)
WBC # BLD: ABNORMAL 10*3/UL

## 2019-07-02 PROCEDURE — 82947 ASSAY GLUCOSE BLOOD QUANT: CPT

## 2019-07-02 PROCEDURE — 6370000000 HC RX 637 (ALT 250 FOR IP): Performed by: INTERNAL MEDICINE

## 2019-07-02 PROCEDURE — 85025 COMPLETE CBC W/AUTO DIFF WBC: CPT

## 2019-07-02 PROCEDURE — 2580000003 HC RX 258: Performed by: STUDENT IN AN ORGANIZED HEALTH CARE EDUCATION/TRAINING PROGRAM

## 2019-07-02 PROCEDURE — 99212 OFFICE O/P EST SF 10 MIN: CPT

## 2019-07-02 PROCEDURE — 99232 SBSQ HOSP IP/OBS MODERATE 35: CPT | Performed by: INTERNAL MEDICINE

## 2019-07-02 PROCEDURE — 1200000000 HC SEMI PRIVATE

## 2019-07-02 PROCEDURE — 36415 COLL VENOUS BLD VENIPUNCTURE: CPT

## 2019-07-02 PROCEDURE — 6370000000 HC RX 637 (ALT 250 FOR IP): Performed by: STUDENT IN AN ORGANIZED HEALTH CARE EDUCATION/TRAINING PROGRAM

## 2019-07-02 PROCEDURE — 80048 BASIC METABOLIC PNL TOTAL CA: CPT

## 2019-07-02 RX ORDER — CIPROFLOXACIN 500 MG/1
500 TABLET, FILM COATED ORAL
Status: DISCONTINUED | OUTPATIENT
Start: 2019-07-02 | End: 2019-07-08 | Stop reason: HOSPADM

## 2019-07-02 RX ADMIN — VENLAFAXINE 75 MG: 75 TABLET ORAL at 14:23

## 2019-07-02 RX ADMIN — CIPROFLOXACIN 500 MG: 500 TABLET ORAL at 17:49

## 2019-07-02 RX ADMIN — Medication 10 ML: at 21:20

## 2019-07-02 RX ADMIN — ARIPIPRAZOLE 5 MG: 5 TABLET ORAL at 14:23

## 2019-07-02 RX ADMIN — LEVOTHYROXINE SODIUM 50 MCG: 50 TABLET ORAL at 06:32

## 2019-07-02 RX ADMIN — ACETAMINOPHEN 500 MG: 500 TABLET ORAL at 06:32

## 2019-07-02 RX ADMIN — ACETAMINOPHEN 500 MG: 500 TABLET ORAL at 22:27

## 2019-07-02 RX ADMIN — ACETAMINOPHEN 500 MG: 500 TABLET ORAL at 17:49

## 2019-07-02 RX ADMIN — MIDODRINE HYDROCHLORIDE 10 MG: 5 TABLET ORAL at 17:49

## 2019-07-02 RX ADMIN — FUROSEMIDE 20 MG: 20 TABLET ORAL at 14:23

## 2019-07-02 RX ADMIN — INSULIN LISPRO 1 UNITS: 100 INJECTION, SOLUTION INTRAVENOUS; SUBCUTANEOUS at 21:21

## 2019-07-02 RX ADMIN — ACETAMINOPHEN 500 MG: 500 TABLET ORAL at 14:21

## 2019-07-02 RX ADMIN — ISOSORBIDE MONONITRATE 30 MG: 30 TABLET ORAL at 14:23

## 2019-07-02 RX ADMIN — FUROSEMIDE 20 MG: 20 TABLET ORAL at 21:21

## 2019-07-02 RX ADMIN — VITAMIN D, TAB 1000IU (100/BT) 1000 UNITS: 25 TAB at 14:23

## 2019-07-02 RX ADMIN — LAMOTRIGINE 100 MG: 100 TABLET ORAL at 14:22

## 2019-07-02 RX ADMIN — Medication 10 ML: at 21:21

## 2019-07-02 RX ADMIN — MIDODRINE HYDROCHLORIDE 10 MG: 5 TABLET ORAL at 08:05

## 2019-07-02 RX ADMIN — PANTOPRAZOLE SODIUM 40 MG: 40 TABLET, DELAYED RELEASE ORAL at 06:32

## 2019-07-02 ASSESSMENT — PAIN SCALES - GENERAL
PAINLEVEL_OUTOF10: 0
PAINLEVEL_OUTOF10: 0
PAINLEVEL_OUTOF10: 8
PAINLEVEL_OUTOF10: 3
PAINLEVEL_OUTOF10: 1
PAINLEVEL_OUTOF10: 0
PAINLEVEL_OUTOF10: 8
PAINLEVEL_OUTOF10: 0

## 2019-07-02 NOTE — PROGRESS NOTES
Orthopedic Progress Note    Patient:  Brent Olvera  YOB: 1950     68 y.o. male    Subjective:  Patient seen and examined this morning. `  No complaints or concerns at this time. Pain controlled on current regimen. Denies fever, HA, CP, SOB, N/V, dysuria, numbness or tingling. +BM/+flatus. Tolerating PO intake. Vitals reviewed, afebrile    Objective:   Vitals:    07/02/19 0530   BP: (!) 116/37   Pulse:    Resp:    Temp:    SpO2:      Gen: NAD, cooperative, groggy    RLE: Abduction pillow in place. Dressing clean dry and intact. Severe chronic lymphedema and vascular changes to lower extremity. Compartments soft and compressible. TA/GS motor intact. Deep and Superficial Peroneal/Saphenous/Sural SILT. 2+ DP pulse. Knee flexion contracture. Leg resting in external rotation. Recent Labs     07/01/19  0518   WBC 5.7   HGB 6.8*   HCT 23.5*   *      Meds: heparin, eliquis, merrem  See rec for complete list    Impression/plan: 68 y.o. male s/p right hip I&D on 6/26/19, POD#6  S/p danielle explant, cement spacer placement on 6/28/19, POD#4      -WB status: Non weight bearing right lower extremity  -Wound cx +proteus, +klebsiella  -ID following, recommend IV meropenem. Discussing PO cipro at discharge.  -Internal medicine and nephrology following  -Hgb 6.8 yesterday. Received 1u prbcs. F/u hgb this AM.  -Pain control PO/IV Medication. Attempt to Wean IV medications. Keep narcotic pain medicine to a minimum. -DVT ppx: EPC, eliquis, heparin, Ok to resume chemical AC. -Ice/Elevate as needed for pain and swelling.   -Ok to DC from ortho standpoint pending final ID recs. Case management to assist with DC planning back to SNF.   -Please page ortho with any questions    ----------------------------------------  Jc Jarvis, DO  PGY-1, Department of [address], [place], [place]    Attending:    Juanjose Elizabeth DO, reviewed the information and imaging if

## 2019-07-02 NOTE — PROGRESS NOTES
revision.  Localized area of irregular bone loss   in the lateral femoral cortex is noted.  This may be related to prior   hardware complication.  Underlying inflammatory process or infection should   also be considered in the appropriate clinical setting. Medical Decision Fexboz-Jcocpmgg-Jctjw:   6/29/2019  4:03 PM - Jerome Goldman Incoming Lab Results From [facility]            Specimen Information: Joint, Hip; Swab         Component Collected Lab   Specimen Description 06/26/2019  1:[address]  [address]   . HIP, JOINT RT HIP SWAB    Special Requests 06/26/2019  1:[address]  [address]   NOT REPORTED    Direct Exam Abnormal  06/26/2019  1:41 PM [facility]    Direct Exam 06/26/2019  1:[address]  [address]   NO BACTERIA SEEN    Culture Abnormal  06/26/2019  1:41 PM [address]    Culture Abnormal  06/26/2019  1:41 PM [address] THIS ORGANISM IS AN EXTENDED-SPECTRUM BETA-LACTAMASE  AND RESISTANCE TO THERAPY WITH PENICILLINS, CEPHALOSPORINS AND AZTREONAM IS EXPECTED.  THESE ORGANISMS GENERALLY REMAIN SUSCEPTIBLE TO CARBAPENEMS.  CONSIDER ID CONSULTATION.    Culture 06/26/2019  1:[address]  [address]   NORMAL CIERA    Culture Abnormal  06/26/2019  1:41  [address]   NO ANAEROBIC ORGANISMS ISOLATED AT 3 DAYS    Testing Performed By      Lab - Abbreviation Name Director Address Valid Date Range   [address] [address] [facility] [address] 08/30/17 0801-Present   Susceptibility      Proteus mirabilis (1)              Antibiotic Interpretation ADONAY Status     amikacin     Preliminary       NOT REPORTED   ampicillin Sensitive   Preliminary       <=2  SUSCEPTIBLE   ampicillin-sulbactam     Preliminary       NOT REPORTED   aztreonam Sensitive   Preliminary

## 2019-07-02 NOTE — PROGRESS NOTES
connections:     Talks on phone: Not on file     Gets together: Not on file     Attends Sikhism service: Not on file     Active member of club or organization: Not on file     Attends meetings of clubs or organizations: Not on file     Relationship status: Not on file    Intimate partner violence:     Fear of current or ex partner: Not on file     Emotionally abused: Not on file     Physically abused: Not on file     Forced sexual activity: Not on file   Other Topics Concern    Not on file   Social History Narrative    ** Merged History Encounter **            Family History:     Family History   Problem Relation Age of Onset    Coronary Art Dis Father     Cancer Sister         Allergies:   Bactrim [sulfamethoxazole-trimethoprim]     Review of Systems:   Constitutional: No fevers or chills. No systemic complaints  Head: No headaches  Eyes: No double vision or blurry vision. No conjunctival inflammation. ENT: No sore throat or runny nose. . No hearing loss, tinnitus or vertigo. Cardiovascular: No chest pain or palpitations. No shortness of breath. No GUZMAN  Lung: No shortness of breath or cough. No sputum production  Abdomen: No nausea, vomiting, diarrhea, or abdominal pain. Orem Dials No cramps. Genitourinary: ESRD on HD  Musculoskeletal: No muscle aches or pains. No joint effusions, swelling or deformities  Hematologic: No bleeding or bruising. Neurologic: No headache, weakness, numbness, or tingling. Integument: No rash, no ulcers. Rt hip incision. Multiple echymoses  Psychiatric: No depression. Endocrine: No polyuria, no polydipsia, no polyphagia.     Physical Examination :     Patient Vitals for the past 8 hrs:   BP Temp Temp src Pulse Resp SpO2   07/02/19 0734 115/65 98.3 °F (36.8 °C) Axillary 102 16 97 %   07/02/19 0530 (!) 116/37 -- -- -- -- --   07/02/19 0400 (!) 113/31 97.6 °F (36.4 °C) Oral 105 16 --   07/02/19 0130 (!) 116/45 -- -- -- -- --   07/02/19 0025 -- 97.6 °F (36.4 °C) Oral 64 16 --     General 3.83 06/29/2019    GLUCOSE 122 06/29/2019    GLUCOSE 135 05/18/2012       Medical Decision Making-Imaging:     EXAMINATION:   2 XRAY VIEWS OF THE RIGHT HIP       6/28/2019 3:21 pm       COMPARISON:   None.       HISTORY:   ORDERING SYSTEM PROVIDED HISTORY: intra op   TECHNOLOGIST PROVIDED HISTORY:   intra op   Ordering Physician Provided Reason for Exam: intra op portable hip       FINDINGS:   4 intraoperative radiographs of the left hip and proximal femur.  Subtle   cortical irregularity and lucency along the medial midshaft of the femur. Cannot exclude a femoral fracture versus superimposition of osseous shadows. Continued follow-up is advised.           Impression   Possible periprosthetic fracture along the distal stem of the prosthesis. Additional imaging and short interval follow-up should be considered.       The findings were sent to the Radiology Results [address] at 3:32   pm on 6/28/2019to be communicated to a licensed caregiver.          EXAMINATION:   2 XRAY VIEWS OF THE RIGHT HIP       6/28/2019 4:25 pm       COMPARISON:   None.       HISTORY:   ORDERING SYSTEM PROVIDED HISTORY: PACU please   TECHNOLOGIST PROVIDED HISTORY:   PACU please       FINDINGS:   Interval total hip arthroplasty revision with new implant placement.  There   is localized irregular bone loss in the lateral proximal femoral cortex.       Overlying skin staples are present.  Extensive calcified atheromatous plaque   is present.           Impression   Status post hip arthroplasty revision.  Localized area of irregular bone loss   in the lateral femoral cortex is noted.  This may be related to prior   hardware complication.  Underlying inflammatory process or infection should   also be considered in the appropriate clinical setting. Medical Decision Djivhg-Gjercsyq-Ogddt:   6/29/2019  4:03 PM - Jones, Jerome Incoming Lab Results From [facility]            Specimen Information: Joint, Hip;  Swab         Component

## 2019-07-02 NOTE — PLAN OF CARE
Problem: Falls - Risk of:  Goal: Will remain free from falls  Description  Will remain free from falls  7/2/2019 0444 by Britt Rivera RN  Outcome: Ongoing     Problem: Falls - Risk of:  Goal: Absence of physical injury  Description  Absence of physical injury  7/2/2019 0444 by Britt Rivera RN  Outcome: Ongoing     Problem: Pain:  Goal: Pain level will decrease  Description  Pain level will decrease  7/2/2019 0444 by Britt Rivera RN  Outcome: Ongoing

## 2019-07-02 NOTE — PROGRESS NOTES
[facility] Wound Ostomy  Nurse  Consult Note       NAME:  Author Garcia  MEDICAL RECORD NUMBER:  [id number]  AGE: 68 y.o. GENDER: male  : 1950  TODAY'S DATE:  2019    Subjective   Reason for [address] Nurse Evaluation and Assessment:   Pressure Injury care and prevention. Unstagable heel ulcer to right heel; stable black eschar. Stage 3 pressure injury to left heel. Stage 2 coccyx pressure injury with moisture to skin.   Multiple skin tears    Wound Identification:  Wound Type: pressure  Contributing Factors: lymphedema, chronic pressure, decreased mobility, shear force, malnutrition and incontinence of stool        PAST MEDICAL HISTORY        Diagnosis Date    A-fib [facility])     Acute ischemic stroke ([address] 2013    Acute metabolic encephalopathy 3/6/7735    Acute on chronic congestive heart failure ([address] 2014    Acute on chronic diastolic CHF (congestive heart failure) ([address] 2017    Altered mental status 2013    Anemia     Anemia associated with chronic renal failure 2014    Aphasia 2013    ARF (acute renal failure) ([address] 2014    From pre renal azotemia from newly added diuretic and ARB use, creat peaked at 2.8 from 2 in may 2014    Arteriovenous fistula for hemodialysis in place, primary ([address] 2017    Arthritis     Bradyarrhythmia 2013    Cellulitis 2018    Cerebral contusion ([address] 2013    CHF (congestive heart failure) (HCC)     Chronic atrial fibrillation (HCC) 2013    Chronic kidney disease     Chronic pain of right knee     CKD (chronic kidney disease) stage 4, GFR 15-29 ml/min ([address] 2014    From diabetic and ischemic nephrosclerosis, creat now 2-2.5, GFR 25-30 ml/min    Coagulation defect ([address] 2013    Diabetes mellitus ([address]     Diabetes mellitus type 2 in obese ([address]     Diarrhea 2013    DVT (deep venous thrombosis) (HCC)     Elevated C-reactive protein (CRP)     ESRD (end stage renal disease) ([address] 3/23/2017

## 2019-07-03 ENCOUNTER — APPOINTMENT (OUTPATIENT)
Dept: CT IMAGING | Age: 69
DRG: 466 | End: 2019-07-03
Attending: ORTHOPAEDIC SURGERY
Payer: MEDICARE

## 2019-07-03 PROBLEM — E43 SEVERE MALNUTRITION (HCC): Status: ACTIVE | Noted: 2019-07-03

## 2019-07-03 PROBLEM — G93.40 ACUTE ENCEPHALOPATHY: Status: ACTIVE | Noted: 2019-07-03

## 2019-07-03 LAB
ABSOLUTE EOS #: 0.12 K/UL (ref 0–0.44)
ABSOLUTE IMMATURE GRANULOCYTE: <0.03 K/UL (ref 0–0.3)
ABSOLUTE LYMPH #: 0.66 K/UL (ref 1.1–3.7)
ABSOLUTE MONO #: 0.32 K/UL (ref 0.1–1.2)
BASOPHILS # BLD: 1 % (ref 0–2)
BASOPHILS ABSOLUTE: <0.03 K/UL (ref 0–0.2)
DIFFERENTIAL TYPE: ABNORMAL
EOSINOPHILS RELATIVE PERCENT: 3 % (ref 1–4)
GLUCOSE BLD-MCNC: 115 MG/DL (ref 75–110)
GLUCOSE BLD-MCNC: 118 MG/DL (ref 75–110)
GLUCOSE BLD-MCNC: 147 MG/DL (ref 75–110)
GLUCOSE BLD-MCNC: 92 MG/DL (ref 75–110)
HBV SURFACE AB TITR SER: <3.5 MIU/ML
HCT VFR BLD CALC: 25.4 % (ref 40.7–50.3)
HEMOGLOBIN: 7.3 G/DL (ref 13–17)
HEPATITIS B SURFACE ANTIGEN: NONREACTIVE
IMMATURE GRANULOCYTES: 1 %
INTERVENTION: NORMAL
LYMPHOCYTES # BLD: 15 % (ref 24–43)
MCH RBC QN AUTO: 29 PG (ref 25.2–33.5)
MCHC RBC AUTO-ENTMCNC: 28.7 G/DL (ref 28.4–34.8)
MCV RBC AUTO: 100.8 FL (ref 82.6–102.9)
MONOCYTES # BLD: 7 % (ref 3–12)
NRBC AUTOMATED: 0 PER 100 WBC
PDW BLD-RTO: 17.7 % (ref 11.8–14.4)
PLATELET # BLD: 134 K/UL (ref 138–453)
PLATELET ESTIMATE: ABNORMAL
PMV BLD AUTO: 9.9 FL (ref 8.1–13.5)
RBC # BLD: 2.52 M/UL (ref 4.21–5.77)
RBC # BLD: ABNORMAL 10*6/UL
SEG NEUTROPHILS: 74 % (ref 36–65)
SEGMENTED NEUTROPHILS ABSOLUTE COUNT: 3.18 K/UL (ref 1.5–8.1)
WBC # BLD: 4.3 K/UL (ref 3.5–11.3)
WBC # BLD: ABNORMAL 10*3/UL

## 2019-07-03 PROCEDURE — 36415 COLL VENOUS BLD VENIPUNCTURE: CPT

## 2019-07-03 PROCEDURE — 6370000000 HC RX 637 (ALT 250 FOR IP): Performed by: STUDENT IN AN ORGANIZED HEALTH CARE EDUCATION/TRAINING PROGRAM

## 2019-07-03 PROCEDURE — 1200000000 HC SEMI PRIVATE

## 2019-07-03 PROCEDURE — 97162 PT EVAL MOD COMPLEX 30 MIN: CPT

## 2019-07-03 PROCEDURE — 6370000000 HC RX 637 (ALT 250 FOR IP): Performed by: INTERNAL MEDICINE

## 2019-07-03 PROCEDURE — 87340 HEPATITIS B SURFACE AG IA: CPT

## 2019-07-03 PROCEDURE — 97530 THERAPEUTIC ACTIVITIES: CPT

## 2019-07-03 PROCEDURE — 85025 COMPLETE CBC W/AUTO DIFF WBC: CPT

## 2019-07-03 PROCEDURE — 99233 SBSQ HOSP IP/OBS HIGH 50: CPT | Performed by: INTERNAL MEDICINE

## 2019-07-03 PROCEDURE — 82947 ASSAY GLUCOSE BLOOD QUANT: CPT

## 2019-07-03 PROCEDURE — 99232 SBSQ HOSP IP/OBS MODERATE 35: CPT | Performed by: INTERNAL MEDICINE

## 2019-07-03 PROCEDURE — 2580000003 HC RX 258: Performed by: STUDENT IN AN ORGANIZED HEALTH CARE EDUCATION/TRAINING PROGRAM

## 2019-07-03 PROCEDURE — 86317 IMMUNOASSAY INFECTIOUS AGENT: CPT

## 2019-07-03 RX ADMIN — HYDROCODONE BITARTRATE AND ACETAMINOPHEN 2 TABLET: 5; 325 TABLET ORAL at 09:37

## 2019-07-03 RX ADMIN — VITAMIN D, TAB 1000IU (100/BT) 1000 UNITS: 25 TAB at 10:59

## 2019-07-03 RX ADMIN — APIXABAN 5 MG: 5 TABLET, FILM COATED ORAL at 22:39

## 2019-07-03 RX ADMIN — ACETAMINOPHEN 500 MG: 500 TABLET ORAL at 10:59

## 2019-07-03 RX ADMIN — PANTOPRAZOLE SODIUM 40 MG: 40 TABLET, DELAYED RELEASE ORAL at 06:16

## 2019-07-03 RX ADMIN — ACETAMINOPHEN 500 MG: 500 TABLET ORAL at 22:39

## 2019-07-03 RX ADMIN — ARIPIPRAZOLE 5 MG: 5 TABLET ORAL at 11:00

## 2019-07-03 RX ADMIN — FUROSEMIDE 20 MG: 20 TABLET ORAL at 11:00

## 2019-07-03 RX ADMIN — FUROSEMIDE 20 MG: 20 TABLET ORAL at 22:39

## 2019-07-03 RX ADMIN — Medication 10 ML: at 22:42

## 2019-07-03 RX ADMIN — ACETAMINOPHEN 500 MG: 500 TABLET ORAL at 16:25

## 2019-07-03 RX ADMIN — Medication 10 ML: at 11:04

## 2019-07-03 RX ADMIN — ISOSORBIDE MONONITRATE 30 MG: 30 TABLET ORAL at 10:59

## 2019-07-03 RX ADMIN — CIPROFLOXACIN 500 MG: 500 TABLET ORAL at 11:00

## 2019-07-03 RX ADMIN — MIDODRINE HYDROCHLORIDE 10 MG: 5 TABLET ORAL at 18:29

## 2019-07-03 RX ADMIN — ACETAMINOPHEN 500 MG: 500 TABLET ORAL at 06:16

## 2019-07-03 RX ADMIN — VENLAFAXINE 75 MG: 75 TABLET ORAL at 11:00

## 2019-07-03 RX ADMIN — MIDODRINE HYDROCHLORIDE 10 MG: 5 TABLET ORAL at 13:51

## 2019-07-03 RX ADMIN — LAMOTRIGINE 100 MG: 100 TABLET ORAL at 11:00

## 2019-07-03 RX ADMIN — HYDROCODONE BITARTRATE AND ACETAMINOPHEN 2 TABLET: 5; 325 TABLET ORAL at 16:20

## 2019-07-03 RX ADMIN — LEVOTHYROXINE SODIUM 50 MCG: 50 TABLET ORAL at 06:16

## 2019-07-03 RX ADMIN — INSULIN LISPRO 1 UNITS: 100 INJECTION, SOLUTION INTRAVENOUS; SUBCUTANEOUS at 22:43

## 2019-07-03 ASSESSMENT — PAIN DESCRIPTION - ORIENTATION: ORIENTATION: RIGHT

## 2019-07-03 ASSESSMENT — PAIN SCALES - GENERAL
PAINLEVEL_OUTOF10: 3
PAINLEVEL_OUTOF10: 2
PAINLEVEL_OUTOF10: 0
PAINLEVEL_OUTOF10: 10
PAINLEVEL_OUTOF10: 8
PAINLEVEL_OUTOF10: 10
PAINLEVEL_OUTOF10: 9
PAINLEVEL_OUTOF10: 8
PAINLEVEL_OUTOF10: 9
PAINLEVEL_OUTOF10: 10
PAINLEVEL_OUTOF10: 7

## 2019-07-03 ASSESSMENT — PAIN DESCRIPTION - LOCATION: LOCATION: HIP

## 2019-07-03 NOTE — PROGRESS NOTES
clots, Hyperkalemia, Hyperlipidemia, Hypernatremia, Hypertension, Hypocalcemia, Hypovolemic shock (HCC), Hypoxia, Irregular heartbeat, Knee effusion, right, Left leg cellulitis, Lower GI bleed, MDRO (multiple drug resistant organisms) resistance, Metabolic acidosis, MRSA (methicillin resistant staph aureus) culture positive, On continuous oral anticoagulation, NADIRA on CPAP, Other specified hypothyroidism, Parietal lobe infarction ([address] Pneumonia, Post traumatic encephalopathy, Proteinuria, Pyogenic arthritis of right knee joint ([address] Pyrexia, Renal insufficiency, Renal lesion, Respiratory failure, acute ([address] Right knee pain, SAH (subarachnoid hemorrhage) ([address] Secondary hyperparathyroidism of renal origin ([address] Seizure disorder ([address] Stercoral ulcer of rectum, Streptococcal infection, Subarachnoid bleed ([address] Subdural bleeding ([address] Thyroid disease, Traumatic cerebral hemorrhage ([address] Type 2 diabetes mellitus with left diabetic foot ulcer ([address] Unspecified cerebral artery occlusion with cerebral infarction, Venous stasis dermatitis, and Venous stasis dermatitis of both lower extremities. has a past surgical history that includes Gastric bypass surgery; Vena Cava Filter Placement; Foot Debridement (Left); Foot surgery (Right); Tonsillectomy; Colonoscopy; skin biopsy; Dilatation, esophagus; tracheostomy (summer 2013); Gastrostomy tube placement (summer 2013); Abdomen surgery; Cardiac catheterization; pr knee scope,diagnostic (Right, 3/23/2017); HEMIARTHROPLASTY HIP (Right, 5/15/2019); incision and drainage (Right, 6/26/2019); Revision total hip arthroplasty (Right, 06/28/2019); and Revision total hip arthroplasty (Right, 6/28/2019).     Restrictions  Restrictions/Precautions  Restrictions/Precautions: Weight Bearing  Lower Extremity Weight Bearing Restrictions  Right Lower Extremity Weight Bearing: Weight Bearing As Tolerated  Vision/Hearing  Vision: Within Functional Limits  Hearing: Within functional

## 2019-07-03 NOTE — PROGRESS NOTES
hypertension  5.  Obstructive sleep apnea  6.  History of hemorrhagic CVA  7.  History of DVT status post IVC filter  8.  Left ventricular diastolic dysfunction         PLAN     1. HD as scheduled. Okay to discharge from nephrology standpoint.   2. Will follow    Please do not hesitate to call with questions    This note is created with the assistance of a speech-recognition program. While intending to generate a document that actually reflects the content of the visit, no guarantees can be provided that every mistake has been identified and corrected by editing    Stephen Russell MD, [facility] Richard Cohen, [address]   7/3/2019 10:35 AM  [facility]

## 2019-07-03 NOTE — PROGRESS NOTES
Comment: smoked pipe years ago    Sexual activity: Not on file   Lifestyle    Physical activity:     Days per week: Not on file     Minutes per session: Not on file    Stress: Not on file   Relationships    Social connections:     Talks on phone: Not on file     Gets together: Not on file     Attends Voodoo service: Not on file     Active member of club or organization: Not on file     Attends meetings of clubs or organizations: Not on file     Relationship status: Not on file    Intimate partner violence:     Fear of current or ex partner: Not on file     Emotionally abused: Not on file     Physically abused: Not on file     Forced sexual activity: Not on file   Other Topics Concern    Not on file   Social History Narrative    ** Merged History Encounter **            Family History:     Family History   Problem Relation Age of Onset    Coronary Art Dis Father     Cancer Sister         Allergies:   Bactrim [sulfamethoxazole-trimethoprim]     Review of Systems:   Constitutional: No fevers or chills. No systemic complaints  Head: No headaches  Eyes: No double vision or blurry vision. No conjunctival inflammation. ENT: No sore throat or runny nose. . No hearing loss, tinnitus or vertigo. Cardiovascular: No chest pain or palpitations. No shortness of breath. No GUZMAN  Lung: No shortness of breath or cough. No sputum production  Abdomen: No nausea, vomiting, diarrhea, or abdominal pain. Naoma Broccoli No cramps. Genitourinary: ESRD on HD  Musculoskeletal: No muscle aches or pains. No joint effusions, swelling or deformities  Hematologic: No bleeding or bruising. Neurologic: No headache, weakness, numbness, or tingling. Integument: No rash, no ulcers. Rt hip incision. Multiple echymoses  Psychiatric: No depression. Endocrine: No polyuria, no polydipsia, no polyphagia.     Physical Examination :     Patient Vitals for the past 8 hrs:   BP Temp Temp src Pulse Resp SpO2   07/03/19 1147 (!) 124/43 97.8 °F (36.6 °C) Oral 77 18 95 %   07/03/19 0717 (!) 115/49 97.8 °F (36.6 °C) Oral 72 18 99 %     General Appearance: Awake, alert, and in no apparent distress  Head:  Normocephalic, no trauma  Eyes: Pupils equal, round, reactive to light and accommodation; extraocular movements intact; sclera anicteric; conjunctivae pink. No embolic phenomena. ENT: Oropharynx clear, without erythema, exudate, or thrush. No tenderness of sinuses. Mouth/throat: mucosa pink and moist. No lesions. Dentition in good repair. Neck:Supple, without lymphadenopathy. Thyroid normal, No bruits. Pulmonary/Chest: Clear to auscultation, without wheezes, rales, or rhonchi. No dullness to percussion. Cardiovascular: Regular rate and rhythm without murmurs, rubs, or gallops. Abdomen: Soft, non tender. Bowel sounds normal. No organomegaly  All four Extremities: No cyanosis, clubbing, edema, or effusions. Rt hip incision. Multiple echymoses  Neurologic: No gross sensory or motor deficits. Skin: Warm and dry with good turgor. Signs of peripheral arterial insufficiency. No ulcerations. Rt hip incision. Medical Decision Making -Laboratory:   I have independently reviewed/ordered the following labs:    CBC with Differential:   Recent Labs     07/02/19  0545 07/03/19  0610   WBC 6.6 4.3   HGB 8.0* 7.3*   HCT 27.2* 25.4*    134*   LYMPHOPCT 11* 15*   MONOPCT 6 7     BMP:   Recent Labs     07/02/19  0852   *   K 4.1   CL 99   CO2 20   BUN 48*   CREATININE 4.55*     Hepatic Function Panel: No results for input(s): PROT, LABALBU, BILIDIR, IBILI, BILITOT, ALKPHOS, ALT, AST in the last 72 hours. No results for input(s): RPR in the last 72 hours. No results for input(s): HIV in the last 72 hours. No results for input(s): BC in the last 72 hours.   Lab Results   Component Value Date    MUCUS NOT REPORTED 03/03/2017    RBC 2.52 07/03/2019    RBC 3.35 05/18/2012    TRICHOMONAS NOT REPORTED 03/03/2017    WBC 4.3 07/03/2019    YEAST NOT REPORTED 03/03/2017 participate in the care of this patient. Please call with questions.     Paco Roa MD  Pager: [phone] - Office: [phone]

## 2019-07-03 NOTE — PROGRESS NOTES
Nutrition Assessment    Type and Reason for Visit: Initial(LOS Day 7)    Nutrition Recommendations: Continue renal diet. Recommend nepro supplements TID. Nutrition Assessment:  Pt nutritionally compromised aeb severe malnutrition. Pt w/ ESRD on HD. Pt s/p right hip I&D and danielle explant, cement spacer placement. Pt reported that he dislikes the food and has not been eating much since admission. Pt is unsure of UBW, but reports wt loss. Per EMR, pt w/ 13.6% wt loss x 9 mo. Pt w/ multiple pressure ulcers (Stage III, II and unstageable). Will add supplements d/t severe malnutrition and monitor po intake, weights and wound healing progression. Malnutrition Assessment:  · Malnutrition Status: Meets the criteria for severe malnutrition  · Context: Chronic illness  · Findings of the 6 clinical characteristics of malnutrition (Minimum of 2 out of 6 clinical characteristics is required to make the diagnosis of moderate or severe Protein Calorie Malnutrition based on AND/ASPEN Guidelines):  1. Energy Intake-Less than or equal to 50% of estimated energy requirement, Greater than or equal to 3 months    2. Weight Loss-10% loss or greater, in 6 months  3. Fat Loss-No significant subcutaneous fat loss,    4. Muscle Loss-No significant muscle mass loss,    5. Fluid Accumulation-Moderate to severe fluid accumulation, Extremities  6.   Strength-Not measured    Nutrition Risk Level: High    Nutrient Needs:  · Estimated Daily Total Kcal: 1.2-1.4~~>1965-4628 kcals/d   · Estimated Daily Protein (g): 1.5-1.6 gm/kg ~>122-130 gms/d     Nutrition Diagnosis:   · Problem: Severe malnutrition, In context of chronic illness  · Etiology: related to Insufficient energy/nutrient consumption, Catabolic illness     Signs and symptoms:  as evidenced by Diet history of poor intake, Patient report of, Weight loss greater than or equal to 10% in 6 months, Localized or generalized fluid accumulation    Objective Information:  · Wound Type:

## 2019-07-03 NOTE — PROGRESS NOTES
100 mcg Subcutaneous Weekly - Monday    midodrine  10 mg Oral TID     [Held by provider] heparin (porcine)  5,000 Units Subcutaneous 3 times per day    insulin lispro  0-6 Units Subcutaneous TID     insulin lispro  0-3 Units Subcutaneous Nightly    vitamin D  1,000 Units Oral Daily    levothyroxine  50 mcg Oral Daily    pantoprazole  40 mg Oral QAM AC    isosorbide mononitrate  30 mg Oral Daily    lamoTRIgine  100 mg Oral Daily    venlafaxine  75 mg Oral Daily    ARIPiprazole  5 mg Oral Daily    [Held by provider] apixaban  5 mg Oral BID    Ferric Citrate  210 mg Oral BID    furosemide  20 mg Oral BID    sodium chloride flush  10 mL Intravenous 2 times per day    acetaminophen  500 mg Oral Q4H     Continuous Infusions:    dextrose       PRN Meds: glucose, dextrose, glucagon (rDNA), dextrose, HYDROcodone 5 mg - acetaminophen **OR** HYDROcodone 5 mg - acetaminophen, metoclopramide, sodium chloride flush, magnesium hydroxide    Data:     Past Medical History:   has a past medical history of A-fib ([facility] Acute ischemic stroke ([facility] Acute metabolic encephalopathy, Acute on chronic congestive heart failure ([facility]), Acute on chronic diastolic CHF (congestive heart failure) ([facility] Altered mental status, Anemia, Anemia associated with chronic renal failure, Aphasia, ARF (acute renal failure) ([facility]), Arteriovenous fistula for hemodialysis in place, primary ([facility] Arthritis, Bradyarrhythmia, Cellulitis, Cerebral contusion ([facility]), CHF (congestive heart failure) ([facility]), Chronic atrial fibrillation ([facility]), Chronic kidney disease, Chronic pain of right knee, CKD (chronic kidney disease) stage 4, GFR 15-29 ml/min ([facility]), Coagulation defect ([facility] Diabetes mellitus ([facility] Diabetes mellitus type 2 in obese ([facility] Diarrhea, DVT (deep venous thrombosis) ([facility]), Elevated C-reactive protein (CRP), ESRD (end stage renal disease) ([facility] Essential hypertension, Fever, Foot ulcer due to secondary DM ([facility] GERD

## 2019-07-04 ENCOUNTER — APPOINTMENT (OUTPATIENT)
Dept: DIALYSIS | Age: 69
DRG: 466 | End: 2019-07-04
Attending: ORTHOPAEDIC SURGERY
Payer: MEDICARE

## 2019-07-04 LAB
ANION GAP SERPL CALCULATED.3IONS-SCNC: 10 MMOL/L (ref 9–17)
BUN BLDV-MCNC: 16 MG/DL (ref 8–23)
BUN/CREAT BLD: ABNORMAL (ref 9–20)
CALCIUM SERPL-MCNC: 7.7 MG/DL (ref 8.6–10.4)
CHLORIDE BLD-SCNC: 98 MMOL/L (ref 98–107)
CO2: 25 MMOL/L (ref 20–31)
CREAT SERPL-MCNC: 2.53 MG/DL (ref 0.7–1.2)
GFR AFRICAN AMERICAN: 31 ML/MIN
GFR NON-AFRICAN AMERICAN: 25 ML/MIN
GFR SERPL CREATININE-BSD FRML MDRD: ABNORMAL ML/MIN/{1.73_M2}
GFR SERPL CREATININE-BSD FRML MDRD: ABNORMAL ML/MIN/{1.73_M2}
GLUCOSE BLD-MCNC: 107 MG/DL (ref 75–110)
GLUCOSE BLD-MCNC: 169 MG/DL (ref 75–110)
GLUCOSE BLD-MCNC: 218 MG/DL (ref 70–99)
GLUCOSE BLD-MCNC: 94 MG/DL (ref 75–110)
POTASSIUM SERPL-SCNC: 4.4 MMOL/L (ref 3.7–5.3)
SODIUM BLD-SCNC: 133 MMOL/L (ref 135–144)

## 2019-07-04 PROCEDURE — 90935 HEMODIALYSIS ONE EVALUATION: CPT

## 2019-07-04 PROCEDURE — 6370000000 HC RX 637 (ALT 250 FOR IP): Performed by: STUDENT IN AN ORGANIZED HEALTH CARE EDUCATION/TRAINING PROGRAM

## 2019-07-04 PROCEDURE — 99232 SBSQ HOSP IP/OBS MODERATE 35: CPT | Performed by: INTERNAL MEDICINE

## 2019-07-04 PROCEDURE — 80048 BASIC METABOLIC PNL TOTAL CA: CPT

## 2019-07-04 PROCEDURE — 6370000000 HC RX 637 (ALT 250 FOR IP): Performed by: INTERNAL MEDICINE

## 2019-07-04 PROCEDURE — 82947 ASSAY GLUCOSE BLOOD QUANT: CPT

## 2019-07-04 PROCEDURE — 2580000003 HC RX 258: Performed by: STUDENT IN AN ORGANIZED HEALTH CARE EDUCATION/TRAINING PROGRAM

## 2019-07-04 PROCEDURE — 1200000000 HC SEMI PRIVATE

## 2019-07-04 PROCEDURE — 36415 COLL VENOUS BLD VENIPUNCTURE: CPT

## 2019-07-04 RX ORDER — OXYCODONE HYDROCHLORIDE 5 MG/1
5 TABLET ORAL EVERY 4 HOURS PRN
Status: DISCONTINUED | OUTPATIENT
Start: 2019-07-04 | End: 2019-07-08 | Stop reason: HOSPADM

## 2019-07-04 RX ORDER — OXYCODONE HYDROCHLORIDE 5 MG/1
10 TABLET ORAL EVERY 4 HOURS PRN
Status: DISCONTINUED | OUTPATIENT
Start: 2019-07-04 | End: 2019-07-08 | Stop reason: HOSPADM

## 2019-07-04 RX ADMIN — MIDODRINE HYDROCHLORIDE 10 MG: 5 TABLET ORAL at 16:49

## 2019-07-04 RX ADMIN — PANTOPRAZOLE SODIUM 40 MG: 40 TABLET, DELAYED RELEASE ORAL at 05:00

## 2019-07-04 RX ADMIN — APIXABAN 5 MG: 5 TABLET, FILM COATED ORAL at 22:24

## 2019-07-04 RX ADMIN — CIPROFLOXACIN 500 MG: 500 TABLET ORAL at 14:03

## 2019-07-04 RX ADMIN — LEVOTHYROXINE SODIUM 50 MCG: 50 TABLET ORAL at 05:00

## 2019-07-04 RX ADMIN — ARIPIPRAZOLE 5 MG: 5 TABLET ORAL at 14:04

## 2019-07-04 RX ADMIN — FUROSEMIDE 20 MG: 20 TABLET ORAL at 22:24

## 2019-07-04 RX ADMIN — VITAMIN D, TAB 1000IU (100/BT) 1000 UNITS: 25 TAB at 14:04

## 2019-07-04 RX ADMIN — ACETAMINOPHEN 500 MG: 500 TABLET ORAL at 05:00

## 2019-07-04 RX ADMIN — Medication 10 ML: at 22:25

## 2019-07-04 RX ADMIN — VENLAFAXINE 75 MG: 75 TABLET ORAL at 14:01

## 2019-07-04 RX ADMIN — ACETAMINOPHEN 500 MG: 500 TABLET ORAL at 18:50

## 2019-07-04 RX ADMIN — LAMOTRIGINE 100 MG: 100 TABLET ORAL at 14:02

## 2019-07-04 RX ADMIN — HYDROCODONE BITARTRATE AND ACETAMINOPHEN 1 TABLET: 5; 325 TABLET ORAL at 11:37

## 2019-07-04 RX ADMIN — HYDROCODONE BITARTRATE AND ACETAMINOPHEN 2 TABLET: 5; 325 TABLET ORAL at 15:13

## 2019-07-04 RX ADMIN — ACETAMINOPHEN 500 MG: 500 TABLET ORAL at 14:03

## 2019-07-04 RX ADMIN — MIDODRINE HYDROCHLORIDE 10 MG: 5 TABLET ORAL at 07:48

## 2019-07-04 RX ADMIN — HYDROCODONE BITARTRATE AND ACETAMINOPHEN 2 TABLET: 5; 325 TABLET ORAL at 19:09

## 2019-07-04 RX ADMIN — MIDODRINE HYDROCHLORIDE 10 MG: 5 TABLET ORAL at 14:03

## 2019-07-04 RX ADMIN — APIXABAN 5 MG: 5 TABLET, FILM COATED ORAL at 14:03

## 2019-07-04 ASSESSMENT — PAIN SCALES - GENERAL
PAINLEVEL_OUTOF10: 10
PAINLEVEL_OUTOF10: 8
PAINLEVEL_OUTOF10: 6
PAINLEVEL_OUTOF10: 8
PAINLEVEL_OUTOF10: 8
PAINLEVEL_OUTOF10: 3
PAINLEVEL_OUTOF10: 8
PAINLEVEL_OUTOF10: 10
PAINLEVEL_OUTOF10: 8

## 2019-07-04 NOTE — PROGRESS NOTES
Nephrology Progress Note        Subjective: patient evaluated on dialysis. Patient is stable on dialysis. Access cannulated without problems. No new issues overnite. Stable hemodynamics. Fluid removal reviewed as well. Objective:  CURRENT TEMPERATURE:  Temp: 96 °F (35.6 °C)  MAXIMUM TEMPERATURE OVER 24HRS:  Temp (24hrs), Av.4 °F (35.8 °C), Min:96 °F (35.6 °C), Max:97.8 °F (36.6 °C)    CURRENT RESPIRATORY RATE:  Resp: 15  CURRENT PULSE:  Pulse: 63  CURRENT BLOOD PRESSURE:  BP: (!) 117/28  24HR BLOOD PRESSURE RANGE:  Systolic (20BAF), TFI:291 , Min:102 , WQJ:459   ; Diastolic (37JIU), JJ:29, Min:28, Max:87    24HR INTAKE/OUTPUT:  No intake or output data in the 24 hours ending 19 1013  Patient Vitals for the past 96 hrs (Last 3 readings):   Weight   19 1218 228 lb 2.8 oz (103.5 kg)   19 0830 230 lb 13.2 oz (104.7 kg)         Physical Exam:  General appearance:Awake, alert, in no acute distress  Skin: warm and dry, no rash or erythema  Eyes: conjunctivae normal and sclera anicteric  ENT:no thrush, moist mucus membranes  Neck:  No JVD, midline trachea, some accessory muscles or respiration used  Pulmonary: clear to auscultation and no rales or wheezing or rhonchi   Cardiovascular: irregularly irregular rhythm  Abdomen: soft nontender, bowel sounds present, not distended    Extremities: venous stasis changes lower extremities, mild dependent edema    Access:   As previous    Current Medications:      ciprofloxacin (CIPRO) tablet 500 mg Daily   [START ON 2019] darbepoetin jean pierre-polysorbate (ARANESP) injection 100 mcg Weekly - Monday   midodrine (PROAMATINE) tablet 10 mg TID WC   insulin lispro (HUMALOG) injection vial 0-6 Units TID WC   insulin lispro (HUMALOG) injection vial 0-3 Units Nightly   glucose (GLUTOSE) 40 % oral gel 15 g PRN   dextrose 50 % IV solution PRN   glucagon injection 1 mg PRN   dextrose 5 % solution PRN   HYDROcodone-acetaminophen (NORCO) 5-325 MG per tablet 1 tablet post right hip incision and drainage on 6/26/19 and a revision hemiarthroplasty surgery on 6/28/19    Plan:  1. Weight removal and dialysis orders reviewed. 2. Patient personally seen and examined with hemodialysis  3. Daily basic metabolic panel and CBC  4. Continued hemodynamic monitoring      Please do not hesitate to call with questions.     Electronically signed by Fran Johnson MD on 7/4/2019 at 10:13 AM

## 2019-07-04 NOTE — PROGRESS NOTES
constipation, diarrhea, nausea, vomiting  Neurological:  negative for dizziness, headache    Medications: Allergies:     Allergies   Allergen Reactions    Bactrim [Sulfamethoxazole-Trimethoprim]        Current Meds:   Scheduled Meds:    ciprofloxacin  500 mg Oral Daily    [START ON 7/8/2019] darbepoetin jean pierre-polysorbate  100 mcg Subcutaneous Weekly - Monday    midodrine  10 mg Oral TID WC    insulin lispro  0-6 Units Subcutaneous TID WC    insulin lispro  0-3 Units Subcutaneous Nightly    vitamin D  1,000 Units Oral Daily    levothyroxine  50 mcg Oral Daily    pantoprazole  40 mg Oral QAM AC    isosorbide mononitrate  30 mg Oral Daily    lamoTRIgine  100 mg Oral Daily    venlafaxine  75 mg Oral Daily    ARIPiprazole  5 mg Oral Daily    apixaban  5 mg Oral BID    Ferric Citrate  210 mg Oral BID    furosemide  20 mg Oral BID    sodium chloride flush  10 mL Intravenous 2 times per day    acetaminophen  500 mg Oral Q4H     Continuous Infusions:    dextrose       PRN Meds: glucose, dextrose, glucagon (rDNA), dextrose, HYDROcodone 5 mg - acetaminophen **OR** HYDROcodone 5 mg - acetaminophen, metoclopramide, sodium chloride flush, magnesium hydroxide    Data:     Past Medical History:   has a past medical history of A-fib ([address] Acute encephalopathy, Acute ischemic stroke ([address] Acute metabolic encephalopathy, Acute on chronic congestive heart failure (HCC), Acute on chronic diastolic CHF (congestive heart failure) ([address] Altered mental status, Anemia, Anemia associated with chronic renal failure, Aphasia, ARF (acute renal failure) ([facility]), Arteriovenous fistula for hemodialysis in place, primary ([address] Arthritis, Bradyarrhythmia, Cellulitis, Cerebral contusion ([facility]), CHF (congestive heart failure) ([facility]), Chronic atrial fibrillation ([facility]), Chronic kidney disease, Chronic pain of right knee, CKD (chronic kidney disease) stage 4, GFR 15-29 ml/min ([address] Coagulation defect ([address] Diabetes mellitus ([address]

## 2019-07-04 NOTE — PROGRESS NOTES
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 No stop date  · Office f/up in 4 weeks with Dr Idris Monteiro for infection. Please call [phone] for appointment . Medical Decision Making/Summary/Discussion:7/4/2019     · S/P Rt hemiarthroplasty on 6-15-19. · Subsequently developed superficial incisional dehiscence. · Admitted 6-26-19 and underwent revision of  Rt hip hemiarthroplasty on 6-28-19. · Found to have an Infected Rt hip hemiarthroplasty with Proteus and ESBL + Klebsiella  · Patient started on meropenem for treatment in hospital.  · Will need long term po cipro for treatment and suppression of this infection  · Underlying ESRD on HD., chronic Rt leg lymphedema, chronic illness. Infection Control Recommendations   · [place] Precautions  · Contact Isolation + Klebsiella    Antimicrobial Stewardship Recommendations   · Targeted therapy  · PK dosing    Coordination of Outpatient Care:   · Estimated Length of IV antimicrobials: Stop Meropenem  · Patient will need Midline Catheter Insertion:   · Patient will need: Home IV , [place],  SNF,  LTAC:  · Patient will need outpatient wound care:    Chief complaint/reason for consultation:   · Infected Rt Hip hemiarthroplasty    History of Present Illness:   Ale Melendez is a 68y.o.-year-old  male who was initially admitted on 6/26/2019. Patient seen at the request of Dr Ioana Camarena. INITIAL HISTORY:     Patient with multiple medical problems.   Treated by my service in 2017 and 2018 because of S aureus KNEE ARTHROSCOPY,EXTENSIVE DEBRIDEMENT PARTIAL PROXIMAL AND MEDIAL MENISECTOMY  performed by Sarah Toribio MD at [facility] Right 06/28/2019    STAGE 1 TOTAL HIP REVISION ARTHROPLASTY  WITH REMEDIES AND CEMENT      REVISION TOTAL HIP ARTHROPLASTY Right 6/28/2019    STAGE 1 TOTAL HIP REVISION ARTHROPLASTY  WITH REMEDIES AND CEMENT  SMITH & NEPHEW,  REMOVAL OF HARDWARE , FEMORAL HEAD AND STEM . performed by Marek Leonardo DO at [facility]      TRACHEOSTOMY  summer 2013    VENA CAVA FILTER PLACEMENT         Medications:      ciprofloxacin  500 mg Oral Daily    [START ON 7/8/2019] darbepoetin jean pierre-polysorbate  100 mcg Subcutaneous Weekly - Monday    midodrine  10 mg Oral TID WC    insulin lispro  0-6 Units Subcutaneous TID WC    insulin lispro  0-3 Units Subcutaneous Nightly    vitamin D  1,000 Units Oral Daily    levothyroxine  50 mcg Oral Daily    pantoprazole  40 mg Oral QAM AC    isosorbide mononitrate  30 mg Oral Daily    lamoTRIgine  100 mg Oral Daily    venlafaxine  75 mg Oral Daily    ARIPiprazole  5 mg Oral Daily    apixaban  5 mg Oral BID    Ferric Citrate  210 mg Oral BID    furosemide  20 mg Oral BID    sodium chloride flush  10 mL Intravenous 2 times per day    acetaminophen  500 mg Oral Q4H       Social History:     Social History     Socioeconomic History    Marital status:      Spouse name: Not on file    Number of children: Not on file    Years of education: Not on file    Highest education level: Not on file   Occupational History    Not on file   Social Needs    Financial resource strain: Not on file    Food insecurity:     Worry: Not on file     Inability: Not on file    Transportation needs:     Medical: Not on file     Non-medical: Not on file   Tobacco Use    Smoking status: Never Smoker    Smokeless tobacco: Never Used   Substance and Sexual Activity    Alcohol use: No    Drug use:  No 117/28 96 °F (35.6 °C) 63 --   07/04/19 0850 (!) 110/28 96 °F (35.6 °C) 63 --   07/04/19 0820 (!) 102/34 96 °F (35.6 °C) 62 --   07/04/19 0810 137/67 96 °F (35.6 °C) 75 15     General Appearance: Awake, alert, and in no apparent distress  Head:  Normocephalic, no trauma  Eyes: Pupils equal, round, reactive to light and accommodation; extraocular movements intact; sclera anicteric; conjunctivae pink. No embolic phenomena. ENT: Oropharynx clear, without erythema, exudate, or thrush. No tenderness of sinuses. Mouth/throat: mucosa pink and moist. No lesions. Dentition in good repair. Neck:Supple, without lymphadenopathy. Thyroid normal, No bruits. Pulmonary/Chest: Clear to auscultation, without wheezes, rales, or rhonchi. No dullness to percussion. Cardiovascular: Regular rate and rhythm without murmurs, rubs, or gallops. Abdomen: Soft, non tender. Bowel sounds normal. No organomegaly  All four Extremities: No cyanosis, clubbing, edema, or effusions. Rt hip incision. Multiple echymoses  Neurologic: No gross sensory or motor deficits. Skin: Warm and dry with good turgor. Signs of peripheral arterial insufficiency. No ulcerations. Rt hip incision. Medical Decision Making -Laboratory:   I have independently reviewed/ordered the following labs:    CBC with Differential:   Recent Labs     07/02/19  0545 07/03/19  0610   WBC 6.6 4.3   HGB 8.0* 7.3*   HCT 27.2* 25.4*    134*   LYMPHOPCT 11* 15*   MONOPCT 6 7     BMP:   Recent Labs     07/02/19  0852   *   K 4.1   CL 99   CO2 20   BUN 48*   CREATININE 4.55*     Hepatic Function Panel: No results for input(s): PROT, LABALBU, BILIDIR, IBILI, BILITOT, ALKPHOS, ALT, AST in the last 72 hours. No results for input(s): RPR in the last 72 hours. No results for input(s): HIV in the last 72 hours. No results for input(s): BC in the last 72 hours.   Lab Results   Component Value Date    MUCUS NOT REPORTED 03/03/2017    RBC 2.52 07/03/2019    RBC 3.35 05/18/2012 Medical Decision [facility]:   6/29/2019  4:03 PM - Jerome Goldman Incoming Lab Results From [facility]            Specimen Information: Joint, Hip; Swab         Component Collected Lab   Specimen Description 06/26/2019  1:[address]  [address]   . HIP, JOINT RT HIP SWAB    Special Requests 06/26/2019  1:[address]  [address]   NOT REPORTED    Direct Exam Abnormal  06/26/2019  1:41 PM [facility]    Direct Exam 06/26/2019  1:[address]  [address]   NO BACTERIA SEEN    Culture Abnormal  06/26/2019  1:41 PM [id number]    Culture Abnormal  06/26/2019  1:41 PM [address] THIS ORGANISM IS AN EXTENDED-SPECTRUM BETA-LACTAMASE  AND RESISTANCE TO THERAPY WITH PENICILLINS, CEPHALOSPORINS AND AZTREONAM IS EXPECTED.  THESE ORGANISMS GENERALLY REMAIN SUSCEPTIBLE TO CARBAPENEMS.  CONSIDER ID CONSULTATION.    Culture 06/26/2019  1:[address]    Culture Abnormal  06/26/2019  1:[address]  [address]   NO ANAEROBIC ORGANISMS ISOLATED AT 3 DAYS    Testing Performed By      Lab - Abbreviation Name Director Address Valid Date Range   [address] [place] [address] [facility] [address] 08/30/17 0801-Present   Susceptibility      Proteus mirabilis (1)              Antibiotic Interpretation ADONAY Status     amikacin     Preliminary       NOT REPORTED   ampicillin Sensitive   Preliminary       <=2  SUSCEPTIBLE   ampicillin-sulbactam     Preliminary       NOT REPORTED   aztreonam Sensitive   Preliminary       <=1  SUSCEPTIBLE   ceFAZolin Sensitive   Preliminary       <=4  SUSCEPTIBLE   cefepime     Preliminary       NOT REPORTED   cefTRIAXone Sensitive   Preliminary       <=1  SUSCEPTIBLE   ciprofloxacin Sensitive   Preliminary       <=0.25  SUSCEPTIBLE ertapenem     Preliminary       NOT REPORTED   gentamicin Sensitive   Preliminary       <=1  SUSCEPTIBLE   meropenem     Preliminary       NOT REPORTED   nitrofurantoin     Preliminary       NOT REPORTED   tigecycline     Preliminary       NOT REPORTED   tobramycin Sensitive   Preliminary       <=1  SUSCEPTIBLE   trimethoprim-sulfamethoxazole Sensitive   Preliminary       <=20  SUSCEPTIBLE   piperacillin-tazobactam Sensitive   Preliminary       <=4  SUSCEPTIBLE   Klebsiella pneumoniae (2)              Antibiotic Interpretation ADONAY Status     amikacin     Preliminary       NOT REPORTED   ampicillin Resistant   Preliminary       >=32  RESISTANT   ampicillin-sulbactam     Preliminary       NOT REPORTED   aztreonam Resistant   Preliminary       >=64  RESISTANT   ceFAZolin Resistant   Preliminary       >=64  RESISTANT   cefepime Resistant   Preliminary       32  RESISTANT   cefTRIAXone Resistant   Preliminary       >=64  RESISTANT   ciprofloxacin Sensitive   Preliminary       <=0.25  SUSCEPTIBLE   ertapenem     Preliminary       NOT REPORTED   Confirmatory Extended Spectrum Beta-Lactamase Positive POSITIVE Preliminary     gentamicin Sensitive   Preliminary       <=1  SUSCEPTIBLE   meropenem Sensitive   Preliminary       <=0.25  SUSCEPTIBLE   nitrofurantoin     Preliminary       NOT REPORTED   tigecycline     Preliminary       NOT REPORTED   tobramycin Sensitive   Preliminary       <=1  SUSCEPTIBLE   trimethoprim-sulfamethoxazole Sensitive   Preliminary       <=20  SUSCEPTIBLE   piperacillin-tazobactam Resistant   Preliminary       8  RESISTANT      Medical Decision Making-Other:     Note:  · Labs, medications, radiologic studies were reviewed with personal review of films  · Moderate Large amounts of data were reviewed  · Discussed with nursing Staff, Discharge planner  · Infection Control and Prevention measures reviewed  · All prior entries were reviewed  · Administer medications as ordered  · Prognosis:

## 2019-07-04 NOTE — PROGRESS NOTES
Orthopedic Progress Note    Patient:  Rocío Carbone  YOB: 1950     68 y.o. male    Subjective:  Patient seen and examined this morning. No new changes at this time. Has not ambulated with therapy. Up at bedside for ten minutes. Mentation remains similar to baseline. No complaints or concerns at this time. Pain controlled on current regimen. Denies fever, HA, CP, SOB, N/V, dysuria, numbness or tingling. +BM/+flatus. Tolerating PO intake. Vitals reviewed, afebrile    Objective:   Vitals:    07/03/19 2200   BP: (!) 114/53   Pulse: 61   Resp: 16   Temp: 97 °F (36.1 °C)   SpO2: 97%     Gen: NAD, cooperative, groggy    RLE: Abduction pillow in place. Dressing clean dry and intact. Severe chronic lymphedema and vascular changes to lower extremity. Compartments soft and compressible. TA/GS motor intact. Deep and Superficial Peroneal/Saphenous/Sural SILT. 2+ DP pulse. Knee flexion contracture. Leg resting in external rotation. Recent Labs     07/02/19  0852 07/03/19  0610   WBC  --  4.3   HGB  --  7.3*   HCT  --  25.4*   PLT  --  134*   *  --    K 4.1  --    BUN 48*  --    CREATININE 4.55*  --    GLUCOSE 209*  --       Meds: Cipro  See rec for complete list    Impression/plan: 68 y.o. male s/p right hip I&D on 6/26/19, POD#8  S/p danielle explant, cement spacer placement on 6/28/19, POD#6    -WB status: Non weight bearing right lower extremity  -Wound cx +proteus, +klebsiella  -ID following, switch to PO cipro, with no stop date  -Internal medicine and nephrology following  -Pain control PO/IV Medication. Keep narcotic pain medicine to a minimum. -DVT ppx: EPC. Ok to resume chemical AC when ok with medicine   -Ice/Elevate as needed for pain and swelling.   -Ok to DC from ortho standpoint. Case management to assist with DC planning back to SNF.   -Please page ortho with any questions    ----------------------------------------  Mina Banerjee DO  PGY-2, Department of Orthopaedic

## 2019-07-05 LAB
ABSOLUTE EOS #: 0.09 K/UL (ref 0–0.4)
ABSOLUTE IMMATURE GRANULOCYTE: 0 K/UL (ref 0–0.3)
ABSOLUTE LYMPH #: 0.56 K/UL (ref 1–4.8)
ABSOLUTE MONO #: 0.24 K/UL (ref 0.1–0.8)
ANION GAP SERPL CALCULATED.3IONS-SCNC: 9 MMOL/L (ref 9–17)
BASOPHILS # BLD: 0 % (ref 0–2)
BASOPHILS ABSOLUTE: 0 K/UL (ref 0–0.2)
BUN BLDV-MCNC: 21 MG/DL (ref 8–23)
BUN/CREAT BLD: ABNORMAL (ref 9–20)
CALCIUM SERPL-MCNC: 8 MG/DL (ref 8.6–10.4)
CHLORIDE BLD-SCNC: 98 MMOL/L (ref 98–107)
CO2: 27 MMOL/L (ref 20–31)
CREAT SERPL-MCNC: 3.19 MG/DL (ref 0.7–1.2)
DIFFERENTIAL TYPE: ABNORMAL
EOSINOPHILS RELATIVE PERCENT: 2 % (ref 1–4)
GFR AFRICAN AMERICAN: 23 ML/MIN
GFR NON-AFRICAN AMERICAN: 19 ML/MIN
GFR SERPL CREATININE-BSD FRML MDRD: ABNORMAL ML/MIN/{1.73_M2}
GFR SERPL CREATININE-BSD FRML MDRD: ABNORMAL ML/MIN/{1.73_M2}
GLUCOSE BLD-MCNC: 103 MG/DL (ref 75–110)
GLUCOSE BLD-MCNC: 172 MG/DL (ref 75–110)
GLUCOSE BLD-MCNC: 225 MG/DL (ref 75–110)
GLUCOSE BLD-MCNC: 94 MG/DL (ref 75–110)
GLUCOSE BLD-MCNC: 99 MG/DL (ref 70–99)
HCT VFR BLD CALC: 25.1 % (ref 40.7–50.3)
HEMOGLOBIN: 7.3 G/DL (ref 13–17)
IMMATURE GRANULOCYTES: 0 %
LYMPHOCYTES # BLD: 12 % (ref 24–44)
MCH RBC QN AUTO: 29.3 PG (ref 25.2–33.5)
MCHC RBC AUTO-ENTMCNC: 29.1 G/DL (ref 28.4–34.8)
MCV RBC AUTO: 100.8 FL (ref 82.6–102.9)
MONOCYTES # BLD: 5 % (ref 1–7)
MORPHOLOGY: ABNORMAL
NRBC AUTOMATED: 0 PER 100 WBC
PDW BLD-RTO: 17.4 % (ref 11.8–14.4)
PLATELET # BLD: 126 K/UL (ref 138–453)
PLATELET ESTIMATE: ABNORMAL
PMV BLD AUTO: 9.7 FL (ref 8.1–13.5)
POTASSIUM SERPL-SCNC: 4.9 MMOL/L (ref 3.7–5.3)
RBC # BLD: 2.49 M/UL (ref 4.21–5.77)
RBC # BLD: ABNORMAL 10*6/UL
SEG NEUTROPHILS: 81 % (ref 36–66)
SEGMENTED NEUTROPHILS ABSOLUTE COUNT: 3.81 K/UL (ref 1.8–7.7)
SODIUM BLD-SCNC: 134 MMOL/L (ref 135–144)
WBC # BLD: 4.7 K/UL (ref 3.5–11.3)
WBC # BLD: ABNORMAL 10*3/UL

## 2019-07-05 PROCEDURE — 1200000000 HC SEMI PRIVATE

## 2019-07-05 PROCEDURE — 2580000003 HC RX 258: Performed by: STUDENT IN AN ORGANIZED HEALTH CARE EDUCATION/TRAINING PROGRAM

## 2019-07-05 PROCEDURE — 36415 COLL VENOUS BLD VENIPUNCTURE: CPT

## 2019-07-05 PROCEDURE — 6370000000 HC RX 637 (ALT 250 FOR IP): Performed by: STUDENT IN AN ORGANIZED HEALTH CARE EDUCATION/TRAINING PROGRAM

## 2019-07-05 PROCEDURE — 80048 BASIC METABOLIC PNL TOTAL CA: CPT

## 2019-07-05 PROCEDURE — 6370000000 HC RX 637 (ALT 250 FOR IP): Performed by: INTERNAL MEDICINE

## 2019-07-05 PROCEDURE — 99232 SBSQ HOSP IP/OBS MODERATE 35: CPT | Performed by: INTERNAL MEDICINE

## 2019-07-05 PROCEDURE — 97535 SELF CARE MNGMENT TRAINING: CPT

## 2019-07-05 PROCEDURE — 97166 OT EVAL MOD COMPLEX 45 MIN: CPT

## 2019-07-05 PROCEDURE — 82947 ASSAY GLUCOSE BLOOD QUANT: CPT

## 2019-07-05 PROCEDURE — 85025 COMPLETE CBC W/AUTO DIFF WBC: CPT

## 2019-07-05 PROCEDURE — 97530 THERAPEUTIC ACTIVITIES: CPT

## 2019-07-05 PROCEDURE — 97110 THERAPEUTIC EXERCISES: CPT

## 2019-07-05 RX ORDER — SENNOSIDES 8.6 MG
1 TABLET ORAL 2 TIMES DAILY
Qty: 60 TABLET | Refills: 0 | Status: SHIPPED | OUTPATIENT
Start: 2019-07-05 | End: 2020-07-04

## 2019-07-05 RX ORDER — OXYCODONE HYDROCHLORIDE AND ACETAMINOPHEN 5; 325 MG/1; MG/1
1-2 TABLET ORAL EVERY 6 HOURS PRN
Qty: 56 TABLET | Refills: 0 | Status: SHIPPED | OUTPATIENT
Start: 2019-07-05 | End: 2019-07-12

## 2019-07-05 RX ADMIN — ACETAMINOPHEN 500 MG: 500 TABLET ORAL at 10:32

## 2019-07-05 RX ADMIN — VITAMIN D, TAB 1000IU (100/BT) 1000 UNITS: 25 TAB at 08:11

## 2019-07-05 RX ADMIN — LEVOTHYROXINE SODIUM 50 MCG: 50 TABLET ORAL at 06:34

## 2019-07-05 RX ADMIN — ACETAMINOPHEN 500 MG: 500 TABLET ORAL at 22:39

## 2019-07-05 RX ADMIN — Medication 10 ML: at 21:00

## 2019-07-05 RX ADMIN — VENLAFAXINE 75 MG: 75 TABLET ORAL at 08:10

## 2019-07-05 RX ADMIN — ACETAMINOPHEN 500 MG: 500 TABLET ORAL at 06:34

## 2019-07-05 RX ADMIN — Medication 10 ML: at 08:10

## 2019-07-05 RX ADMIN — CIPROFLOXACIN 500 MG: 500 TABLET ORAL at 08:11

## 2019-07-05 RX ADMIN — ARIPIPRAZOLE 5 MG: 5 TABLET ORAL at 08:11

## 2019-07-05 RX ADMIN — INSULIN LISPRO 2 UNITS: 100 INJECTION, SOLUTION INTRAVENOUS; SUBCUTANEOUS at 11:40

## 2019-07-05 RX ADMIN — FUROSEMIDE 20 MG: 20 TABLET ORAL at 21:00

## 2019-07-05 RX ADMIN — INSULIN LISPRO 1 UNITS: 100 INJECTION, SOLUTION INTRAVENOUS; SUBCUTANEOUS at 22:40

## 2019-07-05 RX ADMIN — APIXABAN 5 MG: 5 TABLET, FILM COATED ORAL at 08:10

## 2019-07-05 RX ADMIN — APIXABAN 5 MG: 5 TABLET, FILM COATED ORAL at 21:00

## 2019-07-05 RX ADMIN — MIDODRINE HYDROCHLORIDE 10 MG: 5 TABLET ORAL at 11:42

## 2019-07-05 RX ADMIN — ACETAMINOPHEN 500 MG: 500 TABLET ORAL at 02:45

## 2019-07-05 RX ADMIN — MIDODRINE HYDROCHLORIDE 10 MG: 5 TABLET ORAL at 08:10

## 2019-07-05 RX ADMIN — ACETAMINOPHEN 500 MG: 500 TABLET ORAL at 17:41

## 2019-07-05 RX ADMIN — PANTOPRAZOLE SODIUM 40 MG: 40 TABLET, DELAYED RELEASE ORAL at 06:34

## 2019-07-05 RX ADMIN — MIDODRINE HYDROCHLORIDE 10 MG: 5 TABLET ORAL at 16:07

## 2019-07-05 RX ADMIN — ISOSORBIDE MONONITRATE 30 MG: 30 TABLET ORAL at 08:08

## 2019-07-05 RX ADMIN — FUROSEMIDE 20 MG: 20 TABLET ORAL at 10:02

## 2019-07-05 RX ADMIN — LAMOTRIGINE 100 MG: 100 TABLET ORAL at 08:10

## 2019-07-05 ASSESSMENT — PAIN DESCRIPTION - LOCATION
LOCATION: HIP
LOCATION: HIP

## 2019-07-05 ASSESSMENT — PAIN SCALES - GENERAL
PAINLEVEL_OUTOF10: 8
PAINLEVEL_OUTOF10: 6
PAINLEVEL_OUTOF10: 5
PAINLEVEL_OUTOF10: 0
PAINLEVEL_OUTOF10: 6
PAINLEVEL_OUTOF10: 2
PAINLEVEL_OUTOF10: 0
PAINLEVEL_OUTOF10: 6
PAINLEVEL_OUTOF10: 0
PAINLEVEL_OUTOF10: 5

## 2019-07-05 ASSESSMENT — PAIN DESCRIPTION - PAIN TYPE
TYPE: SURGICAL PAIN
TYPE: SURGICAL PAIN

## 2019-07-05 ASSESSMENT — PAIN DESCRIPTION - PROGRESSION: CLINICAL_PROGRESSION: GRADUALLY IMPROVING

## 2019-07-05 ASSESSMENT — PAIN DESCRIPTION - DESCRIPTORS: DESCRIPTORS: ACHING

## 2019-07-05 ASSESSMENT — PAIN DESCRIPTION - ORIENTATION
ORIENTATION: RIGHT
ORIENTATION: RIGHT

## 2019-07-05 ASSESSMENT — PAIN DESCRIPTION - FREQUENCY
FREQUENCY: INTERMITTENT
FREQUENCY: INTERMITTENT

## 2019-07-05 ASSESSMENT — PAIN DESCRIPTION - ONSET: ONSET: ON-GOING

## 2019-07-05 NOTE — PROGRESS NOTES
[facility] - Progress Note  Today's Date and Time: 7/5/2019, 12:10 PM    Impression :   · Rt hemiarthroplasty on 6-15-19  · S/p superficial incisional dehiscence  · S/P revision of  Rt hip hemiarthroplasty on 6-28-19  · Infected Rt hip hemiarthroplasty with Proteus and ESBL + Klebsiella  · Irregularities on cortex of femur, lateral aspect raising question of inflammatory process at that level  · ESRD on HD    Recommendations:     · On Cipro 500 mg po q day. No stop date  · Office f/up in 4 weeks with Dr Natalia Degroot for infection. Please call [phone] for appointment . Medical Decision Making/Summary/Discussion:7/5/2019     · S/P Rt hemiarthroplasty on 6-15-19. · Subsequently developed superficial incisional dehiscence. · Admitted 6-26-19 and underwent revision of  Rt hip hemiarthroplasty on 6-28-19. · Found to have an Infected Rt hip hemiarthroplasty with Proteus and ESBL + Klebsiella  · Patient started on meropenem for treatment in hospital.  · Will need long term po cipro for treatment and suppression of this infection  · Underlying ESRD on HD, chronic Rt leg lymphedema, chronic illness. Infection Control Recommendations   · [place] Precautions  · Contact Isolation + Klebsiella    Antimicrobial Stewardship Recommendations   · Targeted therapy  · PK dosing    Coordination of Outpatient Care:   · Estimated Length of IV antimicrobials: None  · Patient will need Midline Catheter Insertion: no  · Patient will need: Home IV , [place],  SNF,  LTAC:TBD  · Patient will need outpatient wound care:Yes    Chief complaint/reason for consultation:   · Infected Rt Hip hemiarthroplasty    History of Present Illness:   Sharmin Pena is a 68y.o.-year-old  male who was initially admitted on 6/26/2019. Patient seen at the request of Dr Juan Munoz. INITIAL HISTORY:     Patient with multiple medical problems.   Treated by my service in 2017 and 2018 because of S aureus DRAINAGE Right 6/26/2019    HIP INCISION AND DRAINAGE performed by Andrez Dobbs DO at [address] [place] [address] Right 3/23/2017    KNEE ARTHROSCOPY,EXTENSIVE DEBRIDEMENT PARTIAL PROXIMAL AND MEDIAL MENISECTOMY  performed by Miriam Romero MD at [facility] Right 06/28/2019    STAGE 1 TOTAL HIP REVISION ARTHROPLASTY  WITH REMEDIES AND CEMENT      REVISION TOTAL HIP ARTHROPLASTY Right 6/28/2019    STAGE 1 TOTAL HIP REVISION ARTHROPLASTY  WITH REMEDIES AND CEMENT  SMITH & NEPHEW,  REMOVAL OF HARDWARE , FEMORAL HEAD AND STEM .  performed by [facility]bs [facility] at [facility]      TRACHEOSTOMY  summer 2013    VENA CAVA FILTER PLACEMENT         Medications:      ciprofloxacin  500 mg Oral Daily    [START ON 7/8/2019] darbepoetin jean pierre-polysorbate  100 mcg Subcutaneous Weekly - Monday    midodrine  10 mg Oral TID WC    insulin lispro  0-6 Units Subcutaneous TID WC    insulin lispro  0-3 Units Subcutaneous Nightly    vitamin D  1,000 Units Oral Daily    levothyroxine  50 mcg Oral Daily    pantoprazole  40 mg Oral QAM AC    isosorbide mononitrate  30 mg Oral Daily    lamoTRIgine  100 mg Oral Daily    venlafaxine  75 mg Oral Daily    ARIPiprazole  5 mg Oral Daily    apixaban  5 mg Oral BID    Ferric Citrate  210 mg Oral BID    furosemide  20 mg Oral BID    sodium chloride flush  10 mL Intravenous 2 times per day    acetaminophen  500 mg Oral Q4H       Social History:     Social History     Socioeconomic History    Marital status:      Spouse name: Not on file    Number of children: Not on file    Years of education: Not on file    Highest education level: Not on file   Occupational History    Not on file   Social Needs    Financial resource strain: Not on file    Food insecurity:     Worry: Not on file     Inability: Not on file    Transportation needs:     Medical: Not on file     Non-medical: Not on file Tobacco Use    Smoking status: Never Smoker    Smokeless tobacco: Never Used   Substance and Sexual Activity    Alcohol use: No    Drug use: No     Comment: smoked pipe years ago    Sexual activity: Not on file   Lifestyle    Physical activity:     Days per week: Not on file     Minutes per session: Not on file    Stress: Not on file   Relationships    Social connections:     Talks on phone: Not on file     Gets together: Not on file     Attends [employer]ism service: Not on file     Active member of club or organization: Not on file     Attends meetings of clubs or organizations: Not on file     Relationship status: Not on file    Intimate partner violence:     Fear of current or ex partner: Not on file     Emotionally abused: Not on file     Physically abused: Not on file     Forced sexual activity: Not on file   Other Topics Concern    Not on file   Social History Narrative    ** Merged History Encounter **            Family History:     Family History   Problem Relation Age of Onset    Coronary Art Dis Father     Cancer Sister         Allergies:   Bactrim [sulfamethoxazole-trimethoprim]     Review of Systems:   Constitutional: No fevers or chills. No systemic complaints  Head: No headaches  Eyes: No double vision or blurry vision. No conjunctival inflammation. ENT: No sore throat or runny nose. . No hearing loss, tinnitus or vertigo. Cardiovascular: No chest pain or palpitations. No shortness of breath. No GUZMAN  Lung: No shortness of breath or cough. No sputum production  Abdomen: No nausea, vomiting, diarrhea, or abdominal pain. Gillie Drones No cramps. Genitourinary: ESRD on HD  Musculoskeletal: No muscle aches or pains. No joint effusions, swelling or deformities  Hematologic: No bleeding or bruising. Neurologic: No headache, weakness, numbness, or tingling. Integument: No rash, no ulcers. Rt hip incision. Multiple echymoses  Psychiatric: No depression.    Endocrine: No polyuria, no polydipsia, no

## 2019-07-05 NOTE — PROGRESS NOTES
filter placement, Hx of blood clots, Hyperkalemia, Hyperlipidemia, Hypernatremia, Hypertension, Hypocalcemia, Hypovolemic shock (HCC), Hypoxia, Irregular heartbeat, Knee effusion, right, Left leg cellulitis, Lower GI bleed, MDRO (multiple drug resistant organisms) resistance, Metabolic acidosis, MRSA (methicillin resistant staph aureus) culture positive, On continuous oral anticoagulation, NADIRA on CPAP, Other specified hypothyroidism, Parietal lobe infarction ([address] Pneumonia, Post traumatic encephalopathy, Proteinuria, Pyogenic arthritis of right knee joint ([address] Pyrexia, Renal insufficiency, Renal lesion, Respiratory failure, acute ([address] Right knee pain, SAH (subarachnoid hemorrhage) ([address] Secondary hyperparathyroidism of renal origin ([address] Seizure disorder ([address] Stercoral ulcer of rectum, Streptococcal infection, Subarachnoid bleed ([address] Subdural bleeding ([address] Thyroid disease, Traumatic cerebral hemorrhage ([address] Type 2 diabetes mellitus with left diabetic foot ulcer ([address] Unspecified cerebral artery occlusion with cerebral infarction, Venous stasis dermatitis, and Venous stasis dermatitis of both lower extremities. has a past surgical history that includes Gastric bypass surgery; Vena Cava Filter Placement; Foot Debridement (Left); Foot surgery (Right); Tonsillectomy; Colonoscopy; skin biopsy; Dilatation, esophagus; tracheostomy (summer 2013); Gastrostomy tube placement (summer 2013); Abdomen surgery; Cardiac catheterization; pr knee scope,diagnostic (Right, 3/23/2017); HEMIARTHROPLASTY HIP (Right, 5/15/2019); incision and drainage (Right, 6/26/2019); Revision total hip arthroplasty (Right, 06/28/2019); and Revision total hip arthroplasty (Right, 6/28/2019).     Restrictions  Restrictions/Precautions  Restrictions/Precautions: Weight Bearing, Isolation  Required Braces or Orthoses?: No  Lower Extremity Weight Bearing Restrictions  Right Lower Extremity Weight Bearing: Non Weight Bearing  Subjective General  Response To Previous Treatment: Patient with no complaints from previous session. Family / Caregiver Present: Yes(Multiple family memebers.)  Subjective  Subjective: RN and and Pt agreeable to PT.pt alert in bed with family upon arrival. Retired Pt to bed weith call light in reach, other staffs in room. Pain Screening  Patient Currently in Pain: No  Vital Signs  Patient Currently in Pain: No       Orientation  Orientation  Overall Orientation Status: Within Functional Limits  Cognition      Objective   Bed mobility  Rolling to Left: Contact guard assistance  Rolling to Right: Contact guard assistance  Supine to Sit: Moderate assistance;2 Person assistance  Sit to Supine: Maximum assistance;2 Person assistance  Scooting: Moderate assistance  Comment: HOB elavated, Increase time and effort noted. Cues for hand placement and sequencing . Transfers  Comment: Multple failed attempts. Pt unable to maintain weight bearing status on RLE. Ambulation  Ambulation?: No     Balance  Posture: Fair  Sitting - Static: Fair;+  Sitting - Dynamic: Fair;-  Comments: Pt sat at EOB for ~20minutes  with BUE support on bed, while demonstrating and foward head posture and Lateral Right lean. cues provided for posture with very little return.   Exercises  Hamstring Sets: 10reps  Gluteal Sets: 10reps  Ankle Pumps: 10reps  Core Strengthenin mins EOB         Goals  Short term goals  Time Frame for Short term goals: 10 visits  Short term goal 1: sit to stand with min assist  Short term goal 2: bed to chair with min assist  Short term goal 3: bed mobility with min assist  Short term goal 4: exercise program x min assist  Patient Goals   Patient goals : Return home    Plan    Plan  Times per week: 1-2x daily  Current Treatment Recommendations: Transfer Training, Endurance Training, Pain Management, Gait Training, Functional Mobility Training, Stair training, Safety Education & Training  Safety Devices  Type of devices: Bed alarm

## 2019-07-05 NOTE — PROGRESS NOTES
°C)    Recent Labs     07/04/19  1559 07/04/19  2219 07/05/19  0637 07/05/19  1051   POCGLU 169* 107 103 225*       I/O (24Hr): No intake or output data in the 24 hours ending 07/05/19 1251    Labs:    Hematology:  Recent Labs     07/03/19  0610 07/05/19  0501   WBC 4.3 4.7   RBC 2.52* 2.49*   HGB 7.3* 7.3*   HCT 25.4* 25.1*   .8 100.8   MCH 29.0 29.3   MCHC 28.7 29.1   RDW 17.7* 17.4*   * 126*   MPV 9.9 9.7     Chemistry:  Recent Labs     07/04/19  1449 07/05/19  0501   * 134*   K 4.4 4.9   CL 98 98   CO2 25 27   GLUCOSE 218* 99   BUN 16 21   CREATININE 2.53* 3.19*   ANIONGAP 10 9   LABGLOM 25* 19*   GFRAA 31* 23*   CALCIUM 7.7* 8.0*     Recent Labs     07/03/19  2233 07/04/19  0703 07/04/19  1559 07/04/19 2219 07/05/19  0637 07/05/19  1051   POCGLU 147* 94 169* 107 103 225*         Lab Results   Component Value Date/Time    SPECIAL NOT REPORTED 06/26/2019 01:41 PM     Lab Results   Component Value Date/Time    CULTURE PROTEUS MIRABILIS LIGHT GROWTH (A) 06/26/2019 01:41 PM    CULTURE (A) 06/26/2019 01:41 PM     KLEBSIELLA PNEUMONIAE LIGHT GROWTH THIS ORGANISM IS AN EXTENDED-SPECTRUM BETA-LACTAMASE  AND RESISTANCE TO THERAPY WITH PENICILLINS, CEPHALOSPORINS AND AZTREONAM IS EXPECTED. THESE ORGANISMS GENERALLY REMAIN SUSCEPTIBLE TO CARBAPENEMS. CONSIDER ID CONSULTATION. CULTURE NORMAL CIERA 06/26/2019 01:41 PM    CULTURE NO ANAEROBIC ORGANISMS ISOLATED AT 5 DAYS (A) 06/26/2019 01:41 PM       Lab Results   Component Value Date    POCPH 7.46 02/28/2017    POCPCO2 36 02/28/2017    POCPO2 47 02/28/2017    POCHCO3 25.8 02/28/2017    NBEA NOT REPORTED 02/28/2017    PBEA 2 02/28/2017    BYO2PLO 27 02/28/2017    TQEU3IRP 85 02/28/2017    FIO2 21.0 02/28/2017       Radiology:    Xr Hip Right (2-3 Views)    Result Date: 6/28/2019  Status post hip arthroplasty revision. Localized area of irregular bone loss in the lateral femoral cortex is noted.   This may be related to prior hardware

## 2019-07-05 NOTE — PROGRESS NOTES
Occupational Therapy   Occupational Therapy Initial Assessment  Date: 2019   Patient Name: Olya Corona  MRN: [id number]     : 1950    Date of Service: 2019    Discharge Recommendations:    Further therapy recommended at discharge. Assessment   Performance deficits / Impairments: Decreased functional mobility ; Decreased high-level IADLs;Decreased ADL status; Decreased endurance;Decreased strength;Decreased balance;Decreased safe awareness;Decreased cognition  Assessment: Pt presents with limitations in functional mobility/ADL status. Pt to benefit from acute OT services while in the hospital and following discharge. Pt able to sit EOB with BUE supported on bed ~20 minutes with SBA. Pt reported not being able to attempt stand d/t inability to adhere to NWB precautions. Prognosis: Good  Decision Making: Medium Complexity  Patient Education: OT role/POC, NWB precautions, rolling technique, importance of safety, fair return. REQUIRES OT FOLLOW UP: Yes  Activity Tolerance  Activity Tolerance: Patient limited by pain; Patient limited by fatigue  Safety Devices  Safety Devices in place: Yes  Type of devices: All fall risk precautions in place; Patient at risk for falls; Left in bed;Call light within reach(Aide in room upon departure)  Restraints  Initially in place: No           Patient Diagnosis(es): The encounter diagnosis was Post-op pain.      has a past medical history of A-fib ([address] Acute encephalopathy, Acute ischemic stroke ([address] Acute metabolic encephalopathy, Acute on chronic congestive heart failure ([address] Acute on chronic diastolic CHF (congestive heart failure) ([address] Altered mental status, Anemia, Anemia associated with chronic renal failure, Aphasia, ARF (acute renal failure) (HCC), Arteriovenous fistula for hemodialysis in place, [facility]), Arthritis, Bradyarrhythmia, Cellulitis, Cerebral contusion ([address] CHF (congestive heart failure) ([address] Chronic atrial fibrillation ([address] Chronic kidney disease, Chronic pain of right knee, CKD (chronic kidney disease) stage 4, GFR 15-29 ml/min ([facility]), Coagulation defect ([address] Diabetes mellitus ([address] Diabetes mellitus type 2 in obese ([address] Diarrhea, DVT (deep venous thrombosis) ([facility]), Elevated C-reactive protein (CRP), ESRD (end stage renal disease) ([address] Essential hypertension, Fever, Foot ulcer due to secondary DM ([address] GERD (gastroesophageal reflux disease), Hematochezia, History of cerebral infarction, History of DVT (deep vein thrombosis), History of superior vena cava filter placement, Hx of blood clots, Hyperkalemia, Hyperlipidemia, Hypernatremia, Hypertension, Hypocalcemia, Hypovolemic shock ([address] Hypoxia, Irregular heartbeat, Knee effusion, right, Left leg cellulitis, Lower GI bleed, MDRO (multiple drug resistant organisms) resistance, Metabolic acidosis, MRSA (methicillin resistant staph aureus) culture positive, On continuous oral anticoagulation, NADIRA on CPAP, Other specified hypothyroidism, Parietal lobe infarction ([address] Pneumonia, Post traumatic encephalopathy, Proteinuria, Pyogenic arthritis of right knee joint ([address] Pyrexia, Renal insufficiency, Renal lesion, Respiratory failure, acute ([address] Right knee pain, SAH (subarachnoid hemorrhage) ([address] Secondary hyperparathyroidism of renal origin ([address] Seizure disorder ([address] Stercoral ulcer of rectum, Streptococcal infection, Subarachnoid bleed ([address] Subdural bleeding ([address] Thyroid disease, Traumatic cerebral hemorrhage ([address] Type 2 diabetes mellitus with left diabetic foot ulcer ([address] Unspecified cerebral artery occlusion with cerebral infarction, Venous stasis dermatitis, and Venous stasis dermatitis of both lower extremities. has a past surgical history that includes Gastric bypass surgery; Vena Cava Filter Placement; Foot Debridement (Left); Foot surgery (Right); Tonsillectomy; Colonoscopy; skin biopsy; Dilatation, esophagus; tracheostomy (summer 2013);  Gastrostomy tube placement Status: Within Functional Limits  Observation/Palpation  Posture: Fair  Balance  Sitting Balance: Stand by assistance(Pt sat unsupported/supported with BUE on bed ~20 minutes. No SOB/LOB noted. Denies dizziness.)  Standing Balance: Unable to assess(comment)(Not attempted this date d/t pt's report of being unable to adhere to NWB status)     ADL  Feeding: Supervision;Setup  Grooming: Supervision;Setup(Pt completed oral hygiene while seated in bed with set up assist. )  UE Bathing: Minimal assistance;Setup  LE Bathing: Maximum assistance;Setup  UE Dressing: Setup;Minimal assistance  LE Dressing: Maximum assistance;Setup(Pt required assistance to don hospital socks while seated EOB.)  Toileting: Maximum assistance     Tone RUE  RUE Tone: Normotonic  Tone LUE  LUE Tone: Normotonic  Coordination  Coordination and Movement description: Decreased accuracy; Decreased speed     Bed mobility  Rolling to Right: Moderate assistance  Supine to Sit: Moderate assistance;2 Person assistance  Sit to Supine: Maximum assistance;2 Person assistance  Scooting: Moderate assistance  Comment: HOB raised, use of bedrails, increased time to complete for all bed mobility  Transfers  Transfer Comments: Not attempted d/t pt reporting being unable to maintain NWB status of RLE. Cognition  Overall Cognitive Status: Exceptions  Following Commands: Follows multistep commands with increased time; Follows multistep commands with repitition  Attention Span: Difficulty attending to directions  Safety Judgement: Decreased awareness of need for assistance  Problem Solving: Assistance required to generate solutions;Assistance required to correct errors made  Insights: Decreased awareness of deficits  Initiation: Requires cues for some  Sequencing: Requires cues for some                 LUE AROM (degrees)  LUE AROM : WFL  RUE AROM (degrees)  RUE AROM : WFL  LUE Strength  Gross LUE Strength: WFL  LUE Strength Comment: Gross LUE strength 3+/5  RUE

## 2019-07-05 NOTE — PROGRESS NOTES
Subjective:  Patient was sitting in his bed. Family was present at his bedside. He was alert and awake. He was dialyzed yesterday and tolerated well  Objective:  Vitals:    07/05/19 1130   BP: (!) 153/41   Pulse:    Resp:    Temp:    SpO2:    Neck was supple  Chest bilateral air entry and clear to auscultation  Abdomen: Soft nontender bowel sounds was positive  Extremities trace edema  Labs:  Results for Sendy Ledesma (MRN [id number]) as of 7/5/2019 13:21Results for Sendy Ledesma (MRN [id number]) as of 7/5/2019 13:21   Ref. Range 7/5/2019 05:01   WBC Latest Ref Range: 3.5 - 11.3 k/uL 4.7   RBC Latest Ref Range: 4.21 - 5.77 m/uL 2.49 (L)   Hemoglobin Quant Latest Ref Range: 13.0 - 17.0 g/dL 7.3 (L)   Hematocrit Latest Ref Range: 40.7 - 50.3 % 25.1 (L)   MCV Latest Ref Range: 82.6 - 102.9 fL 100.8   MCH Latest Ref Range: 25.2 - 33.5 pg 29.3   MCHC Latest Ref Range: 28.4 - 34.8 g/dL 29.1   MPV Latest Ref Range: 8.1 - 13.5 fL 9.7   RDW Latest Ref Range: 11.8 - 14.4 % 17.4 (H)   Platelet Count Latest Ref Range: 138 - 453 k/uL 126 (L)      Ref. Range 7/5/2019 05:01   Sodium Latest Ref Range: 135 - 144 mmol/L 134 (L)   Potassium Latest Ref Range: 3.7 - 5.3 mmol/L 4.9   Chloride Latest Ref Range: 98 - 107 mmol/L 98   CO2 Latest Ref Range: 20 - 31 mmol/L 27   BUN Latest Ref Range: 8 - 23 mg/dL 21   Creatinine Latest Ref Range: 0.70 - 1.20 mg/dL 3.19 (H)   Bun/Cre Ratio Latest Ref Range: 9 - 20  NOT REPORTED   Anion Gap Latest Ref Range: 9 - 17 mmol/L 9   GFR Non- Latest Ref Range: >60 mL/min 19 (L)   GFR  Latest Ref Range: >60 mL/min 23 (L)   Glucose Latest Ref Range: 70 - 99 mg/dL 99     Impression:  1. End-stage renal disease letter dialysis days are Tuesday Thursday Saturdays  2. Long-standing diabetes  3. Infected right hip hemiarthroplasty with Proteus and ESBL Klebsiella  4. Malnutrition    Recommendations:  1.   Dialysis in a.m.  2.  Discharge planning as per primary service  We will

## 2019-07-05 NOTE — DISCHARGE INSTR - COC
Continuity of Care Form    Patient Name: Lydia Zee   :  1950  MRN:  [id number]    Admit date:  2019  Discharge date:      Code Status Order: Full Code   Advance Directives:   [facility] Documentation     Date/Time Healthcare Directive Type of Healthcare Directive Copy in [address] Agent's Name Healthcare Agent's Phone Number    19  Yes, patient has an advance directive for healthcare treatment  --  -- Scanned into chart  Spouse  Kenna Young  [phone]     19 1604  Yes, patient has an advance directive for healthcare treatment  Living will;Durable power of  for health care  No, copy requested from family  --  --  --          Admitting Physician:  Maurice Goyal DO  PCP: Lolita Goss MD    Discharging Nurse: [facility] Unit/Room#: 5496/8251-12  Discharging Unit Phone Number: [phone]    Emergency Contact:   Extended Emergency Contact Information  Primary Emergency Contact: Imelda Kaufman  Address: [place], [address] [place] [address] Phone: [phone]  Relation: Spouse  Secondary Emergency Contact: [address] Phone: [phone]  Relation: Child    Past Surgical History:  Past Surgical History:   Procedure Laterality Date    ABDOMEN SURGERY      CARDIAC CATHETERIZATION      COLONOSCOPY      DILATATION, ESOPHAGUS      FOOT DEBRIDEMENT Left     FOOT SURGERY Right     #5 toe removed  , 1/2 toes #1, #2  partially removed    GASTRIC BYPASS SURGERY      GASTROSTOMY TUBE PLACEMENT  summer 2013    HEMIARTHROPLASTY HIP Right 5/15/2019    HIP HEMIARTHROPLASTY. Lateral [address] [facility]  [facility] will notify performed by Maurice Goyal DO at [address] Right 2019    HIP INCISION AND DRAINAGE performed by Maurice Goyal DO at [address] Right 3/23/2017    [place] [facility] [address] [employer] Beta Lactamase) No 06/26/19 06/28/19 Anaerobic and Aerobic Culture       MDRO (multi-drug resistant organism) No 06/26/19 06/28/19 Anaerobic and Aerobic Culture       Klebsiella hip    VRE No  04/02/14 Patel Mai RN       2013    MRSA No  01/16/14 Thomas Christian RN       Leg 9/2018          Nurse Assessment:  Last Vital Signs: BP (!) 124/43   Pulse 72   Temp 98 °F (36.7 °C) (Axillary)   Resp 16   Ht 6' (1.829 m)   Wt 228 lb 2.8 oz (103.5 kg)   SpO2 98%   BMI 30.95 kg/m²     Last documented pain score (0-10 scale): Pain Level: 5  Last Weight:   Wt Readings from Last 1 Encounters:   07/02/19 228 lb 2.8 oz (103.5 kg)     Mental Status:  disoriented and alert    IV Access:  none    Nursing Mobility/ADLs:  Walking   Dependent  Transfer  Dependent  Bathing  Dependent  Dressing  Dependent  Toileting  Assisted  Feeding  Assisted  Med Admin  Independent  Med Delivery   whole    Wound Care Documentation and Therapy:  Wound 07/02/19 Heel Left (Active)   Wound Image   7/2/2019  3:13 PM   Wound Pressure Stage  3 7/7/2019  8:00 AM   Dressing Status Clean;Dry; Intact 7/8/2019  7:36 AM   Dressing Changed Changed/New 7/5/2019 10:55 AM   Dressing/Treatment Foam;Medihoney 7/8/2019  7:36 AM   Wound Cleansed Rinsed/Irrigated with saline 7/5/2019 10:55 AM   Wound Length (cm) 1.5 cm 7/2/2019  3:13 PM   Wound Width (cm) 1.5 cm 7/2/2019  3:13 PM   Wound Depth (cm) 0.3 cm 7/2/2019  3:13 PM   Wound Surface Area (cm^2) 2.25 cm^2 7/2/2019  3:13 PM   Wound Volume (cm^3) 0.68 cm^3 7/2/2019  3:13 PM   Wound Assessment Dry 7/8/2019  7:36 AM   Drainage Amount Scant 7/8/2019  7:36 AM   Drainage Description Serous 7/8/2019  7:36 AM   Odor None 7/8/2019  7:36 AM   Licha-wound Assessment Pink 7/8/2019  7:36 AM   Number of days: 6       Wound 07/02/19 Heel Right (Active)   Wound Image   7/2/2019  3:13 PM   Wound Pressure Unstageable 7/7/2019  8:00 AM   Dressing Status Other (Comment) 7/8/2019  7:36 AM   Dressing/Treatment Open to air

## 2019-07-06 ENCOUNTER — APPOINTMENT (OUTPATIENT)
Dept: DIALYSIS | Age: 69
DRG: 466 | End: 2019-07-06
Attending: ORTHOPAEDIC SURGERY
Payer: MEDICARE

## 2019-07-06 LAB
ABSOLUTE EOS #: 0.12 K/UL (ref 0–0.44)
ABSOLUTE IMMATURE GRANULOCYTE: 0.04 K/UL (ref 0–0.3)
ABSOLUTE LYMPH #: 0.6 K/UL (ref 1.1–3.7)
ABSOLUTE MONO #: 0.28 K/UL (ref 0.1–1.2)
ANION GAP SERPL CALCULATED.3IONS-SCNC: 13 MMOL/L (ref 9–17)
BASOPHILS # BLD: 1 % (ref 0–2)
BASOPHILS ABSOLUTE: 0.04 K/UL (ref 0–0.2)
BUN BLDV-MCNC: 30 MG/DL (ref 8–23)
BUN/CREAT BLD: ABNORMAL (ref 9–20)
CALCIUM SERPL-MCNC: 8.1 MG/DL (ref 8.6–10.4)
CHLORIDE BLD-SCNC: 98 MMOL/L (ref 98–107)
CO2: 24 MMOL/L (ref 20–31)
CREAT SERPL-MCNC: 4.12 MG/DL (ref 0.7–1.2)
DIFFERENTIAL TYPE: ABNORMAL
EOSINOPHILS RELATIVE PERCENT: 3 % (ref 1–4)
GFR AFRICAN AMERICAN: 17 ML/MIN
GFR NON-AFRICAN AMERICAN: 14 ML/MIN
GFR SERPL CREATININE-BSD FRML MDRD: ABNORMAL ML/MIN/{1.73_M2}
GFR SERPL CREATININE-BSD FRML MDRD: ABNORMAL ML/MIN/{1.73_M2}
GLUCOSE BLD-MCNC: 134 MG/DL (ref 75–110)
GLUCOSE BLD-MCNC: 89 MG/DL (ref 70–99)
GLUCOSE BLD-MCNC: 97 MG/DL (ref 75–110)
HCT VFR BLD CALC: 24.1 % (ref 40.7–50.3)
HEMOGLOBIN: 6.9 G/DL (ref 13–17)
IMMATURE GRANULOCYTES: 1 %
LYMPHOCYTES # BLD: 15 % (ref 24–43)
MCH RBC QN AUTO: 29.5 PG (ref 25.2–33.5)
MCHC RBC AUTO-ENTMCNC: 28.6 G/DL (ref 28.4–34.8)
MCV RBC AUTO: 103 FL (ref 82.6–102.9)
MONOCYTES # BLD: 7 % (ref 3–12)
MORPHOLOGY: ABNORMAL
NRBC AUTOMATED: 0 PER 100 WBC
PDW BLD-RTO: 17.6 % (ref 11.8–14.4)
PLATELET # BLD: 121 K/UL (ref 138–453)
PLATELET ESTIMATE: ABNORMAL
PMV BLD AUTO: 9.7 FL (ref 8.1–13.5)
POTASSIUM SERPL-SCNC: 4.2 MMOL/L (ref 3.7–5.3)
RBC # BLD: 2.34 M/UL (ref 4.21–5.77)
RBC # BLD: ABNORMAL 10*6/UL
SEG NEUTROPHILS: 73 % (ref 36–65)
SEGMENTED NEUTROPHILS ABSOLUTE COUNT: 2.92 K/UL (ref 1.5–8.1)
SODIUM BLD-SCNC: 135 MMOL/L (ref 135–144)
WBC # BLD: 4 K/UL (ref 3.5–11.3)
WBC # BLD: ABNORMAL 10*3/UL

## 2019-07-06 PROCEDURE — 80048 BASIC METABOLIC PNL TOTAL CA: CPT

## 2019-07-06 PROCEDURE — 82947 ASSAY GLUCOSE BLOOD QUANT: CPT

## 2019-07-06 PROCEDURE — 36415 COLL VENOUS BLD VENIPUNCTURE: CPT

## 2019-07-06 PROCEDURE — 97530 THERAPEUTIC ACTIVITIES: CPT

## 2019-07-06 PROCEDURE — 90935 HEMODIALYSIS ONE EVALUATION: CPT

## 2019-07-06 PROCEDURE — 1200000000 HC SEMI PRIVATE

## 2019-07-06 PROCEDURE — 2580000003 HC RX 258: Performed by: STUDENT IN AN ORGANIZED HEALTH CARE EDUCATION/TRAINING PROGRAM

## 2019-07-06 PROCEDURE — 6370000000 HC RX 637 (ALT 250 FOR IP): Performed by: INTERNAL MEDICINE

## 2019-07-06 PROCEDURE — 85025 COMPLETE CBC W/AUTO DIFF WBC: CPT

## 2019-07-06 PROCEDURE — 6370000000 HC RX 637 (ALT 250 FOR IP): Performed by: STUDENT IN AN ORGANIZED HEALTH CARE EDUCATION/TRAINING PROGRAM

## 2019-07-06 PROCEDURE — 97110 THERAPEUTIC EXERCISES: CPT

## 2019-07-06 PROCEDURE — 99232 SBSQ HOSP IP/OBS MODERATE 35: CPT | Performed by: INTERNAL MEDICINE

## 2019-07-06 PROCEDURE — 99231 SBSQ HOSP IP/OBS SF/LOW 25: CPT | Performed by: INTERNAL MEDICINE

## 2019-07-06 RX ADMIN — ACETAMINOPHEN 500 MG: 500 TABLET ORAL at 16:26

## 2019-07-06 RX ADMIN — LEVOTHYROXINE SODIUM 50 MCG: 50 TABLET ORAL at 06:23

## 2019-07-06 RX ADMIN — ACETAMINOPHEN 500 MG: 500 TABLET ORAL at 06:23

## 2019-07-06 RX ADMIN — ACETAMINOPHEN 500 MG: 500 TABLET ORAL at 02:27

## 2019-07-06 RX ADMIN — ARIPIPRAZOLE 5 MG: 5 TABLET ORAL at 16:25

## 2019-07-06 RX ADMIN — FUROSEMIDE 20 MG: 20 TABLET ORAL at 21:48

## 2019-07-06 RX ADMIN — APIXABAN 5 MG: 5 TABLET, FILM COATED ORAL at 16:25

## 2019-07-06 RX ADMIN — ACETAMINOPHEN 500 MG: 500 TABLET ORAL at 21:48

## 2019-07-06 RX ADMIN — LAMOTRIGINE 100 MG: 100 TABLET ORAL at 16:25

## 2019-07-06 RX ADMIN — MIDODRINE HYDROCHLORIDE 10 MG: 5 TABLET ORAL at 16:24

## 2019-07-06 RX ADMIN — MIDODRINE HYDROCHLORIDE 10 MG: 5 TABLET ORAL at 07:51

## 2019-07-06 RX ADMIN — VENLAFAXINE 75 MG: 75 TABLET ORAL at 16:29

## 2019-07-06 RX ADMIN — VITAMIN D, TAB 1000IU (100/BT) 1000 UNITS: 25 TAB at 16:29

## 2019-07-06 RX ADMIN — APIXABAN 5 MG: 5 TABLET, FILM COATED ORAL at 21:48

## 2019-07-06 RX ADMIN — PANTOPRAZOLE SODIUM 40 MG: 40 TABLET, DELAYED RELEASE ORAL at 06:23

## 2019-07-06 RX ADMIN — OXYCODONE HYDROCHLORIDE 10 MG: 5 TABLET ORAL at 16:26

## 2019-07-06 RX ADMIN — CIPROFLOXACIN 500 MG: 500 TABLET ORAL at 16:25

## 2019-07-06 RX ADMIN — Medication 10 ML: at 21:56

## 2019-07-06 ASSESSMENT — ENCOUNTER SYMPTOMS
CHEST TIGHTNESS: 0
EYE PAIN: 0
ABDOMINAL DISTENTION: 0
COLOR CHANGE: 0

## 2019-07-06 ASSESSMENT — PAIN DESCRIPTION - PROGRESSION: CLINICAL_PROGRESSION: NOT CHANGED

## 2019-07-06 ASSESSMENT — PAIN SCALES - GENERAL
PAINLEVEL_OUTOF10: 4
PAINLEVEL_OUTOF10: 7
PAINLEVEL_OUTOF10: 0
PAINLEVEL_OUTOF10: 4
PAINLEVEL_OUTOF10: 1
PAINLEVEL_OUTOF10: 2
PAINLEVEL_OUTOF10: 0
PAINLEVEL_OUTOF10: 8
PAINLEVEL_OUTOF10: 0
PAINLEVEL_OUTOF10: 8
PAINLEVEL_OUTOF10: 0

## 2019-07-06 ASSESSMENT — PAIN DESCRIPTION - ONSET: ONSET: ON-GOING

## 2019-07-06 ASSESSMENT — PAIN DESCRIPTION - LOCATION
LOCATION: HIP
LOCATION: HIP

## 2019-07-06 ASSESSMENT — PAIN DESCRIPTION - PAIN TYPE
TYPE: SURGICAL PAIN;ACUTE PAIN
TYPE: SURGICAL PAIN

## 2019-07-06 ASSESSMENT — PAIN DESCRIPTION - ORIENTATION
ORIENTATION: RIGHT
ORIENTATION: RIGHT

## 2019-07-06 ASSESSMENT — PAIN DESCRIPTION - DESCRIPTORS
DESCRIPTORS: ACHING;DISCOMFORT
DESCRIPTORS: DISCOMFORT

## 2019-07-06 ASSESSMENT — PAIN DESCRIPTION - FREQUENCY: FREQUENCY: INTERMITTENT

## 2019-07-06 NOTE — PROGRESS NOTES
file     Relationship status: Not on file    Intimate partner violence:     Fear of current or ex partner: Not on file     Emotionally abused: Not on file     Physically abused: Not on file     Forced sexual activity: Not on file   Other Topics Concern    Not on file   Social History Narrative    ** Merged History Encounter **            Family History:     Family History   Problem Relation Age of Onset    Coronary Art Dis Father     Cancer Sister         Allergies:   Bactrim [sulfamethoxazole-trimethoprim]     Review of Systems:   Review of Systems   Constitutional: Positive for activity change. HENT: Negative for congestion. Eyes: Negative for pain. Respiratory: Negative for chest tightness. Cardiovascular: Positive for leg swelling. Gastrointestinal: Negative for abdominal distention. Endocrine: Negative for polydipsia. Genitourinary: Negative for dysuria. Musculoskeletal: Positive for arthralgias. Skin: Positive for wound. Negative for color change. Allergic/Immunologic: Negative for food allergies. Neurological: Negative for dizziness. Hematological: Negative for adenopathy. Psychiatric/Behavioral: Negative for agitation. Physical Examination :     Patient Vitals for the past 8 hrs:   BP Temp Pulse Resp   07/06/19 1130 (!) 102/54 -- 74 --   07/06/19 1100 121/61 -- 75 --   07/06/19 1030 (!) 121/52 -- 62 --   07/06/19 1000 (!) 116/53 -- 65 --   07/06/19 0930 (!) 121/54 -- 60 --   07/06/19 0925 (!) 122/52 97.9 °F (36.6 °C) 69 16     Physical Exam   Constitutional: He is oriented to person, place, and time. He appears well-developed and well-nourished. HENT:   Head: Normocephalic and atraumatic. Eyes: Conjunctivae are normal. No scleral icterus. Neck: Neck supple. No tracheal deviation present. Cardiovascular: Exam reveals no friction rub. No murmur heard. Pulmonary/Chest: No stridor. No respiratory distress. Abdominal: Soft. He exhibits no distension.  There is no tenderness. Genitourinary:   Genitourinary Comments: Urine clear   Musculoskeletal: He exhibits edema. He exhibits no deformity. Right hip wound clean but has sero sang foul fluid   Neurological: He is alert and oriented to person, place, and time. No cranial nerve deficit. Skin: Skin is warm. He is not diaphoretic. No erythema. Psychiatric: He has a normal mood and affect. His behavior is normal.        Medical Decision Making -Laboratory:   I have independently reviewed/ordered the following labs:    CBC with Differential:   Recent Labs     07/05/19  0501 07/06/19  0546   WBC 4.7 4.0   HGB 7.3* 6.9*   HCT 25.1* 24.1*   * 121*   LYMPHOPCT 12* 15*   MONOPCT 5 7     BMP:   Recent Labs     07/05/19  0501 07/06/19  0546   * 135   K 4.9 4.2   CL 98 98   CO2 27 24   BUN 21 30*   CREATININE 3.19* 4.12*     Hepatic Function Panel: No results for input(s): PROT, LABALBU, BILIDIR, IBILI, BILITOT, ALKPHOS, ALT, AST in the last 72 hours. No results for input(s): RPR in the last 72 hours. No results for input(s): HIV in the last 72 hours. No results for input(s): BC in the last 72 hours.   Lab Results   Component Value Date    MUCUS NOT REPORTED 03/03/2017    RBC 2.34 07/06/2019    RBC 3.35 05/18/2012    TRICHOMONAS NOT REPORTED 03/03/2017    WBC 4.0 07/06/2019    YEAST NOT REPORTED 03/03/2017    TURBIDITY CLOUDY 03/03/2017     Lab Results   Component Value Date    CREATININE 4.12 07/06/2019    GLUCOSE 89 07/06/2019    GLUCOSE 135 05/18/2012       Medical Decision Making-Imaging:     EXAMINATION:   2 XRAY VIEWS OF THE RIGHT HIP       6/28/2019 3:21 pm       COMPARISON:   None.       HISTORY:   ORDERING SYSTEM PROVIDED HISTORY: intra op   TECHNOLOGIST PROVIDED HISTORY:   intra op   Ordering Physician Provided Reason for Exam: intra op portable hip       FINDINGS:   4 intraoperative radiographs of the left hip and proximal femur.  Subtle   cortical irregularity and lucency along the medial midshaft of the femur. Cannot exclude a femoral fracture versus superimposition of osseous shadows. Continued follow-up is advised.           Impression   Possible periprosthetic fracture along the distal stem of the prosthesis. Additional imaging and short interval follow-up should be considered.       The findings were sent to the Radiology Results [address] at 3:32   pm on 6/28/2019to be communicated to a licensed caregiver.          EXAMINATION:   2 XRAY VIEWS OF THE RIGHT HIP       6/28/2019 4:25 pm       COMPARISON:   None.       HISTORY:   ORDERING SYSTEM PROVIDED HISTORY: PACU please   TECHNOLOGIST PROVIDED HISTORY:   PACU please       FINDINGS:   Interval total hip arthroplasty revision with new implant placement.  There   is localized irregular bone loss in the lateral proximal femoral cortex.       Overlying skin staples are present.  Extensive calcified atheromatous plaque   is present.           Impression   Status post hip arthroplasty revision.  Localized area of irregular bone loss   in the lateral femoral cortex is noted.  This may be related to prior   hardware complication.  Underlying inflammatory process or infection should   also be considered in the appropriate clinical setting. Medical Decision [id number]:   6/29/2019  4:03 PM - Jones, Jerome Incoming Lab Results From [facility]            Specimen Information: Joint, Hip; Swab         Component Collected Lab   Specimen Description 06/26/2019  1:[address]  [address]   . HIP, JOINT RT HIP SWAB    Special Requests 06/26/2019  1:[address]  [address]   NOT REPORTED    Direct Exam Abnormal  06/26/2019  1:41 PM [facility]    Direct Exam 06/26/2019  1:[address]  [address]   NO BACTERIA SEEN    Culture Abnormal  06/26/2019  1:41 PM [address]    Culture Abnormal  06/26/2019  1:[address]  [address]

## 2019-07-06 NOTE — PROGRESS NOTES
for dizziness, headache    Medications: Allergies:     Allergies   Allergen Reactions    Bactrim [Sulfamethoxazole-Trimethoprim]        Current Meds:   Scheduled Meds:    ciprofloxacin  500 mg Oral Daily    [START ON 7/8/2019] darbepoetin jean pierre-polysorbate  100 mcg Subcutaneous Weekly - Monday    midodrine  10 mg Oral TID     insulin lispro  0-6 Units Subcutaneous TID     insulin lispro  0-3 Units Subcutaneous Nightly    vitamin D  1,000 Units Oral Daily    levothyroxine  50 mcg Oral Daily    pantoprazole  40 mg Oral QAM AC    isosorbide mononitrate  30 mg Oral Daily    lamoTRIgine  100 mg Oral Daily    venlafaxine  75 mg Oral Daily    ARIPiprazole  5 mg Oral Daily    apixaban  5 mg Oral BID    Ferric Citrate  210 mg Oral BID    furosemide  20 mg Oral BID    sodium chloride flush  10 mL Intravenous 2 times per day    acetaminophen  500 mg Oral Q4H     Continuous Infusions:    dextrose       PRN Meds: oxyCODONE **OR** oxyCODONE, glucose, dextrose, glucagon (rDNA), dextrose, metoclopramide, sodium chloride flush, magnesium hydroxide    Data:     Past Medical History:   has a past medical history of A-fib ([facility] Acute encephalopathy, Acute ischemic stroke ([facility] Acute metabolic encephalopathy, Acute on chronic congestive heart failure ([facility]), Acute on chronic diastolic CHF (congestive heart failure) ([facility] Altered mental status, Anemia, Anemia associated with chronic renal failure, Aphasia, ARF (acute renal failure) ([facility]), Arteriovenous fistula for hemodialysis in place, primary ([facility] Arthritis, Bradyarrhythmia, Cellulitis, Cerebral contusion ([facility]), CHF (congestive heart failure) ([facility]), Chronic atrial fibrillation ([facility] Chronic kidney disease, Chronic pain of right knee, CKD (chronic kidney disease) stage 4, GFR 15-29 ml/min ([facility] Coagulation defect ([facility] Diabetes mellitus ([facility] Diabetes mellitus type 2 in obese ([facility] Diarrhea, DVT (deep venous thrombosis) ([facility] Elevated C-reactive

## 2019-07-06 NOTE — PROGRESS NOTES
Isolation  Required Braces or Orthoses?: No  Lower Extremity Weight Bearing Restrictions  Right Lower Extremity Weight Bearing: Non Weight Bearing  Subjective   General  Chart Reviewed: Yes  Response To Previous Treatment: Patient reporting fatigue but able to participate. Family / Caregiver Present: Yes(wife and daughter)  Subjective  Subjective: RN and and Pt agreeable to PT.pt alert in bed with family upon arrival.   General Comment  Comments: Pt lefyt in bed with call light within reach, family present in room  Pain Screening  Patient Currently in Pain: Yes  Pain Assessment  Pain Assessment: 0-10  Pain Level: 7  Pain Type: Surgical pain;Acute pain  Pain Location: Hip  Pain Orientation: Right  Pain Descriptors: Aching;Discomfort  Pain Frequency: Intermittent  Pain Onset: On-going  Clinical Progression: Not changed  Non-Pharmaceutical Pain Intervention(s): Ambulation/Increased Activity; Therapeutic presence  Response to Pain Intervention: Patient Satisfied  Vital Signs  Patient Currently in Pain: Yes       Orientation  Orientation  Overall Orientation Status: Within Functional Limits  Cognition      Objective   Bed mobility  Rolling to Left: Contact guard assistance  Rolling to Right: Contact guard assistance  Supine to Sit: Moderate assistance(for trunk progression and management of R LE)  Sit to Supine: Moderate assistance  Scooting: Moderate assistance  Comment: HOB elevated, increased time to complete. VCs for sequencing and progression  Transfers  Comment: not assessed, pt declined to attempt STS secondary to pain and faitgue  Ambulation  Ambulation?: No     Balance  Posture: Fair  Sitting - Static: Fair;+  Sitting - Dynamic: Fair  Comments: Pt sat at EOB for ~21 minutes with BUE support on bed, with close SBA  Exercises  Seated LE exercise program: Long Arc Quads, hip abduction/adduction, heel/toe raises, and marches.  Reps: 10x (AAROM for R LE)  Supine Exercises: Gluteal sets,  Heel Slides,  Quad Sets,  SLR.

## 2019-07-06 NOTE — PLAN OF CARE
Problem: Falls - Risk of:  Goal: Will remain free from falls  Description  Will remain free from falls  7/6/2019 1914 by Tayo Roach RN  Outcome: Met This Shift  7/6/2019 0739 by Kiley Hollis RN  Outcome: Met This Shift  Goal: Absence of physical injury  Description  Absence of physical injury  7/6/2019 1914 by Tayo Roach RN  Outcome: Met This Shift  7/6/2019 0739 by Kiley Hollis RN  Outcome: Met This Shift     Problem: Pain:  Goal: Pain level will decrease  Description  Pain level will decrease  7/6/2019 1914 by Tayo Roach RN  Outcome: Ongoing  7/6/2019 0739 by Kiley Hollis RN  Outcome: Ongoing  Goal: Control of acute pain  Description  Control of acute pain  7/6/2019 1914 by Tayo Roach RN  Outcome: Ongoing  7/6/2019 0739 by Kiley Hollis RN  Outcome: Ongoing  Goal: Control of chronic pain  Description  Control of chronic pain  7/6/2019 1914 by Tayo Roach RN  Outcome: Ongoing  7/6/2019 0739 by Kiley Hollis RN  Outcome: Ongoing     Problem: Risk for Impaired Skin Integrity  Goal: Tissue integrity - skin and mucous membranes  Description  Structural intactness and normal physiological function of skin and  mucous membranes.   7/6/2019 1914 by Tayo Roach RN  Outcome: Ongoing  7/6/2019 0739 by Kiley Hollis RN  Outcome: Ongoing     Problem: Nutrition  Goal: Optimal nutrition therapy  7/6/2019 1914 by Tayo Roach RN  Outcome: Ongoing  7/6/2019 0739 by Kiley Hollis RN  Outcome: Ongoing     Problem: Musculor/Skeletal Functional Status  Goal: Highest potential functional level  7/6/2019 1914 by Tayo Roach RN  Outcome: Ongoing  7/6/2019 0739 by Kiley Hollis RN  Outcome: Ongoing     Problem: Confusion - Acute:  Goal: Absence of continued neurological deterioration signs and symptoms  Description  Absence of continued neurological deterioration signs and symptoms  7/6/2019 1914 by Tayo Roach RN  Outcome: Ongoing  7/6/2019 0739 by Kiley Hollis

## 2019-07-07 LAB
ABSOLUTE EOS #: 0.16 K/UL (ref 0–0.4)
ABSOLUTE IMMATURE GRANULOCYTE: 0 K/UL (ref 0–0.3)
ABSOLUTE LYMPH #: 0.56 K/UL (ref 1–4.8)
ABSOLUTE MONO #: 0.08 K/UL (ref 0.1–0.8)
ANION GAP SERPL CALCULATED.3IONS-SCNC: 15 MMOL/L (ref 9–17)
BASOPHILS # BLD: 0 % (ref 0–2)
BASOPHILS ABSOLUTE: 0 K/UL (ref 0–0.2)
BLOOD BANK SPECIMEN: NORMAL
BUN BLDV-MCNC: 23 MG/DL (ref 8–23)
BUN/CREAT BLD: ABNORMAL (ref 9–20)
CALCIUM SERPL-MCNC: 7.9 MG/DL (ref 8.6–10.4)
CHLORIDE BLD-SCNC: 99 MMOL/L (ref 98–107)
CO2: 22 MMOL/L (ref 20–31)
CREAT SERPL-MCNC: 3.06 MG/DL (ref 0.7–1.2)
DIFFERENTIAL TYPE: ABNORMAL
EOSINOPHILS RELATIVE PERCENT: 4 % (ref 1–4)
GFR AFRICAN AMERICAN: 25 ML/MIN
GFR NON-AFRICAN AMERICAN: 20 ML/MIN
GFR SERPL CREATININE-BSD FRML MDRD: ABNORMAL ML/MIN/{1.73_M2}
GFR SERPL CREATININE-BSD FRML MDRD: ABNORMAL ML/MIN/{1.73_M2}
GLUCOSE BLD-MCNC: 107 MG/DL (ref 70–99)
GLUCOSE BLD-MCNC: 128 MG/DL (ref 75–110)
HCT VFR BLD CALC: 24.6 % (ref 40.7–50.3)
HEMOGLOBIN: 6.9 G/DL (ref 13–17)
IMMATURE GRANULOCYTES: 0 %
LYMPHOCYTES # BLD: 14 % (ref 24–44)
MCH RBC QN AUTO: 29.1 PG (ref 25.2–33.5)
MCHC RBC AUTO-ENTMCNC: 28 G/DL (ref 28.4–34.8)
MCV RBC AUTO: 103.8 FL (ref 82.6–102.9)
MONOCYTES # BLD: 2 % (ref 1–7)
MORPHOLOGY: ABNORMAL
MORPHOLOGY: ABNORMAL
NRBC AUTOMATED: 0 PER 100 WBC
PDW BLD-RTO: 17.7 % (ref 11.8–14.4)
PLATELET # BLD: 114 K/UL (ref 138–453)
PLATELET ESTIMATE: ABNORMAL
PMV BLD AUTO: 9.6 FL (ref 8.1–13.5)
POTASSIUM SERPL-SCNC: 4.3 MMOL/L (ref 3.7–5.3)
RBC # BLD: 2.37 M/UL (ref 4.21–5.77)
RBC # BLD: ABNORMAL 10*6/UL
SEG NEUTROPHILS: 80 % (ref 36–66)
SEGMENTED NEUTROPHILS ABSOLUTE COUNT: 3.2 K/UL (ref 1.8–7.7)
SODIUM BLD-SCNC: 136 MMOL/L (ref 135–144)
WBC # BLD: 4 K/UL (ref 3.5–11.3)
WBC # BLD: ABNORMAL 10*3/UL

## 2019-07-07 PROCEDURE — 6370000000 HC RX 637 (ALT 250 FOR IP): Performed by: STUDENT IN AN ORGANIZED HEALTH CARE EDUCATION/TRAINING PROGRAM

## 2019-07-07 PROCEDURE — 82947 ASSAY GLUCOSE BLOOD QUANT: CPT

## 2019-07-07 PROCEDURE — 97530 THERAPEUTIC ACTIVITIES: CPT

## 2019-07-07 PROCEDURE — 97110 THERAPEUTIC EXERCISES: CPT

## 2019-07-07 PROCEDURE — 86901 BLOOD TYPING SEROLOGIC RH(D): CPT

## 2019-07-07 PROCEDURE — 99231 SBSQ HOSP IP/OBS SF/LOW 25: CPT | Performed by: INTERNAL MEDICINE

## 2019-07-07 PROCEDURE — 1200000000 HC SEMI PRIVATE

## 2019-07-07 PROCEDURE — 86900 BLOOD TYPING SEROLOGIC ABO: CPT

## 2019-07-07 PROCEDURE — 36415 COLL VENOUS BLD VENIPUNCTURE: CPT

## 2019-07-07 PROCEDURE — 99232 SBSQ HOSP IP/OBS MODERATE 35: CPT | Performed by: INTERNAL MEDICINE

## 2019-07-07 PROCEDURE — 85025 COMPLETE CBC W/AUTO DIFF WBC: CPT

## 2019-07-07 PROCEDURE — 6370000000 HC RX 637 (ALT 250 FOR IP): Performed by: INTERNAL MEDICINE

## 2019-07-07 PROCEDURE — 86920 COMPATIBILITY TEST SPIN: CPT

## 2019-07-07 PROCEDURE — 86850 RBC ANTIBODY SCREEN: CPT

## 2019-07-07 PROCEDURE — 2580000003 HC RX 258: Performed by: STUDENT IN AN ORGANIZED HEALTH CARE EDUCATION/TRAINING PROGRAM

## 2019-07-07 PROCEDURE — 80048 BASIC METABOLIC PNL TOTAL CA: CPT

## 2019-07-07 RX ORDER — 0.9 % SODIUM CHLORIDE 0.9 %
250 INTRAVENOUS SOLUTION INTRAVENOUS ONCE
Status: DISCONTINUED | OUTPATIENT
Start: 2019-07-07 | End: 2019-07-08 | Stop reason: HOSPADM

## 2019-07-07 RX ADMIN — CIPROFLOXACIN 500 MG: 500 TABLET ORAL at 10:18

## 2019-07-07 RX ADMIN — Medication 10 ML: at 10:20

## 2019-07-07 RX ADMIN — FUROSEMIDE 20 MG: 20 TABLET ORAL at 10:19

## 2019-07-07 RX ADMIN — MIDODRINE HYDROCHLORIDE 10 MG: 5 TABLET ORAL at 10:18

## 2019-07-07 RX ADMIN — VENLAFAXINE 75 MG: 75 TABLET ORAL at 12:49

## 2019-07-07 RX ADMIN — APIXABAN 5 MG: 5 TABLET, FILM COATED ORAL at 21:34

## 2019-07-07 RX ADMIN — LEVOTHYROXINE SODIUM 50 MCG: 50 TABLET ORAL at 10:19

## 2019-07-07 RX ADMIN — VITAMIN D, TAB 1000IU (100/BT) 1000 UNITS: 25 TAB at 10:18

## 2019-07-07 RX ADMIN — OXYCODONE HYDROCHLORIDE 5 MG: 5 TABLET ORAL at 17:55

## 2019-07-07 RX ADMIN — ISOSORBIDE MONONITRATE 30 MG: 30 TABLET ORAL at 10:18

## 2019-07-07 RX ADMIN — Medication 10 ML: at 21:34

## 2019-07-07 RX ADMIN — ACETAMINOPHEN 500 MG: 500 TABLET ORAL at 17:55

## 2019-07-07 RX ADMIN — APIXABAN 5 MG: 5 TABLET, FILM COATED ORAL at 10:19

## 2019-07-07 RX ADMIN — ACETAMINOPHEN 500 MG: 500 TABLET ORAL at 10:19

## 2019-07-07 RX ADMIN — LAMOTRIGINE 100 MG: 100 TABLET ORAL at 10:19

## 2019-07-07 RX ADMIN — PANTOPRAZOLE SODIUM 40 MG: 40 TABLET, DELAYED RELEASE ORAL at 10:18

## 2019-07-07 RX ADMIN — OXYCODONE HYDROCHLORIDE 5 MG: 5 TABLET ORAL at 04:55

## 2019-07-07 RX ADMIN — ACETAMINOPHEN 500 MG: 500 TABLET ORAL at 04:55

## 2019-07-07 RX ADMIN — FUROSEMIDE 20 MG: 20 TABLET ORAL at 21:34

## 2019-07-07 RX ADMIN — OXYCODONE HYDROCHLORIDE 5 MG: 5 TABLET ORAL at 21:49

## 2019-07-07 RX ADMIN — OXYCODONE HYDROCHLORIDE 5 MG: 5 TABLET ORAL at 12:48

## 2019-07-07 RX ADMIN — ARIPIPRAZOLE 5 MG: 5 TABLET ORAL at 10:19

## 2019-07-07 RX ADMIN — MIDODRINE HYDROCHLORIDE 10 MG: 5 TABLET ORAL at 18:04

## 2019-07-07 RX ADMIN — ACETAMINOPHEN 500 MG: 500 TABLET ORAL at 21:34

## 2019-07-07 RX ADMIN — MIDODRINE HYDROCHLORIDE 10 MG: 5 TABLET ORAL at 12:55

## 2019-07-07 ASSESSMENT — PAIN SCALES - GENERAL
PAINLEVEL_OUTOF10: 8
PAINLEVEL_OUTOF10: 4
PAINLEVEL_OUTOF10: 8
PAINLEVEL_OUTOF10: 7
PAINLEVEL_OUTOF10: 7
PAINLEVEL_OUTOF10: 3
PAINLEVEL_OUTOF10: 8
PAINLEVEL_OUTOF10: 6
PAINLEVEL_OUTOF10: 4

## 2019-07-07 ASSESSMENT — ENCOUNTER SYMPTOMS
ABDOMINAL DISTENTION: 0
CHOKING: 0
CHEST TIGHTNESS: 0
EYE PAIN: 0
COLOR CHANGE: 0

## 2019-07-07 ASSESSMENT — PAIN DESCRIPTION - PROGRESSION: CLINICAL_PROGRESSION: NOT CHANGED

## 2019-07-07 ASSESSMENT — PAIN DESCRIPTION - PAIN TYPE
TYPE: SURGICAL PAIN
TYPE: SURGICAL PAIN

## 2019-07-07 ASSESSMENT — PAIN DESCRIPTION - FREQUENCY: FREQUENCY: INTERMITTENT

## 2019-07-07 ASSESSMENT — PAIN DESCRIPTION - LOCATION
LOCATION: HIP
LOCATION: HIP

## 2019-07-07 ASSESSMENT — PAIN DESCRIPTION - ONSET: ONSET: ON-GOING

## 2019-07-07 ASSESSMENT — PAIN DESCRIPTION - ORIENTATION
ORIENTATION: RIGHT
ORIENTATION: RIGHT

## 2019-07-07 ASSESSMENT — PAIN DESCRIPTION - DESCRIPTORS
DESCRIPTORS: ACHING
DESCRIPTORS: ACHING;DISCOMFORT

## 2019-07-07 NOTE — PROGRESS NOTES
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 Watch closely for tendinitis under Cipro-discussed with patient and family  Office FU in 4 weeks    Medical Decision Making/Summary/Discussion:7/7/2019     · S/P Rt hemiarthroplasty on 6-15-19. · Subsequently developed superficial incisional dehiscence. · Admitted 6-26-19 and underwent revision of  Rt hip hemiarthroplasty on 6-28-19. No overt purulence  · Found to have an Infected Rt hip hemiarthroplasty with Proteus and ESBL + Klebsiella  · Patient started on meropenem for treatment in hospital.  · Will need long term po cipro for treatment and suppression of this infection  · Underlying ESRD on HD, chronic Rt leg lymphedema, chronic illness. Infection Control Recommendations   · Gary Precautions  · Contact Isolation + Klebsiella    Antimicrobial Stewardship Recommendations   · Targeted therapy  · PK dosing    Coordination of Outpatient Care:   · Estimated Length of IV antimicrobials: None  · Patient will need Midline Catheter Insertion: no  · Patient will need: Home IV , [place],  SNF,  LTAC:TBD  · Patient will need outpatient wound care:Yes    Chief complaint/reason for consultation:   · Infected Rt Hip hemiarthroplasty    History of Present Illness:   Lord Gonzalez is a 68y.o.-year-old  male who was initially admitted on 6/26/2019.  Patient arthritis of right knee joint ([address] 3/23/2017    Pyrexia 7/19/2013    Renal insufficiency     Renal lesion 3/3/2017    Respiratory failure, acute ([address] 7/7/2013    Right knee pain     SAH (subarachnoid hemorrhage) (HCC)     Secondary hyperparathyroidism of renal origin ([address] 12/16/2015    Seizure disorder ([address] 3/1/2017    Stercoral ulcer of rectum     Streptococcal infection     Subarachnoid bleed (HCC) 7/13/2013    Subdural bleeding ([address] 7/5/2013    Thyroid disease     Traumatic cerebral hemorrhage ([address] 7/7/2013    Type 2 diabetes mellitus with left diabetic foot ulcer ([address] 4/16/2014    Unspecified cerebral artery occlusion with cerebral infarction     Venous stasis dermatitis 5/28/2014    Venous stasis dermatitis of both lower extremities 5/28/2014       Past Surgical  History:     Past Surgical History:   Procedure Laterality Date    ABDOMEN SURGERY      CARDIAC CATHETERIZATION      COLONOSCOPY      DILATATION, ESOPHAGUS      FOOT DEBRIDEMENT Left     FOOT SURGERY Right     #5 toe removed  , 1/2 toes #1, #2  partially removed    GASTRIC BYPASS SURGERY      GASTROSTOMY TUBE PLACEMENT  summer 2013    HEMIARTHROPLASTY HIP Right 5/15/2019    HIP HEMIARTHROPLASTY. Lateral 30[address] table, Gayle Rep. Rob Madrid will notify performed by Arlin Crowell DO at [place] [address] Right 6/26/2019    HIP INCISION AND DRAINAGE performed by Arlin Crowell DO at [address] [place] [address] Right 3/23/2017    KNEE ARTHROSCOPY,EXTENSIVE DEBRIDEMENT PARTIAL PROXIMAL AND MEDIAL MENISECTOMY  performed by Keshav Appiah MD at [facility] Right 06/28/2019    STAGE 1 TOTAL HIP REVISION ARTHROPLASTY  WITH REMEDIES AND CEMENT      REVISION TOTAL HIP ARTHROPLASTY Right 6/28/2019    STAGE 1 TOTAL HIP REVISION ARTHROPLASTY  WITH REMEDIES AND CEMENT  SMITH & NEPHEW,  REMOVAL OF HARDWARE , FEMORAL HEAD AND STEM .  performed by Arlin Crowell DO at [facility] SKIN BIOPSY      TONSILLECTOMY      TRACHEOSTOMY  summer 2013    VENA CAVA FILTER PLACEMENT         Medications:      sodium chloride  250 mL Intravenous Once    ciprofloxacin  500 mg Oral Daily    [START ON 7/8/2019] darbepoetin jean pierre-polysorbate  100 mcg Subcutaneous Weekly - Monday    midodrine  10 mg Oral TID WC    insulin lispro  0-6 Units Subcutaneous TID WC    insulin lispro  0-3 Units Subcutaneous Nightly    vitamin D  1,000 Units Oral Daily    levothyroxine  50 mcg Oral Daily    pantoprazole  40 mg Oral QAM AC    isosorbide mononitrate  30 mg Oral Daily    lamoTRIgine  100 mg Oral Daily    venlafaxine  75 mg Oral Daily    ARIPiprazole  5 mg Oral Daily    apixaban  5 mg Oral BID    Ferric Citrate  210 mg Oral BID    furosemide  20 mg Oral BID    sodium chloride flush  10 mL Intravenous 2 times per day    acetaminophen  500 mg Oral Q4H       Social History:     Social History     Socioeconomic History    Marital status:      Spouse name: Not on file    Number of children: Not on file    Years of education: Not on file    Highest education level: Not on file   Occupational History    Not on file   Social Needs    Financial resource strain: Not on file    Food insecurity:     Worry: Not on file     Inability: Not on file    Transportation needs:     Medical: Not on file     Non-medical: Not on file   Tobacco Use    Smoking status: Never Smoker    Smokeless tobacco: Never Used   Substance and Sexual Activity    Alcohol use: No    Drug use: No     Comment: smoked pipe years ago    Sexual activity: Not on file   Lifestyle    Physical activity:     Days per week: Not on file     Minutes per session: Not on file    Stress: Not on file   Relationships    Social connections:     Talks on phone: Not on file     Gets together: Not on file     Attends [employer] service: Not on file     Active member of club or organization: Not on file     Attends meetings of clubs or Comments: Urine clear   Musculoskeletal: He exhibits edema. He exhibits no deformity. Right hip wound clean but has sero sang foul fluid   Neurological: He is alert and oriented to person, place, and time. No cranial nerve deficit. Skin: Skin is warm. He is not diaphoretic. No erythema. Psychiatric: He has a normal mood and affect. His behavior is normal.        Medical Decision Making -Laboratory:   I have independently reviewed/ordered the following labs:    CBC with Differential:   Recent Labs     07/06/19  0546 07/07/19  0605   WBC 4.0 4.0   HGB 6.9* 6.9*   HCT 24.1* 24.6*   * 114*   LYMPHOPCT 15* 14*   MONOPCT 7 2     BMP:   Recent Labs     07/06/19  0546 07/07/19  0605    136   K 4.2 4.3   CL 98 99   CO2 24 22   BUN 30* 23   CREATININE 4.12* 3.06*     Hepatic Function Panel: No results for input(s): PROT, LABALBU, BILIDIR, IBILI, BILITOT, ALKPHOS, ALT, AST in the last 72 hours. No results for input(s): RPR in the last 72 hours. No results for input(s): HIV in the last 72 hours. No results for input(s): BC in the last 72 hours. Lab Results   Component Value Date    MUCUS NOT REPORTED 03/03/2017    RBC 2.37 07/07/2019    RBC 3.35 05/18/2012    TRICHOMONAS NOT REPORTED 03/03/2017    WBC 4.0 07/07/2019    YEAST NOT REPORTED 03/03/2017    TURBIDITY CLOUDY 03/03/2017     Lab Results   Component Value Date    CREATININE 3.06 07/07/2019    GLUCOSE 107 07/07/2019    GLUCOSE 135 05/18/2012       Medical Decision Making-Imaging:     EXAMINATION:   2 XRAY VIEWS OF THE RIGHT HIP       6/28/2019 3:21 pm       COMPARISON:   None.       HISTORY:   ORDERING SYSTEM PROVIDED HISTORY: intra op   TECHNOLOGIST PROVIDED HISTORY:   intra op   Ordering Physician Provided Reason for Exam: intra op portable hip       FINDINGS:   4 intraoperative radiographs of the left hip and proximal femur.  Subtle   cortical irregularity and lucency along the medial midshaft of the femur.    Cannot exclude a femoral fracture

## 2019-07-08 VITALS
HEIGHT: 72 IN | SYSTOLIC BLOOD PRESSURE: 98 MMHG | DIASTOLIC BLOOD PRESSURE: 48 MMHG | BODY MASS INDEX: 30.91 KG/M2 | TEMPERATURE: 98.2 F | RESPIRATION RATE: 16 BRPM | WEIGHT: 228.18 LBS | HEART RATE: 78 BPM | OXYGEN SATURATION: 97 %

## 2019-07-08 LAB
ABSOLUTE EOS #: 0.11 K/UL (ref 0–0.44)
ABSOLUTE IMMATURE GRANULOCYTE: 0.04 K/UL (ref 0–0.3)
ABSOLUTE LYMPH #: 0.53 K/UL (ref 1.1–3.7)
ABSOLUTE MONO #: 0.27 K/UL (ref 0.1–1.2)
ANION GAP SERPL CALCULATED.3IONS-SCNC: 14 MMOL/L (ref 9–17)
BASOPHILS # BLD: 1 % (ref 0–2)
BASOPHILS ABSOLUTE: 0.04 K/UL (ref 0–0.2)
BUN BLDV-MCNC: 33 MG/DL (ref 8–23)
BUN/CREAT BLD: ABNORMAL (ref 9–20)
C-REACTIVE PROTEIN: 59.9 MG/L (ref 0–5)
CALCIUM SERPL-MCNC: 8.1 MG/DL (ref 8.6–10.4)
CHLORIDE BLD-SCNC: 100 MMOL/L (ref 98–107)
CO2: 22 MMOL/L (ref 20–31)
CREAT SERPL-MCNC: 3.79 MG/DL (ref 0.7–1.2)
DIFFERENTIAL TYPE: ABNORMAL
EOSINOPHILS RELATIVE PERCENT: 3 % (ref 1–4)
GFR AFRICAN AMERICAN: 19 ML/MIN
GFR NON-AFRICAN AMERICAN: 16 ML/MIN
GFR SERPL CREATININE-BSD FRML MDRD: ABNORMAL ML/MIN/{1.73_M2}
GFR SERPL CREATININE-BSD FRML MDRD: ABNORMAL ML/MIN/{1.73_M2}
GLUCOSE BLD-MCNC: 110 MG/DL (ref 75–110)
GLUCOSE BLD-MCNC: 121 MG/DL (ref 75–110)
GLUCOSE BLD-MCNC: 124 MG/DL (ref 75–110)
GLUCOSE BLD-MCNC: 137 MG/DL (ref 70–99)
HCT VFR BLD CALC: 24.8 % (ref 40.7–50.3)
HEMOGLOBIN: 6.7 G/DL (ref 13–17)
IMMATURE GRANULOCYTES: 1 %
LYMPHOCYTES # BLD: 14 % (ref 24–43)
MCH RBC QN AUTO: 29 PG (ref 25.2–33.5)
MCHC RBC AUTO-ENTMCNC: 27 G/DL (ref 28.4–34.8)
MCV RBC AUTO: 107.4 FL (ref 82.6–102.9)
MONOCYTES # BLD: 7 % (ref 3–12)
MORPHOLOGY: ABNORMAL
NRBC AUTOMATED: 0 PER 100 WBC
PDW BLD-RTO: 17.4 % (ref 11.8–14.4)
PLATELET # BLD: 110 K/UL (ref 138–453)
PLATELET ESTIMATE: ABNORMAL
PMV BLD AUTO: 9.4 FL (ref 8.1–13.5)
POTASSIUM SERPL-SCNC: 4.5 MMOL/L (ref 3.7–5.3)
RBC # BLD: 2.31 M/UL (ref 4.21–5.77)
RBC # BLD: ABNORMAL 10*6/UL
SEDIMENTATION RATE, ERYTHROCYTE: 40 MM (ref 0–10)
SEG NEUTROPHILS: 74 % (ref 36–65)
SEGMENTED NEUTROPHILS ABSOLUTE COUNT: 2.81 K/UL (ref 1.5–8.1)
SODIUM BLD-SCNC: 136 MMOL/L (ref 135–144)
WBC # BLD: 3.8 K/UL (ref 3.5–11.3)
WBC # BLD: ABNORMAL 10*3/UL

## 2019-07-08 PROCEDURE — 99232 SBSQ HOSP IP/OBS MODERATE 35: CPT | Performed by: FAMILY MEDICINE

## 2019-07-08 PROCEDURE — 2580000003 HC RX 258: Performed by: STUDENT IN AN ORGANIZED HEALTH CARE EDUCATION/TRAINING PROGRAM

## 2019-07-08 PROCEDURE — 82947 ASSAY GLUCOSE BLOOD QUANT: CPT

## 2019-07-08 PROCEDURE — 97607 NEG PRS WND THR NDME<=50SQCM: CPT

## 2019-07-08 PROCEDURE — 86140 C-REACTIVE PROTEIN: CPT

## 2019-07-08 PROCEDURE — P9040 RBC LEUKOREDUCED IRRADIATED: HCPCS

## 2019-07-08 PROCEDURE — 85651 RBC SED RATE NONAUTOMATED: CPT

## 2019-07-08 PROCEDURE — 6370000000 HC RX 637 (ALT 250 FOR IP): Performed by: INTERNAL MEDICINE

## 2019-07-08 PROCEDURE — 97110 THERAPEUTIC EXERCISES: CPT

## 2019-07-08 PROCEDURE — 6360000002 HC RX W HCPCS: Performed by: INTERNAL MEDICINE

## 2019-07-08 PROCEDURE — 2580000003 HC RX 258: Performed by: INTERNAL MEDICINE

## 2019-07-08 PROCEDURE — 97530 THERAPEUTIC ACTIVITIES: CPT

## 2019-07-08 PROCEDURE — 80048 BASIC METABOLIC PNL TOTAL CA: CPT

## 2019-07-08 PROCEDURE — 6370000000 HC RX 637 (ALT 250 FOR IP): Performed by: STUDENT IN AN ORGANIZED HEALTH CARE EDUCATION/TRAINING PROGRAM

## 2019-07-08 PROCEDURE — 86900 BLOOD TYPING SEROLOGIC ABO: CPT

## 2019-07-08 PROCEDURE — 36415 COLL VENOUS BLD VENIPUNCTURE: CPT

## 2019-07-08 PROCEDURE — 99232 SBSQ HOSP IP/OBS MODERATE 35: CPT | Performed by: INTERNAL MEDICINE

## 2019-07-08 PROCEDURE — 85025 COMPLETE CBC W/AUTO DIFF WBC: CPT

## 2019-07-08 RX ADMIN — PANTOPRAZOLE SODIUM 40 MG: 40 TABLET, DELAYED RELEASE ORAL at 08:00

## 2019-07-08 RX ADMIN — LEVOTHYROXINE SODIUM 50 MCG: 50 TABLET ORAL at 08:01

## 2019-07-08 RX ADMIN — MIDODRINE HYDROCHLORIDE 10 MG: 5 TABLET ORAL at 13:12

## 2019-07-08 RX ADMIN — MIDODRINE HYDROCHLORIDE 10 MG: 5 TABLET ORAL at 16:53

## 2019-07-08 RX ADMIN — VITAMIN D, TAB 1000IU (100/BT) 1000 UNITS: 25 TAB at 08:00

## 2019-07-08 RX ADMIN — APIXABAN 5 MG: 5 TABLET, FILM COATED ORAL at 08:00

## 2019-07-08 RX ADMIN — VENLAFAXINE 75 MG: 75 TABLET ORAL at 08:00

## 2019-07-08 RX ADMIN — ACETAMINOPHEN 500 MG: 500 TABLET ORAL at 16:53

## 2019-07-08 RX ADMIN — Medication 10 ML: at 08:01

## 2019-07-08 RX ADMIN — ISOSORBIDE MONONITRATE 30 MG: 30 TABLET ORAL at 08:01

## 2019-07-08 RX ADMIN — ARIPIPRAZOLE 5 MG: 5 TABLET ORAL at 08:01

## 2019-07-08 RX ADMIN — ACETAMINOPHEN 500 MG: 500 TABLET ORAL at 13:12

## 2019-07-08 RX ADMIN — DARBEPOETIN ALFA 100 MCG: 100 INJECTION, SOLUTION INTRAVENOUS; SUBCUTANEOUS at 09:14

## 2019-07-08 RX ADMIN — LAMOTRIGINE 100 MG: 100 TABLET ORAL at 08:00

## 2019-07-08 RX ADMIN — CIPROFLOXACIN 500 MG: 500 TABLET ORAL at 08:00

## 2019-07-08 RX ADMIN — FUROSEMIDE 20 MG: 20 TABLET ORAL at 08:00

## 2019-07-08 RX ADMIN — ACETAMINOPHEN 500 MG: 500 TABLET ORAL at 06:27

## 2019-07-08 RX ADMIN — MIDODRINE HYDROCHLORIDE 10 MG: 5 TABLET ORAL at 08:01

## 2019-07-08 RX ADMIN — SODIUM CHLORIDE 250 ML: 9 INJECTION, SOLUTION INTRAVENOUS at 08:53

## 2019-07-08 ASSESSMENT — PAIN SCALES - GENERAL
PAINLEVEL_OUTOF10: 6
PAINLEVEL_OUTOF10: 5
PAINLEVEL_OUTOF10: 6
PAINLEVEL_OUTOF10: 5

## 2019-07-08 ASSESSMENT — PAIN DESCRIPTION - PAIN TYPE: TYPE: SURGICAL PAIN

## 2019-07-08 ASSESSMENT — PAIN DESCRIPTION - LOCATION: LOCATION: HIP

## 2019-07-08 ASSESSMENT — PAIN DESCRIPTION - ORIENTATION: ORIENTATION: RIGHT

## 2019-07-08 NOTE — PROGRESS NOTES
Examination:     General:  Patient was alert and awake  Chest:   Bilateral vesicular breath sounds, no rales or wheezes. Cardiac:  S1 S2 RR, no murmurs, gallops or rubs, JVP not raised. Abdomen: Soft, non-tender, non distended, BS audible. SKIN:  No rashes, good skin turgor. Extremities:  No edema, no clubbing, No cyanosis  Neuro:  AAO x 3, No FND. Left AVF    Labs:       Recent Labs     07/06/19  0546 07/07/19  0605 07/08/19  0545   WBC 4.0 4.0 3.8   RBC 2.34* 2.37* 2.31*   HGB 6.9* 6.9* 6.7*   HCT 24.1* 24.6* 24.8*   .0* 103.8* 107.4*   MCH 29.5 29.1 29.0   MCHC 28.6 28.0* 27.0*   RDW 17.6* 17.7* 17.4*   * 114* 110*   MPV 9.7 9.6 9.4      BMP:   Recent Labs     07/06/19  0546 07/07/19  0605 07/08/19  0545    136 136   K 4.2 4.3 4.5   CL 98 99 100   CO2 24 22 22   BUN 30* 23 33*   CREATININE 4.12* 3.06* 3.79*   GLUCOSE 89 107* 137*   CALCIUM 8.1* 7.9* 8.1*      BNP:      Lab Results   Component Value Date    BNP NOT REPORTED 07/11/2014     PTH:     Lab Results   Component Value Date    IPTH 97.86 02/09/2017       Urinalysis/Chemistries:      Lab Results   Component Value Date    NITRU NEGATIVE 03/03/2017    COLORU DARK YELLOW 03/03/2017    PHUR 5.0 03/03/2017    WBCUA 5 TO 10 03/03/2017    RBCUA 0 TO 2 03/03/2017    MUCUS NOT REPORTED 03/03/2017    TRICHOMONAS NOT REPORTED 03/03/2017    YEAST NOT REPORTED 03/03/2017    BACTERIA NOT REPORTED 03/03/2017    SPECGRAV 1.020 03/03/2017    LEUKOCYTESUR SMALL 03/03/2017    UROBILINOGEN ELEVATED 03/03/2017    BILIRUBINUR NEGATIVE  Verified by ictotest. 03/03/2017    BILIRUBINUR NEGATIVE 05/18/2012    GLUCOSEU TRACE 03/03/2017    GLUCOSEU NEGATIVE 05/18/2012    [address] NEGATIVE 03/03/2017    AMORPHOUS NOT REPORTED 03/03/2017       Radiology:     CXR:     Assessment:     1. ESRD on Hemodialysis TTS under Dr. Lynford Buerger at [address] [facility] Admitted for right hip I & D 6/26. Left AV fistula works well  2.   Status post right hip I&D on 26 June and scheduled for

## 2019-07-08 NOTE — PROGRESS NOTES
Physical Therapy  Facility/Department: [address] ORTHO/MED SURG  Daily Treatment Note  NAME: Jorge Alberto Reid  : 1950  MRN: [id number]    Date of Service: 2019    Discharge Recommendations:  Patient would benefit from continued therapy after discharge   PT Equipment Recommendations  Equipment Needed: No    Assessment   Body structures, Functions, Activity limitations: Decreased functional mobility ; Decreased strength;Decreased endurance; Increased Pain  Assessment: Pt unable to stand with maxA x 2 and rw. Attempted standing in edward stedy maxA x 2, able to slightly lift from bed. Difficult maintaining NWB RLE  Prognosis: Good  Patient Education: PT POC, safety, NWB RLE  REQUIRES PT FOLLOW UP: Yes  Activity Tolerance  Activity Tolerance: Patient limited by endurance; Patient limited by pain     Patient Diagnosis(es): The primary encounter diagnosis was Post-op pain. A diagnosis of History of hemiarthroplasty of right hip was also pertinent to this visit.      has a past medical history of A-fib ([address] Acute encephalopathy, Acute ischemic stroke ([address] Acute metabolic encephalopathy, Acute on chronic congestive heart failure ([address] Acute on chronic diastolic CHF (congestive heart failure) ([address] Altered mental status, Anemia, Anemia associated with chronic renal failure, Aphasia, ARF (acute renal failure) ([facility]), Arteriovenous fistula for hemodialysis in place, [facility]), Arthritis, Bradyarrhythmia, Cellulitis, Cerebral contusion ([facility]), CHF (congestive heart failure) ([address] Chronic atrial fibrillation ([address] Chronic kidney disease, Chronic pain of right knee, CKD (chronic kidney disease) stage 4, GFR 15-29 ml/min ([address] Coagulation defect ([address] Diabetes mellitus ([address] Diabetes mellitus type 2 in obese ([address] Diarrhea, DVT (deep venous thrombosis) ([address] Elevated C-reactive protein (CRP), ESRD (end stage renal disease) ([address] Essential hypertension, Fever, Foot ulcer due to secondary DM ([address] GERD (gastroesophageal reflux disease), Hematochezia, History of cerebral infarction, History of DVT (deep vein thrombosis), History of superior vena cava filter placement, Hx of blood clots, Hyperkalemia, Hyperlipidemia, Hypernatremia, Hypertension, Hypocalcemia, Hypovolemic shock (HCC), Hypoxia, Irregular heartbeat, Knee effusion, right, Left leg cellulitis, Lower GI bleed, MDRO (multiple drug resistant organisms) resistance, Metabolic acidosis, MRSA (methicillin resistant staph aureus) culture positive, On continuous oral anticoagulation, NADIRA on CPAP, Other specified hypothyroidism, Parietal lobe infarction ([address] Pneumonia, Post traumatic encephalopathy, Proteinuria, Pyogenic arthritis of right knee joint ([address] Pyrexia, Renal insufficiency, Renal lesion, Respiratory failure, acute ([address] Right knee pain, SAH (subarachnoid hemorrhage) ([address] Secondary hyperparathyroidism of renal origin ([address] Seizure disorder ([address] Stercoral ulcer of rectum, Streptococcal infection, Subarachnoid bleed ([address] Subdural bleeding ([address] Thyroid disease, Traumatic cerebral hemorrhage ([address] Type 2 diabetes mellitus with left diabetic foot ulcer ([address] Unspecified cerebral artery occlusion with cerebral infarction, Venous stasis dermatitis, and Venous stasis dermatitis of both lower extremities. has a past surgical history that includes Gastric bypass surgery; Vena Cava Filter Placement; Foot Debridement (Left); Foot surgery (Right); Tonsillectomy; Colonoscopy; skin biopsy; Dilatation, esophagus; tracheostomy (summer 2013); Gastrostomy tube placement (summer 2013); Abdomen surgery; Cardiac catheterization; pr knee scope,diagnostic (Right, 3/23/2017); HEMIARTHROPLASTY HIP (Right, 5/15/2019); incision and drainage (Right, 6/26/2019); Revision total hip arthroplasty (Right, 06/28/2019); and Revision total hip arthroplasty (Right, 6/28/2019).     Restrictions  Restrictions/Precautions  Restrictions/Precautions: Weight Bearing, Isolation  Required Braces or Orthoses?: No  Lower Extremity Weight Bearing Restrictions  Right Lower Extremity Weight Bearing: Non Weight Bearing  Subjective   General  Chart Reviewed: Yes  Family / Caregiver Present: Yes(family)  Subjective  Subjective: Pt willing to get up to EOB  Pain Screening  Patient Currently in Pain: Yes  Pain Assessment  Pain Assessment: 0-10  Pain Level: 5  Pain Type: Surgical pain  Pain Location: Hip  Pain Orientation: Right  Vital Signs  Patient Currently in Pain: Yes       Orientation  Orientation  Overall Orientation Status: Within Functional Limits  Cognition   Cognition  Overall Cognitive Status: Exceptions  Following Commands: Follows multistep commands with increased time; Follows multistep commands with repitition  Attention Span: Difficulty attending to directions  Safety Judgement: Decreased awareness of need for assistance  Problem Solving: Assistance required to generate solutions;Assistance required to correct errors made  Insights: Decreased awareness of deficits  Initiation: Requires cues for some  Sequencing: Requires cues for some  Objective   Bed mobility  Rolling to Left: Contact guard assistance  Rolling to Right: Contact guard assistance  Supine to Sit: Moderate assistance  Sit to Supine: Moderate assistance  Scooting: Moderate assistance  Comment: ModA for Trunk and RLE  Transfers  Sit to Stand: 2 Person Assistance;Maximum Assistance  Stand to sit: Maximum Assistance;2 Person Assistance  Comment: Attempted standing x 2 with rw maxA x 2-unable to stand. Ambulation  Ambulation?: No  More Ambulation?: No  Stairs/Curb  Stairs?: No     Balance  Posture: Fair  Sitting - Static: Fair  Sitting - Dynamic: Fair  Standing - Static: Poor  Comments: Pt sat EOB x 5-6 minutes with CGA        Total Hip Arthroplasty Exercise Program: Quad sets, glut sets, ankle pumps, heel slides. Reps: 10 with assist for all active motion.   Unable to extend knee, lacks 20-30 degrees   R

## 2019-07-08 NOTE — PROGRESS NOTES
[place]    Attending:    Provena to incision site prior to d/c  NWB R LE  F/u in 7-10 days    IDaysi DO, reviewed the information and imaging if available. The case was discussed with the resident and plan reviewed.

## 2019-07-08 NOTE — PROGRESS NOTES
[facility] OR    DC KNEE SCOPE,DIAGNOSTIC Right 3/23/2017    KNEE ARTHROSCOPY,EXTENSIVE DEBRIDEMENT PARTIAL PROXIMAL AND MEDIAL MENISECTOMY  performed by Cheo Luque MD at [facility] Right 06/28/2019    STAGE 1 TOTAL HIP REVISION ARTHROPLASTY  WITH REMEDIES AND CEMENT      REVISION TOTAL HIP ARTHROPLASTY Right 6/28/2019    STAGE 1 TOTAL HIP REVISION ARTHROPLASTY  WITH REMEDIES AND CEMENT  SMITH & NEPHEW,  REMOVAL OF HARDWARE , FEMORAL HEAD AND STEM .  performed by Jacqueline West DO at [facility]      TRACHEOSTOMY  summer 2013    VENA CAVA FILTER PLACEMENT         Medications:      sodium chloride  250 mL Intravenous Once    ciprofloxacin  500 mg Oral Daily    darbepoetin jean pierre-polysorbate  100 mcg Subcutaneous Weekly - Monday    midodrine  10 mg Oral TID WC    insulin lispro  0-6 Units Subcutaneous TID WC    insulin lispro  0-3 Units Subcutaneous Nightly    vitamin D  1,000 Units Oral Daily    levothyroxine  50 mcg Oral Daily    pantoprazole  40 mg Oral QAM AC    isosorbide mononitrate  30 mg Oral Daily    lamoTRIgine  100 mg Oral Daily    venlafaxine  75 mg Oral Daily    ARIPiprazole  5 mg Oral Daily    apixaban  5 mg Oral BID    Ferric Citrate  210 mg Oral BID    furosemide  20 mg Oral BID    sodium chloride flush  10 mL Intravenous 2 times per day    acetaminophen  500 mg Oral Q4H       Social History:     Social History     Socioeconomic History    Marital status:      Spouse name: Not on file    Number of children: Not on file    Years of education: Not on file    Highest education level: Not on file   Occupational History    Not on file   Social Needs    Financial resource strain: Not on file    Food insecurity:     Worry: Not on file     Inability: Not on file    Transportation needs:     Medical: Not on file     Non-medical: Not on file   Tobacco Use    Smoking status: Never Smoker    Smokeless tobacco: hours. No results for input(s): RPR in the last 72 hours. No results for input(s): HIV in the last 72 hours. No results for input(s): BC in the last 72 hours. Lab Results   Component Value Date    MUCUS NOT REPORTED 03/03/2017    RBC 2.31 07/08/2019    RBC 3.35 05/18/2012    TRICHOMONAS NOT REPORTED 03/03/2017    WBC 3.8 07/08/2019    YEAST NOT REPORTED 03/03/2017    TURBIDITY CLOUDY 03/03/2017     Lab Results   Component Value Date    CREATININE 3.79 07/08/2019    GLUCOSE 137 07/08/2019    GLUCOSE 135 05/18/2012       Medical Decision Making-Imaging:     EXAMINATION:   2 XRAY VIEWS OF THE RIGHT HIP       6/28/2019 3:21 pm       COMPARISON:   None.       HISTORY:   ORDERING SYSTEM PROVIDED HISTORY: intra op   TECHNOLOGIST PROVIDED HISTORY:   intra op   Ordering Physician Provided Reason for Exam: intra op portable hip       FINDINGS:   4 intraoperative radiographs of the left hip and proximal femur.  Subtle   cortical irregularity and lucency along the medial midshaft of the femur. Cannot exclude a femoral fracture versus superimposition of osseous shadows. Continued follow-up is advised.           Impression   Possible periprosthetic fracture along the distal stem of the prosthesis.    Additional imaging and short interval follow-up should be considered.       The findings were sent to the Radiology Results [address] at 3:32   pm on 6/28/2019to be communicated to a licensed caregiver.          EXAMINATION:   2 XRAY VIEWS OF THE RIGHT HIP       6/28/2019 4:25 pm       COMPARISON:   None.       HISTORY:   ORDERING SYSTEM PROVIDED HISTORY: PACU please   TECHNOLOGIST PROVIDED HISTORY:   PACU please       FINDINGS:   Interval total hip arthroplasty revision with new implant placement.  There   is localized irregular bone loss in the lateral proximal femoral cortex.       Overlying skin staples are present.  Extensive calcified atheromatous plaque   is present.           Impression   Status post hip arthroplasty revision.  Localized area of irregular bone loss   in the lateral femoral cortex is noted.  This may be related to prior   hardware complication.  Underlying inflammatory process or infection should   also be considered in the appropriate clinical setting. Medical Decision Boqonc-[id number]-Swaif:   6/29/2019  4:03 PM - Jerome Goldman Incoming Lab Results From [facility]            Specimen Information: Joint, Hip; Swab         Component Collected Lab   Specimen Description 06/26/2019  1:[address]  [address]   . HIP, JOINT RT HIP SWAB    Special Requests 06/26/2019  1:[address]  [address]   NOT REPORTED    Direct Exam Abnormal  06/26/2019  1:41 PM [facility]    Direct Exam 06/26/2019  1:[address]  [address]   NO BACTERIA SEEN    Culture Abnormal  06/26/2019  1:41 PM [address]    Culture Abnormal  06/26/2019  1:41 PM [address] THIS ORGANISM IS AN EXTENDED-SPECTRUM BETA-LACTAMASE  AND RESISTANCE TO THERAPY WITH PENICILLINS, CEPHALOSPORINS AND AZTREONAM IS EXPECTED.  THESE ORGANISMS GENERALLY REMAIN SUSCEPTIBLE TO CARBAPENEMS.  CONSIDER ID CONSULTATION.    Culture 06/26/2019  1:[address]  [address]   NORMAL CIERA    Culture Abnormal  06/26/2019  1:[address]  [address]   NO ANAEROBIC ORGANISMS ISOLATED AT 3 DAYS    Testing Performed By      Lab - Abbreviation Name Director Address Valid Date Range   [address] [place] [address] [facility] [address] 08/30/17 0801-Present   Susceptibility      Proteus mirabilis (1)              Antibiotic Interpretation ADONAY Status     amikacin     Preliminary       NOT REPORTED   ampicillin Sensitive   Preliminary       <=2  SUSCEPTIBLE   ampicillin-sulbactam     Preliminary       NOT REPORTED   aztreonam Sensitive

## 2019-07-08 NOTE — PROGRESS NOTES
regular rate and rhythm, no murmur  Abdomen:  soft, nontender, nondistended, normal bowel sounds, no masses, hepatomegaly, splenomegaly  Extremities:  no edema, redness, tenderness in the calves  Skin:  COVERED WOUND    Assessment:        Primary Problem  Surgical wound dehiscence    Active Hospital Problems    Diagnosis Date Noted    Surgical wound infection [T81.49XA]     Severe malnutrition ([facility] [E43] 07/03/2019    Infection due to ESBL-producing Klebsiella pneumoniae [A49.8, Z16.12]     Proteus mirabilis infection [A49.8]     Infection and inflammatory reaction due to internal right hip prosthesis, initial encounter ([facility] Donna DineGasm     Surgical wound dehiscence [T81.31XA] 06/27/2019    Post-op pain [G89.18]     History of hemiarthroplasty of right hip [Z96.641] 06/26/2019    Thyroid disease [E07.9]     History of DVT (deep vein thrombosis) [Z86.718] 09/17/2018    Arteriovenous fistula for hemodialysis in place, primary ([facility] [Z99.2] 11/17/2017    ESRD (end stage renal disease) ([facility] [N18.6] 03/23/2017    Essential hypertension [I10] 03/23/2017    Seizure disorder ([facility] [G40.909] 03/01/2017    Chronic diastolic congestive heart failure ([facility] [I50.32] 02/25/2017    Venous stasis dermatitis of both lower extremities [I87.2] 05/28/2014    Type 2 diabetes mellitus with left diabetic foot ulcer ([facility] [E11.621, L97.529] 04/16/2014    Anemia [D64.9] 08/24/2013    Aphasia [R47.01] 08/24/2013    History of superior vena cava filter placement [Z95.828] 07/18/2013    Chronic atrial fibrillation ([facility] [I48.2] 07/06/2013       Plan:        1. Continue antibiotics as per infectious disease  2. Okay to discharge to SNF. 3. Continue Aranesp as per anemia. Will suggest transfuse 1 unit of packed red blood cells prior to discharge to SNF for hemoglobin of 6.7.   If okay by nephrology    Vernell Hammond MD  7/8/2019  7:58 AM

## 2019-07-09 ENCOUNTER — TELEPHONE (OUTPATIENT)
Dept: ORTHOPEDIC SURGERY | Age: 69
End: 2019-07-09

## 2019-07-09 LAB
ABO/RH: NORMAL
ANTIBODY SCREEN: NEGATIVE
ARM BAND NUMBER: NORMAL
BLD PROD TYP BPU: NORMAL
BLD PROD TYP BPU: NORMAL
CROSSMATCH RESULT: NORMAL
CROSSMATCH RESULT: NORMAL
DISPENSE STATUS BLOOD BANK: NORMAL
DISPENSE STATUS BLOOD BANK: NORMAL
EXPIRATION DATE: NORMAL
TRANSFUSION STATUS: NORMAL
TRANSFUSION STATUS: NORMAL
UNIT DIVISION: 0
UNIT DIVISION: 0
UNIT NUMBER: NORMAL
UNIT NUMBER: NORMAL

## 2019-07-10 ENCOUNTER — HOSPITAL ENCOUNTER (OUTPATIENT)
Age: 69
Setting detail: SPECIMEN
Discharge: HOME | End: 2019-07-10
Payer: MEDICARE

## 2019-07-10 LAB
ANION GAP SERPL CALCULATED.3IONS-SCNC: 13 MMOL/L (ref 9–17)
BUN BLDV-MCNC: 40 MG/DL (ref 8–23)
BUN/CREAT BLD: ABNORMAL (ref 9–20)
CALCIUM SERPL-MCNC: 8.2 MG/DL (ref 8.6–10.4)
CHLORIDE BLD-SCNC: 97 MMOL/L (ref 98–107)
CO2: 24 MMOL/L (ref 20–31)
CREAT SERPL-MCNC: 4.58 MG/DL (ref 0.7–1.2)
GFR AFRICAN AMERICAN: 15 ML/MIN
GFR NON-AFRICAN AMERICAN: 13 ML/MIN
GFR SERPL CREATININE-BSD FRML MDRD: ABNORMAL ML/MIN/{1.73_M2}
GFR SERPL CREATININE-BSD FRML MDRD: ABNORMAL ML/MIN/{1.73_M2}
GLUCOSE BLD-MCNC: 100 MG/DL (ref 70–99)
HCT VFR BLD CALC: 24.7 % (ref 40.7–50.3)
HEMOGLOBIN: 7.2 G/DL (ref 13–17)
MCH RBC QN AUTO: 29.3 PG (ref 25.2–33.5)
MCHC RBC AUTO-ENTMCNC: 29.1 G/DL (ref 28.4–34.8)
MCV RBC AUTO: 100.4 FL (ref 82.6–102.9)
NRBC AUTOMATED: 0 PER 100 WBC
PDW BLD-RTO: 17.2 % (ref 11.8–14.4)
PLATELET # BLD: 116 K/UL (ref 138–453)
PMV BLD AUTO: 9.7 FL (ref 8.1–13.5)
POTASSIUM SERPL-SCNC: 4.7 MMOL/L (ref 3.7–5.3)
RBC # BLD: 2.46 M/UL (ref 4.21–5.77)
SODIUM BLD-SCNC: 134 MMOL/L (ref 135–144)
WBC # BLD: 4.4 K/UL (ref 3.5–11.3)

## 2019-07-10 PROCEDURE — 80048 BASIC METABOLIC PNL TOTAL CA: CPT

## 2019-07-10 PROCEDURE — P9603 ONE-WAY ALLOW PRORATED MILES: HCPCS

## 2019-07-10 PROCEDURE — 36415 COLL VENOUS BLD VENIPUNCTURE: CPT

## 2019-07-10 PROCEDURE — 85027 COMPLETE CBC AUTOMATED: CPT

## 2019-07-11 ENCOUNTER — HOSPITAL ENCOUNTER (OUTPATIENT)
Age: 69
Setting detail: SPECIMEN
Discharge: HOME | End: 2019-07-11
Payer: MEDICARE

## 2019-07-11 LAB
HCT VFR BLD CALC: 24.9 % (ref 40.7–50.3)
HEMOGLOBIN: 7.1 G/DL (ref 13–17)

## 2019-07-11 PROCEDURE — P9603 ONE-WAY ALLOW PRORATED MILES: HCPCS

## 2019-07-11 PROCEDURE — 85018 HEMOGLOBIN: CPT

## 2019-07-11 PROCEDURE — 85014 HEMATOCRIT: CPT

## 2019-07-11 PROCEDURE — 36415 COLL VENOUS BLD VENIPUNCTURE: CPT

## 2019-07-12 ENCOUNTER — HOSPITAL ENCOUNTER (OUTPATIENT)
Age: 69
Setting detail: SPECIMEN
Discharge: HOME | End: 2019-07-12
Payer: MEDICARE

## 2019-07-12 ENCOUNTER — TELEPHONE (OUTPATIENT)
Dept: ORTHOPEDIC SURGERY | Age: 69
End: 2019-07-12

## 2019-07-12 LAB
HCT VFR BLD CALC: 24.8 % (ref 40.7–50.3)
HEMOGLOBIN: 7 G/DL (ref 13–17)

## 2019-07-12 PROCEDURE — 85014 HEMATOCRIT: CPT

## 2019-07-12 PROCEDURE — P9603 ONE-WAY ALLOW PRORATED MILES: HCPCS

## 2019-07-12 PROCEDURE — 36415 COLL VENOUS BLD VENIPUNCTURE: CPT

## 2019-07-12 PROCEDURE — 85018 HEMOGLOBIN: CPT

## 2019-07-12 NOTE — TELEPHONE ENCOUNTER
Bria from [facility] called [phone]. The patient Baldemar Aranda is not working properly and his hemoglobin is dropping daily.

## 2019-07-13 ENCOUNTER — HOSPITAL ENCOUNTER (OUTPATIENT)
Age: 69
Setting detail: SPECIMEN
Discharge: HOME | End: 2019-07-13
Payer: MEDICARE

## 2019-07-13 LAB
HCT VFR BLD CALC: 26.1 % (ref 40.7–50.3)
HEMOGLOBIN: 7.6 G/DL (ref 13–17)

## 2019-07-13 PROCEDURE — 85018 HEMOGLOBIN: CPT

## 2019-07-13 PROCEDURE — P9603 ONE-WAY ALLOW PRORATED MILES: HCPCS

## 2019-07-13 PROCEDURE — 36415 COLL VENOUS BLD VENIPUNCTURE: CPT

## 2019-07-13 PROCEDURE — 85014 HEMATOCRIT: CPT

## 2019-07-15 ENCOUNTER — TELEPHONE (OUTPATIENT)
Dept: ORTHOPEDIC SURGERY | Age: 69
End: 2019-07-15

## 2019-07-15 NOTE — DISCHARGE SUMMARY
(deep vein thrombosis)    On continuous oral anticoagulation    Elevated C-reactive protein (CRP)    Left leg cellulitis    MRSA infection    Streptococcal infection    Arteriovenous fistula for hemodialysis in place, primary [facility])    Closed right hip fracture, initial encounter ([facility]    Closed fracture of right hip (HCC)    Thyroid disease    Hyponatremia    Uncontrolled type 2 diabetes mellitus with hyperglycemia ([facility]    History of hemiarthroplasty of right hip    Surgical wound dehiscence    Post-op pain    Infection due to ESBL-producing Klebsiella pneumoniae    Proteus mirabilis infection    Infection and inflammatory reaction due to internal right hip prosthesis, initial encounter ([facility]    Severe malnutrition ([facility]    Surgical wound infection        Consults:  IM, nephro, ID    Procedures:   -Right hip wound incision and drainage (19)  -Right hip stage 1 revision arthroplasty (19)    Hospital Course:   Lydia Zee is a 68 y.o. male with hx of right hip hemiarthroplasty. Pt went for right hip wound incision and drainage 19 with Dr. John Butler. Pt went for stage 1 right hip revision arthroplasty 19 with Dr. John Butler and Dr. Jon Rodgers. Pt seen by ID and treated with appropriate IV abx. Nephro followed for HD management. We discussed DC with patient and he is ok with DC. All questions and concerns were addressed at this time. Laboratory parameters were followed and optimized when possible.     Time spend on discharge discussion and plannin minutes    Disposition:   SNF    Discharged Condition:  Stable     Discharge Medications:       Delphine Bedoya   Home Medication Instructions PMO:[id number]    Printed on:19 3465   Medication Information                      apixaban (ELIQUIS) 2.5 MG TABS tablet  Take 1 tablet by mouth 2 times daily             apixaban (ELIQUIS) 5 MG TABS tablet  Take 5 mg by mouth 2 times daily             ARIPiprazole (ABILIFY) 5 MG tablet  Take 5

## 2019-07-16 ENCOUNTER — OFFICE VISIT (OUTPATIENT)
Dept: ORTHOPEDIC SURGERY | Age: 69
End: 2019-07-16

## 2019-07-16 VITALS — BODY MASS INDEX: 30.91 KG/M2 | WEIGHT: 228.18 LBS | HEIGHT: 72 IN

## 2019-07-16 DIAGNOSIS — S72.001A CLOSED RIGHT HIP FRACTURE, INITIAL ENCOUNTER (HCC): Primary | ICD-10-CM

## 2019-07-16 PROCEDURE — 99024 POSTOP FOLLOW-UP VISIT: CPT | Performed by: ORTHOPAEDIC SURGERY

## 2019-07-16 NOTE — PROGRESS NOTES
[address] [place] [id number]  Dept: [phone]  Dept Fax: [phone]        Ambulatory Follow Up    Subjective:   HPI:  Vinod Santillan is a 68y.o. year old male who presents to our office today for post op follow up of stage 1 HARMAN, antibiotic cement spacer on 6/28/19. Original right hip danielle on 5/15/19 subsided and had subsequent infection. He is still at a SNF. His pain is controlled today. On Cipro 500 mg po q day with no stop date per Dr. Funmilayo Chandler. ROS: No fevers, chills, nausea, vomiting, shortness of breath, chest pain, numbness or tingling. Objective :   General: Vinod Santillan is a 68 y.o. male who is alert and oriented and sitting comfortably in our office. Neuro: Alert and oriented. Eyes: Extra-ocular muscles intact  Mouth: Oral mucosa moist. No perioral lesions  Pulm: Respirations unlabored and regular. Skin: Warm, well perfused  Psych: Patient has good fund of knowledge and displays understanging of exam, diagnosis, and plan. Ortho Exam  MS: Incision staples present. No signs of dehiscence. No erythema. Minimal drainage at inferior aspect of wound. Significant lymphadema in right lower extremity Compartments soft and compressible. TA/EHL/FHL/GS motor intact. Deep and Superficial Peroneal/Saphenous/Sural SILT. 2+ DP pulse. Radiology:   X-rays reviewed from EMR. No new imaging performed today.     Assessment/Plan:   Vinod Santillan is a 68y.o. year old male 2.5 weeks out from right hip danielle explant and placement of antibiotic cement spacer    Incision healing well mild drainage at inferior aspect  Staples removed today  Pain controlled today  Dressing changes as needed  Continue working with physical therapy  Tylenol / Motrin for pain control  Continue Eliquis for DVT prophylaxis  Follow up in 4 weeks with Dr. Agustín Morris    ----------------------------------------  Jian Ibarra, DO  PGY-2, Department of [facility] [address].

## 2019-07-17 ENCOUNTER — HOSPITAL ENCOUNTER (OUTPATIENT)
Age: 69
Setting detail: SPECIMEN
Discharge: HOME | End: 2019-07-17
Payer: MEDICARE

## 2019-07-17 LAB
HCT VFR BLD CALC: 25.3 % (ref 40.7–50.3)
HEMOGLOBIN: 7 G/DL (ref 13–17)

## 2019-07-17 PROCEDURE — 36415 COLL VENOUS BLD VENIPUNCTURE: CPT

## 2019-07-17 PROCEDURE — P9603 ONE-WAY ALLOW PRORATED MILES: HCPCS

## 2019-07-17 PROCEDURE — 85018 HEMOGLOBIN: CPT

## 2019-07-17 PROCEDURE — 85014 HEMATOCRIT: CPT

## 2019-07-20 ENCOUNTER — HOSPITAL ENCOUNTER (OUTPATIENT)
Age: 69
Setting detail: SPECIMEN
Discharge: HOME | End: 2019-07-20
Payer: MEDICARE

## 2019-07-20 PROCEDURE — 87070 CULTURE OTHR SPECIMN AEROBIC: CPT

## 2019-07-20 PROCEDURE — 87205 SMEAR GRAM STAIN: CPT

## 2019-07-20 PROCEDURE — 87186 SC STD MICRODIL/AGAR DIL: CPT

## 2019-07-20 PROCEDURE — 87077 CULTURE AEROBIC IDENTIFY: CPT

## 2019-07-22 ENCOUNTER — APPOINTMENT (OUTPATIENT)
Dept: CT IMAGING | Age: 69
End: 2019-07-22
Payer: MEDICARE

## 2019-07-22 ENCOUNTER — HOSPITAL ENCOUNTER (OUTPATIENT)
Age: 69
Setting detail: SPECIMEN
Discharge: HOME | End: 2019-07-22
Payer: MEDICARE

## 2019-07-22 ENCOUNTER — APPOINTMENT (OUTPATIENT)
Dept: GENERAL RADIOLOGY | Age: 69
End: 2019-07-22
Payer: MEDICARE

## 2019-07-22 ENCOUNTER — HOSPITAL ENCOUNTER (INPATIENT)
Age: 69
LOS: 6 days | Discharge: NURSING, REHAB OR LONG-TERM CARE | DRG: 193 | End: 2019-07-28
Attending: INTERNAL MEDICINE | Admitting: INTERNAL MEDICINE
Payer: MEDICARE

## 2019-07-22 ENCOUNTER — HOSPITAL ENCOUNTER (EMERGENCY)
Age: 69
Discharge: ANOTHER HOSPITAL | End: 2019-07-22
Attending: EMERGENCY MEDICINE
Payer: MEDICARE

## 2019-07-22 VITALS
SYSTOLIC BLOOD PRESSURE: 114 MMHG | OXYGEN SATURATION: 99 % | WEIGHT: 230 LBS | BODY MASS INDEX: 31.15 KG/M2 | HEIGHT: 72 IN | TEMPERATURE: 99.8 F | HEART RATE: 104 BPM | DIASTOLIC BLOOD PRESSURE: 57 MMHG | RESPIRATION RATE: 26 BRPM

## 2019-07-22 DIAGNOSIS — M25.551 PAIN OF RIGHT HIP JOINT: ICD-10-CM

## 2019-07-22 DIAGNOSIS — L03.314 CELLULITIS OF GROIN: ICD-10-CM

## 2019-07-22 DIAGNOSIS — J18.9 PNEUMONIA DUE TO ORGANISM: ICD-10-CM

## 2019-07-22 DIAGNOSIS — N30.00 ACUTE CYSTITIS WITHOUT HEMATURIA: ICD-10-CM

## 2019-07-22 DIAGNOSIS — K92.2 GASTROINTESTINAL HEMORRHAGE, UNSPECIFIED GASTROINTESTINAL HEMORRHAGE TYPE: ICD-10-CM

## 2019-07-22 DIAGNOSIS — A41.9 SEVERE SEPSIS (HCC): Primary | ICD-10-CM

## 2019-07-22 DIAGNOSIS — R65.20 SEVERE SEPSIS (HCC): Primary | ICD-10-CM

## 2019-07-22 PROBLEM — R31.9 HEMATURIA: Status: ACTIVE | Noted: 2019-07-22

## 2019-07-22 LAB
-: ABNORMAL
ABO/RH: NORMAL
ABSOLUTE BANDS #: 0.62 K/UL (ref 0–1)
ABSOLUTE EOS #: 0 K/UL (ref 0–0.4)
ABSOLUTE IMMATURE GRANULOCYTE: ABNORMAL K/UL (ref 0–0.3)
ABSOLUTE LYMPH #: 0.35 K/UL (ref 1–4.8)
ABSOLUTE MONO #: 0.18 K/UL (ref 0.1–1.3)
ALBUMIN SERPL-MCNC: 2.6 G/DL (ref 3.5–5.2)
ALBUMIN/GLOBULIN RATIO: ABNORMAL (ref 1–2.5)
ALP BLD-CCNC: 99 U/L (ref 40–129)
ALT SERPL-CCNC: <5 U/L (ref 5–41)
AMORPHOUS: ABNORMAL
ANION GAP SERPL CALCULATED.3IONS-SCNC: 18 MMOL/L (ref 9–17)
ANION GAP SERPL CALCULATED.3IONS-SCNC: 20 MMOL/L (ref 9–17)
ANTIBODY SCREEN: NEGATIVE
ARM BAND NUMBER: NORMAL
AST SERPL-CCNC: 11 U/L
BACTERIA: ABNORMAL
BANDS: 14 % (ref 0–10)
BASOPHILS # BLD: 0 % (ref 0–2)
BASOPHILS ABSOLUTE: 0 K/UL (ref 0–0.2)
BILIRUB SERPL-MCNC: 1.09 MG/DL (ref 0.3–1.2)
BILIRUBIN URINE: NEGATIVE
BUN BLDV-MCNC: 68 MG/DL (ref 8–23)
BUN BLDV-MCNC: 68 MG/DL (ref 8–23)
BUN/CREAT BLD: 13 (ref 9–20)
BUN/CREAT BLD: ABNORMAL (ref 9–20)
CALCIUM SERPL-MCNC: 8 MG/DL (ref 8.6–10.4)
CALCIUM SERPL-MCNC: 8.2 MG/DL (ref 8.6–10.4)
CASTS UA: ABNORMAL /LPF
CHLORIDE BLD-SCNC: 96 MMOL/L (ref 98–107)
CHLORIDE BLD-SCNC: 96 MMOL/L (ref 98–107)
CO2: 20 MMOL/L (ref 20–31)
CO2: 22 MMOL/L (ref 20–31)
COLOR: ABNORMAL
COMMENT UA: ABNORMAL
CREAT SERPL-MCNC: 5.43 MG/DL (ref 0.7–1.2)
CREAT SERPL-MCNC: 5.7 MG/DL (ref 0.7–1.2)
CRYSTALS, UA: ABNORMAL /HPF
DIFFERENTIAL TYPE: ABNORMAL
DIRECT EXAM: NORMAL
EOSINOPHILS RELATIVE PERCENT: 0 % (ref 0–4)
EPITHELIAL CELLS UA: ABNORMAL /HPF
EXPIRATION DATE: NORMAL
GFR AFRICAN AMERICAN: 12 ML/MIN
GFR AFRICAN AMERICAN: 13 ML/MIN
GFR NON-AFRICAN AMERICAN: 10 ML/MIN
GFR NON-AFRICAN AMERICAN: 10 ML/MIN
GFR SERPL CREATININE-BSD FRML MDRD: ABNORMAL ML/MIN/{1.73_M2}
GLUCOSE BLD-MCNC: 108 MG/DL (ref 70–99)
GLUCOSE BLD-MCNC: 93 MG/DL (ref 70–99)
GLUCOSE URINE: NEGATIVE
HCT VFR BLD CALC: 23 % (ref 41–53)
HCT VFR BLD CALC: 23.8 % (ref 40.7–50.3)
HCT VFR BLD CALC: 24.6 % (ref 40.7–50.3)
HEMOGLOBIN: 6.6 G/DL (ref 13–17)
HEMOGLOBIN: 7.1 G/DL (ref 13.5–17.5)
HEMOGLOBIN: 7.1 G/DL (ref 13–17)
IMMATURE GRANULOCYTES: ABNORMAL %
INR BLD: 1.8
KETONES, URINE: ABNORMAL
LACTIC ACID, SEPSIS WHOLE BLOOD: ABNORMAL MMOL/L (ref 0.5–1.9)
LACTIC ACID, SEPSIS WHOLE BLOOD: ABNORMAL MMOL/L (ref 0.5–1.9)
LACTIC ACID, SEPSIS: 3.5 MMOL/L (ref 0.5–1.9)
LACTIC ACID, SEPSIS: 3.7 MMOL/L (ref 0.5–1.9)
LEUKOCYTE ESTERASE, URINE: ABNORMAL
LYMPHOCYTES # BLD: 8 % (ref 24–44)
Lab: NORMAL
MCH RBC QN AUTO: 29.1 PG (ref 25.2–33.5)
MCH RBC QN AUTO: 29.2 PG (ref 26–34)
MCHC RBC AUTO-ENTMCNC: 28.9 G/DL (ref 28.4–34.8)
MCHC RBC AUTO-ENTMCNC: 30.9 G/DL (ref 31–37)
MCV RBC AUTO: 100.8 FL (ref 82.6–102.9)
MCV RBC AUTO: 94.3 FL (ref 80–100)
METAMYELOCYTES ABSOLUTE COUNT: 0.09 K/UL
METAMYELOCYTES: 2 %
MONOCYTES # BLD: 4 % (ref 1–7)
MORPHOLOGY: ABNORMAL
MORPHOLOGY: ABNORMAL
MUCUS: ABNORMAL
NITRITE, URINE: NEGATIVE
NRBC AUTOMATED: 0 PER 100 WBC
NRBC AUTOMATED: ABNORMAL PER 100 WBC
OTHER OBSERVATIONS UA: ABNORMAL
PDW BLD-RTO: 16.6 % (ref 11.8–14.4)
PDW BLD-RTO: 17.6 % (ref 11.5–14.9)
PH UA: 6 (ref 5–8)
PLATELET # BLD: 145 K/UL (ref 138–453)
PLATELET # BLD: 172 K/UL (ref 150–450)
PLATELET ESTIMATE: ABNORMAL
PMV BLD AUTO: 7.4 FL (ref 6–12)
PMV BLD AUTO: 9.3 FL (ref 8.1–13.5)
POTASSIUM SERPL-SCNC: 4.5 MMOL/L (ref 3.7–5.3)
POTASSIUM SERPL-SCNC: 4.5 MMOL/L (ref 3.7–5.3)
PROTEIN UA: ABNORMAL
PROTHROMBIN TIME: 20.5 SEC (ref 11.8–14.6)
RBC # BLD: 2.44 M/UL (ref 4.21–5.77)
RBC # BLD: 2.44 M/UL (ref 4.5–5.9)
RBC # BLD: ABNORMAL 10*6/UL
RBC UA: ABNORMAL /HPF
RENAL EPITHELIAL, UA: ABNORMAL /HPF
SEG NEUTROPHILS: 72 % (ref 36–66)
SEGMENTED NEUTROPHILS ABSOLUTE COUNT: 3.16 K/UL (ref 1.3–9.1)
SODIUM BLD-SCNC: 136 MMOL/L (ref 135–144)
SODIUM BLD-SCNC: 136 MMOL/L (ref 135–144)
SPECIFIC GRAVITY UA: 1.01 (ref 1–1.03)
SPECIMEN DESCRIPTION: NORMAL
TOTAL PROTEIN: 6.8 G/DL (ref 6.4–8.3)
TRICHOMONAS: ABNORMAL
TROPONIN INTERP: ABNORMAL
TROPONIN INTERP: ABNORMAL
TROPONIN T: ABNORMAL NG/ML
TROPONIN T: ABNORMAL NG/ML
TROPONIN, HIGH SENSITIVITY: 166 NG/L (ref 0–22)
TROPONIN, HIGH SENSITIVITY: 182 NG/L (ref 0–22)
TURBIDITY: ABNORMAL
URINE HGB: ABNORMAL
UROBILINOGEN, URINE: NORMAL
WBC # BLD: 4.4 K/UL (ref 3.5–11)
WBC # BLD: 5 K/UL (ref 3.5–11.3)
WBC # BLD: ABNORMAL 10*3/UL
WBC UA: ABNORMAL /HPF
YEAST: ABNORMAL

## 2019-07-22 PROCEDURE — 74176 CT ABD & PELVIS W/O CONTRAST: CPT

## 2019-07-22 PROCEDURE — 87150 DNA/RNA AMPLIFIED PROBE: CPT

## 2019-07-22 PROCEDURE — 85025 COMPLETE CBC W/AUTO DIFF WBC: CPT

## 2019-07-22 PROCEDURE — 87077 CULTURE AEROBIC IDENTIFY: CPT

## 2019-07-22 PROCEDURE — 87040 BLOOD CULTURE FOR BACTERIA: CPT

## 2019-07-22 PROCEDURE — 36415 COLL VENOUS BLD VENIPUNCTURE: CPT

## 2019-07-22 PROCEDURE — 80053 COMPREHEN METABOLIC PANEL: CPT

## 2019-07-22 PROCEDURE — 6370000000 HC RX 637 (ALT 250 FOR IP): Performed by: EMERGENCY MEDICINE

## 2019-07-22 PROCEDURE — 6360000002 HC RX W HCPCS: Performed by: EMERGENCY MEDICINE

## 2019-07-22 PROCEDURE — 87070 CULTURE OTHR SPECIMN AEROBIC: CPT

## 2019-07-22 PROCEDURE — 2500000003 HC RX 250 WO HCPCS: Performed by: EMERGENCY MEDICINE

## 2019-07-22 PROCEDURE — 96365 THER/PROPH/DIAG IV INF INIT: CPT

## 2019-07-22 PROCEDURE — 93005 ELECTROCARDIOGRAM TRACING: CPT | Performed by: EMERGENCY MEDICINE

## 2019-07-22 PROCEDURE — 85027 COMPLETE CBC AUTOMATED: CPT

## 2019-07-22 PROCEDURE — P9603 ONE-WAY ALLOW PRORATED MILES: HCPCS

## 2019-07-22 PROCEDURE — 87086 URINE CULTURE/COLONY COUNT: CPT

## 2019-07-22 PROCEDURE — 2580000003 HC RX 258: Performed by: EMERGENCY MEDICINE

## 2019-07-22 PROCEDURE — 83605 ASSAY OF LACTIC ACID: CPT

## 2019-07-22 PROCEDURE — 86850 RBC ANTIBODY SCREEN: CPT

## 2019-07-22 PROCEDURE — 87205 SMEAR GRAM STAIN: CPT

## 2019-07-22 PROCEDURE — 87186 SC STD MICRODIL/AGAR DIL: CPT

## 2019-07-22 PROCEDURE — 85610 PROTHROMBIN TIME: CPT

## 2019-07-22 PROCEDURE — 86900 BLOOD TYPING SEROLOGIC ABO: CPT

## 2019-07-22 PROCEDURE — 85014 HEMATOCRIT: CPT

## 2019-07-22 PROCEDURE — 71045 X-RAY EXAM CHEST 1 VIEW: CPT

## 2019-07-22 PROCEDURE — 99285 EMERGENCY DEPT VISIT HI MDM: CPT

## 2019-07-22 PROCEDURE — 81001 URINALYSIS AUTO W/SCOPE: CPT

## 2019-07-22 PROCEDURE — 85018 HEMOGLOBIN: CPT

## 2019-07-22 PROCEDURE — 84484 ASSAY OF TROPONIN QUANT: CPT

## 2019-07-22 PROCEDURE — 6360000002 HC RX W HCPCS: Performed by: INTERNAL MEDICINE

## 2019-07-22 PROCEDURE — 86901 BLOOD TYPING SEROLOGIC RH(D): CPT

## 2019-07-22 PROCEDURE — 1200000000 HC SEMI PRIVATE

## 2019-07-22 PROCEDURE — 99223 1ST HOSP IP/OBS HIGH 75: CPT | Performed by: INTERNAL MEDICINE

## 2019-07-22 PROCEDURE — 80048 BASIC METABOLIC PNL TOTAL CA: CPT

## 2019-07-22 PROCEDURE — 2580000003 HC RX 258: Performed by: INTERNAL MEDICINE

## 2019-07-22 PROCEDURE — 96367 TX/PROPH/DG ADDL SEQ IV INF: CPT

## 2019-07-22 RX ORDER — ACETAMINOPHEN 325 MG/1
650 TABLET ORAL EVERY 4 HOURS PRN
Status: DISCONTINUED | OUTPATIENT
Start: 2019-07-22 | End: 2019-07-28 | Stop reason: HOSPADM

## 2019-07-22 RX ORDER — SODIUM CHLORIDE 0.9 % (FLUSH) 0.9 %
10 SYRINGE (ML) INJECTION EVERY 12 HOURS SCHEDULED
Status: DISCONTINUED | OUTPATIENT
Start: 2019-07-22 | End: 2019-07-28 | Stop reason: HOSPADM

## 2019-07-22 RX ORDER — PANTOPRAZOLE SODIUM 40 MG/1
40 TABLET, DELAYED RELEASE ORAL DAILY
Status: DISCONTINUED | OUTPATIENT
Start: 2019-07-22 | End: 2019-07-28 | Stop reason: HOSPADM

## 2019-07-22 RX ORDER — SODIUM CHLORIDE 0.9 % (FLUSH) 0.9 %
10 SYRINGE (ML) INJECTION PRN
Status: DISCONTINUED | OUTPATIENT
Start: 2019-07-22 | End: 2019-07-28 | Stop reason: HOSPADM

## 2019-07-22 RX ORDER — ARIPIPRAZOLE 5 MG/1
5 TABLET ORAL DAILY
Status: DISCONTINUED | OUTPATIENT
Start: 2019-07-22 | End: 2019-07-28 | Stop reason: HOSPADM

## 2019-07-22 RX ORDER — ALBUTEROL SULFATE 2.5 MG/3ML
2.5 SOLUTION RESPIRATORY (INHALATION)
Status: DISCONTINUED | OUTPATIENT
Start: 2019-07-22 | End: 2019-07-27

## 2019-07-22 RX ORDER — ISOSORBIDE MONONITRATE 30 MG/1
30 TABLET, EXTENDED RELEASE ORAL DAILY
Status: DISCONTINUED | OUTPATIENT
Start: 2019-07-22 | End: 2019-07-28 | Stop reason: HOSPADM

## 2019-07-22 RX ORDER — FUROSEMIDE 20 MG/1
20 TABLET ORAL 2 TIMES DAILY
Status: DISCONTINUED | OUTPATIENT
Start: 2019-07-22 | End: 2019-07-28 | Stop reason: HOSPADM

## 2019-07-22 RX ORDER — VENLAFAXINE 75 MG/1
75 TABLET ORAL DAILY
Status: DISCONTINUED | OUTPATIENT
Start: 2019-07-22 | End: 2019-07-28 | Stop reason: HOSPADM

## 2019-07-22 RX ORDER — NICOTINE 21 MG/24HR
1 PATCH, TRANSDERMAL 24 HOURS TRANSDERMAL DAILY PRN
Status: DISCONTINUED | OUTPATIENT
Start: 2019-07-22 | End: 2019-07-28 | Stop reason: HOSPADM

## 2019-07-22 RX ORDER — ONDANSETRON 2 MG/ML
4 INJECTION INTRAMUSCULAR; INTRAVENOUS EVERY 6 HOURS PRN
Status: DISCONTINUED | OUTPATIENT
Start: 2019-07-22 | End: 2019-07-28 | Stop reason: HOSPADM

## 2019-07-22 RX ORDER — LAMOTRIGINE 100 MG/1
100 TABLET ORAL DAILY
Status: DISCONTINUED | OUTPATIENT
Start: 2019-07-22 | End: 2019-07-28 | Stop reason: HOSPADM

## 2019-07-22 RX ORDER — ACETAMINOPHEN 500 MG
1000 TABLET ORAL ONCE
Status: COMPLETED | OUTPATIENT
Start: 2019-07-22 | End: 2019-07-22

## 2019-07-22 RX ORDER — SENNA PLUS 8.6 MG/1
1 TABLET ORAL 2 TIMES DAILY
Status: DISCONTINUED | OUTPATIENT
Start: 2019-07-22 | End: 2019-07-28 | Stop reason: HOSPADM

## 2019-07-22 RX ORDER — LEVOTHYROXINE SODIUM 0.05 MG/1
50 TABLET ORAL DAILY
Status: DISCONTINUED | OUTPATIENT
Start: 2019-07-22 | End: 2019-07-28 | Stop reason: HOSPADM

## 2019-07-22 RX ADMIN — PIPERACILLIN SODIUM,TAZOBACTAM SODIUM 2.25 G: 2; .25 INJECTION, POWDER, FOR SOLUTION INTRAVENOUS at 18:07

## 2019-07-22 RX ADMIN — METRONIDAZOLE 500 MG: 500 INJECTION, SOLUTION INTRAVENOUS at 12:17

## 2019-07-22 RX ADMIN — ACETAMINOPHEN 1000 MG: 500 TABLET, FILM COATED ORAL at 13:05

## 2019-07-22 RX ADMIN — CEFEPIME 2 G: 2 INJECTION, POWDER, FOR SOLUTION INTRAVENOUS at 10:08

## 2019-07-22 RX ADMIN — SODIUM CHLORIDE, PRESERVATIVE FREE 10 ML: 5 INJECTION INTRAVENOUS at 21:33

## 2019-07-22 RX ADMIN — Medication 1500 MG: at 11:04

## 2019-07-22 ASSESSMENT — ENCOUNTER SYMPTOMS
VOMITING: 0
NAUSEA: 0

## 2019-07-22 ASSESSMENT — PAIN SCALES - GENERAL
PAINLEVEL_OUTOF10: 4
PAINLEVEL_OUTOF10: 4
PAINLEVEL_OUTOF10: 7

## 2019-07-22 NOTE — CONSULTS
Chronic atrial fibrillation (HCC) 7/6/2013    Chronic kidney disease     Chronic pain of right knee     CKD (chronic kidney disease) stage 4, GFR 15-29 ml/min ([facility]) 5/28/2014    From diabetic and ischemic nephrosclerosis, creat now 2-2.5, GFR 25-30 ml/min    Coagulation defect ([address] 7/6/2013    Diabetes mellitus ([address]     Diabetes mellitus type 2 in obese ([address]     Diarrhea 7/16/2013    DVT (deep venous thrombosis) ([facility])     Elevated C-reactive protein (CRP)     ESRD (end stage renal disease) ([address] 3/23/2017    Essential hypertension 3/23/2017    Fever 7/18/2013    Foot ulcer due to secondary DM ([address] 5/28/2014    Left side on antibiotics    GERD (gastroesophageal reflux disease)     Hematochezia 3/4/2017    History of cerebral infarction 3/1/2017    History of DVT (deep vein thrombosis) 9/17/2018    History of superior vena cava filter placement 7/18/2013    Hx of blood clots     Hyperkalemia 7/16/2013    Hyperlipidemia     Hypernatremia 7/11/2013    Hypertension     Hypocalcemia 7/18/2013    Hypovolemic shock ([address] 3/4/2017    Hypoxia     Irregular heartbeat     hx of a-fib    Knee effusion, right 2/25/2017    Left leg cellulitis     Lower GI bleed 3/4/2017    MDRO (multiple drug resistant organisms) resistance     Metabolic acidosis 4/61/6007    MRSA (methicillin resistant staph aureus) culture positive 09/17/2018    leg    On continuous oral anticoagulation 9/17/2018    NADIRA on CPAP     Other specified hypothyroidism 8/24/2013    Parietal lobe infarction ([address] 8/26/2013    Pneumonia     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 Emotionally abused: Not on file     Physically abused: Not on file     Forced sexual activity: Not on file   Other Topics Concern    Not on file   Social History Narrative    ** Merged History Encounter **          Family History:    Family History   Problem Relation Age of Onset    Coronary Art Dis Father     Cancer Sister        Previous Urologic Family history: none    REVIEW OF SYSTEMS:    Attempted, but unable to complete due to patient mental status. Physical Exam:    This a 68 y.o. male   Patient Vitals for the past 24 hrs:   BP Temp Temp src Pulse Resp SpO2 Height Weight   07/22/19 1630 (!) 107/54 99 °F (37.2 °C) Axillary 89 24 98 % 6' (1.829 m) 248 lb 4.8 oz (112.6 kg)       Constitutional: Patient in no acute distress; Neuro: Somnolent  Skin: Bruising of parts of abdomen and left leg  Lungs: Respiratory effort normal  Cardiovascular:  RRR. Normal peripheral pulses  Abdomen: Soft, non-tender, non-distended, obese  No CVA tenderness  Bladder non-tender and not distended. Lower extremities significant edema bilaterally of the legs.     :  Penis buried, edematous  Urethral meatus could not be evaluated  Scrotal exam edematous, tender    LABS:    Recent Labs     07/22/19  0730 07/22/19  0938   WBC 5.0 4.4   HGB 7.1* 7.1*   HCT 24.6* 23.0*   .8 94.3    172     Recent Labs     07/22/19  0730 07/22/19  0938    136   K 4.5 4.5   CL 96* 96*   CO2 22 20   BUN 68* 68*   CREATININE 5.43* 5.70*     Lab Results   Component Value Date    PSA 1.57 05/18/2012       Additional Lab/culture results:    Urinalysis:   Recent Labs     07/22/19  0955   COLORU RED*   PHUR 6.0   WBCUA 10 TO 20   RBCUA TOO NUMEROUS TO COUNT   MUCUS 1+*   TRICHOMONAS NOT REPORTED   YEAST NOT REPORTED   BACTERIA FEW*   SPECGRAV 1.015   LEUKOCYTESUR LARGE*   UROBILINOGEN Normal   BILIRUBINUR NEGATIVE        -----------------------------------------------------------------  Imaging Results:  Imaging was independently

## 2019-07-22 NOTE — ED PROVIDER NOTES
bleed ([facility] 7/13/2013    Subdural bleeding ([facility] 7/5/2013    Thyroid disease     Traumatic cerebral hemorrhage ([facility] 7/7/2013    Type 2 diabetes mellitus with left diabetic foot ulcer ([facility] 4/16/2014    Unspecified cerebral artery occlusion with cerebral infarction     Venous stasis dermatitis 5/28/2014    Venous stasis dermatitis of both lower extremities 5/28/2014     SURGICAL HISTORY       Past Surgical History:   Procedure Laterality Date    ABDOMEN SURGERY      CARDIAC CATHETERIZATION      COLONOSCOPY      DILATATION, ESOPHAGUS      FOOT DEBRIDEMENT Left     FOOT SURGERY Right     #5 toe removed  , 1/2 toes #1, #2  partially removed    GASTRIC BYPASS SURGERY      GASTROSTOMY TUBE PLACEMENT  summer 2013    HEMIARTHROPLASTY HIP Right 5/15/2019    HIP HEMIARTHROPLASTY. Lateral 3080 table, Gayle Rep. Orquidea De Jesus will notify performed by Nettie Kaur DO at [facility] Right 6/26/2019    HIP INCISION AND DRAINAGE performed by Nettie Kaur DO at [address] [facility] [address] Right 3/23/2017    KNEE ARTHROSCOPY,EXTENSIVE DEBRIDEMENT PARTIAL PROXIMAL AND MEDIAL MENISECTOMY  performed by Makenna Baird MD at [address] Right 06/28/2019    STAGE 1 TOTAL HIP REVISION ARTHROPLASTY  WITH REMEDIES AND CEMENT      REVISION TOTAL HIP ARTHROPLASTY Right 6/28/2019    STAGE 1 TOTAL HIP REVISION ARTHROPLASTY  WITH REMEDIES AND CEMENT  SMITH & NEPHEW,  REMOVAL OF HARDWARE , FEMORAL HEAD AND STEM .  performed by Nettie Kaur DO at [address] [facility]  summer 2013    VENA CAVA FILTER PLACEMENT       CURRENT MEDICATIONS       Previous Medications    APIXABAN (ELIQUIS) 2.5 MG TABS TABLET    Take 1 tablet by mouth 2 times daily    APIXABAN (ELIQUIS) 5 MG TABS TABLET    Take 5 mg by mouth 2 times daily    ARIPIPRAZOLE (ABILIFY) 5 MG TABLET    Take 5 mg by mouth daily     CHOLECALCIFEROL (VITAMIN D3) 2000 UNITS are read by the radiologist, see reports below, unless otherwisenoted in MDM or here. XR CHEST PORTABLE   Final Result   1. Right base consolidation, partially obscuring the medial portion of the   right hemidiaphragm. 2. Small right pleural effusion. 3. Persistent elevation of the right hemidiaphragm. 4. Stable mild enlargement of the cardiac silhouette. CT ABDOMEN PELVIS WO CONTRAST Additional Contrast? None   Preliminary Result   1. Evaluation limited due to the patient's arm position and positioning   within the gantry with soft tissues of the left hip incompletely included on   the study. 2.  Patient has small to moderate right pleural effusion, partially loculated   with consolidation right lower lobe. Small left effusion is noted. Cardiomegaly and coronary artery calcification is noted. 3.  Dilated gallbladder with hydrops appearance with gallstone within the   gallbladder. 4.  Small amount of fluid around the inferior right lobe of the liver   extending in the right pericolic gutter. 5.  Multiple nonobstructing vascular parenchymal calcifications in both   kidneys. 6.  Severe atherosclerotic disease of the branches off the aorta. Patient is   at risk for intestinal angina. 7.  No evidence of appendicitis, bowel obstruction, or urinary obstruction. LABS: All lab results were reviewed by myself, and all abnormals are listed below.   Labs Reviewed   LACTATE, SEPSIS - Abnormal; Notable for the following components:       Result Value    Lactic Acid, Sepsis 3.5 (*)     All other components within normal limits   LACTATE, SEPSIS - Abnormal; Notable for the following components:    Lactic Acid, Sepsis 3.7 (*)     All other components within normal limits   CBC WITH AUTO DIFFERENTIAL - Abnormal; Notable for the following components:    RBC 2.44 (*)     Hemoglobin 7.1 (*)     Hematocrit 23.0 (*)     MCHC 30.9 (*)     RDW 17.6 (*)     Seg Neutrophils 72 (*) Placed This Encounter   Medications    cefepime (MAXIPIME) 2 g IVPB minibag    vancomycin (VANCOCIN) 1500 mg in dextrose 5 % 250 mL IVPB    vancomycin (VANCOCIN) intermittent dosing (placeholder)    metronidazole (FLAGYL) 500 mg in NaCl 100 mL IVPB premix    acetaminophen (TYLENOL) tablet 1,000 mg     CONSULTS:  PHARMACY TO DOSE VANCOMYCIN  IP CONSULT TO INTERNAL MEDICINE    FINAL IMPRESSION      1. Severe sepsis ([address]    2. Cellulitis of groin    3. Pneumonia due to organism    4. Acute cystitis without hematuria    5.  Gastrointestinal hemorrhage, unspecified gastrointestinal hemorrhage type          DISPOSITION/PLAN   DISPOSITION Decision To Transfer 07/22/2019 12:40:51 PM      Viridiana More MD  Attending Emergency Physician                    Viridiana More MD  07/22/19 7682

## 2019-07-22 NOTE — PLAN OF CARE
PROVIDE ADEQUATE OXYGENATION WITH ACCEPTABLE SP02/ABG'S    ? IDENTIFY APPROPRIATE OXYGEN THERAPY  ? MONITOR SP02/ABG'S AS NEEDED   ? PATIENT EDUCATION AS NEEDED       drew marmolejo, [facility]. Cellulitis [L03.90] . Vitals:    07/22/19 1630   BP: (!) 107/54   Pulse: 89   Resp: 24   Temp: 99 °F (37.2 °C)   SpO2: 98%   . Patients home meds are   Prior to Admission medications    Medication Sig Start Date End Date Taking? Authorizing Provider   senna (SENOKOT) 8.6 MG TABS tablet Take 1 tablet by mouth 2 times daily 7/5/19   Kit Qawalangin, DO   apixaban (ELIQUIS) 2.5 MG TABS tablet Take 1 tablet by mouth 2 times daily 7/5/19 8/9/19  Kit Qawalangin, DO   isosorbide dinitrate (ISORDIL) 30 MG tablet Take 30 mg by mouth daily 5/16/19   Historical Provider, MD   darbepoetin jean pierre-polysorbate (ARANESP) 40 MCG/0.4ML SOSY injection Infuse 0.8 mLs intravenously every 7 days 5/18/19   Peters Segura, DO   furosemide (LASIX) 20 MG tablet Take 20 mg by mouth 2 times daily    Historical Provider, MD   ARIPiprazole (ABILIFY) 5 MG tablet Take 5 mg by mouth daily  7/26/17   Historical Provider, MD   apixaban (ELIQUIS) 5 MG TABS tablet Take 5 mg by mouth 2 times daily    Historical Provider, MD   Ferric Citrate (AURYXIA) 1  MG(Fe) TABS Take 210 mg by mouth 2 times daily Patients med list states frequency is 2-3 times a day    Historical Provider, MD   venlafaxine (EFFEXOR) 75 MG tablet Take 75 mg by mouth daily    Historical Provider, MD   lamoTRIgine (LAMICTAL) 25 MG tablet Take 100 mg by mouth daily     Historical Provider, MD   isosorbide mononitrate (IMDUR) 30 MG CR tablet Take 30 mg by mouth daily  12/12/14   Historical Provider, MD   pantoprazole (PROTONIX) 40 MG tablet Take 40 mg by mouth daily  10/17/13   Historical Provider, MD   levothyroxine (LEVOTHROID) 50 MCG tablet Take 50 mcg by mouth Daily.     Historical Provider, MD   Cholecalciferol (VITAMIN D3) 2000 UNITS CAPS Take 1,000 Units by mouth secretions ? Copius, Viscious Yellow/ Secretions   CXR as reported by physician ?  clear  ? Unavailable ? Infiltrates and/or consolidation  ? Unavailable ? Mucus Plugging and or lobar consolidation  ? Unavailable   Cough ? Strong, productive cough ? Weak productive cough ?   No cough or weak non-productive cough   drew arias tommystef  4:59 PM                            FEMALE                                  MALE                            FEV1 Predicted Normal Values                        FEV1 Predicted Normal Values          Age                                     Height in Feet and Inches       Age                                     Height in Feet and Inches       4' 11\" 5' 1\" 5' 3\" 5' 5\" 5' 7\" 5' 9\" 5' 11\" 6' 1\"  4' 11\" 5' 1\" 5' 3\" 5' 5\" 5' 7\" 5' 9\" 5' 11\" 6' 1\"   42 - 45 2.49 2.66 2.84 3.03 3.22 3.42 3.62 3.83 42 - 45 2.82 3.03 3.26 3.49 3.72 3.96 4.22 4.47   46 - 49 2.40 2.57 2.76 2.94 3.14 3.33 3.54 3.75 46 - 49 2.70 2.92 3.14 3.37 3.61 3.85 4.10 4.36   50 - 53 2.31 2.48 2.66 2.85 3.04 3.24 3.45 3.66 50 - 53 2.58 2.80 3.02 3.25 3.49 3.73 3.98 4.24   54 - 57 2.21 2.38 2.57 2.75 2.95 3.14 3.35 3.56 54 - 57 2.46 2.67 2.89 3.12 3.36 3.60 3.85 4.11   58 - 61 2.10 2.28 2.46 2.65 2.84 3.04 3.24 3.45 58 - 61 2.32 2.54 2.76 2.99 3.23 3.47 3.72 3.98   62 - 65 1.99 2.17 2.35 2.54 2.73 2.93 3.13 3.34 62 - 65 2.19 2.40 2.62 2.85 3.09 3.33 3.58 3.84   66 - 69 1.88 2.05 2.23 2.42 2.61 2.81 3.02 3.23 66 - 69 2.04 2.26 2.48 2.71 2.95 3.19 3.44 3.70   70+ 1.82 1.99 2.17 2.36 2.55 2.75 2.95 3.16 70+ 1.97 2.19 2.41 2.64 2.87 3.12 3.37 3.62             Predicted Peak Expiratory Flow Rate                                       Height (in)  Female       Height (in) Male           Age 64 62 64 63 57 71 78 74 Age            20 945 190 445 894 630 801 087 552  58 10 12 74 92 96 23 42 21   25 337 352 366 381 396 411 426 441 25 447 476 [phone] [phone]   30 329 344 359 374 389 404 419 434 30 437 [phone]

## 2019-07-22 NOTE — H&P
[facility]    HISTORY AND PHYSICAL EXAMINATION            Date:   7/22/2019  Patient name:  Rhys Reyes  Date of admission:  7/22/2019  3:58 PM  MRN:   [id number]  Account:  [de-identified]  YOB: 1950  PCP:    Mary Kelly MD  Room:   [address] [place] [address]  Code Status:    Full Code    Chief Complaint:     fever    History Obtained From:     patient, electronic medical record, spouse    History of Present Illness: This is 68years old gentleman who was transferred to us from [facility] after he presented there with fever. Patient is a resident of a nursing home and was just recently discharged from our hospital.  He had issues with hip infection and was treated with antibiotics and spacer. At the nursing home patient was noted to have fever and was sent to [facility].  He was found to have abdominal cellulitis and question of pneumonia and UTI. He was started on broad-spectrum antibiotics and referred for admission. Apparently there was also concern for blood in the urine and occult blood positive in the stools. Currently patient does not have any marlon bleeding. Patient is not able to give much history and upon discussion with the patient's wife, he was discharged on 7.5 mg of eliquis twice daily.       Past Medical History:     Past Medical History:   Diagnosis Date    A-fib ([address]     Acute encephalopathy     Acute ischemic stroke ([address] 7/13/2013    Acute metabolic encephalopathy 3/0/1098    Acute on chronic congestive heart failure ([address] 5/9/2014    Acute on chronic diastolic CHF (congestive heart failure) ([address] 2/25/2017    Altered mental status 8/24/2013    Anemia     Anemia associated with chronic renal failure 5/28/2014    Aphasia 8/24/2013    ARF (acute renal failure) ([address] 5/28/2014    From pre renal azotemia from newly added diuretic and ARB use, creat peaked at 2.8 from 2 in may 2014    knee joint ([address] 3/23/2017    Pyrexia 7/19/2013    Renal insufficiency     Renal lesion 3/3/2017    Respiratory failure, acute ([address] 7/7/2013    Right knee pain     [facility]H [facility]achnoid hemorrhage) ([facility]     Secondary hyperparathyroidism of renal origin ([address] 12/16/2015    Seizure disorder ([address] 3/1/2017    Stercoral ulcer of rectum     Streptococcal infection     Subarachnoid bleed (HCC) 7/13/2013    Subdural bleeding ([address] 7/5/2013    Thyroid disease     Traumatic cerebral hemorrhage ([address] 7/7/2013    Type 2 diabetes mellitus with left diabetic foot ulcer ([address] 4/16/2014    Unspecified cerebral artery occlusion with cerebral infarction     Venous stasis dermatitis 5/28/2014    Venous stasis dermatitis of both lower extremities 5/28/2014        Past Surgical History:     Past Surgical History:   Procedure Laterality Date    ABDOMEN SURGERY      CARDIAC CATHETERIZATION      COLONOSCOPY      DILATATION, ESOPHAGUS      FOOT DEBRIDEMENT Left     FOOT SURGERY Right     #5 toe removed  , 1/2 toes #1, #2  partially removed    GASTRIC BYPASS SURGERY      GASTROSTOMY TUBE PLACEMENT  summer 2013    HEMIARTHROPLASTY HIP Right 5/15/2019    HIP HEMIARTHROPLASTY. Lateral [address] table, Gayle Rep. [place] will notify performed by Morris White DO at [place] [address] Right 6/26/2019    HIP INCISION AND DRAINAGE performed by Morris White DO at [address] [place] [address] Right 3/23/2017    KNEE ARTHROSCOPY,EXTENSIVE DEBRIDEMENT PARTIAL PROXIMAL AND MEDIAL MENISECTOMY  performed by Anselmo Suarez MD at [facility] Right 06/28/2019    STAGE 1 TOTAL HIP REVISION ARTHROPLASTY  WITH REMEDIES AND CEMENT      REVISION TOTAL HIP ARTHROPLASTY Right 6/28/2019    STAGE 1 TOTAL HIP REVISION ARTHROPLASTY  WITH REMEDIES AND CEMENT  SMITH & NEPHEW,  REMOVAL OF HARDWARE , FEMORAL HEAD AND STEM .  performed by Morris White DO at [address] [place] [address]

## 2019-07-22 NOTE — ED NOTES
[facility] here to transport pt. Report was called to 3B at [facility]. [facility]. Spoke with Megha MOLINA.       Jose Luis Medrano RN  07/22/19 1500

## 2019-07-22 NOTE — PROGRESS NOTES
hemodialysis in place, primary [facility])    Closed right hip fracture, initial encounter ([facility]    Closed fracture of right hip ([facility]    Thyroid disease    Hyponatremia    Uncontrolled type 2 diabetes mellitus with hyperglycemia ([facility]    History of hemiarthroplasty of right hip    Surgical wound dehiscence    Post-op pain    Infection due to ESBL-producing Klebsiella pneumoniae    Proteus mirabilis infection    Infection and inflammatory reaction due to internal right hip prosthesis, initial encounter ([facility]    Severe malnutrition ([facility]    Surgical wound infection       Allergies:  Bactrim [sulfamethoxazole-trimethoprim]     Temp max: 99.8    Recent Labs     07/22/19  0730 07/22/19  0938   BUN 68* 68*       Recent Labs     07/22/19  0730 07/22/19  0938   CREATININE 5.43* 5.70*       Recent Labs     07/22/19  0730 07/22/19  0938   WBC 5.0 4.4       No intake or output data in the 24 hours ending 07/22/19 1701    Culture Date      Source                       Results  7/22                     blood, wound    Ht Readings from Last 1 Encounters:   07/22/19 6' (1.829 m)        Wt Readings from Last 1 Encounters:   07/22/19 248 lb 4.8 oz (112.6 kg)         Body mass index is 33.68 kg/m². Estimated Creatinine Clearance: 15 mL/min (A) (based on SCr of 5.7 mg/dL [facility])). Goal Trough Level: 10-15 mcg/mL    Assessment/Plan:  Will initiate vancomycin dosed by levels. Patient already received 1500mg today at [facility]. Unsure of dialysis schedule. Recent note says patient receives dialysis on all weekdays except weekends. Timing of trough level will be determined based on culture results, renal function, and clinical response. Thank you for the consult. Will continue to follow.     Monique GrossmanD, BCPS 7/22/2019 5:08 PM

## 2019-07-22 NOTE — ED NOTES
Bed: 09  Expected date:   Expected time:   Means of arrival:   Comments:     Fede Gould RN  07/22/19 0399

## 2019-07-22 NOTE — ED NOTES
Pt to be transferred to [facility]. V's per family request.      Sandra Mayes RN  07/22/19 9880

## 2019-07-23 ENCOUNTER — APPOINTMENT (OUTPATIENT)
Dept: DIALYSIS | Age: 69
DRG: 193 | End: 2019-07-23
Attending: INTERNAL MEDICINE
Payer: MEDICARE

## 2019-07-23 LAB
ANION GAP SERPL CALCULATED.3IONS-SCNC: 18 MMOL/L (ref 9–17)
BLOOD BANK SPECIMEN: NORMAL
BUN BLDV-MCNC: 77 MG/DL (ref 8–23)
BUN/CREAT BLD: ABNORMAL (ref 9–20)
CALCIUM SERPL-MCNC: 8.1 MG/DL (ref 8.6–10.4)
CHLORIDE BLD-SCNC: 92 MMOL/L (ref 98–107)
CO2: 20 MMOL/L (ref 20–31)
CREAT SERPL-MCNC: 5.93 MG/DL (ref 0.7–1.2)
CULTURE: ABNORMAL
DIRECT EXAM: ABNORMAL
EKG ATRIAL RATE: 94 BPM
EKG Q-T INTERVAL: 402 MS
EKG QRS DURATION: 90 MS
EKG QTC CALCULATION (BAZETT): 518 MS
EKG R AXIS: 61 DEGREES
EKG T AXIS: 143 DEGREES
EKG VENTRICULAR RATE: 100 BPM
GFR AFRICAN AMERICAN: 11 ML/MIN
GFR NON-AFRICAN AMERICAN: 9 ML/MIN
GFR SERPL CREATININE-BSD FRML MDRD: ABNORMAL ML/MIN/{1.73_M2}
GFR SERPL CREATININE-BSD FRML MDRD: ABNORMAL ML/MIN/{1.73_M2}
GLUCOSE BLD-MCNC: 179 MG/DL (ref 70–99)
HCT VFR BLD CALC: 23.8 % (ref 40.7–50.3)
HCT VFR BLD CALC: 25 % (ref 40.7–50.3)
HCT VFR BLD CALC: 25.3 % (ref 40.7–50.3)
HEMOGLOBIN: 7.1 G/DL (ref 13–17)
HEMOGLOBIN: 7.4 G/DL (ref 13–17)
HEMOGLOBIN: 7.7 G/DL (ref 13–17)
Lab: ABNORMAL
MCH RBC QN AUTO: 29.1 PG (ref 25.2–33.5)
MCHC RBC AUTO-ENTMCNC: 28.4 G/DL (ref 28.4–34.8)
MCV RBC AUTO: 102.5 FL (ref 82.6–102.9)
NRBC AUTOMATED: 0 PER 100 WBC
PDW BLD-RTO: 16.4 % (ref 11.8–14.4)
PLATELET # BLD: 122 K/UL (ref 138–453)
PMV BLD AUTO: 10.1 FL (ref 8.1–13.5)
POTASSIUM SERPL-SCNC: 4.7 MMOL/L (ref 3.7–5.3)
RBC # BLD: 2.44 M/UL (ref 4.21–5.77)
SODIUM BLD-SCNC: 130 MMOL/L (ref 135–144)
SPECIMEN DESCRIPTION: ABNORMAL
WBC # BLD: 4.1 K/UL (ref 3.5–11.3)

## 2019-07-23 PROCEDURE — 6360000002 HC RX W HCPCS: Performed by: INTERNAL MEDICINE

## 2019-07-23 PROCEDURE — 2580000003 HC RX 258: Performed by: INTERNAL MEDICINE

## 2019-07-23 PROCEDURE — 99222 1ST HOSP IP/OBS MODERATE 55: CPT | Performed by: INTERNAL MEDICINE

## 2019-07-23 PROCEDURE — 36415 COLL VENOUS BLD VENIPUNCTURE: CPT

## 2019-07-23 PROCEDURE — 6370000000 HC RX 637 (ALT 250 FOR IP): Performed by: INTERNAL MEDICINE

## 2019-07-23 PROCEDURE — 94760 N-INVAS EAR/PLS OXIMETRY 1: CPT

## 2019-07-23 PROCEDURE — 2580000003 HC RX 258: Performed by: NURSE PRACTITIONER

## 2019-07-23 PROCEDURE — 1200000000 HC SEMI PRIVATE

## 2019-07-23 PROCEDURE — 86920 COMPATIBILITY TEST SPIN: CPT

## 2019-07-23 PROCEDURE — 2700000000 HC OXYGEN THERAPY PER DAY

## 2019-07-23 PROCEDURE — P9016 RBC LEUKOCYTES REDUCED: HCPCS

## 2019-07-23 PROCEDURE — 85018 HEMOGLOBIN: CPT

## 2019-07-23 PROCEDURE — 85027 COMPLETE CBC AUTOMATED: CPT

## 2019-07-23 PROCEDURE — 76937 US GUIDE VASCULAR ACCESS: CPT

## 2019-07-23 PROCEDURE — 36430 TRANSFUSION BLD/BLD COMPNT: CPT

## 2019-07-23 PROCEDURE — 90935 HEMODIALYSIS ONE EVALUATION: CPT

## 2019-07-23 PROCEDURE — 86850 RBC ANTIBODY SCREEN: CPT

## 2019-07-23 PROCEDURE — 99213 OFFICE O/P EST LOW 20 MIN: CPT

## 2019-07-23 PROCEDURE — 86900 BLOOD TYPING SEROLOGIC ABO: CPT

## 2019-07-23 PROCEDURE — 86901 BLOOD TYPING SEROLOGIC RH(D): CPT

## 2019-07-23 PROCEDURE — 85014 HEMATOCRIT: CPT

## 2019-07-23 PROCEDURE — 93010 ELECTROCARDIOGRAM REPORT: CPT | Performed by: INTERNAL MEDICINE

## 2019-07-23 PROCEDURE — G0328 FECAL BLOOD SCRN IMMUNOASSAY: HCPCS

## 2019-07-23 PROCEDURE — 99232 SBSQ HOSP IP/OBS MODERATE 35: CPT | Performed by: INTERNAL MEDICINE

## 2019-07-23 PROCEDURE — 80048 BASIC METABOLIC PNL TOTAL CA: CPT

## 2019-07-23 RX ORDER — MIDODRINE HYDROCHLORIDE 5 MG/1
5 TABLET ORAL 2 TIMES DAILY PRN
Status: DISCONTINUED | OUTPATIENT
Start: 2019-07-23 | End: 2019-07-28 | Stop reason: HOSPADM

## 2019-07-23 RX ORDER — VANCOMYCIN HYDROCHLORIDE 1 G/200ML
1000 INJECTION, SOLUTION INTRAVENOUS ONCE
Status: COMPLETED | OUTPATIENT
Start: 2019-07-23 | End: 2019-07-23

## 2019-07-23 RX ORDER — 0.9 % SODIUM CHLORIDE 0.9 %
250 INTRAVENOUS SOLUTION INTRAVENOUS ONCE
Status: COMPLETED | OUTPATIENT
Start: 2019-07-23 | End: 2019-07-23

## 2019-07-23 RX ADMIN — VITAMIN D, TAB 1000IU (100/BT) 2000 UNITS: 25 TAB at 13:59

## 2019-07-23 RX ADMIN — ARIPIPRAZOLE 5 MG: 5 TABLET ORAL at 14:00

## 2019-07-23 RX ADMIN — VENLAFAXINE 75 MG: 75 TABLET ORAL at 14:00

## 2019-07-23 RX ADMIN — LAMOTRIGINE 100 MG: 100 TABLET ORAL at 13:59

## 2019-07-23 RX ADMIN — ISOSORBIDE MONONITRATE 30 MG: 30 TABLET ORAL at 14:00

## 2019-07-23 RX ADMIN — MEROPENEM 1 G: 1 INJECTION, POWDER, FOR SOLUTION INTRAVENOUS at 18:14

## 2019-07-23 RX ADMIN — SODIUM CHLORIDE 250 ML: 9 INJECTION, SOLUTION INTRAVENOUS at 05:12

## 2019-07-23 RX ADMIN — IRON SUCROSE 100 MG: 20 INJECTION, SOLUTION INTRAVENOUS at 12:41

## 2019-07-23 RX ADMIN — SENNOSIDES 8.6 MG: 8.6 TABLET, FILM COATED ORAL at 20:38

## 2019-07-23 RX ADMIN — FUROSEMIDE 20 MG: 20 TABLET ORAL at 14:00

## 2019-07-23 RX ADMIN — SENNOSIDES 8.6 MG: 8.6 TABLET, FILM COATED ORAL at 13:59

## 2019-07-23 RX ADMIN — PIPERACILLIN SODIUM,TAZOBACTAM SODIUM 2.25 G: 2; .25 INJECTION, POWDER, FOR SOLUTION INTRAVENOUS at 05:11

## 2019-07-23 RX ADMIN — VANCOMYCIN HYDROCHLORIDE 1000 MG: 1 INJECTION, SOLUTION INTRAVENOUS at 14:05

## 2019-07-23 RX ADMIN — PANTOPRAZOLE SODIUM 40 MG: 40 TABLET, DELAYED RELEASE ORAL at 13:59

## 2019-07-23 RX ADMIN — LEVOTHYROXINE SODIUM 50 MCG: 50 TABLET ORAL at 14:00

## 2019-07-23 RX ADMIN — MIDODRINE HYDROCHLORIDE 5 MG: 5 TABLET ORAL at 10:18

## 2019-07-23 RX ADMIN — FUROSEMIDE 20 MG: 20 TABLET ORAL at 20:38

## 2019-07-23 ASSESSMENT — PAIN SCALES - GENERAL
PAINLEVEL_OUTOF10: 1
PAINLEVEL_OUTOF10: 0
PAINLEVEL_OUTOF10: 0

## 2019-07-23 NOTE — PROGRESS NOTES
[address] [place] [place] [address] Continence Nurse  Consult Note       NAME:  Rosemarie Titus RECORD NUMBER:  [id number]  AGE: 68 y.o. GENDER: male  : 1950  TODAY'S DATE:  2019    Subjective:     Reason for [id number] Nurse Evaluation and Assessment:   Pressure Injury care and prevention. Unstagable heel ulcer to right heel; stable black eschar. Stage 3 pressure injury to left heel. New, traumatic injury to the left lateral foot. Stage 2 coccyx pressure injury with moisture to skin.   Multiple skin tears  Bridge of nose reddened from Bipap mask     Wound Identification:  Wound Type: pressure  Contributing Factors: lymphedema, chronic pressure, decreased mobility, shear force, malnutrition and incontinence of stool        PAST MEDICAL HISTORY        Diagnosis Date    A-fib ([address]     Acute encephalopathy     Acute ischemic stroke ([address] 2013    Acute metabolic encephalopathy 3/1/6558    Acute on chronic congestive heart failure ([address] 2014    Acute on chronic diastolic CHF (congestive heart failure) ([address] 2017    Altered mental status 2013    Anemia     Anemia associated with chronic renal failure 2014    Aphasia 2013    ARF (acute renal failure) ([address] 2014    From pre renal azotemia from newly added diuretic and ARB use, creat peaked at 2.8 from 2 in may 2014    Arteriovenous fistula for hemodialysis in place, primary ([address] 2017    Arthritis     Bradyarrhythmia 2013    Cellulitis 2018    Cerebral contusion ([address] 2013    CHF (congestive heart failure) (HCC)     Chronic atrial fibrillation (HCC) 2013    Chronic kidney disease     Chronic pain of right knee     CKD (chronic kidney disease) stage 4, GFR 15-29 ml/min ([address] 2014    From diabetic and ischemic nephrosclerosis, creat now 2-2.5, GFR 25-30 ml/min    Coagulation defect ([address] 2013    Diabetes mellitus ([address]     Diabetes mellitus type 2 in obese ([address]     Diarrhea 2013 lb (113.4 kg)   SpO2 100%   BMI 33.91 kg/m²   Edi Risk Score: Edi Scale Score: 15    LABS    CBC:   Lab Results   Component Value Date    WBC 4.1 07/23/2019    RBC 2.44 07/23/2019    RBC 3.35 05/18/2012    HGB 7.7 07/23/2019     CMP:  Albumin:    Lab Results   Component Value Date    LABALBU 2.6 07/22/2019    LABALBU 3.7 05/18/2012     PT/INR:    Lab Results   Component Value Date    PROTIME 20.5 07/22/2019    PROTIME 12.8 04/17/2012    INR 1.8 07/22/2019     HgBA1c:    Lab Results   Component Value Date    LABA1C 5.3 03/24/2017     PTT: No components found for: LABPTT      Assessment:     Patient Active Problem List   Diagnosis    Subdural bleeding ([address]    Coagulation defect [facility]    Chronic atrial fibrillation ([facility]    Respiratory failure, acute ([facility]    Traumatic cerebral hemorrhage (HCC)    [facility]usion ([facility]    Pneumonia    Post traumatic encephalopathy    Subarachnoid bleed (HCC)    Acute ischemic stroke ([address]    Bradyarrhythmia    Hyperkalemia    Deep vein thrombosis (DVT) of left lower extremity (HCC)    Hypocalcemia    History of superior vena cava filter placement    Anemia    Aphasia    Other specified hypothyroidism    Parietal lobe infarction ([address]    Type 2 diabetes mellitus with left diabetic foot ulcer (HCC)    Acute on chronic congestive heart failure (HCC)    NADIRA on CPAP    CKD (chronic kidney disease) stage 4, GFR 15-29 ml/min ([facility])    Venous stasis dermatitis of both lower extremities    Foot ulcer due to secondary DM ([address]    Anemia associated with chronic renal failure    Secondary hyperparathyroidism of renal origin ([address]    Proteinuria    Chronic diastolic CHF (congestive heart failure) (HCC)    Knee effusion, right    Hypoxia    Chronic pain of right knee    Diabetes mellitus type 2 in obese (HCC)    Acute metabolic encephalopathy    Seizure disorder (HCC)    History of cerebral infarction    Renal lesion    Lower GI bleed    Hypovolemic

## 2019-07-23 NOTE — PROGRESS NOTES
Urology Progress Note  CC:  Hematuria  Subjective: Tre Rodriguez is a 68 y.o. male. His/Her current Diet is: DIET RENAL;. The patient is * No surgery found * from   No acute urologic events overnight. PVR was 19cc. On PAP.   No chest pain, shortness of breath, nausea, vomiting, fevers, chills      Patient Vitals for the past 24 hrs:   BP Temp Temp src Pulse Resp SpO2 Height Weight   07/23/19 0648 (!) (P) 93/46 (P) 97.8 °F (36.6 °C) (P) Axillary (P) 81 (P) 17 (P) 93 % -- --   07/23/19 0324 -- -- -- -- 16 -- -- --   07/22/19 2332 -- -- -- -- 20 -- -- --   07/22/19 2100 (!) 108/58 98 °F (36.7 °C) Oral 76 17 95 % -- --   07/22/19 1630 (!) 107/54 99 °F (37.2 °C) Axillary 89 24 98 % 6' (1.829 m) 248 lb 4.8 oz (112.6 kg)       Intake/Output Summary (Last 24 hours) at 7/23/2019 0703  Last data filed at 7/22/2019 2100  Gross per 24 hour   Intake --   Output 50 ml   Net -50 ml       Recent Labs     07/22/19  0730 07/22/19  0938 07/22/19 2031 07/23/19  0400   WBC 5.0 4.4  --  4.1   HGB 7.1* 7.1* 6.6* 7.1*   HCT 24.6* 23.0* 23.8* 25.0*   .8 94.3  --  102.5    172  --  122*     Recent Labs     07/22/19  0730 07/22/19  0938 07/23/19  0400    136 130*   K 4.5 4.5 4.7   CL 96* 96* 92*   CO2 22 20 20   BUN 68* 68* 77*   CREATININE 5.43* 5.70* 5.93*       Recent Labs     07/22/19  0955   COLORU RED*   PHUR 6.0   WBCUA 10 TO 20   RBCUA TOO NUMEROUS TO COUNT   MUCUS 1+*   TRICHOMONAS NOT REPORTED   YEAST NOT REPORTED   BACTERIA FEW*   SPECGRAV 1.015   LEUKOCYTESUR LARGE*   UROBILINOGEN Normal   BILIRUBINUR NEGATIVE       Current Facility-Administered Medications   Medication Dose Route Frequency Provider Last Rate Last Dose    [Held by provider] apixaban (ELIQUIS) tablet 2.5 mg  2.5 mg Oral BID Rosemarie Denis MD        ARIPiprazole (ABILIFY) tablet 5 mg  5 mg Oral Daily Rosemarie Denis MD        vitamin D (CHOLECALCIFEROL) tablet 2,000 Units  2,000 Units Oral Daily Rosemarie Denis MD        Ferric Citrate TABS 210 mg

## 2019-07-23 NOTE — PROGRESS NOTES
[address]    Progress Note    7/23/2019    4:28 PM    Name:   Timo Gibson  MRN:     [id number]     [place]:      [de-identified]   Room:   [address] [place] [address] Day:  1  Admit Date:  7/22/2019  3:58 PM    PCP:   Kelvin Libman, MD  Code Status:  Full Code    Subjective:     C/C: fever  Interval History Status: improved. Patient underwent hemodialysis today, he is doing much better, he is awake and answering questions, hematuria has resolved, he did receive 1 unit of blood last night, no evidence of bleeding at this point      Brief History: This is 68years old gentleman who was transferred to us from [facility] after he presented there with fever. Patient is a resident of a nursing home and was just recently discharged from our hospital.  He had issues with hip infection and was treated with antibiotics and spacer. At the nursing home patient was noted to have fever and was sent to [facility].  He was found to have abdominal cellulitis and question of pneumonia and UTI. He was started on broad-spectrum antibiotics and referred for admission. Apparently there was also concern for blood in the urine and occult blood positive in the stools. Currently patient does not have any marlon bleeding. Patient is not able to give much history and upon discussion with the patient's wife, he was discharged on 7.5 mg of eliquis twice daily. Review of Systems:     Constitutional:  negative for chills, fevers, sweats  Respiratory:  negative for cough, dyspnea on exertion, hemoptysis, shortness of breath, wheezing  Cardiovascular:  negative for chest pain, chest pressure/discomfort, lower extremity edema, palpitations  Gastrointestinal:  negative for abdominal pain, constipation, diarrhea, nausea, vomiting  Neurological:  negative for dizziness, headache    Medications: Allergies:     Allergies   Allergen Reactions    Bactrim

## 2019-07-23 NOTE — FLOWSHEET NOTE
Hemodialysis completed as ordered after 3.5 hours, accessing left upper arm fistula with 15 gauge needles, maintaining blood flow rate of 400 ml./min. Hemodynamically stable with Low BP's  96/53 to 107/54, utilizing cool dialysate temp of 36 degrees and Midodrine 5 mg. For BP support. Also received one unit of packed red blood cells this am.  Outpt. Dry weight 110 kg, pre hemo weight 117.1 kg., post weight 113.4 kg. Net fluid off was 3650 ml. Scheduled for more ultrafiltration only tomorrow per Dr. Sanjana Tom instruction. Continuing with Aranesp 60 mcg. Weekly and Venofer per Dr. Ranjeet Villalta.

## 2019-07-23 NOTE — CONSULTS
from discharge. Somehow that has been discontinued. At the time of evaluation he denies any shortness of breath, chest pain, PND orthopnea. Does complain of significant disc discomfort and tenderness in the left upper thigh. Urology consult has been obtained for hematuria. Antibiotics have been started. Past History/Allergies? Social History:     Past Medical History:   Diagnosis Date    A-fib [facility])     Acute encephalopathy     Acute ischemic stroke ([address] 7/13/2013    Acute metabolic encephalopathy 0/6/3059    Acute on chronic congestive heart failure ([address] 5/9/2014    Acute on chronic diastolic CHF (congestive heart failure) ([address] 2/25/2017    Altered mental status 8/24/2013    Anemia     Anemia associated with chronic renal failure 5/28/2014    Aphasia 8/24/2013    ARF (acute renal failure) ([address] 5/28/2014    From pre renal azotemia from newly added diuretic and ARB use, creat peaked at 2.8 from 2 in may 2014    Arteriovenous fistula for hemodialysis in place, primary ([address] 11/17/2017    Arthritis     Bradyarrhythmia 7/13/2013    Cellulitis 9/17/2018    Cerebral contusion ([address] 7/7/2013    [facility] failure) ([facility]     Chronic atrial fibrillation (HCC) 7/6/2013    Chronic kidney disease     Chronic pain of right knee     CKD (chronic kidney disease) stage 4, GFR 15-29 ml/min ([address] 5/28/2014    From diabetic and ischemic nephrosclerosis, creat now 2-2.5, GFR 25-30 ml/min    Coagulation defect ([address] 7/6/2013    Diabetes mellitus ([address]     Diabetes mellitus type 2 in obese ([address]     Diarrhea 7/16/2013    DVT (deep venous thrombosis) ([facility])     Elevated C-reactive protein (CRP)     ESRD (end stage renal disease) ([address] 3/23/2017    Essential hypertension 3/23/2017    Fever 7/18/2013    Foot ulcer due to secondary DM ([address] 5/28/2014    Left side on antibiotics    GERD (gastroesophageal reflux disease)     Hematochezia 3/4/2017    History of cerebral infarction 3/1/2017 mg by mouth 2 times daily  Ferric Citrate (AURYXIA) 1  MG(Fe) TABS, Take 210 mg by mouth 2 times daily Patients med list states frequency is 2-3 times a day  venlafaxine (EFFEXOR) 75 MG tablet, Take 75 mg by mouth daily  lamoTRIgine (LAMICTAL) 25 MG tablet, Take 100 mg by mouth daily   isosorbide mononitrate (IMDUR) 30 MG CR tablet, Take 30 mg by mouth daily   pantoprazole (PROTONIX) 40 MG tablet, Take 40 mg by mouth daily   levothyroxine (LEVOTHROID) 50 MCG tablet, Take 50 mcg by mouth Daily. Cholecalciferol (VITAMIN D3) 2000 UNITS CAPS, Take 1,000 Units by mouth daily. Current Medications:     Scheduled Meds:    Ferric Citrate  210 mg Oral BID WC    [Held by provider] apixaban  2.5 mg Oral BID    ARIPiprazole  5 mg Oral Daily    vitamin D  2,000 Units Oral Daily    furosemide  20 mg Oral BID    isosorbide mononitrate  30 mg Oral Daily    lamoTRIgine  100 mg Oral Daily    levothyroxine  50 mcg Oral Daily    pantoprazole  40 mg Oral Daily    senna  1 tablet Oral BID    venlafaxine  75 mg Oral Daily    sodium chloride flush  10 mL Intravenous 2 times per day    piperacillin-tazobactam  2.25 g Intravenous Q12H    vancomycin (VANCOCIN) intermittent dosing (placeholder)   Other RX Placeholder     Continuous Infusions:   PRN Meds:  midodrine, sodium chloride flush, magnesium hydroxide, ondansetron, nicotine, acetaminophen, albuterol    Review of Systems:     Constitutional: No fever, no chills, no lethargy, no weakness. HEENT:  No headache, otalgia, itchy eyes, nasal discharge or sore throat. Cardiac:  No chest pain, dyspnea, orthopnea or PND. Chest:  No cough, phlegm or wheezing. Abdomen:  No abdominal pain, nausea or vomiting. Neuro:  No focal weakness, abnormal movements orseizure like activity. Skin:   No rashes, no itching. :   No hematuria, no pyuria, no dysuria, no flank pain. Extremities:  Both lower extremity swelling no joint pains.   ROS was otherwise negative except as

## 2019-07-23 NOTE — CONSULTS
[facility] - Initial Consult Note  Today's Date and Time: 7/23/2019, 9:32 AM    Impression :   · Abdominal wall subcutaneous bleeding  · Right lower lobe consolidation, concern for pneumonia   · Gross hematuria   · Type 2 DM  · ESRD on hemodialysis  · S/P Rt hemiarthroplasty on 6-15-19  · S/p superficial incisional dehiscence  · S/P revision of  Rt hip hemiarthroplasty on 6-28-19  · Infected Rt hip hemiarthroplasty with Proteus and ESBL + Klebsiella  · Irregularities on cortex of femur, lateral aspect raising question of inflammatory process at that level    Recommendations:   · Discontinue zosyn and vancomycin   · Meropenem 1 gm IV q 24 hr  · Monitor clinical response  · Follow culture data monitoring for new growth    Medical Decision Making/Summary/Discussion:7/23/2019     · S/P Rt hemiarthroplasty on 6-15-19. · Subsequently developed superficial incisional dehiscence. · Previously admitted 6-26-19 and underwent revision of  Rt hip hemiarthroplasty on 6-28-19. · Found to have an Infected Rt hip hemiarthroplasty with Proteus and ESBL + Klebsiella  · Patient started on meropenem for treatment in hospital.  · Was discharged to [facility] on long term po cipro for treatment and suppression of Rt hip infection  · Underlying ESRD on HD, chronic Rt leg lymphedema, chronic illness.   · Patient's anticoagulation led to subcutaneous skin bleeding of abdominal wall, hematuria, transfusion  Infection Control Recommendations   · [place] Precautions  · Contact Isolation     Antimicrobial Stewardship Recommendations     · Targeted therapy  · PK dosing    Coordination of Outpatient Care:   · Estimated Length of IV antimicrobials: TBD  · Patient will need Midline Catheter Insertion: TBD  · Patient will need PICC line Insertion: TBD  · Patient will need: Home IV , [place],  SNF,  LTAC: undetermined  · Patient will need outpatient wound care: TBD    Chief complaint/reason for Metabolic acidosis 5/57/6920    MRSA (methicillin resistant staph aureus) culture positive 09/17/2018    leg    On continuous oral anticoagulation 9/17/2018    NADIRA on CPAP     Other specified hypothyroidism 8/24/2013    Parietal lobe infarction ([address] 8/26/2013    Pneumonia     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 hyperparathyroidism of renal origin ([address] 12/16/2015    Seizure disorder ([address] 3/1/2017    Stercoral ulcer of rectum     Streptococcal infection     Subarachnoid bleed ([address] 7/13/2013    Subdural bleeding ([address] 7/5/2013    Thyroid disease     Traumatic cerebral hemorrhage ([address] 7/7/2013    Type 2 diabetes mellitus with left diabetic foot ulcer ([address] 4/16/2014    Unspecified cerebral artery occlusion with cerebral infarction     Venous stasis dermatitis 5/28/2014    Venous stasis dermatitis of both lower extremities 5/28/2014       Past Surgical  History:     Past Surgical History:   Procedure Laterality Date    ABDOMEN SURGERY      CARDIAC CATHETERIZATION      COLONOSCOPY      DILATATION, ESOPHAGUS      FOOT DEBRIDEMENT Left     FOOT SURGERY Right     #5 toe removed  , 1/2 toes #1, #2  partially removed    GASTRIC BYPASS SURGERY      GASTROSTOMY TUBE PLACEMENT  summer 2013    HEMIARTHROPLASTY HIP Right 5/15/2019    HIP HEMIARTHROPLASTY. Lateral [address] table, Gayle Rep.  Cecilio Nixon will notify performed by Tiffany Avalos DO at [facility] Right 6/26/2019    HIP INCISION AND DRAINAGE performed by Tiffany Avalos DO at [address] [place] [address] Right 3/23/2017    KNEE ARTHROSCOPY,EXTENSIVE DEBRIDEMENT PARTIAL Making-Other:     Note:  · Labs, medications, radiologic studies were reviewed with personal review of films  · Moderate Large amounts of data were reviewed  · Discussed with nursing Staff, Discharge planner  · Infection Control and Prevention measures reviewed  · All prior entries were reviewed  · Administer medications as ordered  · Prognosis: Guarded  · Discharge planning reviewed  · Follow up as outpatient. Thank you for allowing us to participate in the care of this patient. Please call with questions.     Wang Norris  Pager: [phone] - Office: [phone]

## 2019-07-23 NOTE — CONSULTS
ALLERGIES:  Allergies   Allergen Reactions    Bactrim [Sulfamethoxazole-Trimethoprim]        HOME MEDICATIONS:  Prior to Admission medications    Medication Sig Start Date End Date Taking? Authorizing Provider   senna (SENOKOT) 8.6 MG TABS tablet Take 1 tablet by mouth 2 times daily 7/5/19   Glenna Andrade DO   apixaban (ELIQUIS) 2.5 MG TABS tablet Take 1 tablet by mouth 2 times daily 7/5/19 8/9/19  Glenna Andrade DO   isosorbide dinitrate (ISORDIL) 30 MG tablet Take 30 mg by mouth daily 5/16/19   Historical Provider, MD   darbepoetin jean pierre-polysorbate (ARANESP) 40 MCG/0.4ML SOSY injection Infuse 0.8 mLs intravenously every 7 days 5/18/19   Leena Hummel DO   furosemide (LASIX) 20 MG tablet Take 20 mg by mouth 2 times daily    Historical Provider, MD   ARIPiprazole (ABILIFY) 5 MG tablet Take 5 mg by mouth daily  7/26/17   Historical Provider, MD   apixaban (ELIQUIS) 5 MG TABS tablet Take 5 mg by mouth 2 times daily    Historical Provider, MD   Ferric Citrate (AURYXIA) 1  MG(Fe) TABS Take 210 mg by mouth 2 times daily Patients med list states frequency is 2-3 times a day    Historical Provider, MD   venlafaxine (EFFEXOR) 75 MG tablet Take 75 mg by mouth daily    Historical Provider, MD   lamoTRIgine (LAMICTAL) 25 MG tablet Take 100 mg by mouth daily     Historical Provider, MD   isosorbide mononitrate (IMDUR) 30 MG CR tablet Take 30 mg by mouth daily  12/12/14   Historical Provider, MD   pantoprazole (PROTONIX) 40 MG tablet Take 40 mg by mouth daily  10/17/13   Historical Provider, MD   levothyroxine (LEVOTHROID) 50 MCG tablet Take 50 mcg by mouth Daily. Historical Provider, MD   Cholecalciferol (VITAMIN D3) 2000 UNITS CAPS Take 1,000 Units by mouth daily.     Historical Provider, MD     .  CURRENT MEDICATIONS:  Scheduled Meds:   [Held by provider] apixaban  2.5 mg Oral BID    ARIPiprazole  5 mg Oral Daily    vitamin D  2,000 Units Oral Daily    Ferric Citrate  210 mg Oral BID    furosemide  20 mg

## 2019-07-24 ENCOUNTER — APPOINTMENT (OUTPATIENT)
Dept: CT IMAGING | Age: 69
DRG: 193 | End: 2019-07-24
Attending: INTERNAL MEDICINE
Payer: MEDICARE

## 2019-07-24 ENCOUNTER — HOSPITAL ENCOUNTER (INPATIENT)
Dept: DIALYSIS | Age: 69
Discharge: HOME | DRG: 193 | End: 2019-07-24
Attending: INTERNAL MEDICINE
Payer: MEDICARE

## 2019-07-24 VITALS
RESPIRATION RATE: 83 BRPM | TEMPERATURE: 97.2 F | HEART RATE: 87 BPM | DIASTOLIC BLOOD PRESSURE: 57 MMHG | BODY MASS INDEX: 33.49 KG/M2 | WEIGHT: 246.91 LBS | SYSTOLIC BLOOD PRESSURE: 94 MMHG

## 2019-07-24 LAB
CULTURE: ABNORMAL
DATE, STOOL #1: NORMAL
DATE, STOOL #2: NORMAL
DATE, STOOL #3: NORMAL
DIRECT EXAM: ABNORMAL
GLUCOSE BLD-MCNC: 118 MG/DL (ref 75–110)
GLUCOSE BLD-MCNC: 203 MG/DL (ref 75–110)
HCT VFR BLD CALC: 23 % (ref 40.7–50.3)
HCT VFR BLD CALC: 26.2 % (ref 40.7–50.3)
HEMOCCULT SP1 STL QL: NEGATIVE
HEMOCCULT SP2 STL QL: NORMAL
HEMOCCULT SP3 STL QL: NORMAL
HEMOGLOBIN: 6.6 G/DL (ref 13–17)
HEMOGLOBIN: 7.4 G/DL (ref 13–17)
INTERVENTION: NORMAL
Lab: ABNORMAL
SPECIMEN DESCRIPTION: ABNORMAL
TIME, STOOL #1: NORMAL
TIME, STOOL #2: NORMAL
TIME, STOOL #3: NORMAL

## 2019-07-24 PROCEDURE — P9016 RBC LEUKOCYTES REDUCED: HCPCS

## 2019-07-24 PROCEDURE — 90935 HEMODIALYSIS ONE EVALUATION: CPT

## 2019-07-24 PROCEDURE — APPSS30 APP SPLIT SHARED TIME 16-30 MINUTES: Performed by: NURSE PRACTITIONER

## 2019-07-24 PROCEDURE — 6370000000 HC RX 637 (ALT 250 FOR IP): Performed by: INTERNAL MEDICINE

## 2019-07-24 PROCEDURE — 85014 HEMATOCRIT: CPT

## 2019-07-24 PROCEDURE — 6360000002 HC RX W HCPCS: Performed by: INTERNAL MEDICINE

## 2019-07-24 PROCEDURE — 86900 BLOOD TYPING SEROLOGIC ABO: CPT

## 2019-07-24 PROCEDURE — 36430 TRANSFUSION BLD/BLD COMPNT: CPT

## 2019-07-24 PROCEDURE — 99232 SBSQ HOSP IP/OBS MODERATE 35: CPT | Performed by: INTERNAL MEDICINE

## 2019-07-24 PROCEDURE — 85018 HEMOGLOBIN: CPT

## 2019-07-24 PROCEDURE — 6370000000 HC RX 637 (ALT 250 FOR IP): Performed by: NURSE PRACTITIONER

## 2019-07-24 PROCEDURE — 74176 CT ABD & PELVIS W/O CONTRAST: CPT

## 2019-07-24 PROCEDURE — 2580000003 HC RX 258: Performed by: INTERNAL MEDICINE

## 2019-07-24 PROCEDURE — P9047 ALBUMIN (HUMAN), 25%, 50ML: HCPCS | Performed by: INTERNAL MEDICINE

## 2019-07-24 PROCEDURE — 73700 CT LOWER EXTREMITY W/O DYE: CPT

## 2019-07-24 PROCEDURE — 36415 COLL VENOUS BLD VENIPUNCTURE: CPT

## 2019-07-24 PROCEDURE — 82947 ASSAY GLUCOSE BLOOD QUANT: CPT

## 2019-07-24 PROCEDURE — 1200000000 HC SEMI PRIVATE

## 2019-07-24 RX ORDER — ALBUMIN (HUMAN) 12.5 G/50ML
25 SOLUTION INTRAVENOUS ONCE
Status: COMPLETED | OUTPATIENT
Start: 2019-07-24 | End: 2019-07-24

## 2019-07-24 RX ORDER — DEXTROSE MONOHYDRATE 25 G/50ML
12.5 INJECTION, SOLUTION INTRAVENOUS PRN
Status: DISCONTINUED | OUTPATIENT
Start: 2019-07-24 | End: 2019-07-28 | Stop reason: HOSPADM

## 2019-07-24 RX ORDER — 0.9 % SODIUM CHLORIDE 0.9 %
250 INTRAVENOUS SOLUTION INTRAVENOUS ONCE
Status: DISCONTINUED | OUTPATIENT
Start: 2019-07-24 | End: 2019-07-28 | Stop reason: HOSPADM

## 2019-07-24 RX ORDER — DEXTROSE MONOHYDRATE 50 MG/ML
100 INJECTION, SOLUTION INTRAVENOUS PRN
Status: DISCONTINUED | OUTPATIENT
Start: 2019-07-24 | End: 2019-07-28 | Stop reason: HOSPADM

## 2019-07-24 RX ORDER — NICOTINE POLACRILEX 4 MG
15 LOZENGE BUCCAL PRN
Status: DISCONTINUED | OUTPATIENT
Start: 2019-07-24 | End: 2019-07-28 | Stop reason: HOSPADM

## 2019-07-24 RX ADMIN — FUROSEMIDE 20 MG: 20 TABLET ORAL at 21:21

## 2019-07-24 RX ADMIN — ALBUMIN (HUMAN) 25 G: 0.25 INJECTION, SOLUTION INTRAVENOUS at 15:41

## 2019-07-24 RX ADMIN — MEROPENEM 1 G: 1 INJECTION, POWDER, FOR SOLUTION INTRAVENOUS at 17:58

## 2019-07-24 RX ADMIN — INSULIN LISPRO 1 UNITS: 100 INJECTION, SOLUTION INTRAVENOUS; SUBCUTANEOUS at 22:31

## 2019-07-24 RX ADMIN — IRON SUCROSE 100 MG: 20 INJECTION, SOLUTION INTRAVENOUS at 16:16

## 2019-07-24 RX ADMIN — PANTOPRAZOLE SODIUM 40 MG: 40 TABLET, DELAYED RELEASE ORAL at 09:07

## 2019-07-24 RX ADMIN — ARIPIPRAZOLE 5 MG: 5 TABLET ORAL at 09:07

## 2019-07-24 RX ADMIN — LAMOTRIGINE 100 MG: 100 TABLET ORAL at 09:07

## 2019-07-24 RX ADMIN — SENNOSIDES 8.6 MG: 8.6 TABLET, FILM COATED ORAL at 09:07

## 2019-07-24 RX ADMIN — SENNOSIDES 8.6 MG: 8.6 TABLET, FILM COATED ORAL at 21:21

## 2019-07-24 RX ADMIN — VENLAFAXINE 75 MG: 75 TABLET ORAL at 09:07

## 2019-07-24 RX ADMIN — FUROSEMIDE 20 MG: 20 TABLET ORAL at 09:07

## 2019-07-24 RX ADMIN — ISOSORBIDE MONONITRATE 30 MG: 30 TABLET ORAL at 09:07

## 2019-07-24 RX ADMIN — MIDODRINE HYDROCHLORIDE 5 MG: 5 TABLET ORAL at 14:18

## 2019-07-24 RX ADMIN — VITAMIN D, TAB 1000IU (100/BT) 2000 UNITS: 25 TAB at 09:07

## 2019-07-24 RX ADMIN — SODIUM CHLORIDE, PRESERVATIVE FREE 10 ML: 5 INJECTION INTRAVENOUS at 09:08

## 2019-07-24 RX ADMIN — LEVOTHYROXINE SODIUM 50 MCG: 50 TABLET ORAL at 09:07

## 2019-07-24 RX ADMIN — SODIUM CHLORIDE, PRESERVATIVE FREE 10 ML: 5 INJECTION INTRAVENOUS at 21:22

## 2019-07-24 ASSESSMENT — PAIN DESCRIPTION - ONSET: ONSET: ON-GOING

## 2019-07-24 ASSESSMENT — PAIN DESCRIPTION - PAIN TYPE: TYPE: SURGICAL PAIN

## 2019-07-24 ASSESSMENT — PAIN DESCRIPTION - DESCRIPTORS: DESCRIPTORS: ACHING

## 2019-07-24 ASSESSMENT — PAIN DESCRIPTION - PROGRESSION: CLINICAL_PROGRESSION: NOT CHANGED

## 2019-07-24 ASSESSMENT — PAIN SCALES - GENERAL: PAINLEVEL_OUTOF10: 3

## 2019-07-24 ASSESSMENT — PAIN DESCRIPTION - ORIENTATION: ORIENTATION: RIGHT

## 2019-07-24 ASSESSMENT — PAIN DESCRIPTION - FREQUENCY: FREQUENCY: INTERMITTENT

## 2019-07-24 ASSESSMENT — PAIN DESCRIPTION - LOCATION: LOCATION: HIP

## 2019-07-24 NOTE — PROGRESS NOTES
of education: Not on file    Highest education level: Not on file   Occupational History    Not on file   Social Needs    Financial resource strain: Not on file    Food insecurity:     Worry: Not on file     Inability: Not on file    Transportation needs:     Medical: Not on file     Non-medical: Not on file   Tobacco Use    Smoking status: Never Smoker    Smokeless tobacco: Never Used   Substance and Sexual Activity    Alcohol use: No    Drug use: No     Comment: smoked pipe years ago    Sexual activity: Not on file   Lifestyle    Physical activity:     Days per week: Not on file     Minutes per session: Not on file    Stress: Not on file   Relationships    Social connections:     Talks on phone: Not on file     Gets together: Not on file     Attends [employer] service: Not on file     Active member of club or organization: Not on file     Attends meetings of clubs or organizations: Not on file     Relationship status: Not on file    Intimate partner violence:     Fear of current or ex partner: Not on file     Emotionally abused: Not on file     Physically abused: Not on file     Forced sexual activity: Not on file   Other Topics Concern    Not on file   Social History Narrative    ** Merged History Encounter **            Family History:     Family History   Problem Relation Age of Onset    Coronary Art Dis Father     Cancer Sister         Allergies:   Bactrim [sulfamethoxazole-trimethoprim]     Review of Systems:   Constitutional: No fevers or chills. No systemic complaints  Head: No headaches  Eyes: No double vision or blurry vision. No conjunctival inflammation. ENT: No sore throat or runny nose. . No hearing loss, tinnitus or vertigo. Cardiovascular: No chest pain or palpitations. No shortness of breath. No GUZMAN  Lung: No shortness of breath or cough. No sputum production  Abdomen: No nausea, vomiting, diarrhea, or abdominal pain. Carlynn Vicky No cramps.   Genitourinary: No increased urinary frequency, or Bwslbl-Ehstljhu-Bwdyw:       Medical Decision Making-Other:     Note:  · Labs, medications, radiologic studies were reviewed with personal review of films  · Moderate Large amounts of data were reviewed  · Discussed with nursing Staff, Discharge planner  · Infection Control and Prevention measures reviewed  · All prior entries were reviewed  · Administer medications as ordered  · Prognosis: Guarded  · Discharge planning reviewed  · Follow up as outpatient. Thank you for allowing us to participate in the care of this patient. Please call with questions. Benny Youssef     ATTESTATION:    I have discussed the case, including pertinent history and exam findings with the residents. I have seen and examined the patient and the key elements of the encounter have been performed by me. I have reviewed the laboratory data, other diagnostic studies and discussed them with the residents. I have updated the medical record where necessary. I agree with the assessment, plan and orders as documented by the resident.     David Delacruz MD.    Pager: [phone] - Office: [phone]

## 2019-07-24 NOTE — PROGRESS NOTES
ultrafiltration to reach his dry weight   2. Anemia of chronic disease additional element from blood loss. Patient dropped his hemoglobin less than 7. He will be receiving blood transfusion during dialysis today. 3. Secondary hyperparathyroidism  4. Hypertension  5. Left upper thigh cellulitis and receiving antibiotic  6. Recent right hip septic arthritis    Plan:   1. Ultrafiltration today  2. Transfusion with dialysis  3. Continue to check H&H every 12 hours  4. We will follow with an routine dialysis tomorrow     Nutrition   Please ensure that patient is on a renal diet/TF. Thank you for the consultation. Please do not hesitate to contact us for any further questions/concerns. We will continue to follow along with you.

## 2019-07-24 NOTE — FLOWSHEET NOTE
Two hours of ultrafiltration completed accessing left upper arm fistula without difficulty, maintaining blood flow rate of 450 ml./min. Hypotensive throughout with systolic BP in 56'B despite cool dialysate temp, Midodrine, SPA and transfusing one unit of PRBC's. Pt. Asymptomatic with hypotension. Net fluid removed 1950 ml., outpt. Dry weight 110 kg., pre weight 112.5kg., post weight 111kg.

## 2019-07-24 NOTE — CONSULTS
Chronic atrial fibrillation (HCC) 7/6/2013    Chronic kidney disease     Chronic pain of right knee     CKD (chronic kidney disease) stage 4, GFR 15-29 ml/min ([facility]) 5/28/2014    From diabetic and ischemic nephrosclerosis, creat now 2-2.5, GFR 25-30 ml/min    Coagulation defect ([address] 7/6/2013    Diabetes mellitus ([address]     Diabetes mellitus type 2 in obese ([address]     Diarrhea 7/16/2013    DVT (deep venous thrombosis) ([facility])     Elevated C-reactive protein (CRP)     ESRD (end stage renal disease) ([address] 3/23/2017    Essential hypertension 3/23/2017    Fever 7/18/2013    Foot ulcer due to secondary DM ([address] 5/28/2014    Left side on antibiotics    GERD (gastroesophageal reflux disease)     Hematochezia 3/4/2017    History of cerebral infarction 3/1/2017    History of DVT (deep vein thrombosis) 9/17/2018    History of superior vena cava filter placement 7/18/2013    Hx of blood clots     Hyperkalemia 7/16/2013    Hyperlipidemia     Hypernatremia 7/11/2013    Hypertension     Hypocalcemia 7/18/2013    Hypovolemic shock ([address] 3/4/2017    Hypoxia     Irregular heartbeat     hx of a-fib    Knee effusion, right 2/25/2017    Left leg cellulitis     Lower GI bleed 3/4/2017    MDRO (multiple drug resistant organisms) resistance     Metabolic acidosis 1/65/2136    MRSA (methicillin resistant staph aureus) culture positive 09/17/2018    leg    On continuous oral anticoagulation 9/17/2018    NADIRA on CPAP     Other specified hypothyroidism 8/24/2013    Parietal lobe infarction ([address] 8/26/2013    Pneumonia     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

## 2019-07-24 NOTE — PROGRESS NOTES
[address] [facility] [facility]    Progress Note    7/24/2019    7:54 AM    Name:   Marcie Turner  MRN:     [id number]     [place]:      [de-identified]   Room:   [address] [place] [address] Day:  2  Admit Date:  7/22/2019  3:58 PM    PCP:   Ginna Valentin MD  Code Status:  Full Code    Subjective:     C/C: No chief complaint on file. FEVER  OCCULT POSTIVE STOOL  HEMATURIA  Interval History Status: not changed. NO leukocytosis  No fevers   Blood pressures are stable  Blood sugars are stable  Hemoglobin took a drop  6.6 from yesterdays 7.4  Patient with increase hip pain left and right, with left lower quadrant pain   Stool 1/3 negative for blood, still off eliquis        Brief History: This is 68years old gentleman who was transferred to us from [facility] after he presented there with fever. Patient is a resident of a nursing home and was just recently discharged from our hospital.  He had issues with hip infection and was treated with antibiotics and spacer. At the nursing home patient was noted to have fever and was sent to [facility].  He was found to have abdominal cellulitis and question of pneumonia and UTI. He was started on broad-spectrum antibiotics and referred for admission. Apparently there was also concern for blood in the urine and occult blood positive in the stools. Currently patient does not have any marlon bleeding. Patient is not able to give much history and upon discussion with the patient's wife, he was discharged on 7.5 mg of eliquis twice daily.     Review of Systems:     Constitutional:  negative for chills, fevers, sweats  Respiratory:  negative for cough, dyspnea on exertion, hemoptysis, shortness of breath, wheezing  Cardiovascular:  negative for chest pain, chest pressure/discomfort, lower extremity edema, palpitations  Gastrointestinal:  negative for abdominal pain, constipation, diarrhea, nausea, vomiting  Neurological: negative for dizziness, headache  M/S: complaints of left hip and Right hip pain   Skin: multiple wounds    Medications: Allergies:     Allergies   Allergen Reactions    Bactrim [Sulfamethoxazole-Trimethoprim]        Current Meds:   Scheduled Meds:    Ferric Citrate  210 mg Oral BID WC    [START ON 7/27/2019] darbepoetin jean pierre-polysorbate  60 mcg Intravenous Weekly    meropenem  1 g Intravenous Q24H    [Held by provider] apixaban  2.5 mg Oral BID    ARIPiprazole  5 mg Oral Daily    vitamin D  2,000 Units Oral Daily    furosemide  20 mg Oral BID    isosorbide mononitrate  30 mg Oral Daily    lamoTRIgine  100 mg Oral Daily    levothyroxine  50 mcg Oral Daily    pantoprazole  40 mg Oral Daily    senna  1 tablet Oral BID    venlafaxine  75 mg Oral Daily    sodium chloride flush  10 mL Intravenous 2 times per day     Continuous Infusions:   PRN Meds: midodrine, iron sucrose, sodium chloride flush, magnesium hydroxide, ondansetron, nicotine, acetaminophen, albuterol    Data:     Past Medical History:   has a past medical history of A-fib ([facility] Acute encephalopathy, Acute ischemic stroke ([facility] Acute metabolic encephalopathy, Acute on chronic congestive heart failure ([facility]), Acute on chronic diastolic CHF (congestive heart failure) ([facility] Altered mental status, Anemia, Anemia associated with chronic renal failure, Aphasia, ARF (acute renal failure) ([facility]), Arteriovenous fistula for hemodialysis in place, primary ([facility] Arthritis, Bradyarrhythmia, Cellulitis, Cerebral contusion ([facility]), CHF (congestive heart failure) ([facility]), Chronic atrial fibrillation ([facility] Chronic kidney disease, Chronic pain of right knee, CKD (chronic kidney disease) stage 4, GFR 15-29 ml/min ([facility] Coagulation defect ([facility] Diabetes mellitus ([facility] Diabetes mellitus type 2 in obese ([facility] Diarrhea, DVT (deep venous thrombosis) ([facility]), Elevated C-reactive protein (CRP), ESRD (end stage renal disease) ([facility] Essential hypertension, Fever,

## 2019-07-24 NOTE — PROGRESS NOTES
07/23/19 0648 (!) 93/46 97.8 °F (36.6 °C) Axillary 81 17 93 % -- --       Intake/Output Summary (Last 24 hours) at 7/24/2019 0628  Last data filed at 7/23/2019 1730  Gross per 24 hour   Intake 1050 ml   Output 4425 ml   Net -3375 ml       Recent Labs     07/22/19  0730 07/22/19  0938  07/23/19  0400 07/23/19  1219 07/23/19  1916   WBC 5.0 4.4  --  4.1  --   --    HGB 7.1* 7.1*   < > 7.1* 7.7* 7.4*   HCT 24.6* 23.0*   < > 25.0* 25.3* 23.8*   .8 94.3  --  102.5  --   --     172  --  122*  --   --     < > = values in this interval not displayed.      Recent Labs     07/22/19  0730 07/22/19  0938 07/23/19  0400    136 130*   K 4.5 4.5 4.7   CL 96* 96* 92*   CO2 22 20 20   BUN 68* 68* 77*   CREATININE 5.43* 5.70* 5.93*       Recent Labs     07/22/19  0955   COLORU RED*   PHUR 6.0   WBCUA 10 TO 20   RBCUA TOO NUMEROUS TO COUNT   MUCUS 1+*   TRICHOMONAS NOT REPORTED   YEAST NOT REPORTED   BACTERIA FEW*   SPECGRAV 1.015   LEUKOCYTESUR LARGE*   UROBILINOGEN Normal   BILIRUBINUR NEGATIVE       Current Facility-Administered Medications   Medication Dose Route Frequency Provider Last Rate Last Dose    midodrine (PROAMATINE) tablet 5 mg  5 mg Oral BID PRN Bala Bear MD   5 mg at 07/23/19 1018    Ferric Citrate TABS 210 mg  210 mg Oral BID  Ava Ruiz MD        iron sucrose (VENOFER) injection 100 mg  100 mg Intravenous PRN Bala Bear MD   100 mg at 07/23/19 1241    [START ON 7/27/2019] darbepoetin jean pierre-polysorbate (ARANESP) injection 60 mcg  60 mcg Intravenous Weekly Ava Ruiz MD        meropenem (MERREM) 1 g in sodium chloride 0.9 % 100 mL IVPB (mini-bag)  1 g Intravenous Q24H Trevor Segovia MD   Stopped at 07/23/19 2131    [Held by provider] apixaban (ELIQUIS) tablet 2.5 mg  2.5 mg Oral BID Manju Appiah MD        ARIPiprazole (ABILIFY) tablet 5 mg  5 mg Oral Daily Manju Appiah MD   5 mg at 07/23/19 1400    vitamin D (CHOLECALCIFEROL) tablet 2,000 Units  2,000 Units penoscrotal edema. 1+ LE edema, pitting      Interval Imaging Findings:    Culture Results:  UCx 7/22/19: pending, group D eneterococcus 10-50K. Impression:    The patient is a 68 y.o. male  admitted with      Problem List  - Gross hematuria  - Buried penis  - Penoscrotal edema  - ESRD on hemodialysis TRS (2 year history of HD) and makes about 400 cc of urine per day by estimation     Plan:     - serial urines have been clear  - Urinalysis shows RBCs, no bacteria, no epithelial cells, bacteria present  - f/u Urine culture   - On Vancomycin and Meropenem; defer antibiotics to ID given complex infection in multiple sites  - Outpatient gross hematuria workup with cystoscopy and retrograde pyelogram  - No hydronephrosis On recent imaging (CTAP).  Parenchymal thinning of kidneys      Dm Phillips  Urology Resident, PGY3  6:28 AM 7/24/2019

## 2019-07-25 ENCOUNTER — APPOINTMENT (OUTPATIENT)
Dept: GENERAL RADIOLOGY | Age: 69
DRG: 193 | End: 2019-07-25
Attending: INTERNAL MEDICINE
Payer: MEDICARE

## 2019-07-25 ENCOUNTER — APPOINTMENT (OUTPATIENT)
Dept: DIALYSIS | Age: 69
DRG: 193 | End: 2019-07-25
Attending: INTERNAL MEDICINE
Payer: MEDICARE

## 2019-07-25 PROBLEM — M86.9 OSTEOMYELITIS (HCC): Status: ACTIVE | Noted: 2019-07-25

## 2019-07-25 PROBLEM — L02.415 ABSCESS OF RIGHT THIGH: Status: ACTIVE | Noted: 2019-07-25

## 2019-07-25 PROBLEM — S72.92XA CLOSED FRACTURE OF LEFT FEMUR (HCC): Status: ACTIVE | Noted: 2019-07-25

## 2019-07-25 LAB
ANION GAP SERPL CALCULATED.3IONS-SCNC: 16 MMOL/L (ref 9–17)
BUN BLDV-MCNC: 59 MG/DL (ref 8–23)
BUN/CREAT BLD: ABNORMAL (ref 9–20)
C-REACTIVE PROTEIN: 183.6 MG/L (ref 0–5)
CALCIUM SERPL-MCNC: 7.8 MG/DL (ref 8.6–10.4)
CHLORIDE BLD-SCNC: 95 MMOL/L (ref 98–107)
CO2: 22 MMOL/L (ref 20–31)
CREAT SERPL-MCNC: 4.91 MG/DL (ref 0.7–1.2)
GFR AFRICAN AMERICAN: 14 ML/MIN
GFR NON-AFRICAN AMERICAN: 12 ML/MIN
GFR SERPL CREATININE-BSD FRML MDRD: ABNORMAL ML/MIN/{1.73_M2}
GFR SERPL CREATININE-BSD FRML MDRD: ABNORMAL ML/MIN/{1.73_M2}
GLUCOSE BLD-MCNC: 102 MG/DL (ref 75–110)
GLUCOSE BLD-MCNC: 104 MG/DL (ref 70–99)
GLUCOSE BLD-MCNC: 107 MG/DL (ref 75–110)
GLUCOSE BLD-MCNC: 133 MG/DL (ref 75–110)
GLUCOSE BLD-MCNC: 201 MG/DL (ref 75–110)
HCT VFR BLD CALC: 23.3 % (ref 40.7–50.3)
HCT VFR BLD CALC: 25.8 % (ref 40.7–50.3)
HEMOGLOBIN: 7.1 G/DL (ref 13–17)
HEMOGLOBIN: 7.1 G/DL (ref 13–17)
POTASSIUM SERPL-SCNC: 3.5 MMOL/L (ref 3.7–5.3)
SODIUM BLD-SCNC: 133 MMOL/L (ref 135–144)

## 2019-07-25 PROCEDURE — 90935 HEMODIALYSIS ONE EVALUATION: CPT

## 2019-07-25 PROCEDURE — 80048 BASIC METABOLIC PNL TOTAL CA: CPT

## 2019-07-25 PROCEDURE — 99232 SBSQ HOSP IP/OBS MODERATE 35: CPT | Performed by: INTERNAL MEDICINE

## 2019-07-25 PROCEDURE — 36415 COLL VENOUS BLD VENIPUNCTURE: CPT

## 2019-07-25 PROCEDURE — 73502 X-RAY EXAM HIP UNI 2-3 VIEWS: CPT

## 2019-07-25 PROCEDURE — 82947 ASSAY GLUCOSE BLOOD QUANT: CPT

## 2019-07-25 PROCEDURE — 85014 HEMATOCRIT: CPT

## 2019-07-25 PROCEDURE — 2580000003 HC RX 258: Performed by: INTERNAL MEDICINE

## 2019-07-25 PROCEDURE — 6370000000 HC RX 637 (ALT 250 FOR IP): Performed by: NURSE PRACTITIONER

## 2019-07-25 PROCEDURE — 6360000002 HC RX W HCPCS: Performed by: INTERNAL MEDICINE

## 2019-07-25 PROCEDURE — 6370000000 HC RX 637 (ALT 250 FOR IP): Performed by: INTERNAL MEDICINE

## 2019-07-25 PROCEDURE — 85018 HEMOGLOBIN: CPT

## 2019-07-25 PROCEDURE — 86140 C-REACTIVE PROTEIN: CPT

## 2019-07-25 PROCEDURE — 1200000000 HC SEMI PRIVATE

## 2019-07-25 RX ORDER — ALBUMIN (HUMAN) 12.5 G/50ML
25 SOLUTION INTRAVENOUS ONCE
Status: DISCONTINUED | OUTPATIENT
Start: 2019-07-25 | End: 2019-07-28 | Stop reason: HOSPADM

## 2019-07-25 RX ORDER — HYDROCODONE BITARTRATE AND ACETAMINOPHEN 5; 325 MG/1; MG/1
1 TABLET ORAL EVERY 6 HOURS PRN
Status: DISCONTINUED | OUTPATIENT
Start: 2019-07-25 | End: 2019-07-28 | Stop reason: HOSPADM

## 2019-07-25 RX ORDER — MORPHINE SULFATE 2 MG/ML
2 INJECTION, SOLUTION INTRAMUSCULAR; INTRAVENOUS
Status: COMPLETED | OUTPATIENT
Start: 2019-07-25 | End: 2019-07-25

## 2019-07-25 RX ADMIN — SENNOSIDES 8.6 MG: 8.6 TABLET, FILM COATED ORAL at 14:45

## 2019-07-25 RX ADMIN — ARIPIPRAZOLE 5 MG: 5 TABLET ORAL at 14:44

## 2019-07-25 RX ADMIN — VITAMIN D, TAB 1000IU (100/BT) 2000 UNITS: 25 TAB at 14:46

## 2019-07-25 RX ADMIN — SODIUM CHLORIDE, PRESERVATIVE FREE 10 ML: 5 INJECTION INTRAVENOUS at 14:52

## 2019-07-25 RX ADMIN — MORPHINE SULFATE 2 MG: 2 INJECTION, SOLUTION INTRAMUSCULAR; INTRAVENOUS at 15:59

## 2019-07-25 RX ADMIN — INSULIN LISPRO 2 UNITS: 100 INJECTION, SOLUTION INTRAVENOUS; SUBCUTANEOUS at 18:15

## 2019-07-25 RX ADMIN — PANTOPRAZOLE SODIUM 40 MG: 40 TABLET, DELAYED RELEASE ORAL at 14:46

## 2019-07-25 RX ADMIN — FUROSEMIDE 20 MG: 20 TABLET ORAL at 14:47

## 2019-07-25 RX ADMIN — ISOSORBIDE MONONITRATE 30 MG: 30 TABLET ORAL at 14:47

## 2019-07-25 RX ADMIN — FUROSEMIDE 20 MG: 20 TABLET ORAL at 21:04

## 2019-07-25 RX ADMIN — HYDROCODONE BITARTRATE AND ACETAMINOPHEN 1 TABLET: 5; 325 TABLET ORAL at 21:04

## 2019-07-25 RX ADMIN — VENLAFAXINE 75 MG: 75 TABLET ORAL at 14:46

## 2019-07-25 RX ADMIN — MEROPENEM 1 G: 1 INJECTION, POWDER, FOR SOLUTION INTRAVENOUS at 18:23

## 2019-07-25 RX ADMIN — LAMOTRIGINE 100 MG: 100 TABLET ORAL at 14:47

## 2019-07-25 RX ADMIN — LEVOTHYROXINE SODIUM 50 MCG: 50 TABLET ORAL at 14:46

## 2019-07-25 RX ADMIN — SENNOSIDES 8.6 MG: 8.6 TABLET, FILM COATED ORAL at 21:04

## 2019-07-25 ASSESSMENT — PAIN SCALES - GENERAL
PAINLEVEL_OUTOF10: 0
PAINLEVEL_OUTOF10: 0
PAINLEVEL_OUTOF10: 10
PAINLEVEL_OUTOF10: 5
PAINLEVEL_OUTOF10: 8
PAINLEVEL_OUTOF10: 8

## 2019-07-25 NOTE — PROGRESS NOTES
Home IV , [facility],  [facility],  LTAC: undetermined  · Patient will need outpatient wound care: TBD    Chief complaint/reason for consultation:   · Cellulitis, Pneumonia      History of Present Illness:   Lydia Zee is a 68y.o.-year-old  male who was initially admitted on 7/22/2019. Patient seen at the request of Dr. Jerman Kelley. INITIAL EVALUATION  This is 68years old gentleman who was transferred to us from [address] [place] [address] after he presented there with a fever. Patient is a resident of a nursing home and was just recently discharged from our hospital.  He had issues with hip infection and was treated with antibiotics and a spacer. At the nursing home patient was noted to have fever and was sent to [facility]. He was found to have abdominal wall cellulitis and question of pneumonia and UTI. He was started on broad-spectrum antibiotics and referred for admission. Apparently there was also concern for blood in the urine and occult blood positive in the stools. He was discharged on 7.5 mg of eliquis twice daily. CURRENT EVALUATION 7/25/2019    Afebrile  VS Stable, BP on lower side     Patient awake and alert. He was transfused on 7-22-19. Has not experienced any further hematuria. Exam of skin does not show abdominal wall cellulitis, rather discoloration from subcutaneous bleeding. Left leg painful to pressure and movement  CT scan suggests acute non displaced Lt femoral neck fracture  Rt hip with subacute fracture Rt proximal femur without displacement. Additional changes of periosteal reaction. Skin ulcers both heels. Underwent HD 7--25-19 without any problems.    Urology plan for outpatient gross hematuria workup with cystoscopy, retrograde pyelogram.     Labs, X rays reviewed: 7/25/2019    Cr: 5.7->5.93  BUN: 68-> 77    WBC: 4.4->4.1  Hb: 7.1->7.1  Plat: 172->122    Cultures:  Urine:  · 7/22/19: presumptive group D enterococcus   Blood:  · 7/22/19: E coli (Jessica

## 2019-07-25 NOTE — PROGRESS NOTES
([address] Elevated C-reactive protein (CRP), ESRD (end stage renal disease) ([address] Essential hypertension, Fever, Foot ulcer due to secondary DM ([address] GERD (gastroesophageal reflux disease), Hematochezia, History of cerebral infarction, History of DVT (deep vein thrombosis), History of superior vena cava filter placement, Hx of blood clots, Hyperkalemia, Hyperlipidemia, Hypernatremia, Hypertension, Hypocalcemia, Hypovolemic shock ([address] Hypoxia, Irregular heartbeat, Knee effusion, right, Left leg cellulitis, Lower GI bleed, MDRO (multiple drug resistant organisms) resistance, Metabolic acidosis, MRSA (methicillin resistant staph aureus) culture positive, On continuous oral anticoagulation, NADIRA on CPAP, Other specified hypothyroidism, Parietal lobe infarction ([address] Pneumonia, Post traumatic encephalopathy, Proteinuria, Pyogenic arthritis of right knee joint ([address] Pyrexia, Renal insufficiency, Renal lesion, Respiratory failure, acute ([address] Right knee pain, SAH (subarachnoid hemorrhage) ([address] Secondary hyperparathyroidism of renal origin ([address] Seizure disorder ([address] Stercoral ulcer of rectum, Streptococcal infection, Subarachnoid bleed ([address] Subdural bleeding ([address] Thyroid disease, Traumatic cerebral hemorrhage ([address] Type 2 diabetes mellitus with left diabetic foot ulcer ([facility] Unspecified cerebral artery occlusion with cerebral infarction, Venous stasis dermatitis, and Venous stasis dermatitis of both lower extremities. Social History:   reports that he has never smoked. He has never used smokeless tobacco. He reports that he does not drink alcohol or use drugs.      Family History:   Family History   Problem Relation Age of Onset    Coronary Art Dis Father     Cancer Sister        Vitals:  /65   Pulse 85   Temp 97.9 °F (36.6 °C) (Oral)   Resp 16   Ht 6' (1.829 m)   Wt 245 lb 13 oz (111.5 kg)   SpO2 99%   BMI 33.34 kg/m²   Temp (24hrs), Av.4 °F (36.3 °C), Min:96.9 °F (36.1 °C), Max:97.9 °F 7/22/2019  1. Evaluation limited due to the patient's arm position and positioning within the gantry with soft tissues of the left hip incompletely included on the study. 2.  Patient has small to moderate right pleural effusion, partially loculated with consolidation right lower lobe. Small left effusion is noted. Cardiomegaly and coronary artery calcification is noted. 3.  Dilated gallbladder with hydrops appearance with gallstone within the gallbladder. 4.  Small amount of fluid around the inferior right lobe of the liver extending in the right pericolic gutter. 5.  Multiple nonobstructing vascular parenchymal calcifications in both kidneys. 6.  Severe atherosclerotic disease of the branches off the aorta. Patient is at risk for intestinal angina. 7.  No evidence of appendicitis, bowel obstruction, or urinary obstruction. Xr Chest Portable    Result Date: 7/22/2019  1. Right base consolidation, partially obscuring the medial portion of the right hemidiaphragm. 2. Small right pleural effusion. 3. Persistent elevation of the right hemidiaphragm. 4. Stable mild enlargement of the cardiac silhouette. Physical Examination:        General appearance:  alert, cooperative and no distress, painful with any touch   Mental Status:  oriented to person, place, \" I dont know\" the other responses and normal affect  Lungs:  clear to auscultation anterior upper lobes and diminished posterior bilaterally, normal effort  Heart:  regular rate and rhythm, + murmur  Abdomen:  soft, nontender with exception of left lower abdomen, nondistended, normal bowel sounds,   Extremities:  2+ edema, - redness, +tenderness  T/o bilateral hips , thighs and legs, PPP- weak   Skin:  Multiple petechial areas t/o body. Bilateral lower extremities with venous stasis changes, heel ulceration, sacral ulceration.      Assessment:        Primary Problem  Cellulitis    Active Hospital Problems    Diagnosis Date Noted    Closed fracture of left for overload,  9. Essential hypertension-  Blood pressures 100- 120. Stable no RX therapy at this time  10. Antibiotics per infectious disease  11.  Discussed with patient's wife in detail    Enedelia Delaney MD  7/25/2019  3:01 PM

## 2019-07-26 LAB
CULTURE: ABNORMAL
GLUCOSE BLD-MCNC: 104 MG/DL (ref 75–110)
GLUCOSE BLD-MCNC: 117 MG/DL (ref 75–110)
GLUCOSE BLD-MCNC: 143 MG/DL (ref 75–110)
GLUCOSE BLD-MCNC: 240 MG/DL (ref 75–110)
HCT VFR BLD CALC: 24.6 % (ref 40.7–50.3)
HCT VFR BLD CALC: 24.8 % (ref 40.7–50.3)
HCT VFR BLD CALC: 25.9 % (ref 40.7–50.3)
HEMOGLOBIN: 7 G/DL (ref 13–17)
HEMOGLOBIN: 7.2 G/DL (ref 13–17)
HEMOGLOBIN: 7.5 G/DL (ref 13–17)
Lab: ABNORMAL
MCH RBC QN AUTO: 28.3 PG (ref 25.2–33.5)
MCHC RBC AUTO-ENTMCNC: 28.5 G/DL (ref 28.4–34.8)
MCV RBC AUTO: 99.6 FL (ref 82.6–102.9)
NRBC AUTOMATED: 0 PER 100 WBC
PDW BLD-RTO: 16.7 % (ref 11.8–14.4)
PLATELET # BLD: 112 K/UL (ref 138–453)
PMV BLD AUTO: 9.8 FL (ref 8.1–13.5)
RBC # BLD: 2.47 M/UL (ref 4.21–5.77)
SPECIMEN DESCRIPTION: ABNORMAL
WBC # BLD: 4.5 K/UL (ref 3.5–11.3)

## 2019-07-26 PROCEDURE — 82947 ASSAY GLUCOSE BLOOD QUANT: CPT

## 2019-07-26 PROCEDURE — 85027 COMPLETE CBC AUTOMATED: CPT

## 2019-07-26 PROCEDURE — 85014 HEMATOCRIT: CPT

## 2019-07-26 PROCEDURE — 6370000000 HC RX 637 (ALT 250 FOR IP): Performed by: NURSE PRACTITIONER

## 2019-07-26 PROCEDURE — 6360000002 HC RX W HCPCS: Performed by: INTERNAL MEDICINE

## 2019-07-26 PROCEDURE — 85018 HEMOGLOBIN: CPT

## 2019-07-26 PROCEDURE — 1200000000 HC SEMI PRIVATE

## 2019-07-26 PROCEDURE — 99232 SBSQ HOSP IP/OBS MODERATE 35: CPT | Performed by: INTERNAL MEDICINE

## 2019-07-26 PROCEDURE — 36415 COLL VENOUS BLD VENIPUNCTURE: CPT

## 2019-07-26 PROCEDURE — 6370000000 HC RX 637 (ALT 250 FOR IP): Performed by: INTERNAL MEDICINE

## 2019-07-26 PROCEDURE — 2580000003 HC RX 258: Performed by: INTERNAL MEDICINE

## 2019-07-26 RX ORDER — 0.9 % SODIUM CHLORIDE 0.9 %
250 INTRAVENOUS SOLUTION INTRAVENOUS ONCE
Status: DISCONTINUED | OUTPATIENT
Start: 2019-07-26 | End: 2019-07-26

## 2019-07-26 RX ADMIN — FUROSEMIDE 20 MG: 20 TABLET ORAL at 09:41

## 2019-07-26 RX ADMIN — ISOSORBIDE MONONITRATE 30 MG: 30 TABLET ORAL at 09:41

## 2019-07-26 RX ADMIN — VENLAFAXINE 75 MG: 75 TABLET ORAL at 09:41

## 2019-07-26 RX ADMIN — MEROPENEM 1 G: 1 INJECTION, POWDER, FOR SOLUTION INTRAVENOUS at 18:30

## 2019-07-26 RX ADMIN — INSULIN LISPRO 1 UNITS: 100 INJECTION, SOLUTION INTRAVENOUS; SUBCUTANEOUS at 21:27

## 2019-07-26 RX ADMIN — PANTOPRAZOLE SODIUM 40 MG: 40 TABLET, DELAYED RELEASE ORAL at 09:40

## 2019-07-26 RX ADMIN — VITAMIN D, TAB 1000IU (100/BT) 2000 UNITS: 25 TAB at 09:40

## 2019-07-26 RX ADMIN — FUROSEMIDE 20 MG: 20 TABLET ORAL at 21:27

## 2019-07-26 RX ADMIN — LEVOTHYROXINE SODIUM 50 MCG: 50 TABLET ORAL at 09:40

## 2019-07-26 RX ADMIN — ARIPIPRAZOLE 5 MG: 5 TABLET ORAL at 09:41

## 2019-07-26 RX ADMIN — LAMOTRIGINE 100 MG: 100 TABLET ORAL at 09:40

## 2019-07-26 NOTE — PROGRESS NOTES
Progress note    Subjective: Seen and examined. Hemodynamically stable. Patient is feeling better and his appetite is improving. Physical Examination:     General:  Patient was sitting comfortably alert and awake  neck:   Supple  Chest:   Lateral air entry and clear to auscultation  cardiac:  S1 and S2 audible no S3 or murmur. Abdomen: Soft and nontender bowel sounds are positive  :   No suprapubic or flank tenderness. Neuro:  AAO x 3, No FND. SKIN:  No rashes, good skin turgor. Extremities:  1-2+ edema on lower upper extremities. Labs:       Recent Labs     07/25/19  1942 07/26/19  0542 07/26/19  0804   WBC  --  4.5  --    RBC  --  2.47*  --    HGB 7.1* 7.0* 7.2*   HCT 23.3* 24.6* 24.8*   MCV  --  99.6  --    MCH  --  28.3  --    MCHC  --  28.5  --    RDW  --  16.7*  --    PLT  --  112*  --    MPV  --  9.8  --       BMP:   Recent Labs     07/25/19  0759   *   K 3.5*   CL 95*   CO2 22   BUN 59*   CREATININE 4.91*   GLUCOSE 104*   CALCIUM 7.8*      Assessment:     1. ESRD on Hemodialysis. Patient is clinically getting better. External weight was removed. We will dialyze him again today with ultrafiltration to bring close to his dry weight. 2.  Malnutrition  3. Secondary hyperparathyroidism  4. Hypertension  5. Left upper thigh cellulitis and receiving antibiotic and getting better  6. Volume overload  7. Anemia hemoglobin was low but stable    Plan:   1. Dialysis in a.m.   2.  May need weight challenge and ultrafiltration as an out patient. Nutrition   Please ensure that patient is on a renal diet/TF. Thank you for the consultation. Please do not hesitate to contact us for any further questions/concerns. We will continue to follow along with you.      Danette Henson MD

## 2019-07-26 NOTE — PROGRESS NOTES
Nutrition Assessment    Type and Reason for Visit: Reassess    Nutrition Recommendations: Liberalize diet to no added salt, carb controlled (75 gm/meal). D/c Ming ONS and send Ensure Clear ONS twice daily. Nutrition Assessment: Pt eating lunch well at visit. Significant other at bedside reports pt continues to have a good appetite. Does not like wound healing ONS. Malnutrition Assessment:  · Malnutrition Status: Meets the criteria for moderate malnutrition  · Context: Chronic illness  · Findings of the 6 clinical characteristics of malnutrition (Minimum of 2 out of 6 clinical characteristics is required to make the diagnosis of moderate or severe Protein Calorie Malnutrition based on AND/ASPEN Guidelines):  1. Energy Intake-Greater than 75% of estimated energy requirement,      2. Weight Loss-No significant weight loss, (10 months)  3. Fat Loss-Mild subcutaneous fat loss, Triceps  4. Muscle Loss-Moderate muscle mass loss, Clavicles (pectoralis and deltoids), Temples (temporalis muscle)  5. Fluid Accumulation-(Mild to severe fluid accumulation), Extremities, Generalized  6.  Strength-Not measured    Nutrition Risk Level: Moderate    Nutrient Needs:  · Estimated Daily Total Kcal: 2505-2634 kcals/day  · Estimated Daily Protein (g): 105-120 gm/day    Nutrition Diagnosis:   · Problem: Increased nutrient needs  · Etiology: related to Increased demand for energy/nutrients, Renal dysfunction     Signs and symptoms:  as evidenced by Presence of wounds, Known losses from dialysis    Objective Information:  · Wound Type: Skin Tears, Pressure Ulcer, Stage II(Small pressure wounds on right foot (x2), left heel (x1), coccyx (x1).  Stage II per RN)  · Current Nutrition Therapies:  · Oral Diet Orders: Renal, Carb Control 5 Carbs/Meal   · Oral Diet intake: %  · Oral Nutrition Supplement (ONS) Orders: Wound Healing Oral Supplement  · ONS intake: Refused  · Anthropometric Measures:  · Ht: 6' (182.9 cm)   · Current

## 2019-07-26 NOTE — PROGRESS NOTES
Tony Wilde 17    Progress Note    7/26/2019    1:41 PM    Name:   Taty Whitman  MRN:     [id number]     Natilyside:      [de-identified]   Room:   [address] [place] [address]  IP Day:  4  Admit Date:  7/22/2019  3:58 PM    PCP:   Roger Durbin MD  Code Status:  Full Code    Subjective:     C/C: No chief complaint on file. FEVER  OCCULT POSTIVE STOOL  HEMATURIA  Interval History Status: not changed. Hemoglobin is low but stable, no active bleeding, continues on meropenem      Brief History: This is 68years old gentleman who was transferred to us from [facility] after he presented there with fever. Patient is a resident of a nursing home and was just recently discharged from our hospital.  He had issues with hip infection and was treated with antibiotics and spacer. At the nursing home patient was noted to have fever and was sent to [facility].  He was found to have abdominal cellulitis and question of pneumonia and UTI. He was started on broad-spectrum antibiotics and referred for admission. Apparently there was also concern for blood in the urine and occult blood positive in the stools. Currently patient does not have any marlon bleeding. Patient is not able to give much history and upon discussion with the patient's wife, he was discharged on 7.5 mg of eliquis twice daily. Review of Systems:     Constitutional:  negative for chills, fevers, sweats  Respiratory:  negative for cough, dyspnea on exertion, hemoptysis, shortness of breath, wheezing  Cardiovascular:  negative for chest pain, chest pressure/discomfort, lower extremity edema, palpitations  Gastrointestinal:  negative for abdominal pain, constipation, diarrhea, nausea, vomiting  Neurological:  negative for dizziness, headache  M/S: complaints of left hip and Right hip pain   Skin: multiple wounds    Medications: Allergies:     Allergies   Allergen Reactions    Bactrim 07/25/2019    Osteomyelitis ([facility] [M86.9] 07/25/2019    Failure of right total hip arthroplasty ([facility] [T84.010A]     Prosthetic hip infection ([facility] [T84.59XA, Z96.649]     Hematuria [R31.9] 07/22/2019    Severe malnutrition ([facility] [E43] 07/03/2019    Cellulitis [L03.90] 09/17/2018    On continuous oral anticoagulation [Z79.01] 09/17/2018    History of DVT (deep vein thrombosis) [Z86.718] 09/17/2018    ESRD (end stage renal disease) ([facility] [N18.6] 03/23/2017    Anemia [D64.9] 03/04/2017    Seizure disorder ([facility] [G40.909] 03/01/2017    Chronic diastolic CHF (congestive heart failure) ([facility] [I50.32] 02/25/2017    Secondary hyperparathyroidism of renal origin ([facility] [N25.81] 12/16/2015    Venous stasis dermatitis of both lower extremities [I87.2] 05/28/2014    Type 2 diabetes mellitus with left diabetic foot ulcer ([facility] [B44.248, L97.529] 04/16/2014    Chronic atrial fibrillation ([facility] [I48.2] 07/06/2013   Involved bleeding ruled out   Possible abdominal wall cellulitis  Decubitus ulcer stage III of the left heel present on admission    Plan:        1. Acute on chronic Anemia- source unknown-CT scan reviewed, Eliquis on hold. S/p transfusion of  2 units total ( 7/22, 7/23) was evaluated by GI and urology and no further plans per them  2. orthopedics evaluated the patient, they are not planning to do any intervention  3. Abdominal wall sub-q bleeding- initial CT noted,. CT does not show any evidence  4. Hematuria- Urology on consultation. Work up in progress. No RBC on UA, no bacteria no epithelial cells. 5. Infected Right hip Craig-arthoplasty( 6/15/19) & revision 6/28/19 with proteus and ESBL +Klebsiella- ID follows, on meropenem. ( I&D 6/26) . CT concerning for abscess, orthopedic evaluation  6. Femur fracture bilaterally with concern for osteomyelitis, ortho consulted  7. Diabetes type 2- stable add accu-checks and add insulin if needed   8. ESRD- hemodialysis per nephrology  9.  Chronic diastolic

## 2019-07-26 NOTE — PROGRESS NOTES
[facility] - Progress Note    Today's Date and Time: 7/26/2019, 9:36 AM    Impression :   · Abdominal wall subcutaneous bleeding  · Right lower lobe consolidation, concern for pneumonia   · Gross hematuria   · Type 2 DM  · ESRD on hemodialysis  · S/P Rt hemiarthroplasty on 6-15-19  · S/p superficial incisional dehiscence  · S/P revision of  Rt hip hemiarthroplasty on 6-28-19  · Infected Rt hip hemiarthroplasty with Proteus and ESBL + Klebsiella  · Irregularities on cortex of femur, lateral aspect raising question of inflammatory process at that level. · Acute non displaced Lt femoral neck fracture by CT. Ortho evaluated, suspects reading is not correct. · Rt hip with subacute fracture Rt proximal femur without displacement. · Septicemia with E coli ESBL + 722-19 (Resistant to Cipro)    Recommendations:     · Meropenem 1 gm IV q 24 hr. .Stop date 8-16-19  · Monitor clinical response  · Follow up with Ortho   · Office f/up in 4 weeks with Dr Mayra Fisher for infection. Please call [phone] for appointment. Coincide with visit to Orthopedics  ·     Medical Decision Making/Summary/Discussion:7/26/2019     · S/P Rt hemiarthroplasty on 6-15-19. · Subsequently developed superficial incisional dehiscence. · Previously admitted 6-26-19 and underwent revision of  Rt hip hemiarthroplasty on 6-28-19. · Found to have an Infected Rt hip hemiarthroplasty with Proteus and ESBL + Klebsiella  · Patient started on meropenem for treatment in hospital.  · Was discharged to [facility] on long term po cipro for treatment and suppression of Rt hip infection  · Underlying ESRD on HD, chronic Rt leg lymphedema, chronic illness.   · Patient's anticoagulation led to subcutaneous skin bleeding of abdominal wall, hematuria, transfusion  · Patient with Septicemia with E coli ESBL + 722-19 (Organsism now Resistant to Cipro)  ·   Infection Control Recommendations   · [place] Precautions  · Contact Isolation diuretic and ARB use, creat peaked at 2.8 from 2 in may 2014    Arteriovenous fistula for hemodialysis in place, primary [facility]) 11/17/2017    Arthritis     Bradyarrhythmia 7/13/2013    Cellulitis 9/17/2018    Cerebral contusion ([address] 7/7/2013    CHF (congestive heart failure) (HCC)     Chronic atrial fibrillation ([address] 7/6/2013    Chronic kidney disease     Chronic pain of right knee     CKD (chronic kidney disease) stage 4, GFR 15-29 ml/min ([address] 5/28/2014    From diabetic and ischemic nephrosclerosis, creat now 2-2.5, GFR 25-30 ml/min    Coagulation defect ([address] 7/6/2013    Diabetes mellitus ([address]     Diabetes mellitus type 2 in obese ([address]     Diarrhea 7/16/2013    DVT (deep venous thrombosis) (HCC)     Elevated C-reactive protein (CRP)     ESRD (end stage renal disease) ([address] 3/23/2017    Essential hypertension 3/23/2017    Fever 7/18/2013    Foot ulcer due to secondary DM ([address] 5/28/2014    Left side on antibiotics    GERD (gastroesophageal reflux disease)     Hematochezia 3/4/2017    History of cerebral infarction 3/1/2017    History of DVT (deep vein thrombosis) 9/17/2018    History of superior vena cava filter placement 7/18/2013    Hx of blood clots     Hyperkalemia 7/16/2013    Hyperlipidemia     Hypernatremia 7/11/2013    Hypertension     Hypocalcemia 7/18/2013    Hypovolemic shock ([address] 3/4/2017    Hypoxia     Irregular heartbeat     hx of a-fib    Knee effusion, right 2/25/2017    Left leg cellulitis     Lower GI bleed 3/4/2017    MDRO (multiple drug resistant organisms) resistance     Metabolic acidosis 4/91/8486    MRSA (methicillin resistant staph aureus) culture positive 09/17/2018    leg    On continuous oral anticoagulation 9/17/2018    NADIRA on CPAP     Other specified hypothyroidism 8/24/2013    Parietal lobe infarction ([address] 8/26/2013    Pneumonia     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    Proteinuria 11/30/2016    Fluctuates between 2-3 grams, performed by Meng Plasencia DO at [address] [place] [address]  summer 2013    VENA CAVA FILTER PLACEMENT         Medications:      albumin human  25 g Intravenous Once    sodium chloride  250 mL Intravenous Once    insulin lispro  0-6 Units Subcutaneous TID     insulin lispro  0-3 Units Subcutaneous Nightly    Ferric Citrate  210 mg Oral BID     [START ON 7/27/2019] darbepoetin jean pierre-polysorbate  60 mcg Intravenous Weekly    meropenem  1 g Intravenous Q24H    [Held by provider] apixaban  2.5 mg Oral BID    ARIPiprazole  5 mg Oral Daily    vitamin D  2,000 Units Oral Daily    furosemide  20 mg Oral BID    isosorbide mononitrate  30 mg Oral Daily    lamoTRIgine  100 mg Oral Daily    levothyroxine  50 mcg Oral Daily    pantoprazole  40 mg Oral Daily    senna  1 tablet Oral BID    venlafaxine  75 mg Oral Daily    sodium chloride flush  10 mL Intravenous 2 times per day       Social History:     Social History     Socioeconomic History    Marital status:      Spouse name: Not on file    Number of children: Not on file    Years of education: Not on file    Highest education level: Not on file   Occupational History    Not on file   Social Needs    Financial resource strain: Not on file    Food insecurity:     Worry: Not on file     Inability: Not on file    Transportation needs:     Medical: Not on file     Non-medical: Not on file   Tobacco Use    Smoking status: Never Smoker    Smokeless tobacco: Never Used   Substance and Sexual Activity    Alcohol use: No    Drug use: No     Comment: smoked pipe years ago    Sexual activity: Not on file   Lifestyle    Physical activity:     Days per week: Not on file     Minutes per session: Not on file    Stress: Not on file   Relationships    Social connections:     Talks on phone: Not on file     Gets together: Not on file     Attends Pentecostalism service: Not on file     Active member of club or hip was performed without the administration of   intravenous contrast.  Multiplanar reformatted images are provided for   review. Dose modulation, iterative reconstruction, and/or weight based   adjustment of the mA/kV was utilized to reduce the radiation dose to as low   as reasonably achievable.       COMPARISON:   Radiographs from 06/28/2019 and CT from 07/22/2019       HISTORY   ORDERING SYSTEM PROVIDED HISTORY: HIP PAIN, NO RECENT XRAY       Hip pain.  Recent surgery.       FINDINGS:   Left hip:       Bones: Demineralized skeleton.  The left inferior pubic ramus, the acetabular   walls, and the visualized portions of the left iliac bone are intact.  Subtle   cortical irregularity of the left femoral neck with an associated patchy   sclerosis is suspicious for a nondisplaced transcervical femoral neck   fracture (coronal image 33).  Due to osteopenia, evaluation is somewhat   limited.  The proximal femur is intact.       Soft Tissue:  Generalized lateral soft tissue swelling extending posteriorly.    Heavily calcified vessels.  Generalized muscle atrophy.       Joint:  Arthritic changes of the left hip.       Right hip:       Bones: Right hip arthroplasty.  Antibiotic spacer.  Nondisplaced fracture   along the medial margin of the stem of the prosthesis with minimal   displacement and a small amount of periosteal new bone.  Aggressive   periostitis of the proximal femur.  An adjacent or intrinsic infectious   process is suspected.       Soft tissues: Soft tissue air in the superficial soft tissues as well as   intra-articular air.  Superficial fluid collection also containing air   measures 5.4 x 3.9 cm.  Phlegmonous change within the gluteal muscles with a   few foci of air.  Generalized stranding of the anterior abdominal wall likely   due to edema.       Joint: Erosions of the superior acetabulum suspicious of septic arthritis.           Impression   Left hip:       Suspected acute nondisplaced left femoral

## 2019-07-26 NOTE — DISCHARGE INSTR - COC
Wound Assessment Yellow;Dry 7/25/2019  4:00 PM   Drainage Amount None 7/25/2019  4:00 PM   Drainage Description Serosanguinous 7/24/2019  8:30 PM   Odor None 7/25/2019  4:00 PM   Licha-wound Assessment Dry; Intact 7/25/2019  4:00 PM   Yellow%Wound Bed 100 7/23/2019  3:48 PM   Number of days: 24       Wound 07/02/19 Heel Right (Active)   Wound Image   7/23/2019  3:48 PM   Wound Pressure Unstageable 7/25/2019  8:00 AM   Dressing Status Other (Comment) 7/24/2019  8:30 PM   Dressing Changed Changed/New 7/23/2019  3:48 PM   Dressing/Treatment Other (comment) 7/23/2019  3:48 PM   Dressing Change Due 07/24/19 7/23/2019  3:48 PM   Wound Length (cm) 1.5 cm 7/23/2019  3:48 PM   Wound Width (cm) 2.4 cm 7/23/2019  3:48 PM   Wound Depth (cm) 0 cm 7/23/2019  3:48 PM   Wound Surface Area (cm^2) 3.6 cm^2 7/23/2019  3:48 PM   Change in Wound Size % (l*w) 40 7/23/2019  3:48 PM   Wound Volume (cm^3) 0 cm^3 7/23/2019  3:48 PM   Wound Healing % 100 7/23/2019  3:48 PM   Wound Assessment Dry;Black 7/25/2019  4:00 PM   Drainage Amount None 7/25/2019  4:00 PM   Odor None 7/24/2019  8:30 PM   Licha-wound Assessment Dry 7/25/2019  4:00 PM   Black%Wound Bed 100 7/23/2019  3:48 PM   Number of days: 24       Wound 07/02/19 Coccyx (Active)   Wound Skin Tear 7/25/2019  8:55 AM   Dressing Status Clean;Dry; Intact 7/26/2019  8:30 AM   Dressing/Treatment Foam;Protective barrier 7/25/2019  7:30 PM   Wound Cleansed Other (Comment) 7/3/2019  8:00 AM   Wound Length (cm) 3 cm 7/2/2019  3:13 PM   Wound Width (cm) 2 cm 7/2/2019  3:13 PM   Wound Depth (cm) 0.1 cm 7/2/2019  3:13 PM   Wound Surface Area (cm^2) 6 cm^2 7/2/2019  3:13 PM   Wound Volume (cm^3) 0.6 cm^3 7/2/2019  3:13 PM   Wound Assessment Pink 7/25/2019  4:00 PM   Drainage Amount None 7/25/2019  4:00 PM   Odor None 7/24/2019  8:30 PM   Licha-wound Assessment Painful;Pink 7/25/2019  4:00 PM   Number of days: 24       Wound 07/23/19 Foot Left;Lateral (Active)   Wound Image   7/23/2019  3:48 PM   Wound Maxx Key, RN on 7/28/19 at 8:43 AM    CASE MANAGEMENT/SOCIAL WORK SECTION    Inpatient Status Date: 07/22/2019    Readmission Risk Assessment Score:  Readmission Risk              Risk of Unplanned Readmission:        39           Discharging to Facility/ Agency   · Name: [address] [facility] [address]         Phone: [phone]       Fax: [phone]        ·     Dialysis Facility (if applicable)   · Name:  · Address:  · Dialysis Schedule:  · Phone:  · Fax:    / signature: Electronically signed by Katherine Obrien RN on 7/26/19 at 5:31 PM    PHYSICIAN SECTION    Prognosis: Fair    Condition at Discharge: Stable    Rehab Potential (if transferring to Rehab): Fair    Recommended Labs or Other Treatments After Discharge:   Hemoglobin monitoring 3 times a week starting 7/29/19 and transfuse as needed to Keep Hemoglobin>7  Monitor vitals  Glucose monitoring  Wound care as recommended  Weight bearing per orthopedics  Monitor oxygen saturations  Fu with Dr Maria A Flores with orthopedics  Monitor for any bleeding  CPAP HS and as needed  Oxygen via nasal cannula as needed to keep QLAOYNUHAYA>05%  Physician Certification: I certify the above information and transfer of Timo Gibson  is necessary for the continuing treatment of the diagnosis listed and that he requires [facility] for greater 30 days.      Update Admission H&P: Changes in H&P as follows - see discharge summary    PHYSICIAN SIGNATURE:  Electronically signed by Itzel Bob MD on 7/28/19 at 8:52 AM

## 2019-07-27 ENCOUNTER — APPOINTMENT (OUTPATIENT)
Dept: DIALYSIS | Age: 69
DRG: 193 | End: 2019-07-27
Attending: INTERNAL MEDICINE
Payer: MEDICARE

## 2019-07-27 LAB
ABO/RH: NORMAL
ANION GAP SERPL CALCULATED.3IONS-SCNC: 15 MMOL/L (ref 9–17)
ANTIBODY SCREEN: NEGATIVE
ARM BAND NUMBER: NORMAL
BLD PROD TYP BPU: NORMAL
BLOOD BANK SPECIMEN: NORMAL
BUN BLDV-MCNC: 53 MG/DL (ref 8–23)
BUN/CREAT BLD: ABNORMAL (ref 9–20)
CALCIUM SERPL-MCNC: 7.8 MG/DL (ref 8.6–10.4)
CHLORIDE BLD-SCNC: 97 MMOL/L (ref 98–107)
CO2: 21 MMOL/L (ref 20–31)
CREAT SERPL-MCNC: 4.55 MG/DL (ref 0.7–1.2)
CROSSMATCH RESULT: NORMAL
DISPENSE STATUS BLOOD BANK: NORMAL
EXPIRATION DATE: NORMAL
GFR AFRICAN AMERICAN: 16 ML/MIN
GFR NON-AFRICAN AMERICAN: 13 ML/MIN
GFR SERPL CREATININE-BSD FRML MDRD: ABNORMAL ML/MIN/{1.73_M2}
GFR SERPL CREATININE-BSD FRML MDRD: ABNORMAL ML/MIN/{1.73_M2}
GLUCOSE BLD-MCNC: 105 MG/DL (ref 75–110)
GLUCOSE BLD-MCNC: 138 MG/DL (ref 75–110)
GLUCOSE BLD-MCNC: 154 MG/DL (ref 75–110)
GLUCOSE BLD-MCNC: 169 MG/DL (ref 70–99)
HCT VFR BLD CALC: 24.3 % (ref 40.7–50.3)
HCT VFR BLD CALC: 25.9 % (ref 40.7–50.3)
HEMOGLOBIN: 6.9 G/DL (ref 13–17)
HEMOGLOBIN: 7.7 G/DL (ref 13–17)
MCH RBC QN AUTO: 28.8 PG (ref 25.2–33.5)
MCHC RBC AUTO-ENTMCNC: 28.4 G/DL (ref 28.4–34.8)
MCV RBC AUTO: 101.3 FL (ref 82.6–102.9)
NRBC AUTOMATED: 0 PER 100 WBC
PDW BLD-RTO: 16.2 % (ref 11.8–14.4)
PLATELET # BLD: 109 K/UL (ref 138–453)
PMV BLD AUTO: 9.5 FL (ref 8.1–13.5)
POTASSIUM SERPL-SCNC: 3.8 MMOL/L (ref 3.7–5.3)
RBC # BLD: 2.4 M/UL (ref 4.21–5.77)
SODIUM BLD-SCNC: 133 MMOL/L (ref 135–144)
TRANSFUSION STATUS: NORMAL
UNIT DIVISION: 0
UNIT NUMBER: NORMAL
WBC # BLD: 4.1 K/UL (ref 3.5–11.3)

## 2019-07-27 PROCEDURE — 85027 COMPLETE CBC AUTOMATED: CPT

## 2019-07-27 PROCEDURE — 90935 HEMODIALYSIS ONE EVALUATION: CPT

## 2019-07-27 PROCEDURE — 86850 RBC ANTIBODY SCREEN: CPT

## 2019-07-27 PROCEDURE — 82947 ASSAY GLUCOSE BLOOD QUANT: CPT

## 2019-07-27 PROCEDURE — 86920 COMPATIBILITY TEST SPIN: CPT

## 2019-07-27 PROCEDURE — 36430 TRANSFUSION BLD/BLD COMPNT: CPT

## 2019-07-27 PROCEDURE — 86901 BLOOD TYPING SEROLOGIC RH(D): CPT

## 2019-07-27 PROCEDURE — 2580000003 HC RX 258: Performed by: INTERNAL MEDICINE

## 2019-07-27 PROCEDURE — 85014 HEMATOCRIT: CPT

## 2019-07-27 PROCEDURE — 1200000000 HC SEMI PRIVATE

## 2019-07-27 PROCEDURE — P9016 RBC LEUKOCYTES REDUCED: HCPCS

## 2019-07-27 PROCEDURE — 85018 HEMOGLOBIN: CPT

## 2019-07-27 PROCEDURE — 6370000000 HC RX 637 (ALT 250 FOR IP): Performed by: INTERNAL MEDICINE

## 2019-07-27 PROCEDURE — 6360000002 HC RX W HCPCS: Performed by: INTERNAL MEDICINE

## 2019-07-27 PROCEDURE — 80048 BASIC METABOLIC PNL TOTAL CA: CPT

## 2019-07-27 PROCEDURE — 99232 SBSQ HOSP IP/OBS MODERATE 35: CPT | Performed by: INTERNAL MEDICINE

## 2019-07-27 PROCEDURE — 36415 COLL VENOUS BLD VENIPUNCTURE: CPT

## 2019-07-27 PROCEDURE — 86900 BLOOD TYPING SEROLOGIC ABO: CPT

## 2019-07-27 PROCEDURE — 99233 SBSQ HOSP IP/OBS HIGH 50: CPT | Performed by: INTERNAL MEDICINE

## 2019-07-27 RX ORDER — 0.9 % SODIUM CHLORIDE 0.9 %
250 INTRAVENOUS SOLUTION INTRAVENOUS ONCE
Status: DISCONTINUED | OUTPATIENT
Start: 2019-07-27 | End: 2019-07-28 | Stop reason: HOSPADM

## 2019-07-27 RX ORDER — ALBUTEROL SULFATE 2.5 MG/3ML
2.5 SOLUTION RESPIRATORY (INHALATION) EVERY 6 HOURS PRN
Status: DISCONTINUED | OUTPATIENT
Start: 2019-07-27 | End: 2019-07-28 | Stop reason: HOSPADM

## 2019-07-27 RX ADMIN — ISOSORBIDE MONONITRATE 30 MG: 30 TABLET ORAL at 13:59

## 2019-07-27 RX ADMIN — ARIPIPRAZOLE 5 MG: 5 TABLET ORAL at 13:59

## 2019-07-27 RX ADMIN — LAMOTRIGINE 100 MG: 100 TABLET ORAL at 13:59

## 2019-07-27 RX ADMIN — LEVOTHYROXINE SODIUM 50 MCG: 50 TABLET ORAL at 13:59

## 2019-07-27 RX ADMIN — PANTOPRAZOLE SODIUM 40 MG: 40 TABLET, DELAYED RELEASE ORAL at 13:59

## 2019-07-27 RX ADMIN — VITAMIN D, TAB 1000IU (100/BT) 2000 UNITS: 25 TAB at 13:59

## 2019-07-27 RX ADMIN — MEROPENEM 1 G: 1 INJECTION, POWDER, FOR SOLUTION INTRAVENOUS at 18:24

## 2019-07-27 RX ADMIN — DARBEPOETIN ALFA 60 MCG: 60 INJECTION, SOLUTION INTRAVENOUS; SUBCUTANEOUS at 12:09

## 2019-07-27 RX ADMIN — FUROSEMIDE 20 MG: 20 TABLET ORAL at 13:59

## 2019-07-27 RX ADMIN — VENLAFAXINE 75 MG: 75 TABLET ORAL at 13:59

## 2019-07-27 ASSESSMENT — PAIN SCALES - GENERAL
PAINLEVEL_OUTOF10: 0
PAINLEVEL_OUTOF10: 0

## 2019-07-27 NOTE — PROGRESS NOTES
History of cerebral infarction 3/1/2017    History of DVT (deep vein thrombosis) 9/17/2018    History of superior vena cava filter placement 7/18/2013    Hx of blood clots     Hyperkalemia 7/16/2013    Hyperlipidemia     Hypernatremia 7/11/2013    Hypertension     Hypocalcemia 7/18/2013    Hypovolemic shock ([address] 3/4/2017    Hypoxia     Irregular heartbeat     hx of a-fib    Knee effusion, right 2/25/2017    Left leg cellulitis     Lower GI bleed 3/4/2017    MDRO (multiple drug resistant organisms) resistance     Metabolic acidosis 3/61/0987    MRSA (methicillin resistant staph aureus) culture positive 09/17/2018    leg    On continuous oral anticoagulation 9/17/2018    NADIRA on CPAP     Other specified hypothyroidism 8/24/2013    Parietal lobe infarction ([address] 8/26/2013    Pneumonia     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    Proteinuria 11/30/2016    Fluctuates between 2-3 grams, cant use ACEI due to hyperkalemia    Pyogenic arthritis of right knee joint ([address] 3/23/2017    Pyrexia 7/19/2013    Renal insufficiency     Renal lesion 3/3/2017    Respiratory failure, acute ([address] 7/7/2013    Right knee pain     SAH (subarachnoid hemorrhage) (HCC)     Secondary hyperparathyroidism of renal origin ([address] 12/16/2015    Seizure disorder ([address] 3/1/2017    Stercoral ulcer of rectum     Streptococcal infection     Subarachnoid bleed ([address] 7/13/2013    Subdural bleeding ([address] 7/5/2013    Thyroid disease     Traumatic cerebral hemorrhage ([address] 7/7/2013    Type 2 diabetes mellitus with left diabetic foot ulcer ([address] 4/16/2014    Unspecified cerebral artery occlusion with cerebral infarction     Venous stasis dermatitis 5/28/2014    Venous stasis dermatitis of both lower extremities 5/28/2014       Past Surgical  History:     Past Surgical History:   Procedure Laterality Date    ABDOMEN SURGERY      CARDIAC CATHETERIZATION      COLONOSCOPY      DILATATION, ESOPHAGUS      FOOT History    Marital status:      Spouse name: Not on file    Number of children: Not on file    Years of education: Not on file    Highest education level: Not on file   Occupational History    Not on file   Social Needs    Financial resource strain: Not on file    Food insecurity:     Worry: Not on file     Inability: Not on file    Transportation needs:     Medical: Not on file     Non-medical: Not on file   Tobacco Use    Smoking status: Never Smoker    Smokeless tobacco: Never Used   Substance and Sexual Activity    Alcohol use: No    Drug use: No     Comment: smoked pipe years ago    Sexual activity: Not on file   Lifestyle    Physical activity:     Days per week: Not on file     Minutes per session: Not on file    Stress: Not on file   Relationships    Social connections:     Talks on phone: Not on file     Gets together: Not on file     Attends Restorationism service: Not on file     Active member of club or organization: Not on file     Attends meetings of clubs or organizations: Not on file     Relationship status: Not on file    Intimate partner violence:     Fear of current or ex partner: Not on file     Emotionally abused: Not on file     Physically abused: Not on file     Forced sexual activity: Not on file   Other Topics Concern    Not on file   Social History Narrative    ** Merged History Encounter **            Family History:     Family History   Problem Relation Age of Onset    Coronary Art Dis Father     Cancer Sister         Allergies:   Bactrim [sulfamethoxazole-trimethoprim]     Review of Systems:   Constitutional: No fevers or chills. No systemic complaints  Head: No headaches  Eyes: No double vision or blurry vision. No conjunctival inflammation. ENT: No sore throat or runny nose. . No hearing loss, tinnitus or vertigo. Cardiovascular: No chest pain or palpitations. No shortness of breath. No GUZMAN  Lung: No shortness of breath or cough.  No sputum production  Abdomen: No abnormality.  Multilevel degenerative   changes are present in the spine.  Soft tissues over the left hip are   incompletely included on the images due to windowing.  No abscess formation   is noted in the included tissues.           Impression   1.  Evaluation limited due to the patient's arm position and positioning   within the gantry with soft tissues of the left hip incompletely included on   the study.       2.  Patient has small to moderate right pleural effusion, partially loculated   with consolidation right lower lobe.  Small left effusion is noted. Cardiomegaly and coronary artery calcification is noted.       3.  Dilated gallbladder with hydrops appearance with gallstone within the   gallbladder.       4.  Small amount of fluid around the inferior right lobe of the liver   extending in the right pericolic gutter.       5.  Multiple nonobstructing vascular parenchymal calcifications in both   kidneys.       6.  Severe atherosclerotic disease of the branches off the aorta.  Patient is   at risk for intestinal angina.       7.  No evidence of appendicitis, bowel obstruction, or urinary obstruction. CT OF THE LEFT HIP WITHOUT CONTRAST; CT OF THE RIGHT HIP WITHOUT CONTRAST   7/24/2019 11:59 am       TECHNIQUE:   CT of the left hip was performed without the administration of intravenous   contrast.  Multiplanar reformatted images are provided for review. Dose   modulation, iterative reconstruction, and/or weight based adjustment of the   mA/kV was utilized to reduce the radiation dose to as low as reasonably   achievable.; CT of the right hip was performed without the administration of   intravenous contrast.  Multiplanar reformatted images are provided for   review.  Dose modulation, iterative reconstruction, and/or weight based   adjustment of the mA/kV was utilized to reduce the radiation dose to as low   as reasonably achievable.       COMPARISON:   Radiographs from 06/28/2019 and CT from 07/22/2019

## 2019-07-27 NOTE — PROGRESS NOTES
Dialysis Post Treatment Note  Patient tolerated treatment well. Denies complaints at time of discharge.    Vitals:    07/27/19 1210   BP: (!) 119/59   Pulse: 83   Resp: 18   Temp: 96.8 °F (36 °C)   SpO2:      Pre-Weight = 112.8 kg  Post-weight = Weight: 242 lb 4.6 oz (109.9 kg)  Total Liters Processed = Total Liters Processed (l/min): 84.6 l/min  Rinseback Volume (mL) = Rinseback Volume (ml): 320 ml  Net Removal (mL) = 3000  Length of treatment=3.5  Patient received 1 UPRBC during treatment

## 2019-07-27 NOTE — PLAN OF CARE
Problem: Falls - Risk of:  Goal: Will remain free from falls  Description  Will remain free from falls  7/27/2019 0110 by Ramiro Rivera RN  Outcome: Ongoing  7/26/2019 1614 by Kathy Kaba RN  Outcome: Ongoing  Goal: Absence of physical injury  Description  Absence of physical injury  7/27/2019 0110 by Ramiro Rivera RN  Outcome: Ongoing  7/26/2019 1614 by Kathy Kaba RN  Outcome: Ongoing     Problem: Risk for Impaired Skin Integrity  Goal: Tissue integrity - skin and mucous membranes  Description  Structural intactness and normal physiological function of skin and  mucous membranes.   7/27/2019 0110 by Ramiro Rivera RN  Outcome: Ongoing  7/26/2019 1614 by Kathy Kaba RN  Outcome: Ongoing     Problem: Pain:  Goal: Pain level will decrease  Description  Pain level will decrease  7/27/2019 0110 by Ramiro Rivera RN  Outcome: Ongoing  7/26/2019 1614 by Kathy Kaba RN  Outcome: Ongoing  Goal: Control of acute pain  Description  Control of acute pain  7/27/2019 0110 by Ramiro Rivera RN  Outcome: Ongoing  7/26/2019 1614 by Kathy Kaba RN  Outcome: Ongoing  Goal: Control of chronic pain  Description  Control of chronic pain  7/27/2019 0110 by Ramiro Rivera RN  Outcome: Ongoing  7/26/2019 1614 by Kathy Kaba RN  Outcome: Ongoing     Problem: Nutrition  Goal: Optimal nutrition therapy  7/27/2019 0110 by Ramiro Rivera RN  Outcome: Ongoing  7/26/2019 1614 by Kathy Kaba RN  Outcome: Ongoing

## 2019-07-28 VITALS
BODY MASS INDEX: 32.82 KG/M2 | HEIGHT: 72 IN | HEART RATE: 80 BPM | SYSTOLIC BLOOD PRESSURE: 123 MMHG | DIASTOLIC BLOOD PRESSURE: 60 MMHG | WEIGHT: 242.29 LBS | RESPIRATION RATE: 18 BRPM | TEMPERATURE: 98.2 F | OXYGEN SATURATION: 99 %

## 2019-07-28 PROBLEM — R31.9 HEMATURIA: Status: RESOLVED | Noted: 2019-07-22 | Resolved: 2019-07-28

## 2019-07-28 LAB
ABO/RH: NORMAL
ANTIBODY SCREEN: NEGATIVE
ARM BAND NUMBER: NORMAL
BLD PROD TYP BPU: NORMAL
CROSSMATCH RESULT: NORMAL
CULTURE: NORMAL
CULTURE: NORMAL
DISPENSE STATUS BLOOD BANK: NORMAL
EXPIRATION DATE: NORMAL
GLUCOSE BLD-MCNC: 114 MG/DL (ref 75–110)
HCT VFR BLD CALC: 25.5 % (ref 40.7–50.3)
HEMOGLOBIN: 7.6 G/DL (ref 13–17)
Lab: NORMAL
Lab: NORMAL
MCH RBC QN AUTO: 29.5 PG (ref 25.2–33.5)
MCHC RBC AUTO-ENTMCNC: 29.8 G/DL (ref 28.4–34.8)
MCV RBC AUTO: 98.8 FL (ref 82.6–102.9)
NRBC AUTOMATED: 0 PER 100 WBC
PDW BLD-RTO: 16 % (ref 11.8–14.4)
PLATELET # BLD: 117 K/UL (ref 138–453)
PMV BLD AUTO: 9.7 FL (ref 8.1–13.5)
RBC # BLD: 2.58 M/UL (ref 4.21–5.77)
SPECIMEN DESCRIPTION: NORMAL
SPECIMEN DESCRIPTION: NORMAL
TRANSFUSION STATUS: NORMAL
UNIT DIVISION: 0
UNIT NUMBER: NORMAL
WBC # BLD: 3.8 K/UL (ref 3.5–11.3)

## 2019-07-28 PROCEDURE — 85027 COMPLETE CBC AUTOMATED: CPT

## 2019-07-28 PROCEDURE — 36415 COLL VENOUS BLD VENIPUNCTURE: CPT

## 2019-07-28 PROCEDURE — 99239 HOSP IP/OBS DSCHRG MGMT >30: CPT | Performed by: INTERNAL MEDICINE

## 2019-07-28 PROCEDURE — 6370000000 HC RX 637 (ALT 250 FOR IP): Performed by: INTERNAL MEDICINE

## 2019-07-28 PROCEDURE — 82947 ASSAY GLUCOSE BLOOD QUANT: CPT

## 2019-07-28 PROCEDURE — 2580000003 HC RX 258: Performed by: INTERNAL MEDICINE

## 2019-07-28 RX ADMIN — ISOSORBIDE MONONITRATE 30 MG: 30 TABLET ORAL at 08:24

## 2019-07-28 RX ADMIN — PANTOPRAZOLE SODIUM 40 MG: 40 TABLET, DELAYED RELEASE ORAL at 08:24

## 2019-07-28 RX ADMIN — LAMOTRIGINE 100 MG: 100 TABLET ORAL at 08:24

## 2019-07-28 RX ADMIN — VITAMIN D, TAB 1000IU (100/BT) 2000 UNITS: 25 TAB at 08:23

## 2019-07-28 RX ADMIN — ARIPIPRAZOLE 5 MG: 5 TABLET ORAL at 08:24

## 2019-07-28 RX ADMIN — SODIUM CHLORIDE, PRESERVATIVE FREE 10 ML: 5 INJECTION INTRAVENOUS at 08:24

## 2019-07-28 RX ADMIN — LEVOTHYROXINE SODIUM 50 MCG: 50 TABLET ORAL at 06:47

## 2019-07-28 RX ADMIN — FUROSEMIDE 20 MG: 20 TABLET ORAL at 08:24

## 2019-07-28 RX ADMIN — VENLAFAXINE 75 MG: 75 TABLET ORAL at 08:24

## 2019-07-28 NOTE — DISCHARGE SUMMARY
Tony Wilde 17    Discharge Summary     Patient ID: Mauro Guerrero  :  1950   MRN: [id number]     ACCOUNT:  [de-identified]   Patient's PCP: Robert Brunson MD  Admit Date: 2019   Discharge Date: 2019   Length of Stay: 6  Code Status:  Prior  Admitting Physician: Brielle Hart MD  Discharge Physician: Yair Amor MD     Active Discharge Diagnoses:     Hospital Problem Lists:  Principal Problem:    Cellulitis  Active Problems:    Chronic atrial fibrillation (HCC)    Type 2 diabetes mellitus with left diabetic foot ulcer ([address]    Venous stasis dermatitis of both lower extremities    Secondary hyperparathyroidism of renal origin ([address]    Chronic diastolic CHF (congestive heart failure) (HCC)    Seizure disorder (HCC)    Anemia    ESRD (end stage renal disease) ([address]    History of DVT (deep vein thrombosis)    On continuous oral anticoagulation    Severe malnutrition (HCC)    Failure of right total hip arthroplasty ([address]    Prosthetic hip infection ([address]    Closed fracture of left femur ([address]    Abscess of right thigh    Osteomyelitis ([address]  Resolved Problems:    Hematuria      Discharged Condition: stable    Hospital Stay:     Hospital Course: This is 68years old gentleman who was transferred to us from [facility] after he presented there with fever.  Patient is a resident of a nursing home and was just recently discharged from our hospital. [facility] had issues with hip infection and was treated with antibiotics and spacer.  At the nursing home patient was noted to have fever and was sent to [facility] [address] was found to have abdominal cellulitis and question of pneumonia and UTI.  He was started on broad-spectrum antibiotics and referred for admission. Apparently there was also concern for blood in the urine and occult blood positive in the stools.   Currently patient does not have any marlon bleeding.  Patient is not silhouette. Ct Hip Left Wo Contrast    Result Date: 7/24/2019  Left hip: Suspected acute nondisplaced left femoral neck fracture. Due to osteopenia, an MRI should be considered to confirm this diagnosis. Right hip: Air-containing collection in the superficial soft tissues suspicious for abscess; additional foci of deep air. Aggressive periosteal reaction of the proximal femur concerning for osteomyelitis. Probable right hip aseptic arthritis given erosions of the superior acetabulum. Subacute fracture of the right proximal femur without displacement. The findings were sent to the Radiology Results [address] at 1:23 pm on 7/24/2019to be communicated to a licensed caregiver. Ct Hip Right Wo Contrast    Result Date: 7/24/2019  Left hip: Suspected acute nondisplaced left femoral neck fracture. Due to osteopenia, an MRI should be considered to confirm this diagnosis. Right hip: Air-containing collection in the superficial soft tissues suspicious for abscess; additional foci of deep air. Aggressive periosteal reaction of the proximal femur concerning for osteomyelitis. Probable right hip aseptic arthritis given erosions of the superior acetabulum. Subacute fracture of the right proximal femur without displacement. The findings were sent to the Radiology Results [address] at 1:23 pm on 7/24/2019to be communicated to a licensed caregiver. Xr Hip 2-3 Vw W Pelvis Left    Result Date: 7/26/2019  Severe osteopenia. No displaced fracture. Given the degree of osteopenia, nondisplaced fractures may be radiographically occult. If pain or concern for fracture persists, consider MR imaging.        Consultations:    Consults:     Final Specialist Recommendations/Findings:   IP CONSULT TO NEPHROLOGY  IP CONSULT TO INFECTIOUS DISEASES  IP CONSULT TO GI  IP CONSULT TO UROLOGY  IP CONSULT TO IV TEAM  IP CONSULT TO ORTHOPEDIC SURGERY      The patient was seen and examined on day of discharge and this

## 2019-07-28 NOTE — CARE COORDINATION
Transition Planning:  Informed by bedside nurse pt has a d/c order however Dr. Kendra Cintron would like to see before pt leaves and anticipates 10AM visit. Transport request with medical necessity and facesheet faxed to [facility] with a will call request, anticipate 11AM p/u.     [address], facesheet faxed to [facility], called [facility] admissions coordinator [phone] informed of d/c and attempting to set up transport for 11am, waiting for MD to see this AM. Will contact back once confirmed. Provided bedside nurse Piyush Rebolledo with report number and informed of above arrangements. [address] with Daiana Lou at [facility] she states their fax is down and they are needing to manually enter data. Provided information with a request for will call anticipate 11am p/u. Piyush Rebolledo bedside nurse to contact [facility] and inform CM when Dr. Kendra Cintron arrives. [address] with Daiana Lou at [facility], request p/u anytime now. She confirms she has an ambulance ready now anticipate 20-30 min window for p/u. Informed bedside nurse Piyush Rebolledo she is agreeable. Called [facility] at [facility] informed of above confirms receipt of JULIA/AVS able to accept. 1005 Attempt to reach pt's wife at [phone] left  informed of transfer to [facility]. She returns call immediately and received update on transition, clinical update requested deferred to bedside nurse Piyush Rebolledo.

## 2019-07-29 ENCOUNTER — HOSPITAL ENCOUNTER (OUTPATIENT)
Age: 69
Setting detail: SPECIMEN
Discharge: HOME | End: 2019-07-29
Payer: MEDICARE

## 2019-07-29 LAB
ANION GAP SERPL CALCULATED.3IONS-SCNC: 15 MMOL/L (ref 9–17)
BUN BLDV-MCNC: 50 MG/DL (ref 8–23)
BUN/CREAT BLD: ABNORMAL (ref 9–20)
CALCIUM SERPL-MCNC: 8.2 MG/DL (ref 8.6–10.4)
CHLORIDE BLD-SCNC: 99 MMOL/L (ref 98–107)
CO2: 22 MMOL/L (ref 20–31)
CREAT SERPL-MCNC: 4.55 MG/DL (ref 0.7–1.2)
GFR AFRICAN AMERICAN: 16 ML/MIN
GFR NON-AFRICAN AMERICAN: 13 ML/MIN
GFR SERPL CREATININE-BSD FRML MDRD: ABNORMAL ML/MIN/{1.73_M2}
GFR SERPL CREATININE-BSD FRML MDRD: ABNORMAL ML/MIN/{1.73_M2}
GLUCOSE BLD-MCNC: 89 MG/DL (ref 70–99)
HCT VFR BLD CALC: 26.6 % (ref 40.7–50.3)
HEMOGLOBIN: 7.7 G/DL (ref 13–17)
MCH RBC QN AUTO: 28.9 PG (ref 25.2–33.5)
MCHC RBC AUTO-ENTMCNC: 28.9 G/DL (ref 28.4–34.8)
MCV RBC AUTO: 100 FL (ref 82.6–102.9)
NRBC AUTOMATED: 0 PER 100 WBC
PDW BLD-RTO: 16 % (ref 11.8–14.4)
PLATELET # BLD: 117 K/UL (ref 138–453)
PMV BLD AUTO: 10 FL (ref 8.1–13.5)
POTASSIUM SERPL-SCNC: 4.1 MMOL/L (ref 3.7–5.3)
RBC # BLD: 2.66 M/UL (ref 4.21–5.77)
SODIUM BLD-SCNC: 136 MMOL/L (ref 135–144)
WBC # BLD: 4 K/UL (ref 3.5–11.3)

## 2019-07-29 PROCEDURE — P9603 ONE-WAY ALLOW PRORATED MILES: HCPCS

## 2019-07-29 PROCEDURE — 80048 BASIC METABOLIC PNL TOTAL CA: CPT

## 2019-07-29 PROCEDURE — 36415 COLL VENOUS BLD VENIPUNCTURE: CPT

## 2019-07-29 PROCEDURE — 85027 COMPLETE CBC AUTOMATED: CPT

## 2019-07-30 ENCOUNTER — TELEPHONE (OUTPATIENT)
Dept: ORTHOPEDIC SURGERY | Age: 69
End: 2019-07-30

## 2019-07-30 LAB — INTERVENTION: NORMAL

## 2019-07-31 ENCOUNTER — HOSPITAL ENCOUNTER (OUTPATIENT)
Age: 69
Setting detail: SPECIMEN
Discharge: HOME | End: 2019-07-31
Payer: MEDICARE

## 2019-07-31 LAB — HEMOGLOBIN: 7.7 G/DL (ref 13–17)

## 2019-07-31 PROCEDURE — 85018 HEMOGLOBIN: CPT

## 2019-08-02 ENCOUNTER — HOSPITAL ENCOUNTER (OUTPATIENT)
Age: 69
Setting detail: SPECIMEN
Discharge: HOME | End: 2019-08-02
Payer: MEDICARE

## 2019-08-02 LAB — HEMOGLOBIN: 7.6 G/DL (ref 13–17)

## 2019-08-02 PROCEDURE — P9603 ONE-WAY ALLOW PRORATED MILES: HCPCS

## 2019-08-02 PROCEDURE — 85018 HEMOGLOBIN: CPT

## 2019-08-05 ENCOUNTER — HOSPITAL ENCOUNTER (OUTPATIENT)
Age: 69
Setting detail: SPECIMEN
Discharge: HOME | End: 2019-08-05
Payer: MEDICARE

## 2019-08-05 LAB — HEMOGLOBIN: 7.5 G/DL (ref 13–17)

## 2019-08-05 PROCEDURE — 85018 HEMOGLOBIN: CPT

## 2019-08-07 ENCOUNTER — HOSPITAL ENCOUNTER (OUTPATIENT)
Age: 69
Setting detail: SPECIMEN
Discharge: HOME | End: 2019-08-07
Payer: MEDICARE

## 2019-08-07 LAB
HCT VFR BLD CALC: 25.9 % (ref 40.7–50.3)
HEMOGLOBIN: 7.4 G/DL (ref 13–17)

## 2019-08-07 PROCEDURE — 85018 HEMOGLOBIN: CPT

## 2019-08-07 PROCEDURE — 85014 HEMATOCRIT: CPT

## 2019-08-09 ENCOUNTER — HOSPITAL ENCOUNTER (OUTPATIENT)
Age: 69
Setting detail: SPECIMEN
Discharge: HOME | End: 2019-08-09
Payer: MEDICARE

## 2019-08-09 LAB — HEMOGLOBIN: 7.3 G/DL (ref 13–17)

## 2019-08-09 PROCEDURE — 85018 HEMOGLOBIN: CPT

## 2019-08-11 ENCOUNTER — HOSPITAL ENCOUNTER (OUTPATIENT)
Age: 69
Setting detail: SPECIMEN
Discharge: HOME | End: 2019-08-11
Payer: MEDICARE

## 2019-08-11 LAB — HEMOGLOBIN: 7.6 G/DL (ref 13–17)

## 2019-08-11 PROCEDURE — 36415 COLL VENOUS BLD VENIPUNCTURE: CPT

## 2019-08-11 PROCEDURE — P9603 ONE-WAY ALLOW PRORATED MILES: HCPCS

## 2019-08-11 PROCEDURE — 85018 HEMOGLOBIN: CPT

## 2019-08-14 ENCOUNTER — HOSPITAL ENCOUNTER (OUTPATIENT)
Age: 69
Setting detail: SPECIMEN
Discharge: HOME | End: 2019-08-14
Payer: MEDICARE

## 2019-08-14 LAB — HEMOGLOBIN: 7.1 G/DL (ref 13–17)

## 2019-08-14 PROCEDURE — 85018 HEMOGLOBIN: CPT

## 2019-08-15 ENCOUNTER — HOSPITAL ENCOUNTER (OUTPATIENT)
Age: 69
Setting detail: SPECIMEN
Discharge: HOME | End: 2019-08-15
Payer: MEDICARE

## 2019-08-15 ENCOUNTER — OFFICE VISIT (OUTPATIENT)
Dept: ORTHOPEDIC SURGERY | Age: 69
End: 2019-08-15

## 2019-08-15 VITALS — HEIGHT: 72 IN | BODY MASS INDEX: 32.82 KG/M2 | WEIGHT: 242.29 LBS

## 2019-08-15 DIAGNOSIS — T84.51XD INFECTION ASSOCIATED WITH INTERNAL RIGHT HIP PROSTHESIS, SUBSEQUENT ENCOUNTER: ICD-10-CM

## 2019-08-15 DIAGNOSIS — S72.001A CLOSED RIGHT HIP FRACTURE, INITIAL ENCOUNTER (HCC): Primary | ICD-10-CM

## 2019-08-15 LAB
DATE, STOOL #1: 8
DATE, STOOL #2: 8
DATE, STOOL #3: 8
HEMOCCULT SP1 STL QL: NEGATIVE
HEMOCCULT SP2 STL QL: NEGATIVE
HEMOCCULT SP3 STL QL: NEGATIVE
TIME, STOOL #1: 1530
TIME, STOOL #2: 2300
TIME, STOOL #3: 430

## 2019-08-15 PROCEDURE — 82272 OCCULT BLD FECES 1-3 TESTS: CPT

## 2019-08-15 PROCEDURE — 99024 POSTOP FOLLOW-UP VISIT: CPT | Performed by: ORTHOPAEDIC SURGERY

## 2019-08-15 ASSESSMENT — ENCOUNTER SYMPTOMS
CHEST TIGHTNESS: 0
APNEA: 0
VOMITING: 0
COUGH: 0
ABDOMINAL PAIN: 0

## 2019-08-15 NOTE — PROGRESS NOTES
[facility]  [facility]  [phone]  Dept: [phone]  Dept Fax: [phone]        Postoperative follow-up note    Subjective:   Glendia Saint is a 68y.o. year old male who presents to our office today for postoperative followup regarding his   1. Closed right hip fracture, subsequent encounter ([address]    2. Infection associated with internal right hip prosthesis, subsequent encounter    . Chief Complaint   Patient presents with    Post-Op Check     DOS:6/28/19-STAGE 1 TOTAL HIP REVISION ARTHROPLASTY     Hazel Henriquez is in the office for post op follow up of stage 1 HARMAN, antibiotic cement spacer on 6/28/19. Original right hip danielle on 5/15/19 subsided and had subsequent infection. The patient was last seen at [address] [place] [address] on 7/22/19 for left and right hip pain. The patient had a CT done on 7/24/19 for possible left femoral neck fracture that was subsequently negative. The patient was discharged back to [facility] on antibiotics in which the patient's wife states that the patient was having issues with the SNF regarding Eliquis as well as antiobiotics. The patient was originally taking eliquis due to history of blood clots. The patient is seeing Dr. Stacie Redmond and is on IV antiobiots. The patient follows up with Dr. Stacie Redmond next week. The patient's wife notes an improvement overall with the patient since he has been back on antibiotics. Patient is currently staying at [facility]. The patient's wife states that he will be discharged from [facility] in the next few weeks. The patient's wife states that she will have the patient go home with Home health care. The patient's wife is very concerned about the patient having surgery in the future and would like to discuss other options for the patient. The patient has been transferring with a sliding board at the [facility] and is not able to stand at all.      Patient's  Wife states that the patient's reformatted images are provided for review. Dose modulation, iterative reconstruction, and/or weight based adjustment of the mA/kV was utilized to reduce the radiation dose to as low as reasonably achievable. COMPARISON: 07/22/2019 HISTORY: ORDERING SYSTEM PROVIDED HISTORY: ABDOMINAL PAIN TECHNOLOGIST PROVIDED HISTORY: FINDINGS: LOWER CHEST: Small right effusion with pleural thickening. There is a complex opacity in the right lower lobe with a component of atelectasis however there is hypoattenuating irregular areas of attenuation within this portion of the right lower lobe concerning for infiltrate. Partially filled segmental bronchi in this area are also noted. Minimal dependent atelectasis in the right lower lobe. Cardiac enlargement and coronary calcifications are present. ORGANS: Lack of intravenous contrast limits evaluation of the solid organs and bowel. Subtle nodular contour of the liver and widening of the periportal space is noted. Splenomegaly measuring up to 16 cm is noted and prominent splenic vein with small perisplenic varices and apparent splenorenal shunt. These findings are suggestive of portal hypertension and cirrhosis. The gallbladder, pancreas, adrenals and kidneys reveal no acute findings. Bilateral renal cortical thinning. Extensive renal vascular calcification. GI/BOWEL: No bowel dilatation or wall thickening. Status post gastric bypass. Normal appendix. PELVIS: No acute findings. PERITONEUM/RETROPERITONEUM: No lymphadenopathy is noted. No free air or free fluid. The aorta is normal in caliber. Calcified atheromatous plaque is present. IVC filter present. BONES/SOFT TISSUES: Diffuse muscle atrophy is noted. Anasarca. Right hip hemiarthroplasty. An open wound with mixed fluid and gas is partially visualized in the lateral aspect of the right hip. Fluid tracks in the greater trochanter, which appears fragmented.   Multilevel degenerative change in the spine is noted with Would like the patient to work with physical therapy for transfers and strengthening of the right lower extremity. The patient is okay to be TTWB with a flat foot to help with transferring. Had a lengthy discussion with the patient and his wife about his options for surgery. Discussed keeping the spacer in place and taking antibiotics for life. Discussed resection arthroplasty with the patient and his wife today in the office as well as being a possible treatment option in the future. Discussed with them that the resection arthoplasty would be the best option at keeping his infection to a minimum. I did discuss with them that this is not a decision that they need to make today, I would like the patient to continue to see Dr. Wade Fisher and continue to get infection under control before surgery is an option. A new dressing is applied to the left hip today. The patient should follow up in the office in 4 weeks with x-rays of the right hip. The patient and his wife know to call the office with any questions or concerns. Follow up: Return in about 4 weeks (around 9/12/2019) for Xrays. No orders of the defined types were placed in this encounter. No orders of the defined types were placed in this encounter. Jenni Cook LPN am scribing for and in the presence of Dr. Pa Ortiz  8/19/2019 7:26 AM      I have reviewed and made changes accordingly to the work scribed by Alen Aguliar LPN. The documentation accurately reflects work and decisions made by me. I have also reviewed documentation completed by clinical staff.     Pa Ortiz DO, [facility]  8/19/2019 7:26 AM    This note is created with the assistance of a speech recognition program.  While intending to generate a document that actually reflects the content of the visit, the document can still have some errors including those of syntax and sound a like substitutions which may escape proof reading.  In such instances, actual meaning can be extrapolated by contextual diversion      Electronically signed by Bob Keyes DO, FAOAO on 8/19/2019 at 7:26 AM

## 2019-08-16 ENCOUNTER — HOSPITAL ENCOUNTER (OUTPATIENT)
Age: 69
Setting detail: SPECIMEN
Discharge: HOME | End: 2019-08-16
Payer: MEDICARE

## 2019-08-16 LAB — HEMOGLOBIN: 7.1 G/DL (ref 13–17)

## 2019-08-16 PROCEDURE — 85018 HEMOGLOBIN: CPT

## 2019-08-19 ENCOUNTER — HOSPITAL ENCOUNTER (OUTPATIENT)
Age: 69
Setting detail: SPECIMEN
Discharge: HOME | End: 2019-08-19
Payer: MEDICARE

## 2019-08-19 LAB — HEMOGLOBIN: 7.1 G/DL (ref 13–17)

## 2019-08-19 PROCEDURE — 85018 HEMOGLOBIN: CPT

## 2019-08-20 NOTE — PROGRESS NOTES
8-16-19. PLAN:  · Meropenem complete 8-16-19  · Monitor off antibiotics  · Unfortunately there are no options for oral chronic suppression as organism has become resistant to quinolones. Relapse of infection in future is possible. · Family asked to alert us if any changes developed      Discussed with patient, family. Patient seen face to face for a period of 30 min, of which more than 50% of the time was spent on counseling, explanation of diagnosis, planning of further management, and answering all questions  . I have personally reviewed the past medical history, past surgical history, medications, social history, and family history, and I have updated the database accordingly.   Past Medical History:     Past Medical History:   Diagnosis Date    A-fib ([address]     Acute encephalopathy     Acute ischemic stroke ([address] 7/13/2013    Acute metabolic encephalopathy 9/9/6813    Acute on chronic congestive heart failure ([address] 5/9/2014    Acute on chronic diastolic CHF (congestive heart failure) ([address] 2/25/2017    Altered mental status 8/24/2013    Anemia     Anemia associated with chronic renal failure 5/28/2014    Aphasia 8/24/2013    ARF (acute renal failure) ([address] 5/28/2014    From pre renal azotemia from newly added diuretic and ARB use, creat peaked at 2.8 from 2 in may 2014    Arteriovenous fistula for hemodialysis in place, primary ([address] 11/17/2017    Arthritis     Bradyarrhythmia 7/13/2013    Cellulitis 9/17/2018    Cerebral contusion ([address] 7/7/2013    CHF (congestive heart failure) (HCC)     Chronic atrial fibrillation (HCC) 7/6/2013    Chronic kidney disease     Chronic pain of right knee     CKD (chronic kidney disease) stage 4, GFR 15-29 ml/min ([address] 5/28/2014    From diabetic and ischemic nephrosclerosis, creat now 2-2.5, GFR 25-30 ml/min    Coagulation defect ([address] 7/6/2013    Diabetes mellitus ([address]     Diabetes mellitus type 2 in obese ([address]     Diarrhea 7/16/2013    DVT (deep venous thrombosis) (HCC)     Elevated C-reactive protein (CRP)     ESRD (end stage renal disease) ([address] 3/23/2017    Essential hypertension 3/23/2017    Fever 7/18/2013    Foot ulcer due to secondary DM ([address] 5/28/2014    Left side on antibiotics    GERD (gastroesophageal reflux disease)     Hematochezia 3/4/2017    History of cerebral infarction 3/1/2017    History of DVT (deep vein thrombosis) 9/17/2018    History of superior vena cava filter placement 7/18/2013    Hx of blood clots     Hyperkalemia 7/16/2013    Hyperlipidemia     Hypernatremia 7/11/2013    Hypertension     Hypocalcemia 7/18/2013    Hypovolemic shock ([address] 3/4/2017    Hypoxia     Irregular heartbeat     hx of a-fib    Knee effusion, right 2/25/2017    Left leg cellulitis     Lower GI bleed 3/4/2017    MDRO (multiple drug resistant organisms) resistance     Metabolic acidosis 8/79/7115    MRSA (methicillin resistant staph aureus) culture positive 09/17/2018    leg    On continuous oral anticoagulation 9/17/2018    NADIRA on CPAP     Other specified hypothyroidism 8/24/2013    Parietal lobe infarction ([address] 8/26/2013    Pneumonia     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    Proteinuria 11/30/2016    Fluctuates between 2-3 grams, cant use ACEI due to hyperkalemia    Pyogenic arthritis of right knee joint ([address] 3/23/2017    Pyrexia 7/19/2013    Renal insufficiency     Renal lesion 3/3/2017    Respiratory failure, acute ([address] 7/7/2013    Right knee pain     SAH (subarachnoid hemorrhage) (HCC)     Secondary hyperparathyroidism of renal origin ([address] 12/16/2015    Seizure disorder ([address] 3/1/2017    Stercoral ulcer of rectum     Streptococcal infection     Subarachnoid bleed ([address] 7/13/2013    Subdural bleeding ([address] 7/5/2013    Thyroid disease     Traumatic cerebral hemorrhage ([address] 7/7/2013    Type 2 diabetes mellitus with left diabetic foot ulcer ([address] 4/16/2014    Unspecified cerebral artery deficits. Skin: Warm and dry with good turgor. Signs of peripheral arterial insufficiency. Skin ulcers both heels.     Medical Decision Making:   I have independently reviewed/ordered the following labs:    CBC with Differential:   Lab Results   Component Value Date    WBC 4.0 07/29/2019    WBC 3.8 07/28/2019    HGB 7.1 08/19/2019    HGB 7.1 08/16/2019    HCT 25.9 08/07/2019    HCT 26.6 07/29/2019     07/29/2019     07/28/2019     05/18/2012     04/19/2012    LYMPHOPCT 8 07/22/2019    LYMPHOPCT 14 07/08/2019    MONOPCT 4 07/22/2019    MONOPCT 7 07/08/2019     BMP:   Lab Results   Component Value Date     07/29/2019     07/27/2019    K 4.1 07/29/2019    K 3.8 07/27/2019    CL 99 07/29/2019    CL 97 07/27/2019    CO2 22 07/29/2019    CO2 21 07/27/2019    BUN 50 07/29/2019    BUN 53 07/27/2019    CREATININE 4.55 07/29/2019    CREATININE 4.55 07/27/2019    MG 2.4 03/21/2017    MG 2.7 02/24/2017     Hepatic Function Panel:   Lab Results   Component Value Date    PROT 6.8 07/22/2019    PROT 6.3 06/28/2019    PROT 6.2 06/27/2012    LABALBU 2.6 07/22/2019    LABALBU 2.4 06/28/2019    LABALBU 3.7 05/18/2012    LABALBU 3.2 04/17/2012    BILIDIR 0.34 09/18/2018    BILIDIR 0.19 08/24/2013    IBILI 0.55 09/18/2018    IBILI 0.08 08/24/2013    BILITOT 1.09 07/22/2019    BILITOT 0.42 06/28/2019    ALKPHOS 99 07/22/2019    ALKPHOS 95 06/28/2019    ALT <5 07/22/2019    ALT <5 06/28/2019    AST 11 07/22/2019    AST 12 06/28/2019     No results found for: RPR  No results found for: HIV  No results found for: [facility]  Lab Results   Component Value Date    MUCUS 1+ 07/22/2019    RBC 2.66 07/29/2019    RBC 3.35 05/18/2012    TRICHOMONAS NOT REPORTED 07/22/2019    WBC 4.0 07/29/2019    YEAST NOT REPORTED 07/22/2019    TURBIDITY TURBID 07/22/2019     Lab Results   Component Value Date    CREATININE 4.55 07/29/2019    GLUCOSE 89 07/29/2019    GLUCOSE 135 05/18/2012       Thank you for allowing us to

## 2019-08-21 ENCOUNTER — HOSPITAL ENCOUNTER (OUTPATIENT)
Age: 69
Setting detail: SPECIMEN
Discharge: HOME | End: 2019-08-21
Payer: MEDICARE

## 2019-08-21 ENCOUNTER — APPOINTMENT (OUTPATIENT)
Dept: GENERAL RADIOLOGY | Age: 69
End: 2019-08-21
Payer: MEDICARE

## 2019-08-21 ENCOUNTER — HOSPITAL ENCOUNTER (EMERGENCY)
Age: 69
Discharge: HOME | End: 2019-08-21
Attending: EMERGENCY MEDICINE
Payer: MEDICARE

## 2019-08-21 VITALS
DIASTOLIC BLOOD PRESSURE: 78 MMHG | OXYGEN SATURATION: 97 % | SYSTOLIC BLOOD PRESSURE: 114 MMHG | WEIGHT: 246 LBS | BODY MASS INDEX: 33.32 KG/M2 | TEMPERATURE: 97.8 F | HEART RATE: 83 BPM | HEIGHT: 72 IN | RESPIRATION RATE: 16 BRPM

## 2019-08-21 DIAGNOSIS — D63.1 ANEMIA DUE TO CHRONIC KIDNEY DISEASE, UNSPECIFIED CKD STAGE: Primary | ICD-10-CM

## 2019-08-21 DIAGNOSIS — N18.9 ANEMIA DUE TO CHRONIC KIDNEY DISEASE, UNSPECIFIED CKD STAGE: Primary | ICD-10-CM

## 2019-08-21 LAB
-: ABNORMAL
ABO/RH: NORMAL
ABSOLUTE EOS #: 0.11 K/UL (ref 0–0.4)
ABSOLUTE IMMATURE GRANULOCYTE: 0 K/UL (ref 0–0.3)
ABSOLUTE LYMPH #: 0.64 K/UL (ref 1–4.8)
ABSOLUTE MONO #: 0 K/UL (ref 0.1–0.8)
ALBUMIN SERPL-MCNC: 2.3 G/DL (ref 3.5–5.2)
ALBUMIN/GLOBULIN RATIO: 0.5 (ref 1–2.5)
ALP BLD-CCNC: 77 U/L (ref 40–129)
ALT SERPL-CCNC: <5 U/L (ref 5–41)
AMORPHOUS: ABNORMAL
ANION GAP SERPL CALCULATED.3IONS-SCNC: 16 MMOL/L (ref 9–17)
ANION GAP SERPL CALCULATED.3IONS-SCNC: 16 MMOL/L (ref 9–17)
ANTIBODY SCREEN: NEGATIVE
ARM BAND NUMBER: NORMAL
AST SERPL-CCNC: 11 U/L
BACTERIA: ABNORMAL
BASOPHILS # BLD: 2 % (ref 0–2)
BASOPHILS ABSOLUTE: 0.11 K/UL (ref 0–0.2)
BILIRUB SERPL-MCNC: 0.48 MG/DL (ref 0.3–1.2)
BILIRUBIN URINE: NEGATIVE
BUN BLDV-MCNC: 50 MG/DL (ref 8–23)
BUN BLDV-MCNC: 53 MG/DL (ref 8–23)
BUN/CREAT BLD: ABNORMAL (ref 9–20)
BUN/CREAT BLD: ABNORMAL (ref 9–20)
CALCIUM SERPL-MCNC: 8 MG/DL (ref 8.6–10.4)
CALCIUM SERPL-MCNC: 8.1 MG/DL (ref 8.6–10.4)
CASTS UA: ABNORMAL /LPF (ref 0–8)
CELLS COUNTED: 200
CHLORIDE BLD-SCNC: 95 MMOL/L (ref 98–107)
CHLORIDE BLD-SCNC: 97 MMOL/L (ref 98–107)
CO2: 23 MMOL/L (ref 20–31)
CO2: 25 MMOL/L (ref 20–31)
COLOR: YELLOW
CREAT SERPL-MCNC: 4.53 MG/DL (ref 0.7–1.2)
CREAT SERPL-MCNC: 5.29 MG/DL (ref 0.7–1.2)
CRYSTALS, UA: ABNORMAL /HPF
DIFFERENTIAL TYPE: ABNORMAL
EOSINOPHILS RELATIVE PERCENT: 2 % (ref 1–4)
EPITHELIAL CELLS UA: ABNORMAL /HPF (ref 0–5)
EXPIRATION DATE: NORMAL
GFR AFRICAN AMERICAN: 13 ML/MIN
GFR AFRICAN AMERICAN: 16 ML/MIN
GFR NON-AFRICAN AMERICAN: 11 ML/MIN
GFR NON-AFRICAN AMERICAN: 13 ML/MIN
GFR SERPL CREATININE-BSD FRML MDRD: ABNORMAL ML/MIN/{1.73_M2}
GLUCOSE BLD-MCNC: 100 MG/DL (ref 70–99)
GLUCOSE BLD-MCNC: 87 MG/DL (ref 70–99)
GLUCOSE URINE: ABNORMAL
HCT VFR BLD CALC: 23.9 % (ref 40.7–50.3)
HEMOGLOBIN: 6.6 G/DL (ref 13–17)
HEMOGLOBIN: 7 G/DL (ref 13–17)
IMMATURE GRANULOCYTES: 0 %
INR BLD: 1.2
KETONES, URINE: NEGATIVE
LEUKOCYTE ESTERASE, URINE: ABNORMAL
LYMPHOCYTES # BLD: 12 % (ref 24–44)
MCH RBC QN AUTO: 29.2 PG (ref 25.2–33.5)
MCHC RBC AUTO-ENTMCNC: 29.3 G/DL (ref 28.4–34.8)
MCV RBC AUTO: 99.6 FL (ref 82.6–102.9)
MONOCYTES # BLD: 0 % (ref 1–7)
MORPHOLOGY: ABNORMAL
MUCUS: ABNORMAL
NITRITE, URINE: NEGATIVE
NRBC AUTOMATED: 0 PER 100 WBC
OTHER OBSERVATIONS UA: ABNORMAL
PARTIAL THROMBOPLASTIN TIME: 33.8 SEC (ref 20.5–30.5)
PDW BLD-RTO: 14.6 % (ref 11.8–14.4)
PH UA: 8 (ref 5–8)
PLATELET # BLD: 94 K/UL (ref 138–453)
PLATELET ESTIMATE: ABNORMAL
PMV BLD AUTO: 9.2 FL (ref 8.1–13.5)
POTASSIUM SERPL-SCNC: 4 MMOL/L (ref 3.7–5.3)
POTASSIUM SERPL-SCNC: 4.1 MMOL/L (ref 3.7–5.3)
PROTEIN UA: ABNORMAL
PROTHROMBIN TIME: 12.2 SEC (ref 9–12)
RBC # BLD: 2.4 M/UL (ref 4.21–5.77)
RBC # BLD: ABNORMAL 10*6/UL
RBC UA: ABNORMAL /HPF (ref 0–4)
RENAL EPITHELIAL, UA: ABNORMAL /HPF
SEG NEUTROPHILS: 84 % (ref 36–66)
SEGMENTED NEUTROPHILS ABSOLUTE COUNT: 4.44 K/UL (ref 1.8–7.7)
SODIUM BLD-SCNC: 134 MMOL/L (ref 135–144)
SODIUM BLD-SCNC: 138 MMOL/L (ref 135–144)
SPECIFIC GRAVITY UA: 1.01 (ref 1–1.03)
TOTAL PROTEIN: 6.5 G/DL (ref 6.4–8.3)
TRICHOMONAS: ABNORMAL
TURBIDITY: ABNORMAL
URINE HGB: ABNORMAL
UROBILINOGEN, URINE: NORMAL
WBC # BLD: 5.3 K/UL (ref 3.5–11.3)
WBC # BLD: ABNORMAL 10*3/UL
WBC UA: ABNORMAL /HPF (ref 0–5)
YEAST: ABNORMAL

## 2019-08-21 PROCEDURE — 80053 COMPREHEN METABOLIC PANEL: CPT

## 2019-08-21 PROCEDURE — 85730 THROMBOPLASTIN TIME PARTIAL: CPT

## 2019-08-21 PROCEDURE — 86900 BLOOD TYPING SEROLOGIC ABO: CPT

## 2019-08-21 PROCEDURE — 86850 RBC ANTIBODY SCREEN: CPT

## 2019-08-21 PROCEDURE — 81001 URINALYSIS AUTO W/SCOPE: CPT

## 2019-08-21 PROCEDURE — 87086 URINE CULTURE/COLONY COUNT: CPT

## 2019-08-21 PROCEDURE — P9612 CATHETERIZE FOR URINE SPEC: HCPCS

## 2019-08-21 PROCEDURE — 6370000000 HC RX 637 (ALT 250 FOR IP): Performed by: STUDENT IN AN ORGANIZED HEALTH CARE EDUCATION/TRAINING PROGRAM

## 2019-08-21 PROCEDURE — 80048 BASIC METABOLIC PNL TOTAL CA: CPT

## 2019-08-21 PROCEDURE — 99285 EMERGENCY DEPT VISIT HI MDM: CPT

## 2019-08-21 PROCEDURE — 71045 X-RAY EXAM CHEST 1 VIEW: CPT

## 2019-08-21 PROCEDURE — 85025 COMPLETE CBC W/AUTO DIFF WBC: CPT

## 2019-08-21 PROCEDURE — 86901 BLOOD TYPING SEROLOGIC RH(D): CPT

## 2019-08-21 PROCEDURE — 85610 PROTHROMBIN TIME: CPT

## 2019-08-21 PROCEDURE — 85018 HEMOGLOBIN: CPT

## 2019-08-21 RX ORDER — CEPHALEXIN 500 MG/1
500 CAPSULE ORAL ONCE
Status: COMPLETED | OUTPATIENT
Start: 2019-08-21 | End: 2019-08-21

## 2019-08-21 RX ORDER — CEPHALEXIN 500 MG/1
500 CAPSULE ORAL 2 TIMES DAILY
Qty: 28 CAPSULE | Refills: 0 | Status: SHIPPED | OUTPATIENT
Start: 2019-08-21 | End: 2019-08-22 | Stop reason: ALTCHOICE

## 2019-08-21 RX ORDER — FENTANYL CITRATE 50 UG/ML
50 INJECTION, SOLUTION INTRAMUSCULAR; INTRAVENOUS ONCE
Status: DISCONTINUED | OUTPATIENT
Start: 2019-08-21 | End: 2019-08-22 | Stop reason: HOSPADM

## 2019-08-21 RX ADMIN — CEPHALEXIN 500 MG: 500 CAPSULE ORAL at 22:22

## 2019-08-21 NOTE — ED PROVIDER NOTES
[facility]  ED  Emergency Department Encounter  EmergencyMedicine Resident     Pt Name:Pacheco Harris  MRN: [id number]  [place] 1950  Date of evaluation: 8/22/19  PCP:  Mino Allison MD    [address] [place] [address]       Chief Complaint   Patient presents with    Other     Low hemoglobin of 6.6       HISTORY OF PRESENT ILLNESS  (Location/Symptom, Timing/Onset, Context/Setting, Quality, Duration, Modifying Factors, Severity.)      Tommie Arreguin is a 68 y.o. male who presents with low hemoglobin of 6.6. Patient sent over by dialysis clinic prior to receiving dialysis. Patient with chronic anemia secondary to end-stage renal disease. Patient denying any bloody stools, bloody emesis, hematuria. Patient denying any pain symptoms. He denies any shortness of breath, chest pain. Patient denying any prior GI bleeds. Patient has been immobile for several months after receiving right hip surgery from mechanical fall. Patient is on Eliquis.     PAST MEDICAL / SURGICAL / SOCIAL / FAMILY HISTORY      has a past medical history of A-fib ([address] Acute encephalopathy, Acute ischemic stroke ([address] Acute metabolic encephalopathy, Acute on chronic congestive heart failure ([address] Acute on chronic diastolic CHF (congestive heart failure) ([address] Altered mental status, Anemia, Anemia associated with chronic renal failure, Aphasia, ARF (acute renal failure) (HCC), Arteriovenous fistula for hemodialysis in place, primary ([address] Arthritis, Bradyarrhythmia, Cellulitis, Cerebral contusion ([address] CHF (congestive heart failure) ([address] Chronic atrial fibrillation ([address] Chronic kidney disease, Chronic pain of right knee, CKD (chronic kidney disease) stage 4, GFR 15-29 ml/min ([address] Coagulation defect ([address] Diabetes mellitus ([address] Diabetes mellitus type 2 in obese ([address] Diarrhea, DVT (deep venous thrombosis) ([address] Elevated C-reactive protein (CRP), ESRD (end stage renal disease) ([address] Essential hypertension, Fever, Foot ulcer due      Spouse name: Not on file    Number of children: Not on file    Years of education: Not on file    Highest education level: Not on file   Occupational History    Not on file   Social Needs    Financial resource strain: Not on file    Food insecurity:     Worry: Not on file     Inability: Not on file    Transportation needs:     Medical: Not on file     Non-medical: Not on file   Tobacco Use    Smoking status: Never Smoker    Smokeless tobacco: Never Used   Substance and Sexual Activity    Alcohol use: No    Drug use: No     Comment: smoked pipe years ago    Sexual activity: Not on file   Lifestyle    Physical activity:     Days per week: Not on file     Minutes per session: Not on file    Stress: Not on file   Relationships    Social connections:     Talks on phone: Not on file     Gets together: Not on file     Attends Yarsanism service: Not on file     Active member of club or organization: Not on file     Attends meetings of clubs or organizations: Not on file     Relationship status: Not on file    Intimate partner violence:     Fear of current or ex partner: Not on file     Emotionally abused: Not on file     Physically abused: Not on file     Forced sexual activity: Not on file   Other Topics Concern    Not on file   Social History Narrative    ** Merged History Encounter **            Family History   Problem Relation Age of Onset    Coronary Art Dis Father     Cancer Sister        Allergies:  Bactrim [sulfamethoxazole-trimethoprim]    Home Medications:  Prior to Admission medications    Medication Sig Start Date End Date Taking?  Authorizing Provider   cephALEXin (KEFLEX) 500 MG capsule Take 1 capsule by mouth 2 times daily for 7 days 8/21/19 8/28/19 Yes Kervin Perry MD   apixaban Michael Singletary) 2.5 MG TABS tablet Take 1 tablet by mouth 2 times daily 7/29/19   Lilliana Garcia MD   senna (SENOKOT) 8.6 MG TABS tablet Take 1 tablet by mouth 2 times daily 7/5/19   Caleb Tucker DO 5)      INITIAL VITALS:   /78   Pulse 83   Temp 97.8 °F (36.6 °C) (Oral)   Resp 16   Ht 6' (1.829 m)   Wt 246 lb (111.6 kg)   SpO2 97%   BMI 33.36 kg/m²     Physical Exam   Constitutional: He is oriented to person, place, and time. He appears well-developed and well-nourished. On toxic, no respiratory distress, appears comfortable, at baseline. HENT:   Head: Normocephalic and atraumatic. Nose: Nose normal.   Mouth/Throat: Oropharynx is clear and moist.   Eyes: Pupils are equal, round, and reactive to light. EOM are normal.   Neck: Normal range of motion. Neck supple. Cardiovascular: Normal rate, regular rhythm, normal heart sounds and intact distal pulses. Pulmonary/Chest: Breath sounds normal. No respiratory distress. He has no wheezes. He has no rales. Lungs clear bilaterally with no respiratory distress. Patient speaking in full sentences. Abdominal: Soft. Bowel sounds are normal. He exhibits no distension. There is no tenderness. Musculoskeletal: Normal range of motion. He exhibits no edema or deformity. Lateral lower extremity edema that is chronic. No open incisions or wound around right hip at site of surgery. Neurological: He is alert and oriented to person, place, and time. He has normal reflexes. Skin: Skin is warm and dry. No rash noted. No erythema. No evidence of bleeding from skin wounds. Psychiatric: He has a normal mood and affect.  His behavior is normal.       DIFFERENTIAL  DIAGNOSIS     PLAN (LABS / IMAGING / EKG):  Orders Placed This Encounter   Procedures    Urine Culture    XR CHEST PORTABLE    CBC Auto Differential    Comprehensive Metabolic Panel    Protime-INR    APTT    Urinalysis with Microscopic    Inpatient consult to Nephrology    TYPE AND SCREEN       MEDICATIONS ORDERED:  Orders Placed This Encounter   Medications    DISCONTD: fentaNYL (SUBLIMAZE) injection 50 mcg    cephALEXin (KEFLEX) capsule 500 mg    cephALEXin (KEFLEX) 500 MG mL/min    GFR African American 13 (L) >60 mL/min    GFR Comment          GFR Staging NOT REPORTED    Protime-INR   Result Value Ref Range    Protime 12.2 (H) 9.0 - 12.0 sec    INR 1.2    APTT   Result Value Ref Range    PTT 33.8 (H) 20.5 - 30.5 sec   Urinalysis with Microscopic   Result Value Ref Range    Color, UA YELLOW YELLOW    Turbidity UA CLOUDY (A) CLEAR    Glucose, Ur TRACE (A) NEGATIVE    Bilirubin Urine NEGATIVE NEGATIVE    Ketones, Urine NEGATIVE NEGATIVE    Specific Gravity, UA 1.013 1.005 - 1.030    Urine Hgb SMALL (A) NEGATIVE    pH, UA 8.0 5.0 - 8.0    Protein, UA 2+ (A) NEGATIVE    Urobilinogen, Urine Normal Normal    Nitrite, Urine NEGATIVE NEGATIVE    Leukocyte Esterase, Urine LARGE (A) NEGATIVE    -          WBC, UA 50  0 - 5 /HPF    RBC, UA 2 TO 5 0 - 4 /HPF    Casts UA  0 - 8 /LPF     2 TO 5 HYALINE Reference range defined for non-centrifuged specimen. Crystals UA NOT REPORTED None /HPF    Epithelial Cells UA 2 TO 5 0 - 5 /HPF    Renal Epithelial, Urine NOT REPORTED 0 /HPF    Bacteria, UA NOT REPORTED None    Mucus, UA NOT REPORTED None    Trichomonas, UA NOT REPORTED None    Amorphous, UA NOT REPORTED None    Other Observations UA NOT REPORTED NOT REQ. Yeast, UA NOT REPORTED None   TYPE AND SCREEN   Result Value Ref Range    Expiration Date 08/24/2019     Arm Band Number [id number]     ABO/Rh A POSITIVE     Antibody Screen NEGATIVE          RADIOLOGY:  XR CHEST PORTABLE   Final Result   1. Persistent right pleural effusion   2. Right basilar opacification also suggesting atelectasis   3. Right-sided PICC appears to terminate in the superior aspect of the right   atrium               EKG  None    All EKG's are interpreted by the Emergency Department Physician who either signs or Co-signs this chart in the absence of a cardiologist.    EMERGENCY DEPARTMENT COURSE:  Patient with reported low hemoglobin 6.6. Repeat CBC showing baseline hemoglobin is 7.0. Patient asymptomatic.   He did

## 2019-08-21 NOTE — ED NOTES
Pt provided with lunch tray.  SW to arrange for transport back to the nursing home      Ana oDwjaime, [facility]  08/21/19 8542

## 2019-08-21 NOTE — ED NOTES
Pt. To ED via EMS moved with stretcher to bed 24  Pt. Here with c/o low hemoglobin. Pt. Lives in an extended care facility where he receives every day dialysis  Pt. Was denied dialysis today because he was reported with a Hgb of 6.6 and the nurses reported an episode of tarry stools  Since pt. Was denied dialysis, wife requested pt. Be sent here for evaluation  Wife states pt. Typically sits around 7.0 for hgb and recieves regular transfusions. Wife states pt. Next appointment for a transfusion is tomorrow at [facility]  Pt. Denies chest pain, N/V, SOB  Vitals obtained, call light provided, pt.  In no apparent distress  Will continue to monitor         Flora Eastman RN  08/21/19 3981

## 2019-08-22 ENCOUNTER — HOSPITAL ENCOUNTER (OUTPATIENT)
Dept: INPATIENT UNIT | Age: 69
Setting detail: THERAPIES SERIES
Discharge: HOME | End: 2019-08-22
Payer: COMMERCIAL

## 2019-08-22 VITALS
RESPIRATION RATE: 20 BRPM | OXYGEN SATURATION: 96 % | SYSTOLIC BLOOD PRESSURE: 109 MMHG | DIASTOLIC BLOOD PRESSURE: 41 MMHG | TEMPERATURE: 97.2 F | HEART RATE: 86 BPM

## 2019-08-22 LAB
CULTURE: NORMAL
Lab: NORMAL
SPECIMEN DESCRIPTION: NORMAL

## 2019-08-22 PROCEDURE — 86900 BLOOD TYPING SEROLOGIC ABO: CPT

## 2019-08-22 PROCEDURE — 36415 COLL VENOUS BLD VENIPUNCTURE: CPT

## 2019-08-22 PROCEDURE — 86850 RBC ANTIBODY SCREEN: CPT

## 2019-08-22 PROCEDURE — 86901 BLOOD TYPING SEROLOGIC RH(D): CPT

## 2019-08-22 PROCEDURE — P9016 RBC LEUKOCYTES REDUCED: HCPCS

## 2019-08-22 PROCEDURE — 86920 COMPATIBILITY TEST SPIN: CPT

## 2019-08-22 PROCEDURE — 36430 TRANSFUSION BLD/BLD COMPNT: CPT

## 2019-08-22 RX ORDER — ACETAMINOPHEN 325 MG/1
650 TABLET ORAL EVERY 4 HOURS PRN
Status: ON HOLD | COMMUNITY
End: 2021-09-24

## 2019-08-22 RX ORDER — ONDANSETRON 4 MG/1
4 TABLET, ORALLY DISINTEGRATING ORAL EVERY 6 HOURS PRN
COMMUNITY
End: 2020-07-04

## 2019-08-22 RX ORDER — LOPERAMIDE HYDROCHLORIDE 2 MG/1
2 CAPSULE ORAL 4 TIMES DAILY PRN
COMMUNITY
End: 2020-07-04

## 2019-08-22 RX ORDER — 0.9 % SODIUM CHLORIDE 0.9 %
250 INTRAVENOUS SOLUTION INTRAVENOUS ONCE
Status: DISCONTINUED | OUTPATIENT
Start: 2019-08-22 | End: 2019-08-23 | Stop reason: HOSPADM

## 2019-08-22 RX ORDER — FUROSEMIDE 10 MG/ML
40 INJECTION INTRAMUSCULAR; INTRAVENOUS PRN
Status: DISCONTINUED | OUTPATIENT
Start: 2019-08-22 | End: 2019-08-23 | Stop reason: HOSPADM

## 2019-08-22 ASSESSMENT — ENCOUNTER SYMPTOMS
BACK PAIN: 0
COUGH: 0
VOMITING: 0
SHORTNESS OF BREATH: 0
SORE THROAT: 0
PHOTOPHOBIA: 0
CHEST TIGHTNESS: 0
NAUSEA: 0
ABDOMINAL PAIN: 0
WHEEZING: 0
TROUBLE SWALLOWING: 0

## 2019-08-23 ENCOUNTER — HOSPITAL ENCOUNTER (OUTPATIENT)
Age: 69
Setting detail: SPECIMEN
Discharge: HOME | End: 2019-08-23
Payer: MEDICARE

## 2019-08-23 ENCOUNTER — OFFICE VISIT (OUTPATIENT)
Dept: INFECTIOUS DISEASES | Age: 69
End: 2019-08-23
Payer: MEDICARE

## 2019-08-23 VITALS
HEIGHT: 72 IN | HEART RATE: 80 BPM | TEMPERATURE: 97.2 F | SYSTOLIC BLOOD PRESSURE: 138 MMHG | DIASTOLIC BLOOD PRESSURE: 70 MMHG | BODY MASS INDEX: 33.36 KG/M2

## 2019-08-23 DIAGNOSIS — Z16.12 INFECTION DUE TO ESBL-PRODUCING KLEBSIELLA PNEUMONIAE: ICD-10-CM

## 2019-08-23 DIAGNOSIS — I48.20 CHRONIC ATRIAL FIBRILLATION (HCC): ICD-10-CM

## 2019-08-23 DIAGNOSIS — E11.69 DIABETES MELLITUS TYPE 2 IN OBESE (HCC): ICD-10-CM

## 2019-08-23 DIAGNOSIS — N18.6 ESRD (END STAGE RENAL DISEASE) (HCC): ICD-10-CM

## 2019-08-23 DIAGNOSIS — T84.010D FAILURE OF RIGHT TOTAL HIP ARTHROPLASTY, SUBSEQUENT ENCOUNTER: ICD-10-CM

## 2019-08-23 DIAGNOSIS — A49.8 INFECTION DUE TO ESBL-PRODUCING ESCHERICHIA COLI: ICD-10-CM

## 2019-08-23 DIAGNOSIS — I10 ESSENTIAL HYPERTENSION: ICD-10-CM

## 2019-08-23 DIAGNOSIS — T84.51XD INFECTION AND INFLAMMATORY REACTION DUE TO INTERNAL RIGHT HIP PROSTHESIS, SUBSEQUENT ENCOUNTER: Primary | ICD-10-CM

## 2019-08-23 DIAGNOSIS — Z16.12 INFECTION DUE TO ESBL-PRODUCING ESCHERICHIA COLI: ICD-10-CM

## 2019-08-23 DIAGNOSIS — B96.20 BACTEREMIA DUE TO ESCHERICHIA COLI: ICD-10-CM

## 2019-08-23 DIAGNOSIS — A49.8 INFECTION DUE TO ESBL-PRODUCING KLEBSIELLA PNEUMONIAE: ICD-10-CM

## 2019-08-23 DIAGNOSIS — S72.001G CLOSED FRACTURE OF RIGHT HIP WITH DELAYED HEALING, SUBSEQUENT ENCOUNTER: ICD-10-CM

## 2019-08-23 DIAGNOSIS — R78.81 BACTEREMIA DUE TO ESCHERICHIA COLI: ICD-10-CM

## 2019-08-23 DIAGNOSIS — A49.8 PROTEUS MIRABILIS INFECTION: ICD-10-CM

## 2019-08-23 DIAGNOSIS — E66.9 DIABETES MELLITUS TYPE 2 IN OBESE (HCC): ICD-10-CM

## 2019-08-23 LAB
ABO/RH: NORMAL
ANTIBODY SCREEN: NEGATIVE
ARM BAND NUMBER: NORMAL
BLD PROD TYP BPU: NORMAL
BLD PROD TYP BPU: NORMAL
CROSSMATCH RESULT: NORMAL
CROSSMATCH RESULT: NORMAL
DISPENSE STATUS BLOOD BANK: NORMAL
DISPENSE STATUS BLOOD BANK: NORMAL
EXPIRATION DATE: NORMAL
HEMOGLOBIN: 7.8 G/DL (ref 13–17)
TRANSFUSION STATUS: NORMAL
TRANSFUSION STATUS: NORMAL
UNIT DIVISION: 0
UNIT DIVISION: 0
UNIT NUMBER: NORMAL
UNIT NUMBER: NORMAL

## 2019-08-23 PROCEDURE — 1123F ACP DISCUSS/DSCN MKR DOCD: CPT | Performed by: INTERNAL MEDICINE

## 2019-08-23 PROCEDURE — G8427 DOCREV CUR MEDS BY ELIG CLIN: HCPCS | Performed by: INTERNAL MEDICINE

## 2019-08-23 PROCEDURE — 4040F PNEUMOC VAC/ADMIN/RCVD: CPT | Performed by: INTERNAL MEDICINE

## 2019-08-23 PROCEDURE — 2022F DILAT RTA XM EVC RTNOPTHY: CPT | Performed by: INTERNAL MEDICINE

## 2019-08-23 PROCEDURE — 3046F HEMOGLOBIN A1C LEVEL >9.0%: CPT | Performed by: INTERNAL MEDICINE

## 2019-08-23 PROCEDURE — 1111F DSCHRG MED/CURRENT MED MERGE: CPT | Performed by: INTERNAL MEDICINE

## 2019-08-23 PROCEDURE — 99214 OFFICE O/P EST MOD 30 MIN: CPT | Performed by: INTERNAL MEDICINE

## 2019-08-23 PROCEDURE — 1036F TOBACCO NON-USER: CPT | Performed by: INTERNAL MEDICINE

## 2019-08-23 PROCEDURE — 3017F COLORECTAL CA SCREEN DOC REV: CPT | Performed by: INTERNAL MEDICINE

## 2019-08-23 PROCEDURE — G8598 ASA/ANTIPLAT THER USED: HCPCS | Performed by: INTERNAL MEDICINE

## 2019-08-23 PROCEDURE — G8417 CALC BMI ABV UP PARAM F/U: HCPCS | Performed by: INTERNAL MEDICINE

## 2019-08-23 PROCEDURE — 85018 HEMOGLOBIN: CPT

## 2019-08-23 NOTE — LETTER
[facility] [address]  Phone: [phone]  Fax: [phone]    PCP: Roger Durbin MD   CC providers:  MD Jacqueline Dill [address]  [address] [place] [address] In [facility] MD Rubén  [facility] [address]  [place] [address]  [address] [place] [address] In [facility]       September 6, 2019       Patient: Taty Whitman   MR Number: [id number]   YOB: 1950   Date of Visit: 8/23/2019     Dear Catherine Vasquez: Thank you for allowing me to see your patient Mr. Taty Whitman. Below are the relevant portions of my assessment and plan of care. [facility] - Office Progress Note  Today's Date and Time: 8/20/2019, 2:37 PM    Diagnostic Impression :     1. Infection and inflammatory reaction due to internal right hip prosthesis, subsequent encounter    2. 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 Proteus mirabilis infection    4. Failure of right total hip arthroplasty, subsequent encounter    5. Chronic atrial fibrillation (HCC)    6. Closed fracture of right hip with delayed healing, subsequent encounter    7. Diabetes mellitus type 2 in obese ([address]    8. ESRD (end stage renal disease) ([facility]    9. Essential hypertension        Recommendations ·   Meropenem complete 8-16-19  · Monitor off antibiotics  · Unfortunately there are no options for oral chronic suppression as organism has become resistant to quinolones. Relapse of infection in future is possible. · Family asked to alert us if any changes developed      Chief complaint/reason for consultation:     Chief Complaint   Patient presents with    Follow-Up from [address] [place] [address] hip hemiarthroplasty infection with ESBL Klebsiella, Proteus       History of Present Illness:   Taty Whitman is a 68y.o.-year-old  male who was evaluated on 8/23/2019.  Patient seen at the request of Dr. Brigido Ashley This is 68years old gentleman who was transferred to us from [facility] after he presented there with a fever.  Patient is a resident of a nursing home and was just recently discharged from our hospital. Samantha Box had issues with hip infection and was treated with antibiotics and a spacer.       At the nursing home patient was noted to have fever and was sent to [address] [place] [address]. He was found to have abdominal wall cellulitis and question of pneumonia and UTI. Samantha Box was started on broad-spectrum antibiotics and referred for admission. Apparently there was also concern for blood in the urine and occult blood positive in the stools. He was discharged on 7.5 mg of eliquis twice daily. On 7-22 he presented to [facility] ED with fever. He was found to have abdominal wall cellulitis, UTI and questionable pneumonia. He was pan cultured, started on Vancomycin and Zosyn and admitted for further care. Blood cultures showed ESBL E coli. The antibiotics were transitioned to Meropenem and he was discharged back to the SNF on 7-28 with IV antibiotics through 8-16-19      Pertinent review of systems include: For complete review of symptoms see ROS section below. CURRENT EVALUATION : 8-23-19    Mr. Xiao Palacios presents in the clinic for follow up on recent hospitalization. Pt had history of of right hip hemiarthroplasty on 6-. Later developed Infected Rt hip hemiarthroplasty with Proteus and ESBL and Klebsiella. IV Meropenem initiated. Patient finished treatment on 8-. Patient feels fine. Denies of fever, chills, swelling of hip, or hip tenderness. PICC line removed today. Plan to Monitor off antibiotics. DISCUSSION:  · S/P Rt hemiarthroplasty on 6-15-19. · Subsequently developed superficial incisional dehiscence. · Previously admitted 6-26-19 and underwent revision of  Rt hip hemiarthroplasty on 6-28-19.   · Found to have an Infected Rt hip hemiarthroplasty with Proteus and ESBL Ling Eng, DO at [address] SKIN BIOPSY      TONSILLECTOMY      TRACHEOSTOMY  summer 2013    VENA CAVA FILTER PLACEMENT         Medications:     Current Outpatient Medications:     apixaban (ELIQUIS) 2.5 MG TABS tablet, Take 1 tablet by mouth 2 times daily, Disp: , Rfl:     senna (SENOKOT) 8.6 MG TABS tablet, Take 1 tablet by mouth 2 times daily, Disp: 60 tablet, Rfl: 0    darbepoetin jean pierre-polysorbate (ARANESP) 40 MCG/0.4ML SOSY injection, Infuse 0.8 mLs intravenously every 7 days, Disp: 8.4 mL, Rfl:     furosemide (LASIX) 20 MG tablet, Take 20 mg by mouth 2 times daily, Disp: , Rfl:     ARIPiprazole (ABILIFY) 5 MG tablet, Take 5 mg by mouth daily , Disp: , Rfl:     Ferric Citrate (AURYXIA) 1  MG(Fe) TABS, Take 210 mg by mouth 2 times daily Patients med list states frequency is 2-3 times a day, Disp: , Rfl:     venlafaxine (EFFEXOR) 75 MG tablet, Take 75 mg by mouth daily, Disp: , Rfl:     lamoTRIgine (LAMICTAL) 25 MG tablet, Take 100 mg by mouth daily , Disp: , Rfl:     isosorbide mononitrate (IMDUR) 30 MG CR tablet, Take 30 mg by mouth daily , Disp: , Rfl:     pantoprazole (PROTONIX) 40 MG tablet, Take 40 mg by mouth daily , Disp: , Rfl:     levothyroxine (LEVOTHROID) 50 MCG tablet, Take 50 mcg by mouth Daily. , Disp: , Rfl:     Cholecalciferol (VITAMIN D3) 2000 UNITS CAPS, Take 1,000 Units by mouth daily. , Disp: , Rfl:      Social History:     Social History     Socioeconomic History    Marital status:      Spouse name: Not on file    Number of children: Not on file    Years of education: Not on file    Highest education level: Not on file   Occupational History    Not on file   Social Needs    Financial resource strain: Not on file    Food insecurity:     Worry: Not on file     Inability: Not on file    Transportation needs:     Medical: Not on file     Non-medical: Not on file   Tobacco Use    Smoking status: Never Smoker    Smokeless tobacco: Never Used

## 2019-08-26 ENCOUNTER — HOSPITAL ENCOUNTER (OUTPATIENT)
Age: 69
Setting detail: SPECIMEN
Discharge: HOME | End: 2019-08-26
Payer: MEDICARE

## 2019-08-26 ENCOUNTER — TELEPHONE (OUTPATIENT)
Dept: ORTHOPEDIC SURGERY | Age: 69
End: 2019-08-26

## 2019-08-26 LAB — HEMOGLOBIN: 8.2 G/DL (ref 13–17)

## 2019-08-26 PROCEDURE — 85018 HEMOGLOBIN: CPT

## 2019-08-26 PROCEDURE — 87040 BLOOD CULTURE FOR BACTERIA: CPT

## 2019-08-28 ENCOUNTER — HOSPITAL ENCOUNTER (OUTPATIENT)
Age: 69
Setting detail: SPECIMEN
Discharge: HOME | End: 2019-08-28
Payer: MEDICARE

## 2019-08-28 LAB — HEMOGLOBIN: 8.2 G/DL (ref 13–17)

## 2019-08-28 PROCEDURE — P9603 ONE-WAY ALLOW PRORATED MILES: HCPCS

## 2019-08-28 PROCEDURE — 36415 COLL VENOUS BLD VENIPUNCTURE: CPT

## 2019-08-28 PROCEDURE — 85018 HEMOGLOBIN: CPT

## 2019-08-30 ENCOUNTER — HOSPITAL ENCOUNTER (OUTPATIENT)
Age: 69
Setting detail: SPECIMEN
Discharge: HOME | End: 2019-08-30
Payer: MEDICARE

## 2019-08-30 LAB — HEMOGLOBIN: 7.7 G/DL (ref 13–17)

## 2019-08-30 PROCEDURE — 36415 COLL VENOUS BLD VENIPUNCTURE: CPT

## 2019-08-30 PROCEDURE — P9603 ONE-WAY ALLOW PRORATED MILES: HCPCS

## 2019-08-30 PROCEDURE — 85018 HEMOGLOBIN: CPT

## 2019-09-01 LAB
CULTURE: NORMAL
CULTURE: NORMAL
Lab: NORMAL
Lab: NORMAL
SPECIMEN DESCRIPTION: NORMAL
SPECIMEN DESCRIPTION: NORMAL

## 2019-09-03 ENCOUNTER — HOSPITAL ENCOUNTER (OUTPATIENT)
Age: 69
Setting detail: SPECIMEN
Discharge: HOME | End: 2019-09-03
Payer: MEDICARE

## 2019-09-03 LAB — HEMOGLOBIN: 7.8 G/DL (ref 13–17)

## 2019-09-03 PROCEDURE — 85018 HEMOGLOBIN: CPT

## 2019-09-06 ENCOUNTER — HOSPITAL ENCOUNTER (OUTPATIENT)
Age: 69
Setting detail: SPECIMEN
Discharge: HOME | End: 2019-09-06
Payer: MEDICARE

## 2019-09-06 LAB — HEMOGLOBIN: 7.7 G/DL (ref 13–17)

## 2019-09-06 PROCEDURE — P9603 ONE-WAY ALLOW PRORATED MILES: HCPCS

## 2019-09-06 PROCEDURE — 36415 COLL VENOUS BLD VENIPUNCTURE: CPT

## 2019-09-06 PROCEDURE — 85018 HEMOGLOBIN: CPT

## 2019-09-09 ENCOUNTER — HOSPITAL ENCOUNTER (OUTPATIENT)
Age: 69
Setting detail: SPECIMEN
Discharge: HOME | End: 2019-09-09
Payer: MEDICARE

## 2019-09-09 LAB — HEMOGLOBIN: 7.5 G/DL (ref 13–17)

## 2019-09-09 PROCEDURE — 85018 HEMOGLOBIN: CPT

## 2019-09-14 DIAGNOSIS — S72.001A CLOSED RIGHT HIP FRACTURE, INITIAL ENCOUNTER (HCC): Primary | ICD-10-CM

## 2019-09-18 ENCOUNTER — OFFICE VISIT (OUTPATIENT)
Dept: ORTHOPEDIC SURGERY | Age: 69
End: 2019-09-18

## 2019-09-18 VITALS — WEIGHT: 246 LBS | HEIGHT: 72 IN | BODY MASS INDEX: 33.32 KG/M2

## 2019-09-18 DIAGNOSIS — T84.51XD INFECTION ASSOCIATED WITH INTERNAL RIGHT HIP PROSTHESIS, SUBSEQUENT ENCOUNTER: ICD-10-CM

## 2019-09-18 DIAGNOSIS — S72.001A CLOSED RIGHT HIP FRACTURE, INITIAL ENCOUNTER (HCC): Primary | ICD-10-CM

## 2019-09-18 PROCEDURE — 99024 POSTOP FOLLOW-UP VISIT: CPT | Performed by: ORTHOPAEDIC SURGERY

## 2019-09-18 ASSESSMENT — ENCOUNTER SYMPTOMS
ABDOMINAL PAIN: 0
CHEST TIGHTNESS: 0
COUGH: 0
APNEA: 0
VOMITING: 0

## 2019-09-18 NOTE — PROGRESS NOTES
complete activities as tolerates. The patient should follow up in the office in 2 months and knows to call the office with any questions or concerns. Follow up: Return in about 2 months (around 11/18/2019). No orders of the defined types were placed in this encounter. No orders of the defined types were placed in this encounter. Vernell Elena LPN am scribing for and in the presence of Dr. Tommie Rueda  9/26/2019 11:51 AM      I have reviewed and made changes accordingly to the work scribed by Ariel Jaimes LPN. The documentation accurately reflects work and decisions made by me. I have also reviewed documentation completed by clinical staff.     Tommie Rueda DO, [facility]  9/26/2019 11:51 AM    This note is created with the assistance of a speech recognition program.  While intending to generate a document that actually reflects the content of the visit, the document can still have some errors including those of syntax and sound a like substitutions which may escape proof reading.  In such instances, actual meaning can be extrapolated by contextual diversion      Electronically signed by Min Escobar on 9/26/2019 at 11:51 AM

## 2019-11-05 NOTE — PROGRESS NOTES
hemodialysis in place, primary [facility])    Closed right hip fracture, initial encounter ([facility]    Closed fracture of right hip ([facility]    Thyroid disease    Hyponatremia    Uncontrolled type 2 diabetes mellitus with hyperglycemia ([facility]    History of hemiarthroplasty of right hip    Surgical wound dehiscence    Post-op pain    Infection due to ESBL-producing Klebsiella pneumoniae    Proteus mirabilis infection    Infection and inflammatory reaction due to internal right hip prosthesis, initial encounter ([facility]    Severe malnutrition (HCC)    Surgical wound infection       Allergies:  Bactrim [sulfamethoxazole-trimethoprim]     Temp max: 97.7 F/36.5 C    Recent Labs     07/22/19  0730 07/22/19  0938   BUN 68* 68*       Recent Labs     07/22/19  0730 07/22/19  0938   CREATININE 5.43* 5.70*       Recent Labs     07/22/19  0730 07/22/19  0938   WBC 5.0 4.4         Intake/Output Summary (Last 24 hours) at 7/22/2019 1111  Last data filed at 7/22/2019 1038  Gross per 24 hour   Intake 50 ml   Output --   Net 50 ml       Culture Date      Source                       Results  7/22                    blood x2  7/22                    urine    Ht Readings from Last 1 Encounters:   07/22/19 6' (1.829 m)        Wt Readings from Last 1 Encounters:   07/22/19 230 lb (104.3 kg)         Body mass index is 31.19 kg/m². Estimated Creatinine Clearance: 15 mL/min (A) (based on SCr of 5.7 mg/dL [facility])). Goal Trough Level: 15-20 mcg/mL    Assessment/Plan:  Will initiate vancomycin 1500 mg IV x1. If Vanco continued during admission will need to determine plans for HD and dose with HD if able. Thank you for the consult. Will continue to follow.     Nalini June,PharmD, 7/22/2019, 11:13 AM full weight-bearing

## 2019-11-20 ENCOUNTER — OFFICE VISIT (OUTPATIENT)
Dept: ORTHOPEDIC SURGERY | Age: 69
End: 2019-11-20
Payer: MEDICARE

## 2019-11-20 VITALS — BODY MASS INDEX: 33.32 KG/M2 | HEIGHT: 72 IN | WEIGHT: 246.03 LBS

## 2019-11-20 DIAGNOSIS — S72.001A CLOSED RIGHT HIP FRACTURE, INITIAL ENCOUNTER (HCC): Primary | ICD-10-CM

## 2019-11-20 DIAGNOSIS — T84.51XD INFECTION ASSOCIATED WITH INTERNAL RIGHT HIP PROSTHESIS, SUBSEQUENT ENCOUNTER: ICD-10-CM

## 2019-11-20 PROCEDURE — 4040F PNEUMOC VAC/ADMIN/RCVD: CPT | Performed by: ORTHOPAEDIC SURGERY

## 2019-11-20 PROCEDURE — 99213 OFFICE O/P EST LOW 20 MIN: CPT | Performed by: ORTHOPAEDIC SURGERY

## 2019-11-20 PROCEDURE — 3017F COLORECTAL CA SCREEN DOC REV: CPT | Performed by: ORTHOPAEDIC SURGERY

## 2019-11-20 PROCEDURE — G8427 DOCREV CUR MEDS BY ELIG CLIN: HCPCS | Performed by: ORTHOPAEDIC SURGERY

## 2019-11-20 PROCEDURE — G8598 ASA/ANTIPLAT THER USED: HCPCS | Performed by: ORTHOPAEDIC SURGERY

## 2019-11-20 PROCEDURE — G8417 CALC BMI ABV UP PARAM F/U: HCPCS | Performed by: ORTHOPAEDIC SURGERY

## 2019-11-20 PROCEDURE — G8484 FLU IMMUNIZE NO ADMIN: HCPCS | Performed by: ORTHOPAEDIC SURGERY

## 2019-11-20 PROCEDURE — 1036F TOBACCO NON-USER: CPT | Performed by: ORTHOPAEDIC SURGERY

## 2019-11-20 PROCEDURE — 1123F ACP DISCUSS/DSCN MKR DOCD: CPT | Performed by: ORTHOPAEDIC SURGERY

## 2019-11-20 ASSESSMENT — ENCOUNTER SYMPTOMS
ABDOMINAL PAIN: 0
COUGH: 0
APNEA: 0
VOMITING: 0
CHEST TIGHTNESS: 0

## 2020-02-24 ENCOUNTER — HOSPITAL ENCOUNTER (EMERGENCY)
Age: 70
Discharge: HOME | End: 2020-02-24
Attending: EMERGENCY MEDICINE
Payer: MEDICARE

## 2020-02-24 VITALS
HEART RATE: 65 BPM | OXYGEN SATURATION: 94 % | BODY MASS INDEX: 34.54 KG/M2 | WEIGHT: 255 LBS | DIASTOLIC BLOOD PRESSURE: 61 MMHG | SYSTOLIC BLOOD PRESSURE: 121 MMHG | TEMPERATURE: 97.9 F | HEIGHT: 72 IN | RESPIRATION RATE: 16 BRPM

## 2020-02-24 LAB
-: ABNORMAL
ABSOLUTE EOS #: 0.15 K/UL (ref 0–0.4)
ABSOLUTE IMMATURE GRANULOCYTE: ABNORMAL K/UL (ref 0–0.3)
ABSOLUTE LYMPH #: 0.53 K/UL (ref 1–4.8)
ABSOLUTE MONO #: 0.11 K/UL (ref 0.1–0.8)
ALBUMIN SERPL-MCNC: 3.2 G/DL (ref 3.5–5.2)
ALBUMIN/GLOBULIN RATIO: 0.8 (ref 1–2.5)
ALP BLD-CCNC: 88 U/L (ref 40–129)
ALT SERPL-CCNC: 8 U/L (ref 5–41)
AMORPHOUS: ABNORMAL
ANION GAP SERPL CALCULATED.3IONS-SCNC: 15 MMOL/L (ref 9–17)
AST SERPL-CCNC: 15 U/L
BACTERIA: ABNORMAL
BASOPHILS # BLD: 0 % (ref 0–2)
BASOPHILS ABSOLUTE: 0 K/UL (ref 0–0.2)
BILIRUB SERPL-MCNC: 0.82 MG/DL (ref 0.3–1.2)
BILIRUBIN URINE: NEGATIVE
BUN BLDV-MCNC: 75 MG/DL (ref 8–23)
BUN/CREAT BLD: ABNORMAL (ref 9–20)
CALCIUM SERPL-MCNC: 8.8 MG/DL (ref 8.6–10.4)
CASTS UA: ABNORMAL /LPF
CHLORIDE BLD-SCNC: 95 MMOL/L (ref 98–107)
CO2: 25 MMOL/L (ref 20–31)
COLOR: YELLOW
COMMENT UA: ABNORMAL
CREAT SERPL-MCNC: 5.43 MG/DL (ref 0.7–1.2)
CRYSTALS, UA: ABNORMAL /HPF
DIFFERENTIAL TYPE: ABNORMAL
EOSINOPHILS RELATIVE PERCENT: 4 % (ref 1–4)
EPITHELIAL CELLS UA: ABNORMAL /HPF (ref 0–5)
GFR AFRICAN AMERICAN: 13 ML/MIN
GFR NON-AFRICAN AMERICAN: 10 ML/MIN
GFR SERPL CREATININE-BSD FRML MDRD: ABNORMAL ML/MIN/{1.73_M2}
GFR SERPL CREATININE-BSD FRML MDRD: ABNORMAL ML/MIN/{1.73_M2}
GLUCOSE BLD-MCNC: 133 MG/DL (ref 70–99)
GLUCOSE URINE: NEGATIVE
HCT VFR BLD CALC: 32.4 % (ref 41–53)
HEMOGLOBIN: 10.4 G/DL (ref 13.5–17.5)
IMMATURE GRANULOCYTES: ABNORMAL %
KETONES, URINE: NEGATIVE
LEUKOCYTE ESTERASE, URINE: ABNORMAL
LYMPHOCYTES # BLD: 14 % (ref 24–44)
MCH RBC QN AUTO: 31.8 PG (ref 26–34)
MCHC RBC AUTO-ENTMCNC: 32 G/DL (ref 31–37)
MCV RBC AUTO: 99.3 FL (ref 80–100)
MONOCYTES # BLD: 3 % (ref 1–7)
MORPHOLOGY: NORMAL
MUCUS: ABNORMAL
NITRITE, URINE: NEGATIVE
NRBC AUTOMATED: ABNORMAL PER 100 WBC
OTHER OBSERVATIONS UA: ABNORMAL
PDW BLD-RTO: 15.2 % (ref 12.5–15.4)
PH UA: 7 (ref 5–8)
PLATELET # BLD: 132 K/UL (ref 140–450)
PLATELET ESTIMATE: ABNORMAL
PMV BLD AUTO: 7.3 FL (ref 6–12)
POTASSIUM SERPL-SCNC: 4.2 MMOL/L (ref 3.7–5.3)
PROTEIN UA: ABNORMAL
RBC # BLD: 3.26 M/UL (ref 4.5–5.9)
RBC # BLD: ABNORMAL 10*6/UL
RBC UA: ABNORMAL /HPF (ref 0–2)
RENAL EPITHELIAL, UA: ABNORMAL /HPF
SEG NEUTROPHILS: 79 % (ref 36–66)
SEGMENTED NEUTROPHILS ABSOLUTE COUNT: 3.01 K/UL (ref 1.8–7.7)
SODIUM BLD-SCNC: 135 MMOL/L (ref 135–144)
SPECIFIC GRAVITY UA: 1.01 (ref 1–1.03)
TOTAL PROTEIN: 7 G/DL (ref 6.4–8.3)
TRICHOMONAS: ABNORMAL
TURBIDITY: ABNORMAL
URINE HGB: ABNORMAL
UROBILINOGEN, URINE: NORMAL
WBC # BLD: 3.8 K/UL (ref 3.5–11)
WBC # BLD: ABNORMAL 10*3/UL
WBC UA: ABNORMAL /HPF (ref 0–5)
YEAST: ABNORMAL

## 2020-02-24 PROCEDURE — 81001 URINALYSIS AUTO W/SCOPE: CPT

## 2020-02-24 PROCEDURE — 80053 COMPREHEN METABOLIC PANEL: CPT

## 2020-02-24 PROCEDURE — 85025 COMPLETE CBC W/AUTO DIFF WBC: CPT

## 2020-02-24 PROCEDURE — 36415 COLL VENOUS BLD VENIPUNCTURE: CPT

## 2020-02-24 PROCEDURE — 99283 EMERGENCY DEPT VISIT LOW MDM: CPT

## 2020-02-24 PROCEDURE — 87086 URINE CULTURE/COLONY COUNT: CPT

## 2020-02-24 RX ORDER — CEPHALEXIN 250 MG/1
250 CAPSULE ORAL 4 TIMES DAILY
Qty: 28 CAPSULE | Refills: 0 | Status: SHIPPED | OUTPATIENT
Start: 2020-02-24 | End: 2020-03-02

## 2020-02-24 NOTE — ED PROVIDER NOTES
[address] [facility]  eMERGENCY dEPARTMENT eNCOUnter      Pt Name: Anushka Torres  MRN: [id number]  [place] 1950  Date of evaluation: 2/24/2020      CHIEF COMPLAINT       Chief Complaint   Patient presents with    Hematuria         HISTORY OF PRESENT ILLNESS      The patient presents with hematuria. This was noticed in the last 24 hours. The patient voids about 3 times a day. He is on dialysis. His last dialysis was on Saturday. Today is Monday. He denies back pain. He denies abdominal pain. He denies fever. He's been able eat and drink normally. He does not have pain when he urinates. Other than having blood in his urine, he has no other complaint. Nothing makes his symptoms better or worse otherwise. REVIEW OF SYSTEMS       All systems reviewed and negative unless noted in HPI. The patient denies fever or constitutional symptoms. Denies vision change. Denies any sore throat or rhinorrhea. Denies any neck pain or stiffness. Denies chest pain or shortness of breath. No nausea,  vomiting or diarrhea. Denies any dysuria. Hematuria noted in the last 24 hours. History of ESRD. Denies musculoskeletal injury or pain. Uses wheelchair. Denies any weakness, numbness or focal neurologic deficit. Denies any skin rash or edema. No recent psychiatric issues. No easy bruising or bleeding. Denies any polyuria, polydypsia or history of immunocompromise.       PAST MEDICAL HISTORY    has a past medical history of A-fib ([address] Acute encephalopathy, Acute ischemic stroke ([address] Acute metabolic encephalopathy, Acute on chronic congestive heart failure ([address] Acute on chronic diastolic CHF (congestive heart failure) ([address] Altered mental status, Anemia, Anemia associated with chronic renal failure, Aphasia, ARF (acute renal failure) (HCC), Arteriovenous fistula for hemodialysis in place, [facility]), Arthritis, Bradyarrhythmia, Cellulitis, Cerebral contusion ([address] CHF He indicated that the status of his sister is unknown.     family history includes Cancer in his sister; Coronary Art Dis in his father. SOCIAL HISTORY      reports that he has never smoked. He has never used smokeless tobacco. He reports that he does not drink alcohol or use drugs. PHYSICAL EXAM     INITIAL VITALS:  height is 6' (1.829 m) and weight is 115.7 kg (255 lb). His oral temperature is 97.9 °F (36.6 °C). His blood pressure is 121/61 and his pulse is 65. His respiration is 18 and oxygen saturation is 94%. Patient is alert and oriented, in no apparent distress. HEENT is atraumatic. Pupils are PERRL at 4 mm with normal extraocular motion. Mucous membranes moist.    Neck is supple with no lymphadenopathy. No JVD. No meningismus. Heart sounds regular rate and rhythm with no gallops, murmurs, or rubs. Lungs clear, no wheezes, rales or rhonchi. Abdomen: soft, nontender with no pain to palpation. No pulsatile mass. Normal bowel sounds are noted. No rebound or guarding. No CVA tenderness. Musculoskeletal exam shows no evidence of trauma. Some atrophy of legs noted. Good buzz in left arm AV fistula. Skin: no rash or edema. Neurological exam reveals cranial nerves 2 through 12 grossly intact. Patient has equal  and normal deep tendon reflexes. Psychiatric: no hallucinations or suicidal ideation. Lymphatics.:  No lymphadenopathy.        DIFFERENTIAL DIAGNOSIS/ MDM:     UTI, hematuria, ESRD, renal stone    DIAGNOSTIC RESULTS       LABS:  Results for orders placed or performed during the hospital encounter of 02/24/20   Urinalysis Reflex to Culture   Result Value Ref Range    Color, UA YELLOW YELLOW    Turbidity UA SLIGHTLY CLOUDY (A) CLEAR    Glucose, Ur NEGATIVE NEGATIVE    Bilirubin Urine NEGATIVE NEGATIVE    Ketones, Urine NEGATIVE NEGATIVE    Specific Gravity, UA 1.015 1.005 - 1.030    Urine Hgb LARGE (A) NEGATIVE    pH, UA 7.0 5.0 - 8.0    Protein, UA 2+ (A) NEGATIVE    Urobilinogen, Urine Normal Normal    Nitrite, Urine NEGATIVE NEGATIVE    Leukocyte Esterase, Urine MODERATE (A) NEGATIVE    Urinalysis Comments NOT REPORTED    CBC Auto Differential   Result Value Ref Range    WBC 3.8 3.5 - 11.0 k/uL    RBC 3.26 (L) 4.5 - 5.9 m/uL    Hemoglobin 10.4 (L) 13.5 - 17.5 g/dL    Hematocrit 32.4 (L) 41 - 53 %    MCV 99.3 80 - 100 fL    MCH 31.8 26 - 34 pg    MCHC 32.0 31 - 37 g/dL    RDW 15.2 12.5 - 15.4 %    Platelets 485 (L) 283 - 450 k/uL    MPV 7.3 6.0 - 12.0 fL    NRBC Automated NOT REPORTED per 100 WBC    Differential Type NOT REPORTED     Immature Granulocytes NOT REPORTED 0 %    Absolute Immature Granulocyte NOT REPORTED 0.00 - 0.30 k/uL    WBC Morphology NOT REPORTED     RBC Morphology NOT REPORTED     Platelet Estimate NOT REPORTED     Seg Neutrophils 79 (H) 36 - 66 %    Lymphocytes 14 (L) 24 - 44 %    Monocytes 3 1 - 7 %    Eosinophils % 4 1 - 4 %    Basophils 0 0 - 2 %    Segs Absolute 3.01 1.8 - 7.7 k/uL    Absolute Lymph # 0.53 (L) 1.0 - 4.8 k/uL    Absolute Mono # 0.11 0.1 - 0.8 k/uL    Absolute Eos # 0.15 0.0 - 0.4 k/uL    Basophils Absolute 0.00 0.0 - 0.2 k/uL    Morphology Normal    Comprehensive Metabolic Panel   Result Value Ref Range    Glucose 133 (H) 70 - 99 mg/dL    BUN 75 (H) 8 - 23 mg/dL    CREATININE 5.43 (HH) 0.70 - 1.20 mg/dL    Bun/Cre Ratio NOT REPORTED 9 - 20    Calcium 8.8 8.6 - 10.4 mg/dL    Sodium 135 135 - 144 mmol/L    Potassium 4.2 3.7 - 5.3 mmol/L    Chloride 95 (L) 98 - 107 mmol/L    CO2 25 20 - 31 mmol/L    Anion Gap 15 9 - 17 mmol/L    Alkaline Phosphatase 88 40 - 129 U/L    ALT 8 5 - 41 U/L    AST 15 <40 U/L    Total Bilirubin 0.82 0.3 - 1.2 mg/dL    Total Protein 7.0 6.4 - 8.3 g/dL    Alb 3.2 (L) 3.5 - 5.2 g/dL    Albumin/Globulin Ratio 0.8 (L) 1.0 - 2.5    GFR Non-African American 10 (L) >60 mL/min    GFR  13 (L) >60 mL/min    GFR Comment          GFR Staging NOT REPORTED    Microscopic Urinalysis   Result Value Ref Range    -          WBC, UA 10 TO 20 0 - 5 /HPF    RBC, UA TOO NUMEROUS TO COUNT 0 - 2 /HPF    Casts UA NOT REPORTED /LPF    Crystals UA NOT REPORTED None /HPF    Epithelial Cells UA 2 TO 5 0 - 5 /HPF    Renal Epithelial, Urine NOT REPORTED 0 /HPF    Bacteria, UA FEW (A) None    Mucus, UA NOT REPORTED None    Trichomonas, UA NOT REPORTED None    Amorphous, UA NOT REPORTED None    Other Observations UA Culture ordered based on defined criteria. (A) NOT REQ. Yeast, UA NOT REPORTED None         EMERGENCY DEPARTMENT COURSE:   Vitals:    Vitals:    02/24/20 1141 02/24/20 1142   BP: 121/61    Pulse: 65    Resp: 18    Temp: 97.9 °F (36.6 °C)    TempSrc: Oral    SpO2: 91% 94%   Weight: 115.7 kg (255 lb)    Height: 6' (1.829 m)      -------------------------  BP: 121/61, Temp: 97.9 °F (36.6 °C), Pulse: 65, Resp: 18      Re-evaluation Notes    The patient's renal failure is stable. He has stable anemia as well. I'm going to treat him for a UTI. We used a catheterized specimen. He should follow-up with his doctor to make sure that this resolves. The patient is discharged in good condition. FINAL IMPRESSION      1. Urinary tract infection with hematuria, site unspecified          DISPOSITION/PLAN   DISPOSITION Decision To Discharge 02/24/2020 01:48:51 PM      Condition on Disposition    good    PATIENT REFERRED TO:  TANMAY Magana - [facility]  [address]  [address] [facility]  [phone]    In 1 week        DISCHARGE MEDICATIONS:  New Prescriptions    CEPHALEXIN (KEFLEX) 250 MG CAPSULE    Take 1 capsule by mouth 4 times daily for 7 days       (Please note that portions of this note were completed with a voice recognition program.  Efforts were made to edit the dictations but occasionally words are mis-transcribed.)    Ruiz MD   Attending Emergency Physician         Laura Shipman MD  02/24/20 9218

## 2020-02-25 LAB
CULTURE: NO GROWTH
Lab: NORMAL
SPECIMEN DESCRIPTION: NORMAL

## 2020-07-04 ENCOUNTER — HOSPITAL ENCOUNTER (EMERGENCY)
Age: 70
Discharge: HOME | End: 2020-07-04
Attending: EMERGENCY MEDICINE
Payer: MEDICARE

## 2020-07-04 VITALS
TEMPERATURE: 98.4 F | OXYGEN SATURATION: 93 % | BODY MASS INDEX: 34.54 KG/M2 | HEIGHT: 72 IN | SYSTOLIC BLOOD PRESSURE: 106 MMHG | RESPIRATION RATE: 12 BRPM | DIASTOLIC BLOOD PRESSURE: 48 MMHG | WEIGHT: 255 LBS | HEART RATE: 78 BPM

## 2020-07-04 LAB
-: ABNORMAL
AMORPHOUS: ABNORMAL
BACTERIA: ABNORMAL
BILIRUBIN URINE: NEGATIVE
CASTS UA: ABNORMAL /LPF
COLOR: ABNORMAL
COMMENT UA: ABNORMAL
CRYSTALS, UA: ABNORMAL /HPF
EPITHELIAL CELLS UA: ABNORMAL /HPF (ref 0–5)
GLUCOSE URINE: NEGATIVE
KETONES, URINE: NEGATIVE
LEUKOCYTE ESTERASE, URINE: ABNORMAL
MUCUS: ABNORMAL
NITRITE, URINE: NEGATIVE
OTHER OBSERVATIONS UA: ABNORMAL
PH UA: 8 (ref 5–8)
PROTEIN UA: ABNORMAL
RBC UA: ABNORMAL /HPF (ref 0–2)
RENAL EPITHELIAL, UA: ABNORMAL /HPF
SPECIFIC GRAVITY UA: 1.02 (ref 1–1.03)
TRICHOMONAS: ABNORMAL
TURBIDITY: ABNORMAL
URINE HGB: ABNORMAL
UROBILINOGEN, URINE: NORMAL
WBC UA: ABNORMAL /HPF (ref 0–5)
YEAST: ABNORMAL

## 2020-07-04 PROCEDURE — 87086 URINE CULTURE/COLONY COUNT: CPT

## 2020-07-04 PROCEDURE — 99283 EMERGENCY DEPT VISIT LOW MDM: CPT

## 2020-07-04 PROCEDURE — 81001 URINALYSIS AUTO W/SCOPE: CPT

## 2020-07-04 RX ORDER — CEPHALEXIN 500 MG/1
500 CAPSULE ORAL 2 TIMES DAILY
Qty: 20 CAPSULE | Refills: 0 | Status: SHIPPED | OUTPATIENT
Start: 2020-07-04 | End: 2021-03-19

## 2020-07-04 NOTE — ED NOTES
Pt to exam room 7 via his electric wheelchair with c/o hematuria. Pt is a dialysis pt, tuesday, thursday, and saturdays. Pt is unable to stand. Spouse reports that he gets frequent UTI's. Pt produces little urine.      Tamica Moe RN  07/04/20 8404

## 2020-07-04 NOTE — ED PROVIDER NOTES
[address] [facility] ED  [address] [facility] [address] [place] [address]  Phone: [phone]  Fax: [phone]    Pt Name: Lamar Stone  MRN: [id number]  [place] 1950  Date of evaluation: 7/4/2020      CHIEF COMPLAINT       Chief Complaint   Patient presents with    Hematuria         HISTORY OF PRESENT ILLNESS     Lamar Stone is a 65 y.o. male who presents with blood in his urine and frequency has this when he has urinated tract infection. There is no other HEENT complaints or abdominal symptoms. No pain and no flank problems. He is brought in by his wife. His blood pressure is mildly low but other vital signs are normal.  He does have dialysis 3 times a week and that has been going well. REVIEW OF SYSTEMS         REVIEW OF SYSTEMS    Constitutional:  Denies fever, chills, or weakness   Eyes:  Denies vision changes  HEENT:  Denies sore throat or nasal congestion  Respiratory:  Denies cough or shortness of breath   Cardiovascular:  Denies chest pain  GI:  Denies abdominal pain, nausea, vomiting, or diarrhea   Musculoskeletal:  Denies back pain   Skin:  No rash on exposed surfaces   Neurologic:  Normal baseline mentation. No new deficits. Lymphatic:   No nodes or infection  Psychiatric:  No psychosis. Alert and interacting normally. Other ROS negative except as noted above.     PAST MEDICAL HISTORY    has a past medical history of A-fib ([address] Acute encephalopathy, Acute ischemic stroke ([address] Acute metabolic encephalopathy, Acute on chronic congestive heart failure ([address] Acute on chronic diastolic CHF (congestive heart failure) ([address] Altered mental status, Anemia, Anemia associated with chronic renal failure, Aphasia, ARF (acute renal failure) (HCC), Arteriovenous fistula for hemodialysis in place, [facility]), Arthritis, Bradyarrhythmia, Cellulitis, Cerebral contusion ([address] CHF (congestive heart failure) ([address] Chronic atrial fibrillation, Chronic kidney disease, Chronic pain of right knee, Gastrostomy tube placement (summer 2013); Abdomen surgery; Cardiac catheterization; pr knee scope,diagnostic (Right, 3/23/2017); HEMIARTHROPLASTY HIP (Right, 5/15/2019); incision and drainage (Right, 6/26/2019); Revision total hip arthroplasty (Right, 06/28/2019); and Revision total hip arthroplasty (Right, 6/28/2019). CURRENT MEDICATIONS       Previous Medications    ACETAMINOPHEN (TYLENOL) 325 MG TABLET    Take 650 mg by mouth every 4 hours as needed for Pain    APIXABAN (ELIQUIS) 2.5 MG TABS TABLET    Take 1 tablet by mouth 2 times daily    ARIPIPRAZOLE (ABILIFY) 5 MG TABLET    Take 5 mg by mouth daily     CHOLECALCIFEROL (VITAMIN D3) 2000 UNITS CAPS    Take 1,000 Units by mouth daily. DARBEPOETIN DIONNE-POLYSORBATE (ARANESP) 40 MCG/0.4ML SOSY INJECTION    Infuse 0.8 mLs intravenously every 7 days    FERRIC CITRATE (AURYXIA) 1  MG(FE) TABS    Take 210 mg by mouth 2 times daily Patients med list states frequency is 2-3 times a day    FUROSEMIDE (LASIX) 20 MG TABLET    Take 20 mg by mouth 2 times daily    ISOSORBIDE MONONITRATE (IMDUR) 30 MG CR TABLET    Take 30 mg by mouth daily     LAMOTRIGINE (LAMICTAL) 25 MG TABLET    Take 25 mg by mouth daily     LEVOTHYROXINE (LEVOTHROID) 50 MCG TABLET    Take 50 mcg by mouth Daily. PANTOPRAZOLE (PROTONIX) 40 MG TABLET    Take 40 mg by mouth daily     VENLAFAXINE (EFFEXOR) 75 MG TABLET    Take 75 mg by mouth daily       ALLERGIES     is allergic to bactrim [sulfamethoxazole-trimethoprim]. FAMILY HISTORY     He indicated that the status of his father is unknown. He indicated that the status of his sister is unknown.     family history includes Cancer in his sister; Coronary Art Dis in his father. SOCIAL HISTORY      reports that he has never smoked. He has never used smokeless tobacco. He reports that he does not drink alcohol or use drugs. PHYSICAL EXAM     INITIAL VITALS:  height is 6' (1.829 m) and weight is 115.7 kg (255 lb).  His oral temperature is 98.4 °F (36.9 °C). His blood pressure is 106/48 (abnormal) and his pulse is 78. His respiration is 12 and oxygen saturation is 93%. Constitutional: The patient is alert and well-developed, vital signs as noted. Psychiatric: Oriented to time, place and person, affect is appropriate for age. Eyes: Pupils equal and reactive to light. EOMI. Ears, nose, and throat: Oropharynx clear  Neck: No masses, trachea midline, and no thyromegaly. Respiratory: Clear to auscultation, full aeration throughout all fields. Cardiovascular: No murmurs, heart sounds normal, no thrills. Gastrointestinal: No masses, no hepatosplenomegaly, bowel sounds positive. No tenderness. Skin: No rashes or lesions on exposed surfaces, good skin turgor. Nontoxic and well-hydrated  Extremities: Good range of motion, no edema. DIFFERENTIAL DIAGNOSIS/ MEDICAL DECISION MAKING:     No sepsis or pyelonephritis    keflex as directed    Follow Exit Care instructions closely. I have reviewed the disposition diagnosis with the patient and or their family/guardian. I have answered their questions and given discharge instructions. They voiced understanding of these instructions and did not have any further questions or complaints.     DIAGNOSTIC RESULTS     RADIOLOGY:   Non-plain film images such as CT, Ultrasound and MRI are read by the radiologist. Jennifer Claudia radiographic images are visualized and preliminarily interpreted by the emergency physician with the below findings:  No orders to display       LABS:  Results for orders placed or performed during the hospital encounter of 07/04/20   Urinalysis Reflex to Culture   Result Value Ref Range    Color, UA LIZETTE (A) YELLOW    Turbidity UA CLOUDY (A) CLEAR    Glucose, Ur NEGATIVE NEGATIVE    Bilirubin Urine NEGATIVE NEGATIVE    Ketones, Urine NEGATIVE NEGATIVE    Specific Gravity, UA 1.020 1.005 - 1.030    Urine Hgb LARGE (A) NEGATIVE    pH, UA 8.0 5.0 - 8.0    Protein, UA 3+ (A) NEGATIVE on Disposition    Fair    PATIENT REFERRED TO:  TANMAY Mayo - CNP  [address]  [address] [facility]  [phone]    In 2 days        DISCHARGE MEDICATIONS:  New Prescriptions    CEPHALEXIN (KEFLEX) 500 MG CAPSULE    Take 1 capsule by mouth 2 times daily       (Please note that portions of this note were completed with a voice recognition program.  Efforts were made to edit the dictations but occasionally words are mis-transcribed.)    Ashley Espino,, DO  Attending Emergency Physician       [address] [place] [address],   07/04/20 1414

## 2020-07-05 ENCOUNTER — CARE COORDINATION (OUTPATIENT)
Dept: CARE COORDINATION | Age: 70
End: 2020-07-05

## 2020-07-05 LAB
CULTURE: ABNORMAL
Lab: ABNORMAL
SPECIMEN DESCRIPTION: ABNORMAL

## 2020-07-05 NOTE — CARE COORDINATION
Patient contacted regarding recent discharge and COVID-19 risk. Discussed COVID-19 related testing which was not done at this time. Test results were N/A. Patient informed of results, if available? N/A     Care Transition Nurse/ Ambulatory Care Manager contacted the family by telephone to perform post discharge assessment. Verified name and  with family as identifiers. Patient has following risk factors of: heart failure, diabetes and chronic kidney disease. CTN/ACM reviewed discharge instructions, medical action plan and red flags related to discharge diagnosis. Reviewed and educated them on any new and changed medications related to discharge diagnosis. Advised obtaining a 90-day supply of all daily and as-needed medications. Education provided regarding infection prevention, and signs and symptoms of COVID-19 and when to seek medical attention with family who verbalized understanding. Discussed exposure protocols and quarantine from [address] you at higher risk for severe illness  and given an opportunity for questions and concerns. The family agrees to contact the COVID-19 hotline [phone] or PCP office for questions related to their healthcare. CTN/ACM provided contact information for future reference. From CDC: Are you at higher risk for severe illness?  Wash your hands often.  Avoid close contact (6 feet, which is about two arm lengths) with people who are sick.  Put distance between yourself and other people if COVID-19 is spreading in your community.  Clean and disinfect frequently touched surfaces.  Avoid all cruise travel and non-essential air travel.  Call your healthcare professional if you have concerns about COVID-19 and your underlying condition or if you are sick.     For more information on steps you can take to protect yourself, see CDC's How to Protect Yourself    Patient/family/caregiver given information for Mckay Reynaga and agrees to enroll yes  Patient's preferred e-mail:  [email or web address]  Patient's preferred phone number: [phone]  Based on Loop alert triggers, patient will be contacted by nurse care manager for worsening symptoms. Pt will be further monitored by COVID Loop Team based on severity of symptoms and risk factors. Advance Care Planning  (ACP)  People with COVID-19 may have no symptoms, mild symptoms, such as fever, cough, and shortness of breath or they may have more severe illness, developing severe and fatal pneumonia. As a result, Advance Care Planning with attention to naming a health care decision maker (someone you trust to make healthcare decisions for you if you could not speak for yourself) and sharing other health care preferences is important BEFORE a possible health crisis. Please contact your Primary Care Provider to discuss Advance Care Planning. ACP reviewed with patient. No changes or additions. Preventing the Spread of Coronavirus Disease 2019 in Homes and Residential Communities  For the most recent information go to [employer]    Prevention steps for People with confirmed or suspected COVID-19 (including persons under investigation) who do not need to be hospitalized  and   People with confirmed COVID-19 who were hospitalized and determined to be medically stable to go home    Your healthcare provider and public health staff will evaluate whether you can be cared for at home. If it is determined that you do not need to be hospitalized and can be isolated at home, you will be monitored by staff from your local or state health department. You should follow the prevention steps below until a healthcare provider or local or state health department says you can return to your normal activities. Stay home except to get medical care  People who are mildly ill with COVID-19 are able to isolate at home during their illness.  You should restrict activities outside your home, except for getting medical care. Do not go to work, school, or public areas. Avoid using public transportation, ride-sharing, or taxis. Separate yourself from other people and animals in your home  People: As much as possible, you should stay in a specific room and away from other people in your home. Also, you should use a separate bathroom, if available. Animals: You should restrict contact with pets and other animals while you are sick with COVID-19, just like you would around other people. Although there have not been reports of pets or other animals becoming sick with COVID-19, it is still recommended that people sick with COVID-19 limit contact with animals until more information is known about the virus. When possible, have another member of your household care for your animals while you are sick. If you are sick with COVID-19, avoid contact with your pet, including petting, snuggling, being kissed or licked, and sharing food. If you must care for your pet or be around animals while you are sick, wash your hands before and after you interact with pets and wear a facemask. Call ahead before visiting your doctor  If you have a medical appointment, call the healthcare provider and tell them that you have or may have COVID-19. This will help the healthcare providers office take steps to keep other people from getting infected or exposed. Wear a facemask  You should wear a facemask when you are around other people (e.g., sharing a room or vehicle) or pets and before you enter a healthcare providers office. If you are not able to wear a facemask (for example, because it causes trouble breathing), then people who live with you should not stay in the same room with you, or they should wear a facemask if they enter your room. Cover your coughs and sneezes  Cover your mouth and nose with a tissue when you cough or sneeze. Throw used tissues in a lined trash can.  Immediately wash your hands with soap and water for at least 20 seconds or, if soap and water are not available, clean your hands with an alcohol-based hand  that contains at least 60% alcohol. Clean your hands often  Wash your hands often with soap and water for at least 20 seconds, especially after blowing your nose, coughing, or sneezing; going to the bathroom; and before eating or preparing food. If soap and water are not readily available, use an alcohol-based hand  with at least 60% alcohol, covering all surfaces of your hands and rubbing them together until they feel dry. Soap and water are the best option if hands are visibly dirty. Avoid touching your eyes, nose, and mouth with unwashed hands. Avoid sharing personal household items  You should not share dishes, drinking glasses, cups, eating utensils, towels, or bedding with other people or pets in your home. After using these items, they should be washed thoroughly with soap and water. Clean all high-touch surfaces everyday  High touch surfaces include counters, tabletops, doorknobs, bathroom fixtures, toilets, phones, keyboards, tablets, and bedside tables. Also, clean any surfaces that may have blood, stool, or body fluids on them. Use a household cleaning spray or wipe, according to the label instructions. Labels contain instructions for safe and effective use of the cleaning product including precautions you should take when applying the product, such as wearing gloves and making sure you have good ventilation during use of the product. Monitor your symptoms  Seek prompt medical attention if your illness is worsening (e.g., difficulty breathing). Before seeking care, call your healthcare provider and tell them that you have, or are being evaluated for, COVID-19. Put on a facemask before you enter the facility.  These steps will help the healthcare providers office to keep other people in the office or waiting room from getting infected or

## 2020-07-15 ENCOUNTER — OFFICE VISIT (OUTPATIENT)
Dept: ORTHOPEDIC SURGERY | Age: 70
End: 2020-07-15
Payer: MEDICARE

## 2020-07-15 VITALS — HEIGHT: 72 IN | BODY MASS INDEX: 34.54 KG/M2 | WEIGHT: 255 LBS

## 2020-07-15 PROCEDURE — G8427 DOCREV CUR MEDS BY ELIG CLIN: HCPCS | Performed by: ORTHOPAEDIC SURGERY

## 2020-07-15 PROCEDURE — 99213 OFFICE O/P EST LOW 20 MIN: CPT | Performed by: ORTHOPAEDIC SURGERY

## 2020-07-15 PROCEDURE — 3017F COLORECTAL CA SCREEN DOC REV: CPT | Performed by: ORTHOPAEDIC SURGERY

## 2020-07-15 PROCEDURE — 1123F ACP DISCUSS/DSCN MKR DOCD: CPT | Performed by: ORTHOPAEDIC SURGERY

## 2020-07-15 PROCEDURE — G8417 CALC BMI ABV UP PARAM F/U: HCPCS | Performed by: ORTHOPAEDIC SURGERY

## 2020-07-15 PROCEDURE — 4040F PNEUMOC VAC/ADMIN/RCVD: CPT | Performed by: ORTHOPAEDIC SURGERY

## 2020-07-15 PROCEDURE — 1036F TOBACCO NON-USER: CPT | Performed by: ORTHOPAEDIC SURGERY

## 2020-07-15 ASSESSMENT — ENCOUNTER SYMPTOMS
NAUSEA: 0
DIARRHEA: 0
COUGH: 0

## 2020-07-15 NOTE — PROGRESS NOTES
[facility] [address] [facility] ([place], [place]) AND SPORTS MEDICINE  [address] [place] [id number]  Dept: [phone]  Dept Fax: [phone]        Ambulatory Follow Up      Subjective:   Rey Phillips is a 65y.o. year old male who presents to our office today for routine followup regarding his   1. Closed right hip fracture, subsequent encounter ([facility] [address]    2. Infection associated with internal right hip prosthesis, subsequent encounter    . Chief Complaint   Patient presents with    Follow-up     Rt HARMAN DOS 6/28/19       HPI  Rey Phillips  is a 65 y.o.   male who presents today in follow for right total hip arthroplasty. His wife is present during the entire appointment and provides much of the past medical history. The patient was last seen on 11/20/2019 and underwent treatment in the form of physical therrapy. The patient notes he has decreased ROM and increased pain with the right hip. Patient and wife states that he is now unable to stand on is own due to the pain in the right hip. Patient and wife state that he has chronic lymphedema in his right leg which makes it difficult to lift. They state that the patient was seen in the emergency room on 7/4/2020 for a UTI. Patient's wife states that a week before he went to the emergency room he had finish an antibiotic for a UTI. She states that the patient is finishing this round of antibiotic for the UTI today. Review of Systems   Constitutional: Negative for chills and fever. HENT: Negative for congestion. Respiratory: Negative for cough. Gastrointestinal: Negative for diarrhea and nausea. Musculoskeletal: Positive for arthralgias (right hip). Negative for gait problem, joint swelling and myalgias. Neurological: Negative for dizziness, weakness and numbness. I have reviewed the CC, HPI, ROS, PMH, FHX, Social History, and if not present in this note, I have reviewed in the patient's chart.    I agree with the documentation provided by other staff and have reviewed their documentation prior to providing my signature indicating agreement. Objective :   Ht 6' (1.829 m)   Wt 255 lb (115.7 kg)   BMI 34.58 kg/m²  Body mass index is 34.58 kg/m². General: Samson Rodriguez is a 65 y.o. male who is alert and oriented and sitting comfortably in our office. Ortho Exam  MS:  Patient sitting in motorized wheelchair. Moderate to significant swelling right lower extremity secondary to chronic lymphedema is appreciated. Weakness with right knee flexion extension right ankle dorsiflexion and plantarflexion is noted. Wife reports incision well-healed without signs of infection. Neuro: alert and oriented to person and place. Eyes: Extra-ocular muscles intact  Mouth: Oral mucosa moist. No perioral lesions  Pulm: Respirations unlabored and regular. Symmetric chest excursion without outward deformity is noted. Skin: warm, well perfused  Psych:   Patient has good fund of knowledge and displays understanging of exam, diagnosis, and plan. Radiology:     Xr Hip Right (2-3 Views)    Result Date: 7/15/2020  History: Right hip cement spacer placement for infection Findings: Low AP pelvis, AP of the right hip, crosstable lateral x-ray of the right hip done in the office today shows cement antibiotic spacer in good position with mild lucency in the proximal Gruen zones and distal Gruen zones but no gross signs of loosening. Prominent vascular markings are appreciated and unchanged from previous x-rays of November 2019. No further evidence of fracture, subluxation, dislocation or radiopaque foreign body, radiopaque tumors noted. Impression: Right hip antibiotic cement spacer in good position as described above. Possible mild signs of loosening also appreciated. Assessment:      1. Closed right hip fracture, subsequent encounter ([facility] [address]    2.  Infection associated with internal right hip prosthesis, subsequent encounter       Plan: X-rays reviewed with patient in office today. Discussed etiology and natural history of right total hip arthroplasty. The treatment options may include oral anti-inflammatories, bracing, injections, advanced imaging, activity modification, physical therapy and/or surgical intervention. The patient would like to proceed with following up as needed. We discussed that the patient should call us with any concerns or questions. The patient and I and his wife had a lengthy discussion about multiple treatment options. Girdlestone procedure was also discussed however the patient would like to continue to maintain the hip with the current spacer and not undergo any further surgery. Follow up:No follow-ups on file. No orders of the defined types were placed in this encounter. No orders of the defined types were placed in this encounter. Jason Baird LPN am scribing for and in the presence of Dr. Yanely Sidhu  7/17/2020 1:45 PM    I have reviewed and made changes accordingly to the work scribed by Marianne Solano LPN. The documentation accurately reflects work and decisions made by me. I have also reviewed documentation completed by clinical staff.     Yanely Sidhu DO, [facility]  7/17/2020 1:46 PM    This note is created with the assistance of a speech recognition program.  While intending to generate a document that actually reflects the content of the visit, the document can still have some errors including those of syntax and sound a like substitutions which may escape proof reading.  In such instances, actual meaning can be extrapolated by contextual diversion      Electronically signed by Roxanne Vega on 7/17/2020 at 1:45 PM

## 2020-08-15 ENCOUNTER — HOSPITAL ENCOUNTER (EMERGENCY)
Age: 70
Discharge: HOME | End: 2020-08-15
Attending: EMERGENCY MEDICINE
Payer: MEDICARE

## 2020-08-15 VITALS
TEMPERATURE: 97.9 F | DIASTOLIC BLOOD PRESSURE: 52 MMHG | HEART RATE: 74 BPM | OXYGEN SATURATION: 97 % | BODY MASS INDEX: 34.58 KG/M2 | RESPIRATION RATE: 16 BRPM | SYSTOLIC BLOOD PRESSURE: 114 MMHG | WEIGHT: 255 LBS

## 2020-08-15 LAB
-: ABNORMAL
AMORPHOUS: ABNORMAL
BACTERIA: ABNORMAL
BILIRUBIN URINE: NEGATIVE
CASTS UA: ABNORMAL /LPF
COLOR: ABNORMAL
COMMENT UA: ABNORMAL
CRYSTALS, UA: ABNORMAL /HPF
EPITHELIAL CELLS UA: ABNORMAL /HPF (ref 0–5)
GLUCOSE URINE: NEGATIVE
KETONES, URINE: NEGATIVE
LEUKOCYTE ESTERASE, URINE: ABNORMAL
MUCUS: ABNORMAL
NITRITE, URINE: NEGATIVE
OTHER OBSERVATIONS UA: ABNORMAL
PH UA: 7 (ref 5–8)
PROTEIN UA: ABNORMAL
RBC UA: ABNORMAL /HPF (ref 0–2)
RENAL EPITHELIAL, UA: ABNORMAL /HPF
SPECIFIC GRAVITY UA: 1.02 (ref 1–1.03)
TRICHOMONAS: ABNORMAL
TURBIDITY: ABNORMAL
URINE HGB: ABNORMAL
UROBILINOGEN, URINE: NORMAL
WBC UA: ABNORMAL /HPF (ref 0–5)
YEAST: ABNORMAL

## 2020-08-15 PROCEDURE — 81001 URINALYSIS AUTO W/SCOPE: CPT

## 2020-08-15 PROCEDURE — 87086 URINE CULTURE/COLONY COUNT: CPT

## 2020-08-15 PROCEDURE — 99283 EMERGENCY DEPT VISIT LOW MDM: CPT

## 2020-08-15 RX ORDER — CEPHALEXIN 250 MG/1
250 CAPSULE ORAL 3 TIMES DAILY
Qty: 21 CAPSULE | Refills: 0 | Status: SHIPPED | OUTPATIENT
Start: 2020-08-15 | End: 2020-08-22

## 2020-08-15 NOTE — ED PROVIDER NOTES
[facility]  eMERGENCY dEPARTMENT eNCOUnter      Pt Name: Inocente Landry  MRN: [id number]  [place] 1950  Date of evaluation: 8/15/2020      CHIEF COMPLAINT       Chief Complaint   Patient presents with    Hematuria     pt's wife states pt has blood in his urine and that he just finished his dialysis today         HISTORY OF PRESENT ILLNESS      The patient presents with hematuria that started today. The patient has kidney failure. He went to dialysis today. He urinates about once a day. He normally self-caths for urine. The patient reports no fever. He denies back pain or abdominal pain. He's had no nausea, vomiting, or diarrhea. Nothing makes the symptoms better or worse. Looking at the patient's chart, the last time he was here he actually grew out candida, rather than a bacterial infection. REVIEW OF SYSTEMS       All systems reviewed and negative unless noted in HPI. The patient denies fever or constitutional symptoms. Denies vision change. Denies any sore throat or rhinorrhea. Denies any neck pain or stiffness. Denies chest pain or shortness of breath. No nausea,  vomiting or diarrhea. Hematuria; history of ESRD. Denies musculoskeletal injury or pain. Denies any weakness, numbness or focal neurologic deficit. History of lymphedema right lower extremity. No recent psychiatric issues. No easy bruising or bleeding. Denies any polyuria, polydypsia or history of immunocompromise.       PAST MEDICAL HISTORY    has a past medical history of A-fib ([address] Acute encephalopathy, Acute ischemic stroke ([address] Acute metabolic encephalopathy, Acute on chronic congestive heart failure ([address] Acute on chronic diastolic CHF (congestive heart failure) ([address] Altered mental status, Anemia, Anemia associated with chronic renal failure, Aphasia, ARF (acute renal failure) ([address] Arteriovenous fistula for hemodialysis in place, primary [facility]), Arthritis, Bradyarrhythmia, Cellulitis, Cerebral contusion ([facility]), CHF (congestive heart failure) ([facility]), Chronic atrial fibrillation, Chronic kidney disease, Chronic pain of right knee, CKD (chronic kidney disease) stage 4, GFR 15-29 ml/min ([facility]), Coagulation defect ([address] Diabetes mellitus ([address] Diabetes mellitus type 2 in obese ([address] Diarrhea, DVT (deep venous thrombosis) ([facility]), Elevated C-reactive protein (CRP), ESRD (end stage renal disease) ([address] Essential hypertension, Fever, Foot ulcer due to secondary DM ([address] GERD (gastroesophageal reflux disease), Hematochezia, History of cerebral infarction, History of DVT (deep vein thrombosis), History of superior vena cava filter placement, Hx of blood clots, Hyperkalemia, Hyperlipidemia, Hypernatremia, Hypertension, Hypocalcemia, Hypovolemic shock ([address] Hypoxia, Irregular heartbeat, Knee effusion, right, Left leg cellulitis, Lower GI bleed, MDRO (multiple drug resistant organisms) resistance, Metabolic acidosis, MRSA (methicillin resistant staph aureus) culture positive, On continuous oral anticoagulation, NADIRA on CPAP, Other specified hypothyroidism, Parietal lobe infarction ([address] Pneumonia, Post traumatic encephalopathy, Postoperative wound infection of right hip, Proteinuria, Pyogenic arthritis of right knee joint ([address] Pyrexia, Renal insufficiency, Renal lesion, Respiratory failure, acute ([address] Right knee pain, SAH (subarachnoid hemorrhage) ([address] Secondary hyperparathyroidism of renal origin ([address] Seizure disorder ([address] Stercoral ulcer of rectum, Streptococcal infection, Subarachnoid bleed ([address] Subdural bleeding ([address] Thyroid disease, Traumatic cerebral hemorrhage ([address] Type 2 diabetes mellitus with left diabetic foot ulcer ([address] Unspecified cerebral artery occlusion with cerebral infarction, Venous stasis dermatitis, and Venous stasis dermatitis of both lower extremities.     SURGICAL HISTORY      has a past surgical history that includes Gastric bypass surgery; Vena Cava Filter Placement; Foot Debridement (Left); Foot surgery (Right); Tonsillectomy; Colonoscopy; skin biopsy; Dilatation, esophagus; tracheostomy (summer 2013); Gastrostomy tube placement (summer 2013); Abdomen surgery; Cardiac catheterization; pr knee scope,diagnostic (Right, 3/23/2017); HEMIARTHROPLASTY HIP (Right, 5/15/2019); incision and drainage (Right, 6/26/2019); Revision total hip arthroplasty (Right, 06/28/2019); and Revision total hip arthroplasty (Right, 6/28/2019). CURRENT MEDICATIONS       Previous Medications    ACETAMINOPHEN (TYLENOL) 325 MG TABLET    Take 650 mg by mouth every 4 hours as needed for Pain    APIXABAN (ELIQUIS) 2.5 MG TABS TABLET    Take 1 tablet by mouth 2 times daily    ARIPIPRAZOLE (ABILIFY) 5 MG TABLET    Take 5 mg by mouth daily     CEPHALEXIN (KEFLEX) 500 MG CAPSULE    Take 1 capsule by mouth 2 times daily    CHOLECALCIFEROL (VITAMIN D3) 2000 UNITS CAPS    Take 1,000 Units by mouth daily. DARBEPOETIN DIONNE-POLYSORBATE (ARANESP) 40 MCG/0.4ML SOSY INJECTION    Infuse 0.8 mLs intravenously every 7 days    FERRIC CITRATE (AURYXIA) 1  MG(FE) TABS    Take 210 mg by mouth 2 times daily Patients med list states frequency is 2-3 times a day    FUROSEMIDE (LASIX) 20 MG TABLET    Take 20 mg by mouth 2 times daily    ISOSORBIDE MONONITRATE (IMDUR) 30 MG CR TABLET    Take 30 mg by mouth daily     LAMOTRIGINE (LAMICTAL) 25 MG TABLET    Take 25 mg by mouth daily     LEVOTHYROXINE (LEVOTHROID) 50 MCG TABLET    Take 50 mcg by mouth Daily. PANTOPRAZOLE (PROTONIX) 40 MG TABLET    Take 40 mg by mouth daily     VENLAFAXINE (EFFEXOR) 75 MG TABLET    Take 75 mg by mouth daily       ALLERGIES     is allergic to bactrim [sulfamethoxazole-trimethoprim] and sulfa antibiotics. FAMILY HISTORY     He indicated that the status of his father is unknown.  He indicated that the status of his sister is unknown.     family history includes Cancer in his sister; Coronary Art Dis in his father. SOCIAL HISTORY      reports that he has never smoked. He has never used smokeless tobacco. He reports that he does not drink alcohol or use drugs. PHYSICAL EXAM     INITIAL VITALS:  weight is 115.7 kg (255 lb). His temporal temperature is 97.9 °F (36.6 °C). His blood pressure is 114/47 (abnormal) and his pulse is 81. His respiration is 16 and oxygen saturation is 92%. Patient is alert and oriented, in no apparent distress. HEENT is atraumatic. Pupils are PERRL at 4 mm with normal extraocular motion. Mucous membranes moist.    Neck is supple with no lymphadenopathy. No JVD. No meningismus. Heart sounds regular rate and rhythm with no gallops, murmurs, or rubs. Lungs clear, no wheezes, rales or rhonchi. Abdomen: soft, nontender with no pain to palpation. No pulsatile mass. No rebound or guarding. No CVA tenderness. Musculoskeletal exam shows no evidence of trauma. Normal distal pulses in all extremities. Skin: edema in legs, right greater than left, with compression stockings in place, consistent with lymphedema and chronic edema. Neurological exam reveals cranial nerves 2 through 12 grossly intact. Patient has equal  and normal deep tendon reflexes. Psychiatric: no hallucinations or suicidal ideation. Lymphatics.:  No lymphadenopathy.        DIFFERENTIAL DIAGNOSIS/ MDM:     UTI, hematuria    DIAGNOSTIC RESULTS       LABS:  Results for orders placed or performed during the hospital encounter of 08/15/20   Urinalysis Reflex to Culture    Specimen: Urine, clean catch   Result Value Ref Range    Color, UA LIZETTE (A) YELLOW    Turbidity UA CLOUDY (A) CLEAR    Glucose, Ur NEGATIVE NEGATIVE    Bilirubin Urine NEGATIVE NEGATIVE    Ketones, Urine NEGATIVE NEGATIVE    Specific Gravity, UA 1.020 1.005 - 1.030    Urine Hgb LARGE (A) NEGATIVE    pH, UA 7.0 5.0 - 8.0    Protein, UA 2+ (A) NEGATIVE    Urobilinogen, Urine Normal Normal    Nitrite, Urine NEGATIVE NEGATIVE

## 2020-08-16 LAB
CULTURE: NORMAL
Lab: NORMAL
SPECIMEN DESCRIPTION: NORMAL

## 2020-08-19 ENCOUNTER — HOSPITAL ENCOUNTER (OUTPATIENT)
Age: 70
Setting detail: SPECIMEN
Discharge: HOME | End: 2020-08-19
Payer: MEDICARE

## 2020-08-19 LAB
ANION GAP SERPL CALCULATED.3IONS-SCNC: 14 MMOL/L (ref 9–17)
BUN BLDV-MCNC: 27 MG/DL (ref 8–23)
BUN/CREAT BLD: ABNORMAL (ref 9–20)
CALCIUM SERPL-MCNC: 8.7 MG/DL (ref 8.6–10.4)
CHLORIDE BLD-SCNC: 95 MMOL/L (ref 98–107)
CO2: 29 MMOL/L (ref 20–31)
CREAT SERPL-MCNC: 3.27 MG/DL (ref 0.7–1.2)
ESTIMATED AVERAGE GLUCOSE: 100 MG/DL
GFR AFRICAN AMERICAN: 23 ML/MIN
GFR NON-AFRICAN AMERICAN: 19 ML/MIN
GFR SERPL CREATININE-BSD FRML MDRD: ABNORMAL ML/MIN/{1.73_M2}
GFR SERPL CREATININE-BSD FRML MDRD: ABNORMAL ML/MIN/{1.73_M2}
GLUCOSE BLD-MCNC: 100 MG/DL (ref 70–99)
HBA1C MFR BLD: 5.1 % (ref 4–6)
HCT VFR BLD CALC: 33.1 % (ref 40.7–50.3)
HEMOGLOBIN: 9.4 G/DL (ref 13–17)
LAMOTRIGINE LEVEL: 2.5 UG/ML (ref 3–15)
MCH RBC QN AUTO: 30.2 PG (ref 25.2–33.5)
MCHC RBC AUTO-ENTMCNC: 28.4 G/DL (ref 28.4–34.8)
MCV RBC AUTO: 106.4 FL (ref 82.6–102.9)
NRBC AUTOMATED: 0 PER 100 WBC
PDW BLD-RTO: 14.6 % (ref 11.8–14.4)
PLATELET # BLD: 199 K/UL (ref 138–453)
PMV BLD AUTO: 9.2 FL (ref 8.1–13.5)
POTASSIUM SERPL-SCNC: 3.9 MMOL/L (ref 3.7–5.3)
RBC # BLD: 3.11 M/UL (ref 4.21–5.77)
SODIUM BLD-SCNC: 138 MMOL/L (ref 135–144)
TSH SERPL DL<=0.05 MIU/L-ACNC: 2.39 MIU/L (ref 0.3–5)
VITAMIN D 25-HYDROXY: 41.7 NG/ML (ref 30–100)
WBC # BLD: 4.6 K/UL (ref 3.5–11.3)

## 2021-03-19 ENCOUNTER — HOSPITAL ENCOUNTER (EMERGENCY)
Age: 71
Discharge: HOME | End: 2021-03-19
Attending: EMERGENCY MEDICINE
Payer: MEDICARE

## 2021-03-19 ENCOUNTER — APPOINTMENT (OUTPATIENT)
Dept: GENERAL RADIOLOGY | Age: 71
End: 2021-03-19
Payer: MEDICARE

## 2021-03-19 VITALS
TEMPERATURE: 97.6 F | RESPIRATION RATE: 16 BRPM | HEIGHT: 72 IN | OXYGEN SATURATION: 94 % | SYSTOLIC BLOOD PRESSURE: 134 MMHG | DIASTOLIC BLOOD PRESSURE: 56 MMHG | WEIGHT: 255 LBS | HEART RATE: 71 BPM | BODY MASS INDEX: 34.54 KG/M2

## 2021-03-19 DIAGNOSIS — S81.011A KNEE LACERATION, RIGHT, INITIAL ENCOUNTER: Primary | ICD-10-CM

## 2021-03-19 PROCEDURE — 6370000000 HC RX 637 (ALT 250 FOR IP): Performed by: PHYSICIAN ASSISTANT

## 2021-03-19 PROCEDURE — 2500000003 HC RX 250 WO HCPCS: Performed by: PHYSICIAN ASSISTANT

## 2021-03-19 PROCEDURE — 90471 IMMUNIZATION ADMIN: CPT | Performed by: PHYSICIAN ASSISTANT

## 2021-03-19 PROCEDURE — 73562 X-RAY EXAM OF KNEE 3: CPT

## 2021-03-19 PROCEDURE — 99282 EMERGENCY DEPT VISIT SF MDM: CPT

## 2021-03-19 PROCEDURE — 6360000002 HC RX W HCPCS: Performed by: PHYSICIAN ASSISTANT

## 2021-03-19 PROCEDURE — 12005 RPR S/N/A/GEN/TRK12.6-20.0CM: CPT

## 2021-03-19 PROCEDURE — 90715 TDAP VACCINE 7 YRS/> IM: CPT | Performed by: PHYSICIAN ASSISTANT

## 2021-03-19 RX ORDER — BUPIVACAINE HYDROCHLORIDE 5 MG/ML
10 INJECTION, SOLUTION PERINEURAL ONCE
Status: COMPLETED | OUTPATIENT
Start: 2021-03-19 | End: 2021-03-19

## 2021-03-19 RX ORDER — GINSENG 100 MG
CAPSULE ORAL ONCE
Status: COMPLETED | OUTPATIENT
Start: 2021-03-19 | End: 2021-03-19

## 2021-03-19 RX ORDER — CEPHALEXIN 500 MG/1
500 CAPSULE ORAL 2 TIMES DAILY
Qty: 14 CAPSULE | Refills: 0 | Status: SHIPPED | OUTPATIENT
Start: 2021-03-19 | End: 2021-03-26

## 2021-03-19 RX ORDER — LIDOCAINE HYDROCHLORIDE AND EPINEPHRINE 10; 10 MG/ML; UG/ML
20 INJECTION, SOLUTION INFILTRATION; PERINEURAL ONCE
Status: COMPLETED | OUTPATIENT
Start: 2021-03-19 | End: 2021-03-19

## 2021-03-19 RX ADMIN — TETANUS TOXOID, REDUCED DIPHTHERIA TOXOID AND ACELLULAR PERTUSSIS VACCINE, ADSORBED 0.5 ML: 5; 2.5; 8; 8; 2.5 SUSPENSION INTRAMUSCULAR at 12:03

## 2021-03-19 RX ADMIN — BUPIVACAINE HYDROCHLORIDE 50 MG: 5 INJECTION, SOLUTION PERINEURAL at 13:00

## 2021-03-19 RX ADMIN — LIDOCAINE HYDROCHLORIDE,EPINEPHRINE BITARTRATE 20 ML: 10; .01 INJECTION, SOLUTION INFILTRATION; PERINEURAL at 13:00

## 2021-03-19 RX ADMIN — BACITRACIN: 500 OINTMENT TOPICAL at 13:00

## 2021-03-19 ASSESSMENT — PAIN SCALES - GENERAL: PAINLEVEL_OUTOF10: 9

## 2021-03-19 ASSESSMENT — PAIN DESCRIPTION - LOCATION: LOCATION: KNEE

## 2021-03-19 NOTE — ED PROVIDER NOTES
[id number] [facility] ED  [address] [facility] [address] [facility] [address]  Phone: [phone]  Fax: [phone]        Pt Name: Shankar Prince  MRN: [id number]  [place] 1950  Date of evaluation: 3/19/21    [address] [place] [address]       Chief Complaint   Patient presents with    Laceration     to right knee, on blood thinners, [facility]       HISTORY OF PRESENT ILLNESS (Location/Symptom, Timing/Onset, Context/Setting, Quality, Duration, Modifying Factors, Severity)      Sahnkar Prince is a 66 y.o. male with pertinent PMH of stroke and wheelchair-bound, CKD, A. fib on Eliquis who presents to the ED via private auto with a right knee laceration. Patient's wife is bedside and reports that approximately 45 minutes prior to arrival patient was in his wheelchair going to the commode when he caught his right knee on a bolt that was sticking out and developed a large laceration across the lower aspect of his right knee. His wife states that there was \"blood everywhere\" due to him being on a blood thinner. He has exacerbation of the pain with palpation and movement. Denies taking any medication for the pain. Denies any other alleviating or exacerbating factors. The patient is not UTD on his tetanus immunization. Denies any fever, chills, head trauma, near-syncope/syncope, nausea, vomiting, or any other concerns at this time.      PAST MEDICAL / SURGICAL / SOCIAL / FAMILY HISTORY     PMH:  has a past medical history of A-fib ([address] Acute encephalopathy, Acute ischemic stroke ([address] Acute metabolic encephalopathy, Acute on chronic congestive heart failure ([address] Acute on chronic diastolic CHF (congestive heart failure) ([address] Altered mental status, Anemia, Anemia associated with chronic renal failure, Aphasia, ARF (acute renal failure) (HCC), Arteriovenous fistula for hemodialysis in place, primary ([address] Arthritis, Bradyarrhythmia, Cellulitis, Cerebral contusion ([address] CHF (congestive heart failure) [facility]), Chronic atrial fibrillation, Chronic kidney disease, Chronic pain of right knee, CKD (chronic kidney disease) stage 4, GFR 15-29 ml/min ([facility]), Coagulation defect ([address] Diabetes mellitus ([address] Diabetes mellitus type 2 in obese ([address] Diarrhea, DVT (deep venous thrombosis) ([facility]), Elevated C-reactive protein (CRP), ESRD (end stage renal disease) ([address] Essential hypertension, Fever, Foot ulcer due to secondary DM ([address] GERD (gastroesophageal reflux disease), Hematochezia, History of cerebral infarction, History of DVT (deep vein thrombosis), History of superior vena cava filter placement, Hx of blood clots, Hyperkalemia, Hyperlipidemia, Hypernatremia, Hypertension, Hypocalcemia, Hypovolemic shock ([address] Hypoxia, Irregular heartbeat, Knee effusion, right, Left leg cellulitis, Lower GI bleed, MDRO (multiple drug resistant organisms) resistance, Metabolic acidosis, MRSA (methicillin resistant staph aureus) culture positive, On continuous oral anticoagulation, NADIRA on CPAP, Other specified hypothyroidism, Parietal lobe infarction ([address] Pneumonia, Post traumatic encephalopathy, Postoperative wound infection of right hip, Proteinuria, Pyogenic arthritis of right knee joint ([address] Pyrexia, Renal insufficiency, Renal lesion, Respiratory failure, acute ([address] Right knee pain, SAH (subarachnoid hemorrhage) ([address] Secondary hyperparathyroidism of renal origin ([address] Seizure disorder ([address] Stercoral ulcer of rectum, Streptococcal infection, Subarachnoid bleed ([address] Subdural bleeding ([address] Thyroid disease, Traumatic cerebral hemorrhage ([address] Type 2 diabetes mellitus with left diabetic foot ulcer ([address] Unspecified cerebral artery occlusion with cerebral infarction, Venous stasis dermatitis, and Venous stasis dermatitis of both lower extremities. Surgical History:  has a past surgical history that includes Gastric bypass surgery; Vena Cava Filter Placement; Foot Debridement (Left); Foot surgery (Right);  Tonsillectomy; Colonoscopy; skin biopsy; Dilatation, esophagus; tracheostomy (summer 2013); Gastrostomy tube placement (summer 2013); Abdomen surgery; Cardiac catheterization; pr knee scope,diagnostic (Right, 3/23/2017); HEMIARTHROPLASTY HIP (Right, 5/15/2019); incision and drainage (Right, 6/26/2019); Revision total hip arthroplasty (Right, 06/28/2019); and Revision total hip arthroplasty (Right, 6/28/2019). Social History:  reports that he has never smoked. He has never used smokeless tobacco. He reports that he does not drink alcohol or use drugs. Family History: He indicated that the status of his father is unknown. He indicated that the status of his sister is unknown.   family history includes Cancer in his sister; Coronary Art Dis in his father. Psychiatric History: None    Allergies: Bactrim [sulfamethoxazole-trimethoprim] and Sulfa antibiotics    Home Medications:   Prior to Admission medications    Medication Sig Start Date End Date Taking?  Authorizing Provider   cephALEXin (KEFLEX) 500 MG capsule Take 1 capsule by mouth 2 times daily for 7 days 3/19/21 3/26/21 Yes Nydia Bush PA-C   acetaminophen (TYLENOL) 325 MG tablet Take 650 mg by mouth every 4 hours as needed for Pain    Historical Provider, MD   apixaban (ELIQUIS) 2.5 MG TABS tablet Take 1 tablet by mouth 2 times daily 7/29/19   Solomon Tucker MD   darbepoetin jean pierre-polysorbate (ARANESP) 40 MCG/0.4ML SOSY injection Infuse 0.8 mLs intravenously every 7 days  Patient not taking: Reported on 7/15/2020 5/18/19   Haydee Bermeo DO   furosemide (LASIX) 20 MG tablet Take 20 mg by mouth 2 times daily    Historical Provider, MD   ARIPiprazole (ABILIFY) 5 MG tablet Take 5 mg by mouth daily  7/26/17   Historical Provider, MD   Ferric Citrate (AURYXIA) 1  MG(Fe) TABS Take 210 mg by mouth 2 times daily Patients med list states frequency is 2-3 times a day    Historical Provider, MD   venlafaxine (EFFEXOR) 75 MG tablet Take 75 mg by mouth daily    Historical Provider, MD   lamoTRIgine (LAMICTAL) 25 MG tablet Take 25 mg by mouth daily     Historical Provider, MD   isosorbide mononitrate (IMDUR) 30 MG CR tablet Take 30 mg by mouth daily  12/12/14   Historical Provider, MD   pantoprazole (PROTONIX) 40 MG tablet Take 40 mg by mouth daily  10/17/13   Historical Provider, MD   levothyroxine (LEVOTHROID) 50 MCG tablet Take 50 mcg by mouth Daily. Historical Provider, MD   Cholecalciferol (VITAMIN D3) 2000 UNITS CAPS Take 1,000 Units by mouth daily. Historical Provider, MD       REVIEW OF SYSTEMS  (2-9 systems for level 4, 10 or more for level 5)      Review of Systems    Constitutional: See HPI. Eyes: Denies vision changes. HENT: Denies sore throat or neck pain. Respiratory: Denies cough or shortness of breath. Cardiovascular: Denies chest pain. GI: See HPI. Musculoskeletal: See HPI. Skin: See HPI. Neurologic:  Denies headache. Heme: Denies bleeding disorders. All other systems negative except as marked. PHYSICAL EXAM  (up to 7 for level 4, 8 or more for level 5)      INITIAL VITALS:  height is 6' (1.829 m) and weight is 115.7 kg (255 lb). His oral temperature is 97.6 °F (36.4 °C). His blood pressure is 134/56 (abnormal) and his pulse is 71. His respiration is 16 and oxygen saturation is 94%. Vital signs reviewed. Physical Exam    General:  Alert, cooperative, well-groomed, well-nourished, appears stated age, and is in no acute distress. Head:  Normocephalic, atraumatic, and without obvious abnormality. Lungs:   No respiratory distress. CTA bilaterally. No wheezes, rhonchi, or rales. Heart:  Regular rate. Regular rhythm. No murmurs, rubs, or gallops. Hand/Forearm: There is a 13.4 cm V-shaped flap laceration over the inferior aspect of the right knee. Clots noted, no active bleeding. No erythema or swelling. Intact sensation to light touch. Cannot assess motor or strength due to baseline mobility. Circulation intact.  Radial pulses 2+. Cap refill less than 2 seconds. Extremities: Warm and dry. Chronic right lower extremity lymphedema. Skin: Soft, good turgor, and well-hydrated. No rashes to the exposed skin. Neurologic: GCS is 15 and no focal deficits are appreciated. Wheelchair user. Unable to weight-bear at baseline. Speech clear. Psychiatric: Normal mood and affect. Normal behavior. Coherent thought process. DIFFERENTIAL DIAGNOSIS / MDM     Patient presented to the ED with a laceration to the inferior aspect of the right knee. Vital signs demonstrate slight hypertension at 104/45 and will repeat prior to discharge. Other vitals are unremarkable. Physical exam demonstrates a nontoxic appearing male seated in his wheelchair in no acute distress. He is a wheelchair user secondary to stroke with limited use of his lower extremities, primarily his right lower extremity where the laceration is. Unable to weight-bear at baseline. Right lower extremity has chronic lymphedema and wound is wrapped with a towel and gauze. Wound does not have any active bleeding. Sensation is intact but motor and strength are unable to be assessed as patient does not perform these functions at baseline. Will obtain x-ray to look for any fractures. Patient received a TD injection without adverse reactions prior to discharge. We will plan for Keflex for antibiotics as a precaution due to significant wound. See procedure note for repair.     PLAN (LABS / IMAGING / EKG):  Orders Placed This Encounter   Procedures    XR KNEE RIGHT (3 VIEWS)    [facility] ORTHOPEDIC SUPPLIES Knee Immobilizer, Right; 14\"       MEDICATIONS ORDERED:  Orders Placed This Encounter   Medications    Tetanus-Diphth-Acell Pertussis (BOOSTRIX) injection 0.5 mL    bacitracin ointment    lidocaine-EPINEPHrine 1 %-[id number] injection 20 mL    bupivacaine (MARCAINE) 0.5 % injection 50 mg    cephALEXin (KEFLEX) 500 MG capsule     Sig: Take 1 capsule by mouth 2 times daily for 7 days     Dispense:  14 capsule     Refill:  0       Controlled Substances Monitoring:     DIAGNOSTIC RESULTS     EKG: All EKG's are interpreted by the Emergency Department Physician who either signs or Co-signs this chart in the absenceof a cardiologist.    RADIOLOGY:  Non-plain film images such as CT, Ultrasound and MRI are read by the radiologist. Plain radiographic images are visualized by me and the following radiologist interpretations are reviewed. Xr Knee Right (3 Views)    Result Date: 3/19/2021  EXAMINATION: THREE XRAY VIEWS OF THE RIGHT KNEE 3/19/2021 8:47 am COMPARISON: 05/14/2019 HISTORY: ORDERING SYSTEM PROVIDED HISTORY: Large laceration over left knee cap from bolt TECHNOLOGIST PROVIDED HISTORY: Large laceration over left knee cap from bolt Reason for Exam: Large laceration over left knee cap from bolt - best images possible, pt unable and unwilling to assist Acuity: Unknown Type of Exam: Unknown FINDINGS: Radiographic evaluation is degraded by diffuse osseous demineralization and overlying bandage material.  The inferior pole of patella is poorly visualized and not well evaluated. Severe tricompartmental degenerative changes. Trace effusion. Extensive vascular calcifications throughout. Radiographic evaluation is degraded by diffuse osseous demineralization and overlying bandage material.  Please note that the inferior pole of patella is poorly visualized. Background severe tricompartmental osteoarthritis and extensive vascular calcifications. LABS:  No results found for this visit on 03/19/21.     EMERGENCY DEPARTMENT COURSE           Vitals:    Vitals:    03/19/21 1114 03/19/21 1345   BP: (!) 104/45 (!) 134/56   Pulse: 77 71   Resp: 16    Temp: 97.6 °F (36.4 °C)    TempSrc: Oral    SpO2: 96% 94%   Weight: 115.7 kg (255 lb)    Height: 6' (1.829 m)      -------------------------  BP: (!) 134/56, Temp: 97.6 °F (36.4 °C), Pulse: 71, Resp: 16      RE-EVALUATION:  See MDM and procedure notes.    The patient and/or family and I have discussed the diagnosis and risks, and we agree with discharging home to follow-up with their primary doctor. The patient appears stable for discharge and has been instructed to return immediately for new concerning symptoms, if the symptoms worsen in any way, or in 8-12 hours if not improved for re-evaluation. We have discussed concerning signs of infection such as increasing redness, swelling, or pain, drainage of pus, or persistent fever as well as numbness or weakness to the arms or legs, coolness or color change to the arms or legs, persistent vomiting, bloody stools, chest pain, or shortness of breath which necessitate immediate return. The patient understands that at this time there is no evidence for a more malignant underlying process, but the patient also understands that early in the process of an illness or injury, an emergency department workup can be falsely reassuring. Routine discharge counseling was given, and the patient understands that worsening, changing or persistent symptoms should prompt an immediate call or follow up with their primary physician or return to the emergency department. The importance of appropriate follow up was also discussed. Patient was given wound management directions and advised to schedule an appointment with his PCP, go to a UC, or return to the ED in 12-14 days for suture removal, though was informed that this would be billed as a separate visit. I have reviewed the disposition diagnosis with the patient and or their family/guardian. I have answered their questions and given discharge instructions. They voiced understanding of these instructions and did not have any further questions or complaints. This patient was seen by the attending physician and they agreed with the assessment and plan. CONSULTS:  None    PROCEDURES:    Laceration Repair Procedure Note    Indication: Laceration    Procedure:  The patient was placed in the appropriate position and anesthesia around the laceration was obtained by infiltration using 5.0 cc of 1% Lidocaine with epinephrine and 7.0 cc of 0.5% Bupivacaine without epinephrine. The area was then irrigated with high pressure normal saline and explored with no foreign bodies discovered and no tendon injury noted. The laceration was closed with 5-0 Ethilon using interrupted sutures. There were no additional lacerations requiring repair. The wound area was then dressed with bacitracin, a sterile dressing, gauze and a knee immobilizer. Total repaired wound length: 13.4 cm. Other Items: Suture count: 16    The patient tolerated the procedure well. Complications: None    Sumit Downey PA-C  Emergency Medicine Physician Assistant    Authorized by: Dr. Trace Larkin      1. Knee laceration, right, initial encounter          DISPOSITION / PLAN     CONDITION ON DISPOSITION:   Good / Stable for discharge. PATIENT REFERRED TO:  TANMAY Penny - CNP  [address] [facility]  [phone]    Call today  To schedule an appointment in 12-14 days for suture removal.    [facility] ED  [address] [place] [address]  [phone]    in 12-14 days for suture removal      DISCHARGE MEDICATIONS:  New Prescriptions    CEPHALEXIN (KEFLEX) 500 MG CAPSULE    Take 1 capsule by mouth 2 times daily for 7 days       Sumit Downey PA-C   Emergency Medicine Physician Assistant    (Please note that portions of this note were completed with a voice recognition program.  Efforts were made to edit the dictations but occasionally words aremis-transcribed.)       Sumit Downey PA-C  03/19/21 4343

## 2021-03-19 NOTE — ED NOTES
Patient and wife provided with discharge instructions, prescriptions, and follow up information. Verbalized understanding. No IV access to discontinue. Patient left in his own wheelchair.      Tamica Morris RN  03/19/21 3861

## 2021-03-19 NOTE — ED NOTES
Patient helped into bed with slideboard. Patient states he needs the urinal while wife states that he needs a bedpan. Patient placed on bedpan. Patient has hx of aphasia.       Holley Santana RN  03/19/21 2436

## 2021-03-19 NOTE — ED PROVIDER NOTES
Emergency Department         COMPLAINT       Chief Complaint   Patient presents with    Laceration     to right knee, on blood thinners, hit bolt while moving to bedisde comode      PHYSICAL EXAM      ED Triage Vitals [03/19/21 1114]   BP Temp Temp Source Pulse Resp SpO2 Height Weight   (!) 104/45 97.6 °F (36.4 °C) Oral 77 16 96 % 6' (1.829 m) 255 lb (115.7 kg)         Constitutional: Alert, oriented x3, nontoxic, answering questions appropriately, acting properly for age, in no acute distress   HEENT: Extraocular muscles intact,   Neck: Trachea midline,   Musculoskeletal: Right lower extremity chronic swelling. Curvilinear 15 cm laceration just below the patella anterior aspect of the leg. It is subcutaneous tissue but does not appear to involve tendon or muscle. Decreased range of motion chronic  Neurologic: Right lower extremity motor intact  Skin: Warm and dry   Physical Exam  DIAGNOSTIC RESULTS     EKG: All EKG's are interpreted by the Emergency Department Physician who either signs or Co-signs this chart in the absence of a cardiologist.    Not indicated unless otherwise documented above or in the midlevel documentation    LABS:  No results found for this visit on 03/19/21. Not indicated unless otherwise documented above or in the midlevel documentation    RADIOLOGY:   I reviewedthe radiologist interpretations:  XR KNEE RIGHT (3 VIEWS)   Final Result   Radiographic evaluation is degraded by diffuse osseous demineralization and   overlying bandage material.  Please note that the inferior pole of patella is   poorly visualized. Background severe tricompartmental osteoarthritis and extensive vascular   calcifications. Not indicated unless otherwise documented above or in the midlevel documentation    EMERGENCY DEPARTMENT COURSE:       PERTINENT ATTENDING PHYSICIAN COMMENTS:    Driving a motorized cart got his knee caught on a bolt while turning to the bedside commode. Laceration. Will obtain x-ray and laceration repair. Update tetanus. Antibiotics. 1:30 PM laceration repaired. Refer to note by Adriano Matson. Bacitracin dressing. Watch for signs of infection. Discharged on antibiotics. Keep clean and dry. Faculty Attestation    I performed a history and physical examination of the patient and discussed management with the mid level provideer. I reviewed the mid level provider's note and agree with the documented findings and plan of care. Any areas of disagreement are noted on the chart. I was personally present for the key portions of any procedures. I have documented in the chart those procedures where I was not present during the key portions. I have reviewed the emergency nurses triage note. I agree with the chief complaint, past medical history, past surgical history, allergies, medications, social and family history as documented unless otherwise noted below. Documentation of the HPI, Physical Exam and Medical Decision Making performed by medical students or scribes is based on my personal performance of the HPI, PE and MDM. For Physician Assistant/ Nurse Practitioner cases/documentation I have personally evaluated this patient and have completed at least one if not all key elements of the E/M (history, physical exam, and MDM). Additional findings are as noted.        Ivon Martino,   03/19/21 8621

## 2021-07-29 ENCOUNTER — INITIAL CONSULT (OUTPATIENT)
Dept: PAIN MANAGEMENT | Age: 71
End: 2021-07-29
Payer: MEDICARE

## 2021-07-29 VITALS — BODY MASS INDEX: 34.58 KG/M2 | HEIGHT: 72 IN

## 2021-07-29 DIAGNOSIS — Z79.891 ENCOUNTER FOR LONG-TERM OPIATE ANALGESIC USE: ICD-10-CM

## 2021-07-29 DIAGNOSIS — G89.29 OTHER CHRONIC PAIN: ICD-10-CM

## 2021-07-29 DIAGNOSIS — M25.551 RIGHT HIP PAIN: Primary | ICD-10-CM

## 2021-07-29 PROCEDURE — G8427 DOCREV CUR MEDS BY ELIG CLIN: HCPCS | Performed by: PAIN MEDICINE

## 2021-07-29 PROCEDURE — 99204 OFFICE O/P NEW MOD 45 MIN: CPT | Performed by: PAIN MEDICINE

## 2021-07-29 PROCEDURE — 3017F COLORECTAL CA SCREEN DOC REV: CPT | Performed by: PAIN MEDICINE

## 2021-07-29 PROCEDURE — G8417 CALC BMI ABV UP PARAM F/U: HCPCS | Performed by: PAIN MEDICINE

## 2021-07-29 PROCEDURE — 4040F PNEUMOC VAC/ADMIN/RCVD: CPT | Performed by: PAIN MEDICINE

## 2021-07-29 PROCEDURE — 1036F TOBACCO NON-USER: CPT | Performed by: PAIN MEDICINE

## 2021-07-29 PROCEDURE — 1123F ACP DISCUSS/DSCN MKR DOCD: CPT | Performed by: PAIN MEDICINE

## 2021-07-29 RX ORDER — LIDOCAINE AND PRILOCAINE 25; 25 MG/G; MG/G
CREAM TOPICAL
COMMUNITY
Start: 2021-07-22

## 2021-07-29 RX ORDER — ATORVASTATIN CALCIUM 80 MG/1
TABLET, FILM COATED ORAL
COMMUNITY
Start: 2021-06-20

## 2021-07-29 RX ORDER — TIMOLOL MALEATE 5 MG/ML
1 SOLUTION/ DROPS OPHTHALMIC 2 TIMES DAILY
COMMUNITY

## 2021-07-29 RX ORDER — TORSEMIDE 20 MG/1
TABLET ORAL
COMMUNITY
Start: 2021-05-31

## 2021-07-29 ASSESSMENT — ENCOUNTER SYMPTOMS
GASTROINTESTINAL NEGATIVE: 1
SHORTNESS OF BREATH: 1
EYES NEGATIVE: 1
PERSISTENT INFECTIONS: 1

## 2021-07-29 NOTE — PROGRESS NOTES
HPI:     Hip Pain   The incident occurred more than 1 week ago. The injury mechanism was a fall. The pain is present in the right hip. The pain is at a severity of 10/10. The pain is moderate. The pain has been constant since onset. Associated symptoms include an inability to bear weight, a loss of motion and muscle weakness. Pertinent negatives include no numbness or tingling. The symptoms are aggravated by weight bearing, palpation and movement. He has tried non-weight bearing and acetaminophen (hip fracture surgery, hip replacement, physical therapy) for the symptoms. The treatment provided mild relief. Chronic hip pain. Has a hip spacer in at this point. Imaging with stable hip spacer. Also has lymphedema in the right lower extremity. Does have multiple medical comorbidities. He is on anticoagulation. He is end-stage renal disease on hemodialysis. Patient denies any new neurological symptoms. No bowel or bladder incontinence, no weakness, and no falling. Review of OARRS does not show any aberrant prescription behavior.      Past Medical History:   Diagnosis Date    A-fib [facility])     Acute encephalopathy     Acute ischemic stroke ([facility] 07/13/2013    Acute metabolic encephalopathy 46/08/1872    Acute on chronic congestive heart failure ([address] 05/09/2014    Acute on chronic diastolic CHF (congestive heart failure) ([facility] 02/25/2017    Altered mental status 08/24/2013    Anemia     Anemia associated with chronic renal failure 05/28/2014    Aphasia 08/24/2013    ARF (acute renal failure) ([address] 05/28/2014    From pre renal azotemia from newly added diuretic and ARB use, creat peaked at 2.8 from 2 in may 2014    Arteriovenous fistula for hemodialysis in place, primary ([address] 11/17/2017    Arthritis     Bradyarrhythmia 07/13/2013    Cellulitis 09/17/2018    Cerebral contusion ([address] 07/07/2013    CHF (congestive heart failure) (HCC)     Chronic atrial fibrillation (HCC) 07/06/2013    Chronic  SAH (subarachnoid hemorrhage) (HCC)     Secondary hyperparathyroidism of renal origin ([facility] 12/16/2015    Seizure disorder ([facility] 03/01/2017    Stercoral ulcer of rectum     Streptococcal infection     Subarachnoid bleed (HCC) 07/13/2013    Subdural bleeding ([facility] [address] 07/05/2013    Thyroid disease     Traumatic cerebral hemorrhage ([facility] 07/07/2013    Type 2 diabetes mellitus with left diabetic foot ulcer ([facility] [address] 04/16/2014    Unspecified cerebral artery occlusion with cerebral infarction     Venous stasis dermatitis 05/28/2014    Venous stasis dermatitis of both lower extremities 05/28/2014       Past Surgical History:   Procedure Laterality Date    ABDOMEN SURGERY      CARDIAC CATHETERIZATION      COLONOSCOPY      DILATATION, ESOPHAGUS      FOOT DEBRIDEMENT Left     FOOT SURGERY Right     #5 toe removed  , 1/2 toes #1, #2  partially removed    GASTRIC BYPASS SURGERY      GASTROSTOMY TUBE PLACEMENT  summer 2013    HEMIARTHROPLASTY HIP Right 5/15/2019    HIP HEMIARTHROPLASTY. Lateral 3080 table, Gayle Rep. Luis Carlos Amaya will notify performed by Yuni Torres DO at [facility] [address] Right 6/26/2019    HIP INCISION AND DRAINAGE performed by Yuni Torres DO at [address] [place] [address] Right 3/23/2017    KNEE ARTHROSCOPY,EXTENSIVE DEBRIDEMENT PARTIAL PROXIMAL AND MEDIAL MENISECTOMY  performed by Marcella Uriarte MD at [facility] Right 06/28/2019    STAGE 1 TOTAL HIP REVISION ARTHROPLASTY  WITH REMEDIES AND CEMENT      REVISION TOTAL HIP ARTHROPLASTY Right 6/28/2019    STAGE 1 TOTAL HIP REVISION ARTHROPLASTY  WITH REMEDIES AND CEMENT  SMITH & NEPHEW,  REMOVAL OF HARDWARE , FEMORAL HEAD AND STEM .  performed by Yuni Torres DO at [address] [place] [address]  summer 2013    VENA CAVA FILTER PLACEMENT         Allergies   Allergen Reactions    Bactrim [Sulfamethoxazole-Trimethoprim]     Sulfa Antibiotics Other (See Comments)         Current Outpatient Medications:     atorvastatin (LIPITOR) 80 MG tablet, , Disp: , Rfl:     timolol (TIMOPTIC) 0.5 % ophthalmic solution, 1 drop 2 times daily, Disp: , Rfl:     torsemide (DEMADEX) 20 MG tablet, , Disp: , Rfl:     apixaban (ELIQUIS) 2.5 MG TABS tablet, Take 1 tablet by mouth 2 times daily, Disp: , Rfl:     ARIPiprazole (ABILIFY) 5 MG tablet, Take 5 mg by mouth daily , Disp: , Rfl:     Ferric Citrate (AURYXIA) 1  MG(Fe) TABS, Take 210 mg by mouth 2 times daily Patients med list states frequency is 2-3 times a day, Disp: , Rfl:     venlafaxine (EFFEXOR) 75 MG tablet, Take 75 mg by mouth daily, Disp: , Rfl:     lamoTRIgine (LAMICTAL) 25 MG tablet, Take 25 mg by mouth daily , Disp: , Rfl:     isosorbide mononitrate (IMDUR) 30 MG CR tablet, Take 30 mg by mouth daily , Disp: , Rfl:     pantoprazole (PROTONIX) 40 MG tablet, Take 40 mg by mouth daily , Disp: , Rfl:     levothyroxine (LEVOTHROID) 50 MCG tablet, Take 50 mcg by mouth Daily. , Disp: , Rfl:     Cholecalciferol (VITAMIN D3) 2000 UNITS CAPS, Take 1,000 Units by mouth daily. , Disp: , Rfl:     lidocaine-prilocaine (EMLA) 2.5-2.5 % cream, APPLY SMALL AMOUNT TO ACCESS SITE (AVF) 1 TO 2 HOURS BEFORE DIALYSIS.  COVER WITH OCCLUSIVE DRESSING [facility] WR[facility]) (Patient not taking: Reported on 7/29/2021), Disp: , Rfl:     Methoxy PEG-Epoetin Beta (MIRCERA IJ), Inject 200 mcg into the skin (Patient not taking: Reported on 7/29/2021), Disp: , Rfl:     acetaminophen (TYLENOL) 325 MG tablet, Take 650 mg by mouth every 4 hours as needed for Pain (Patient not taking: Reported on 7/29/2021), Disp: , Rfl:     darbepoetin jean pierre-polysorbate (ARANESP) 40 MCG/0.4ML SOSY injection, Infuse 0.8 mLs intravenously every 7 days (Patient not taking: Reported on 7/15/2020), Disp: 8.4 mL, Rfl:     furosemide (LASIX) 20 MG tablet, Take 20 mg by mouth 2 times daily (Patient not taking: Reported on 7/29/2021), Disp: , Rfl:     Family History   Problem Relation Age of Onset    Coronary Art Dis Father     Cancer Sister        Social History     Socioeconomic History    Marital status:      Spouse name: Not on file    Number of children: Not on file    Years of education: Not on file    Highest education level: Not on file   Occupational History    Not on file   Tobacco Use    Smoking status: Never Smoker    Smokeless tobacco: Never Used   Substance and Sexual Activity    Alcohol use: No    Drug use: No     Comment: smoked pipe years ago    Sexual activity: Not on file   Other Topics Concern    Not on file   Social History Narrative    ** Merged History Encounter **          Social Determinants of Health     Financial Resource Strain:     Difficulty of Paying Living Expenses:    Food Insecurity:     Worried About Running Out of Food in the Last Year:     Ran Out of Food in the Last Year:    Transportation Needs:     Lack of Transportation (Medical):  Lack of Transportation (Non-Medical):    Physical Activity:     Days of Exercise per Week:     Minutes of Exercise per Session:    Stress:     Feeling of Stress :    Social Connections:     Frequency of Communication with Friends and Family:     Frequency of Social Gatherings with Friends and Family:     Attends [employer] Services:     Active Member of Clubs or Organizations:     Attends Club or Organization Meetings:     Marital Status:    Intimate Partner Violence:     Fear of Current or Ex-Partner:     Emotionally Abused:     Physically Abused:     Sexually Abused:        Review of Systems:  Review of Systems   Constitutional: Negative. HENT: Negative. Eyes: Negative. Cardiovascular: Positive for irregular heartbeat and leg swelling. Respiratory: Positive for shortness of breath. Endocrine: Negative. Hematologic/Lymphatic: Bruises/bleeds easily. Musculoskeletal: Positive for arthritis, joint pain, joint swelling and muscle weakness.

## 2021-07-30 ENCOUNTER — HOSPITAL ENCOUNTER (OUTPATIENT)
Age: 71
Discharge: HOME | End: 2021-07-30
Payer: MEDICARE

## 2021-07-30 DIAGNOSIS — G89.29 OTHER CHRONIC PAIN: ICD-10-CM

## 2021-07-30 PROCEDURE — 80307 DRUG TEST PRSMV CHEM ANLYZR: CPT

## 2021-07-30 PROCEDURE — 36415 COLL VENOUS BLD VENIPUNCTURE: CPT

## 2021-08-01 LAB
AMPHETAMINE: NEGATIVE NG/ML
BARBITURATES: NEGATIVE NG/ML
BENZODIAZEPINES: NEGATIVE NG/ML
BUPRENORPHINE: NEGATIVE NG/ML
COCAINE: NEGATIVE NG/ML
DRUGS OF ABUSE COMMENT: NORMAL
METHADONE: NEGATIVE NG/ML
METHAMPHETAMINE: NEGATIVE NG/ML
OPIATES: NEGATIVE NG/ML
OXYCODONE: NEGATIVE NG/ML
PHENCYCLIDINE: NEGATIVE NG/ML
THC: NEGATIVE NG/ML

## 2021-08-17 ENCOUNTER — TELEPHONE (OUTPATIENT)
Dept: PAIN MANAGEMENT | Age: 71
End: 2021-08-17

## 2021-08-17 NOTE — TELEPHONE ENCOUNTER
Patient was seen on 07/29/2021 and a referral was placed to [address] [place] [address], the office called and stated that the patient would need a new referral to see Dr. Adilson Naik. Please advise.

## 2021-08-18 ENCOUNTER — INITIAL CONSULT (OUTPATIENT)
Dept: PALLATIVE CARE | Age: 71
End: 2021-08-18
Payer: MEDICARE

## 2021-08-18 VITALS
HEART RATE: 66 BPM | RESPIRATION RATE: 20 BRPM | TEMPERATURE: 98.4 F | DIASTOLIC BLOOD PRESSURE: 49 MMHG | HEIGHT: 72 IN | BODY MASS INDEX: 33.86 KG/M2 | WEIGHT: 250 LBS | SYSTOLIC BLOOD PRESSURE: 112 MMHG

## 2021-08-18 DIAGNOSIS — T84.010S FAILURE OF RIGHT TOTAL HIP ARTHROPLASTY, SEQUELA: Primary | ICD-10-CM

## 2021-08-18 PROCEDURE — 99204 OFFICE O/P NEW MOD 45 MIN: CPT | Performed by: INTERNAL MEDICINE

## 2021-08-18 PROCEDURE — 1123F ACP DISCUSS/DSCN MKR DOCD: CPT | Performed by: INTERNAL MEDICINE

## 2021-08-18 PROCEDURE — 1036F TOBACCO NON-USER: CPT | Performed by: INTERNAL MEDICINE

## 2021-08-18 PROCEDURE — G8417 CALC BMI ABV UP PARAM F/U: HCPCS | Performed by: INTERNAL MEDICINE

## 2021-08-18 PROCEDURE — 3017F COLORECTAL CA SCREEN DOC REV: CPT | Performed by: INTERNAL MEDICINE

## 2021-08-18 PROCEDURE — G8427 DOCREV CUR MEDS BY ELIG CLIN: HCPCS | Performed by: INTERNAL MEDICINE

## 2021-08-18 PROCEDURE — 4040F PNEUMOC VAC/ADMIN/RCVD: CPT | Performed by: INTERNAL MEDICINE

## 2021-08-18 RX ORDER — OXYCODONE HYDROCHLORIDE AND ACETAMINOPHEN 5; 325 MG/1; MG/1
1 TABLET ORAL EVERY 6 HOURS PRN
Qty: 120 TABLET | Refills: 0 | Status: SHIPPED | OUTPATIENT
Start: 2021-08-18 | End: 2021-09-17

## 2021-08-18 ASSESSMENT — ENCOUNTER SYMPTOMS
ABDOMINAL PAIN: 0
DIARRHEA: 0
CONSTIPATION: 0
COUGH: 0
WHEEZING: 0
SHORTNESS OF BREATH: 0

## 2021-08-19 DIAGNOSIS — G89.29 OTHER CHRONIC PAIN: Primary | ICD-10-CM

## 2021-08-23 ENCOUNTER — TELEPHONE (OUTPATIENT)
Dept: PALLATIVE CARE | Age: 71
End: 2021-08-23

## 2021-08-23 NOTE — TELEPHONE ENCOUNTER
Pt was referred to our palliative care clinic from pain mgt. Service. Pt was seen in our clinic by Dr. Aretha Santos (Fellow) and Dr. Stephanie Singletary. It was determined due to patient's immobility and hardship getting to the clinic, a referral for [facility] (home-based palliative care) would be done. I called [address] [facility] and they will be seeing patient in September. I called wife Jennifer Delvalle and she confirmed this. I told her to call me for any needs. Will take pt off our schedule.     [id number], Marce, RN, [facility]

## 2021-09-23 ENCOUNTER — HOSPITAL ENCOUNTER (OUTPATIENT)
Age: 71
Setting detail: OBSERVATION
Discharge: HOME | End: 2021-09-24
Attending: COLON & RECTAL SURGERY | Admitting: COLON & RECTAL SURGERY
Payer: MEDICARE

## 2021-09-23 PROBLEM — K62.5 RECTAL BLEEDING: Status: ACTIVE | Noted: 2021-09-23

## 2021-09-23 PROCEDURE — G0379 DIRECT REFER HOSPITAL OBSERV: HCPCS

## 2021-09-23 PROCEDURE — 87635 SARS-COV-2 COVID-19 AMP PRB: CPT

## 2021-09-23 PROCEDURE — G0378 HOSPITAL OBSERVATION PER HR: HCPCS

## 2021-09-23 RX ORDER — DEXTROSE, SODIUM CHLORIDE, SODIUM LACTATE, POTASSIUM CHLORIDE, AND CALCIUM CHLORIDE 5; .6; .31; .03; .02 G/100ML; G/100ML; G/100ML; G/100ML; G/100ML
INJECTION, SOLUTION INTRAVENOUS CONTINUOUS
Status: DISCONTINUED | OUTPATIENT
Start: 2021-09-23 | End: 2021-09-24

## 2021-09-24 ENCOUNTER — ANESTHESIA (OUTPATIENT)
Dept: OPERATING ROOM | Age: 71
End: 2021-09-24
Payer: MEDICARE

## 2021-09-24 ENCOUNTER — ANESTHESIA EVENT (OUTPATIENT)
Dept: OPERATING ROOM | Age: 71
End: 2021-09-24
Payer: MEDICARE

## 2021-09-24 VITALS
SYSTOLIC BLOOD PRESSURE: 76 MMHG | DIASTOLIC BLOOD PRESSURE: 40 MMHG | RESPIRATION RATE: 26 BRPM | OXYGEN SATURATION: 86 %

## 2021-09-24 VITALS
HEART RATE: 71 BPM | RESPIRATION RATE: 20 BRPM | OXYGEN SATURATION: 95 % | HEIGHT: 72 IN | TEMPERATURE: 97.5 F | BODY MASS INDEX: 33.91 KG/M2 | DIASTOLIC BLOOD PRESSURE: 29 MMHG | SYSTOLIC BLOOD PRESSURE: 110 MMHG

## 2021-09-24 LAB
ANION GAP SERPL CALCULATED.3IONS-SCNC: 8 MMOL/L (ref 9–17)
BUN BLDV-MCNC: 21 MG/DL (ref 8–23)
BUN/CREAT BLD: 8 (ref 9–20)
CALCIUM SERPL-MCNC: 8.2 MG/DL (ref 8.6–10.4)
CHLORIDE BLD-SCNC: 96 MMOL/L (ref 98–107)
CO2: 33 MMOL/L (ref 20–31)
CREAT SERPL-MCNC: 2.69 MG/DL (ref 0.7–1.2)
GFR AFRICAN AMERICAN: 28 ML/MIN
GFR NON-AFRICAN AMERICAN: 23 ML/MIN
GFR SERPL CREATININE-BSD FRML MDRD: ABNORMAL ML/MIN/{1.73_M2}
GFR SERPL CREATININE-BSD FRML MDRD: ABNORMAL ML/MIN/{1.73_M2}
GLUCOSE BLD-MCNC: 116 MG/DL (ref 75–110)
GLUCOSE BLD-MCNC: 129 MG/DL (ref 75–110)
GLUCOSE BLD-MCNC: 139 MG/DL (ref 75–110)
GLUCOSE BLD-MCNC: 143 MG/DL (ref 70–99)
GLUCOSE BLD-MCNC: 95 MG/DL (ref 75–110)
HCT VFR BLD CALC: 25.5 % (ref 40.7–50.3)
HEMOGLOBIN: 7.8 G/DL (ref 13–17)
MCH RBC QN AUTO: 31.5 PG (ref 25.2–33.5)
MCHC RBC AUTO-ENTMCNC: 30.6 G/DL (ref 28.4–34.8)
MCV RBC AUTO: 102.8 FL (ref 82.6–102.9)
NRBC AUTOMATED: 0 PER 100 WBC
PDW BLD-RTO: 14.7 % (ref 11.8–14.4)
PLATELET # BLD: 87 K/UL (ref 138–453)
PMV BLD AUTO: 9.1 FL (ref 8.1–13.5)
POTASSIUM SERPL-SCNC: 3.6 MMOL/L (ref 3.7–5.3)
RBC # BLD: 2.48 M/UL (ref 4.21–5.77)
SARS-COV-2, RAPID: NOT DETECTED
SODIUM BLD-SCNC: 137 MMOL/L (ref 135–144)
SPECIMEN DESCRIPTION: NORMAL
WBC # BLD: 2.8 K/UL (ref 3.5–11.3)

## 2021-09-24 PROCEDURE — 6370000000 HC RX 637 (ALT 250 FOR IP): Performed by: COLON & RECTAL SURGERY

## 2021-09-24 PROCEDURE — 3609027000 HC COLONOSCOPY: Performed by: COLON & RECTAL SURGERY

## 2021-09-24 PROCEDURE — 36415 COLL VENOUS BLD VENIPUNCTURE: CPT

## 2021-09-24 PROCEDURE — 3700000000 HC ANESTHESIA ATTENDED CARE: Performed by: COLON & RECTAL SURGERY

## 2021-09-24 PROCEDURE — 80048 BASIC METABOLIC PNL TOTAL CA: CPT

## 2021-09-24 PROCEDURE — 3700000001 HC ADD 15 MINUTES (ANESTHESIA): Performed by: COLON & RECTAL SURGERY

## 2021-09-24 PROCEDURE — 85027 COMPLETE CBC AUTOMATED: CPT

## 2021-09-24 PROCEDURE — 2709999900 HC NON-CHARGEABLE SUPPLY: Performed by: COLON & RECTAL SURGERY

## 2021-09-24 PROCEDURE — 82947 ASSAY GLUCOSE BLOOD QUANT: CPT

## 2021-09-24 PROCEDURE — G0378 HOSPITAL OBSERVATION PER HR: HCPCS

## 2021-09-24 PROCEDURE — 7100000001 HC PACU RECOVERY - ADDTL 15 MIN: Performed by: COLON & RECTAL SURGERY

## 2021-09-24 PROCEDURE — 2580000003 HC RX 258: Performed by: COLON & RECTAL SURGERY

## 2021-09-24 PROCEDURE — 7100000000 HC PACU RECOVERY - FIRST 15 MIN: Performed by: COLON & RECTAL SURGERY

## 2021-09-24 PROCEDURE — 6360000002 HC RX W HCPCS: Performed by: NURSE ANESTHETIST, CERTIFIED REGISTERED

## 2021-09-24 PROCEDURE — 2720000010 HC SURG SUPPLY STERILE: Performed by: COLON & RECTAL SURGERY

## 2021-09-24 RX ORDER — POLYETHYLENE GLYCOL 3350 17 G/17G
17 POWDER, FOR SOLUTION ORAL DAILY
Qty: 30 EACH | Refills: 0 | Status: SHIPPED | OUTPATIENT
Start: 2021-09-24 | End: 2021-10-24

## 2021-09-24 RX ORDER — PROPOFOL 10 MG/ML
INJECTION, EMULSION INTRAVENOUS PRN
Status: DISCONTINUED | OUTPATIENT
Start: 2021-09-24 | End: 2021-09-24 | Stop reason: SDUPTHER

## 2021-09-24 RX ORDER — OXYCODONE HYDROCHLORIDE AND ACETAMINOPHEN 5; 325 MG/1; MG/1
1 TABLET ORAL EVERY 6 HOURS PRN
COMMUNITY

## 2021-09-24 RX ADMIN — PHENYLEPHRINE HYDROCHLORIDE 100 MCG: 10 INJECTION INTRAVENOUS at 15:37

## 2021-09-24 RX ADMIN — POLYETHYLENE GLYCOL 3350, SODIUM SULFATE ANHYDROUS, SODIUM BICARBONATE, SODIUM CHLORIDE, POTASSIUM CHLORIDE 2000 ML: 236; 22.74; 6.74; 5.86; 2.97 POWDER, FOR SOLUTION ORAL at 00:30

## 2021-09-24 RX ADMIN — PROPOFOL 10 MG: 10 INJECTION, EMULSION INTRAVENOUS at 15:21

## 2021-09-24 RX ADMIN — PROPOFOL 30 MG: 10 INJECTION, EMULSION INTRAVENOUS at 15:19

## 2021-09-24 RX ADMIN — PHENYLEPHRINE HYDROCHLORIDE 50 MCG: 10 INJECTION INTRAVENOUS at 15:25

## 2021-09-24 RX ADMIN — PHENYLEPHRINE HYDROCHLORIDE 50 MCG: 10 INJECTION INTRAVENOUS at 15:22

## 2021-09-24 RX ADMIN — SODIUM CHLORIDE, SODIUM LACTATE, POTASSIUM CHLORIDE, CALCIUM CHLORIDE AND DEXTROSE MONOHYDRATE: 5; 600; 310; 30; 20 INJECTION, SOLUTION INTRAVENOUS at 00:30

## 2021-09-24 RX ADMIN — SODIUM CHLORIDE, SODIUM LACTATE, POTASSIUM CHLORIDE, CALCIUM CHLORIDE AND DEXTROSE MONOHYDRATE: 5; 600; 310; 30; 20 INJECTION, SOLUTION INTRAVENOUS at 14:01

## 2021-09-24 RX ADMIN — PHENYLEPHRINE HYDROCHLORIDE 100 MCG: 10 INJECTION INTRAVENOUS at 15:29

## 2021-09-24 RX ADMIN — PROPOFOL 10 MG: 10 INJECTION, EMULSION INTRAVENOUS at 15:27

## 2021-09-24 RX ADMIN — PROPOFOL 10 MG: 10 INJECTION, EMULSION INTRAVENOUS at 15:24

## 2021-09-24 RX ADMIN — PROPOFOL 10 MG: 10 INJECTION, EMULSION INTRAVENOUS at 15:30

## 2021-09-24 ASSESSMENT — PULMONARY FUNCTION TESTS
PIF_VALUE: 1

## 2021-09-24 ASSESSMENT — PAIN SCALES - GENERAL
PAINLEVEL_OUTOF10: 0

## 2021-09-24 NOTE — PROGRESS NOTES
Pt wife at bedside. Upset patient is not going to scope until 3 pm. Upon questioning patient if it was ok RN give him an enema wife states he doesn't understand. RN asked if patient gets confused. Wife states patient is aphasic and she is \"not sure\". Wife is notably agitated and abrupt with patient and RN.  Resident messaged

## 2021-09-24 NOTE — CARE COORDINATION
Case Management Initial Discharge Plan  Mandeep Zacarias,             Met with:spouse/SO to discuss discharge plans. Information verified: address, contacts, phone number, , insurance Yes  PCP: TANMAY Phoenix CNP  Date of last visit: Unsure of last visit, follows in the home    Insurance Provider: Medicare, [employer] [place]    Discharge Planning    Living Arrangements:  Spouse/Significant Other   Support Systems:  Spouse/Significant Other    Home has 1 stories  0 stairs to climb to get into front door, 0 stairs to climb to reach second floor  Location of bedroom/bathroom in home  - main floor    Patient able to perform ADL's:Dependent    Current Services (outpatient & in home) [address]  DME equipment: W/C, slide board, commode, shower chair, glucometer, walker  DME provider: N/A    Pharmacy: [address] [place] [address] Needed:  N/A    Patient agreeable to home care: No  [place] of choice provided:  n/a    Prior SNF/Rehab Placement and Facility: [address] [place] [address] to SNF/Rehab: No  [place] of choice provided: n/a   Evaluation: n/a    Expected Discharge date:  21  Patient expects to be discharged to: Follow Up Appointment: Best Day/ Time: Wednesday PM    Transportation provider: wife per handicapped Anders Delaney arrangements needed for discharge: No    Readmission Risk              Risk of Unplanned Readmission:  0             Does patient have a readmission risk score greater than 14?: No  If yes, follow-up appointment must be made within 7 days of discharge. Goal of Care:       Discharge Plan: Pt sleeping soundly and spoke to wife by phone to discuss D/C planning. Lives with wife who is 24/7 caregiver. Has multiple co-morbidities. Pt had fall at work with brain bleed and stroke in . Is W/C and bed bound but can transport himself with slide board.    Goes to dialysis T-T-S at [facility] in [place]. Nephrologist is Dr. Kathleen Han. On Eliquis for history of Afib. Admitted for bowel prep for colonoscopy today at 3:15pm with Dr. Michelle Hart for rectal bleeding. Pt is current with [facility] as of 9-3. Wife states pt is [facility] and will bring form to be copied for chart. Pt is transported by wife per handicapped Lavinia Mason. Continue to follow post procedure. At D/C will return home with wife who will provide transport.             Electronically signed by Ana Maria Arboleda RN on 9/24/21 at 1:11 PM EDT

## 2021-09-24 NOTE — PLAN OF CARE
Problem: Skin Integrity:  Goal: Will show no infection signs and symptoms  Description: Will show no infection signs and symptoms  Outcome: Ongoing  Goal: Absence of new skin breakdown  Description: Absence of new skin breakdown  Outcome: Ongoing  Goal: Risk for impaired skin integrity will decrease  Description: Risk for impaired skin integrity will decrease  Outcome: Ongoing     Problem: Falls - Risk of:  Goal: Will remain free from falls  Description: Will remain free from falls  Outcome: Ongoing  Goal: Absence of physical injury  Description: Absence of physical injury  Outcome: Ongoing     Problem:  Activity:  Goal: Risk for activity intolerance will decrease  Description: Risk for activity intolerance will decrease  Outcome: Ongoing     Problem: Coping:  Goal: Ability to adjust to condition or change in health will improve  Description: Ability to adjust to condition or change in health will improve  Outcome: Ongoing     Problem: Fluid Volume:  Goal: Ability to maintain a balanced intake and output will improve  Description: Ability to maintain a balanced intake and output will improve  Outcome: Ongoing     Problem: Health Behavior:  Goal: Ability to identify and utilize available resources and services will improve  Description: Ability to identify and utilize available resources and services will improve  Outcome: Ongoing  Goal: Ability to manage health-related needs will improve  Description: Ability to manage health-related needs will improve  Outcome: Ongoing     Problem: Metabolic:  Goal: Ability to maintain appropriate glucose levels will improve  Description: Ability to maintain appropriate glucose levels will improve  Outcome: Ongoing     Problem: Nutritional:  Goal: Maintenance of adequate nutrition will improve  Description: Maintenance of adequate nutrition will improve  Outcome: Ongoing  Goal: Progress toward achieving an optimal weight will improve  Description: Progress toward achieving an optimal weight will improve  Outcome: Ongoing     Problem: Physical Regulation:  Goal: Complications related to the disease process, condition or treatment will be avoided or minimized  Description: Complications related to the disease process, condition or treatment will be avoided or minimized  Outcome: Ongoing  Goal: Diagnostic test results will improve  Description: Diagnostic test results will improve  Outcome: Ongoing     Problem: Tissue Perfusion:  Goal: Adequacy of tissue perfusion will improve  Description: Adequacy of tissue perfusion will improve  Outcome: Ongoing   Skin assessment complete. Waffle mattress in place. Pt turned and repositioned every two hours with assistance from staff. Area kept free from moisture. Proper nourishment and fluids encouraged, as appropriate. Skin remains clean, dry, and intact. Will continue to monitor for additional needs and changes in skin breakdown. Patient is a fall risk during this admission. Fall risk assessment was performed. Patient is absent of falls. Bed is in the lowest position. Wheels on the bed are locked. Call light and bed side table are within reach. Clutter is removed. Patient was educated to call out when needing assistance or wanting to get out of bed. Patient offered toileting assistance during rounding. Hourly rounds have been performed.

## 2021-09-24 NOTE — OP NOTE
Operative Note      Patient: Leobardo Gillespie  YOB: 1950  MRN: [id number]    Date of Procedure: 9/24/2021    Pre-Op Diagnosis: DX RECTAL BLEEDING WITH ANEMIA    Post-Op Diagnosis: Rectal ulcers       Procedure(s):  COLONOSCOPY DIAGNOSTIC WITH ARGON PLASMA CAUTERIZATION (APC)    Surgeon(s):  Faina Chung MD    Assistant:   Resident: Galo Collazo DO    Anesthesia: Monitor Anesthesia Care    Estimated Blood Loss (mL): 7    Complications: None    Specimens:   None    Implants:  None      Drains: none     Findings: multiple rectal ulcers with active bleeding, clot and irritation; cauterized ulcers with adequate hemostasis. Wound class 1    HISTORY: The patient is a 66y.o. year old male with history of above preop diagnosis. I recommended colonoscopy with possible intervention for rectal bleeding and I explained the risk, benefits, expected outcome, and alternatives to the procedure. Risks included but are not limited to bleeding, infection, respiratory distress, hypotension, and perforation of the colon. The patient understands and is in agreement. PROCEDURE: The patient was given IV conscious sedation per anesthesia. The patient was given 4 L of O2 /minute by nasal cannula. The patient's SPO2 remained above 90% throughout the procedure. The colonoscope was inserted per rectum and advanced under direct vision to the cecum without difficulty. Findings:  Cecum/Ascending colon: normal    Transverse colon: normal    Descending/Sigmoid colon: normal    Rectum/Anus: rectal ulcers with active bleeding and clot, irritation. Cauterized with argon. The colon was decompressed and the scope was removed. The patient tolerated the procedure well. IMPRESSION/PLAN:   Follow up in office as needed  No repeat colonoscopy unless continued rectal bleeding    Electronically signed by Galo Collazo DO on 9/24/2021 at 3:59 PM       ROSE Blanc was present throughout and performed the entire procedure.      Ryan HALEY Justen  Colorectal Surgery

## 2021-09-24 NOTE — DISCHARGE INSTR - COC
Continuity of Care Form    Patient Name: Lolita Gore   :  1950  MRN:  [id number]    Admit date:  2021  Discharge date:  ***    Code Status Order: Prior   Advance Directives:      Admitting Physician:  Winter Shin MD  PCP: Jannette Alexis, APRN - CNP    Discharging Nurse: [facility] Unit/Room#:   Discharging Unit Phone Number: ***    Emergency Contact:   Extended Emergency Contact Information  Primary Emergency Contact: Imelda Kaufman  Address: [address] [place] [address], [address] E[address]ce [address]ica of [address] [place] [address] Phone: [phone]  Relation: Spouse  Secondary Emergency Contact: Irais of [address] [place] [address] Phone: [phone]  Relation: Child    Past Surgical History:  Past Surgical History:   Procedure Laterality Date    ABDOMEN SURGERY      CARDIAC CATHETERIZATION      COLONOSCOPY      DILATATION, ESOPHAGUS      FOOT DEBRIDEMENT Left     FOOT SURGERY Right     #5 toe removed  , 1/2 toes #1, #2  partially removed    GASTRIC BYPASS SURGERY      GASTROSTOMY TUBE PLACEMENT  summer 2013    HEMIARTHROPLASTY HIP Right 5/15/2019    HIP HEMIARTHROPLASTY. Lateral 3080 table, Gayle Rep. Nicky Patel will notify performed by Ana Jimenez DO at [address] Right 2019    HIP INCISION AND DRAINAGE performed by Ana Jimenez DO at [facility] Right 3/23/2017    KNEE ARTHROSCOPY,EXTENSIVE DEBRIDEMENT PARTIAL PROXIMAL AND MEDIAL MENISECTOMY  performed by Amrita Chilel MD at [address] [facility] Right 2019    STAGE 1 TOTAL HIP REVISION ARTHROPLASTY  WITH REMEDIES AND CEMENT      REVISION TOTAL HIP ARTHROPLASTY Right 2019    STAGE 1 TOTAL HIP REVISION ARTHROPLASTY  WITH REMEDIES AND CEMENT  SMITH & NEPHEW,  REMOVAL OF HARDWARE , FEMORAL HEAD AND STEM .  performed by Ana Jimenez DO at [address] [facility]  summer 2013    [address] [facility] History:   Immunization History   Administered Date(s) Administered    COVID-19, Moderna, PF, 100mcg/0.5mL 02/02/2021, 03/02/2021    DTaP 01/16/1967    Hepatitis B Adult (Engerix-B) 09/13/2018, 10/11/2018, 11/15/2018, 03/14/2019, 05/21/2019, 06/11/2019    Hepatitis B vaccine 09/13/2018, 10/11/2018, 11/15/2018, 03/14/2019, 05/21/2019, 06/11/2019    Influenza Vaccine, unspecified formulation 09/27/2016    Influenza Virus Vaccine 11/11/2004, 09/27/2016    Influenza, High Dose (Fluzone 65 yrs and older) 10/11/2016, 08/23/2017    Influenza, High-dose, Quadv, 65 yrs +, IM (Fluzone) 09/26/2020    Influenza, Quadv, IM, (6 mo and older Fluzone, Flulaval, Fluarix and 3 yrs and older Afluria) 09/01/2020    Pneumococcal Conjugate 13-valent (Sanaz Twin) 02/28/2017, 10/08/2018    Td, unspecified formulation 07/05/2013    Tdap (Boostrix, Adacel) 03/19/2021    Zoster Live (Zostavax) 08/23/2017    Zoster Vaccine 01/16/1998       Active Problems:  Patient Active Problem List   Diagnosis Code    Subdural bleeding ([address] I62.00    Coagulation defect ([address] D68.9    Chronic atrial fibrillation ([address] I48.20    Respiratory failure, acute ([address] J96.00    Traumatic cerebral hemorrhage ([address] S06.369A    Cerebral contusion ([address] H28.887Y    Pneumonia J18.9    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    Subarachnoid bleed (HCC) I60.9    Acute ischemic stroke (HCC) I63.9    Bradyarrhythmia I49.8    Hyperkalemia E87.5    Deep vein thrombosis (DVT) of left lower extremity (HCC) I82.402    Hypocalcemia E83.51    History of superior vena cava filter placement Z95.828    Aphasia R47.01    Other specified hypothyroidism E03.8    Parietal lobe infarction (HCC) I63.89    Type 2 diabetes mellitus with left diabetic foot ulcer (HCC) E11.621, L97.529    Acute on chronic congestive heart failure (HCC) I50.9    NADIRA on CPAP G47.33, Z99.89    CKD (chronic kidney disease) stage 4, GFR 15-29 ml/min (HCC) N18.4    Venous stasis dermatitis of both lower extremities I87.2    Foot ulcer due to secondary DM ([facility]) E13.621, L97.509    Anemia associated with chronic renal failure N18.9, D63.1    Secondary hyperparathyroidism of renal origin ([facility]) N25.81    Proteinuria R80.9    Chronic diastolic CHF (congestive heart failure) ([facility]) I50.32    Knee effusion, right M25.461    Hypoxia R09.02    Chronic pain of right knee M25.561, G89.29    Diabetes mellitus type 2 in obese ([facility]) E11.69, G73.9    Acute metabolic encephalopathy O38.06    Seizure disorder ([facility] G40.909    History of cerebral infarction Z86.73    Renal lesion N28.9    Lower GI bleed K92.2    Hypovolemic shock ([facility]) R57.1    Hematochezia K92.1    Anemia D64.9    Stercoral ulcer of rectum K62.6    Pyogenic arthritis of right knee joint ([facility]) M00.9    ESRD (end stage renal disease) ([facility]) N18.6    Essential hypertension I10    Right knee pain M25.561    Cellulitis L03.90    History of DVT (deep vein thrombosis) Z86.718    On continuous oral anticoagulation Z79.01    Elevated C-reactive protein (CRP) R79.82    Left leg cellulitis L03. 116    MRSA infection A49.02    Streptococcal infection A49.1    Arteriovenous fistula for hemodialysis in place, primary [facility]) Z99.2    Closed right hip fracture, initial encounter [facility]) S72.001A    Closed fracture of right hip ([facility] S72.001A    Thyroid disease E07.9    Hyponatremia E87.1    Uncontrolled type 2 diabetes mellitus with hyperglycemia ([facility]) E11.65    History of hemiarthroplasty of right hip Z96.641    Surgical wound dehiscence T81.31XA    Post-op pain G89.18    Infection due to ESBL-producing Klebsiella pneumoniae A49.8, Z16.12    Proteus mirabilis infection A49.8    Infection and inflammatory reaction due to internal right hip prosthesis, subsequent encounter T84.51XD    Severe malnutrition ([facility] E43    Surgical wound infection T81.49XA    Failure of right total hip arthroplasty ([facility]) T84.010A    Prosthetic hip infection ([facility]) T84.59XA, Z96.649    Closed fracture of left femur ([address] S72. 92XA    Abscess of right thigh L02.415    Osteomyelitis (HCC) M86.9    Rectal bleeding K62.5       Isolation/Infection:   Isolation            Contact          Patient Infection Status       Infection Onset Added Last Indicated Last Indicated By Review Planned Expiration Resolved Resolved By    MDRO (multi-drug resistant organism) 06/26/19 06/28/19 07/20/19 Wound Culture        Klebsiella hip    ESBL (Extended Spectrum Beta Lactamase) 06/26/19 06/28/19 07/22/19 Wound Culture        E. Coli  - Hip Wound 7/2019 blood 7/2019  Klebsiella - Hip Wound 7/2019            VRE  04/02/14 07/22/19 Urine culture clean catch        2013    MRSA  01/16/14 01/16/14 Bassem Rebolledo RN        Leg 9/2018            Nurse Assessment:  Last Vital Signs: BP (!) 109/46   Pulse 64   Temp 98.2 °F (36.8 °C) (Oral)   Resp 18   Ht 6' (1.829 m)   SpO2 97%   BMI 33.91 kg/m²     Last documented pain score (0-10 scale):    Last Weight:   Wt Readings from Last 1 Encounters:   08/18/21 250 lb (113.4 kg)     Mental Status:  {IP PT MENTAL STATUS:84626:::0}    IV Access:  { JULIA IV ACCESS:438782924:::0}    Nursing Mobility/ADLs:  Walking   {CHP DME ADLs:495819680:::0}  Transfer  {CHP DME ADLs:363137539:::0}  Bathing  {CHP DME ADLs:291892983:::0}  Dressing  {CHP DME ADLs:470393645:::0}  Toileting  {CHP DME ADLs:859597494:::0}  Feeding  {CHP DME ADLs:132237435:::0}  Med Admin  {P DME ADLs:027679292:::0}  Med Delivery   { JULIA MED Delivery:782841071:::0}    Wound Care Documentation and Therapy:  Wound 07/02/19 Heel Left (Active)   Number of days: 625       Wound 07/02/19 Heel Right (Active)   Number of days: 665       Wound 07/02/19 Coccyx (Active)   Number of days: 476       Wound 07/23/19 Foot Left;Lateral (Active)   Number of days: 793        Elimination:  Continence:    Bowel: {YES / ZT:79726}  Bladder: {YES / PQ:59521}  Urinary Catheter: {Urinary Catheter:042038701:::0}   Colostomy/Ileostomy/Ileal Conduit: {YES / LK:89411}       Date of Last BM: ***  No intake or output data in the 24 hours ending 21 1506  No intake/output data recorded.     Safety Concerns:     [address] JULIA Safety Concerns:090399302:::0}    Impairments/Disabilities:      [address] JULIA Impairments/Disabilities:982330600:::0}    Nutrition Therapy:  Current Nutrition Therapy:   [address] [facility] Diet List:764041584:::0}    Routes of Feeding: {CHP DME Other Feedings:431399061:::0}  Liquids: {Slp liquid thickness:56422}  Daily Fluid Restriction: {CHP DME Yes amt example:688462835:::0}  Last Modified Barium Swallow with Video (Video Swallowing Test): {Done Not Done GLW:521873696:::7}    Treatments at the Time of Hospital Discharge:   Respiratory Treatments: ***  Oxygen Therapy:  {Therapy; copd oxygen:88903:::0}  Ventilator:    {[facility] Vent List:375218329:::0}    Rehab Therapies: {THERAPEUTIC INTERVENTION:5696059357}  Weight Bearing Status/Restrictions: [facility] Weight Bearin:::0}  Other Medical Equipment (for information only, NOT a DME order):  {EQUIPMENT:166339647}  Other Treatments: ***    Patient's personal belongings (please select all that are sent with patient):  {[facility] DME Belongings:769319289:::0}    RN SIGNATURE:  {Esignature:634902022:::0}    CASE MANAGEMENT/SOCIAL WORK SECTION    Inpatient Status Date: ***    Readmission Risk Assessment Score:  Readmission Risk              Risk of Unplanned Readmission:  0           Discharging to Facility/ Agency   Name:   Address:  Phone:  Fax:    Dialysis Facility (if applicable)   Name:  Address:  Dialysis Schedule:  Phone:  Fax:    / signature: {Esignature:286733793:::0}    PHYSICIAN SECTION    Prognosis: Good    Condition at Discharge: Stable    Rehab Potential (if transferring to Rehab): Good    Recommended Labs or Other Treatments After Discharge:     Physician Certification: I certify the above information and transfer of Sheryn Lennox  is necessary for the continuing treatment of the diagnosis listed and that he requires {Admit to Appropriate Level of Care:10898:::0} for {GREATER/LESS:774896786} 30 days.      Update Admission H&P: {CHP DME Changes in HandP:271865729:::0}    PHYSICIAN SIGNATURE:  Electronically signed by Jean Carlos Castillo MD on 9/24/21 at 3:10 PM EDT

## 2021-09-24 NOTE — PROGRESS NOTES
SS enema given. Pt tolerated fair. Pt unable to hold enema in and has a fair amount of rectal bleeding. Licha care provided and brief changed.

## 2021-09-24 NOTE — H&P
General Surgery: H&P        PATIENT NAME: Hannah Pelayo OF BIRTH: 1950    ADMISSION DATE: 9/23/2021  9:54 AM     Admitting Provider: Dr. Khloe Flores DATE: 9/24/2021    Chief Complaint:  colonoscopy    HISTORY OF PRESENT ILLNESS:  The patient is a 66 y.o. male with multiple medical co-morbidities who presents for colonoscopy and overnight admission for bowel preparation with golytely. Patient has been agitated and verbally abusive to staff. Drank bowel prep and had several loose bowel movements but continues to be brown and not clear.      Past Medical History:        Diagnosis Date    A-fib [facility])     Acute encephalopathy     Acute ischemic stroke ([facility] 07/13/2013    Acute metabolic encephalopathy 95/70/8829    Acute on chronic congestive heart failure ([address] 05/09/2014    Acute on chronic diastolic CHF (congestive heart failure) ([address] 02/25/2017    Altered mental status 08/24/2013    Anemia     Anemia associated with chronic renal failure 05/28/2014    Aphasia 08/24/2013    ARF (acute renal failure) ([address] 05/28/2014    From pre renal azotemia from newly added diuretic and ARB use, creat peaked at 2.8 from 2 in may 2014    Arteriovenous fistula for hemodialysis in place, primary ([address] 11/17/2017    Arthritis     Bradyarrhythmia 07/13/2013    Cellulitis 09/17/2018    Cerebral contusion ([address] 07/07/2013    CHF (congestive heart failure) (HCC)     Chronic atrial fibrillation (HCC) 07/06/2013    Chronic kidney disease     Chronic pain of right knee     CKD (chronic kidney disease) stage 4, GFR 15-29 ml/min ([address] 05/28/2014    From diabetic and ischemic nephrosclerosis, creat now 2-2.5, GFR 25-30 ml/min    Coagulation defect ([address] 07/06/2013    Diabetes mellitus ([address]     Diabetes mellitus type 2 in obese ([address]     Diarrhea 07/16/2013    DVT (deep venous thrombosis) [facility]     Elevated C-reactive protein (CRP)     ESRD (end stage renal disease) ([address] 03/23/2017    Essential hypertension 03/23/2017    Fever 07/18/2013    Foot ulcer due to secondary DM ([address] 05/28/2014    Left side on antibiotics    GERD (gastroesophageal reflux disease)     Hematochezia 03/04/2017    History of cerebral infarction 03/01/2017    History of DVT (deep vein thrombosis) 09/17/2018    History of superior vena cava filter placement 07/18/2013    Hx of blood clots     Hyperkalemia 07/16/2013    Hyperlipidemia     Hypernatremia 07/11/2013    Hypertension     Hypocalcemia 07/18/2013    Hypovolemic shock ([address] 03/04/2017    Hypoxia     Irregular heartbeat     hx of a-fib    Knee effusion, right 02/25/2017    Left leg cellulitis     Lower GI bleed 03/04/2017    lymphedema     MDRO (multiple drug resistant organisms) resistance     Metabolic acidosis 43/85/8900    MRSA (methicillin resistant staph aureus) culture positive 09/17/2018    leg    On continuous oral anticoagulation 09/17/2018    NADIRA on CPAP     Other specified hypothyroidism 08/24/2013    Parietal lobe infarction ([address] 08/26/2013    Pneumonia     ESBL Klebsiella     Post traumatic encephalopathy 07/12/2013    Postoperative wound infection of right hip     Proteinuria 11/30/2016    Fluctuates between 2-3 grams, cant use ACEI due to hyperkalemia    Pyogenic arthritis of right knee joint ([address] 03/23/2017    Pyrexia 07/19/2013    Renal insufficiency     Renal lesion 03/03/2017    Respiratory failure, acute ([address] 07/07/2013    Right knee pain     SAH (subarachnoid hemorrhage) (HCC)     Secondary hyperparathyroidism of renal origin ([address] 12/16/2015    Seizure disorder ([address] 03/01/2017    Stercoral ulcer of rectum     Streptococcal infection     Subarachnoid bleed ([address] 07/13/2013    Subdural bleeding ([address] 07/05/2013    Thyroid disease     Traumatic cerebral hemorrhage ([address] 07/07/2013    Type 2 diabetes mellitus with left diabetic foot ulcer ([address] 04/16/2014    Unspecified cerebral artery occlusion with cerebral infarction     Venous stasis dermatitis 05/28/2014    Venous stasis dermatitis of both lower extremities 05/28/2014       Past Surgical History:        Procedure Laterality Date    ABDOMEN SURGERY      CARDIAC CATHETERIZATION      COLONOSCOPY      DILATATION, ESOPHAGUS      FOOT DEBRIDEMENT Left     FOOT SURGERY Right     #5 toe removed  , 1/2 toes #1, #2  partially removed    GASTRIC BYPASS SURGERY      GASTROSTOMY TUBE PLACEMENT  summer 2013    HEMIARTHROPLASTY HIP Right 5/15/2019    HIP HEMIARTHROPLASTY. Lateral 3080 table, Gayle Rep. Melissa Cornell will notify performed by Meaghan Moore DO at [address] [facility] Right 6/26/2019    HIP INCISION AND DRAINAGE performed by Meaghan Moore DO at [address] [place] [address] Right 3/23/2017    KNEE ARTHROSCOPY,EXTENSIVE DEBRIDEMENT PARTIAL PROXIMAL AND MEDIAL MENISECTOMY  performed by Abbie Sma MD at [facility] Right 06/28/2019    STAGE 1 TOTAL HIP REVISION ARTHROPLASTY  WITH REMEDIES AND CEMENT      REVISION TOTAL HIP ARTHROPLASTY Right 6/28/2019    STAGE 1 TOTAL HIP REVISION ARTHROPLASTY  WITH REMEDIES AND CEMENT  SMITH & NEPHEW,  REMOVAL OF HARDWARE , FEMORAL HEAD AND STEM . performed by Meaghan Moore DO at [address] [facility]  summer 2013    VENA CAVA FILTER PLACEMENT         Medications Prior to Admission:   Medications Prior to Admission: oxyCODONE-acetaminophen (PERCOCET) 5-325 MG per tablet, Take 1 tablet by mouth every 6 hours as needed for Pain. atorvastatin (LIPITOR) 80 MG tablet,   lidocaine-prilocaine (EMLA) 2.5-2.5 % cream, APPLY SMALL AMOUNT TO ACCESS SITE (AVF) 1 TO 2 HOURS BEFORE DIALYSIS.  COVER WITH OCCLUSIVE DRESSING (SARAN WRAP)  timolol (TIMOPTIC) 0.5 % ophthalmic solution, 1 drop 2 times daily  torsemide (DEMADEX) 20 MG tablet,   apixaban (ELIQUIS) 2.5 MG TABS tablet, Take 1 tablet by mouth 2 times daily  ARIPiprazole (ABILIFY) 5 MG tablet, Take 5 mg by mouth daily   Ferric Citrate (AURYXIA) 1  MG(Fe) TABS, Take 210 mg by mouth 2 times daily Patients med list states frequency is 2-3 times a day  lamoTRIgine (LAMICTAL) 25 MG tablet, Take 25 mg by mouth daily Takes 50mg on days of dialysis  isosorbide mononitrate (IMDUR) 30 MG CR tablet, Take 30 mg by mouth daily   pantoprazole (PROTONIX) 40 MG tablet, Take 40 mg by mouth daily   levothyroxine (LEVOTHROID) 50 MCG tablet, Take 50 mcg by mouth Daily. Cholecalciferol (VITAMIN D3) 2000 UNITS CAPS, Take 1,000 Units by mouth daily.   [DISCONTINUED] Methoxy PEG-Epoetin Beta (MIRCERA IJ), Inject 200 mcg into the skin (Patient not taking: Reported on 7/29/2021)  [DISCONTINUED] acetaminophen (TYLENOL) 325 MG tablet, Take 650 mg by mouth every 4 hours as needed for Pain (Patient not taking: Reported on 7/29/2021)  [DISCONTINUED] darbepoetin jean pierre-polysorbate (ARANESP) 40 MCG/0.4ML SOSY injection, Infuse 0.8 mLs intravenously every 7 days (Patient not taking: Reported on 7/15/2020)  [DISCONTINUED] furosemide (LASIX) 20 MG tablet, Take 20 mg by mouth 2 times daily (Patient not taking: Reported on 7/29/2021)  [DISCONTINUED] venlafaxine (EFFEXOR) 75 MG tablet, Take 75 mg by mouth daily    Allergies:  Bactrim [sulfamethoxazole-trimethoprim] and Sulfa antibiotics    Social History:   Social History     Socioeconomic History    Marital status:      Spouse name: Not on file    Number of children: Not on file    Years of education: Not on file    Highest education level: Not on file   Occupational History    Not on file   Tobacco Use    Smoking status: Never Smoker    Smokeless tobacco: Never Used   Substance and Sexual Activity    Alcohol use: No    Drug use: No     Comment: smoked pipe years ago    Sexual activity: Not on file   Other Topics Concern    Not on file   Social History Narrative    ** Merged History Encounter **          Social Determinants of Health     Financial Resource Strain:     Difficulty of Paying Living Expenses:    Food Insecurity:     Worried About Running Out of Food in the Last Year:     [address] in the Last Year:    Transportation Needs:     Lack of Transportation (Medical):  Lack of Transportation (Non-Medical):    Physical Activity:     Days of Exercise per Week:     Minutes of Exercise per Session:    Stress:     Feeling of Stress :    Social Connections:     Frequency of Communication with Friends and Family:     Frequency of Social Gatherings with Friends and Family:     Attends Yarsanism Services:     Active Member of Clubs or Organizations:     Attends Club or Organization Meetings:     Marital Status:    Intimate Partner Violence:     Fear of Current or Ex-Partner:     Emotionally Abused:     Physically Abused:     Sexually Abused:        Family History:       Problem Relation Age of Onset    Coronary Art Dis Father     Cancer Sister        REVIEW OF SYSTEMS:    Unable to obtain due to agitation    PHYSICAL EXAM:    VITALS:  BP (!) 95/38   Pulse 59   Temp 97.7 °F (36.5 °C) (Oral)   Resp 16   Ht 6' (1.829 m)   SpO2 91%   BMI 33.91 kg/m²   INTAKE/OUTPUT:   No intake or output data in the 24 hours ending 09/24/21 0720    CONSTITUTIONAL:  awake, alert, not distressed and mildly obese; cpap in place  ENT:  normocephalic/atraumatic, without obvious abnormality  NECK:  supple, symmetrical, trachea midline   LUNGS: normal effort with symmetric rise and fall of chest wall  CARDIOVASCULAR:  regular rate and rhythm  ABDOMEN: soft, nondistended   MUSCULOSKELETAL: ROM intact throughout. NEUROLOGIC: CN II- XII intact.    SKIN: No cyanosis      Hematology:  Recent Labs     09/24/21  0605   WBC 2.8*   RBC 2.48*   HGB 7.8*   HCT 25.5*   .8   MCH 31.5   MCHC 30.6   RDW 14.7*   PLT 87*   MPV 9.1     Chemistry:  Recent Labs     09/24/21  0605      K 3.6*   CL 96*   CO2 33*   GLUCOSE 143*   BUN 21   CREATININE 2.69*   ANIONGAP 8* LABGLOM 23*   GFRAA 28*   CALCIUM 8.2*     Recent Labs     09/24/21  0144 09/24/21  0607   POCGLU 139* 129*       Pertinent Radiology:   No results found. ASSESSMENT:  Active Hospital Problems    Diagnosis Date Noted    Rectal bleeding [K62.5] 09/23/2021         3 66year old male admitted for bowel preparation for diagnostic colonoscopy    Plan:  1. NPO  2. IVF  3. Soap suds enema at 10AM if patient will allow  4. Colonoscopy rescheduled from 0800 to 1515  5.  Covid vaccine - documented 2/2 and 3/2      Electronically signed by Golden Nevarez DO  on 9/24/2021 at 7:20 AM

## 2021-09-24 NOTE — PROGRESS NOTES
Pt was very agitated tonight and refusing to take bowel prep. Pt's wife had to stay to get pt to take bowel prep. Pt repeatedly refusing to drink it and cussing at Ulysees Aimee and wife. He eventually did drink the entire bowel prep which was ordered. Pt absolutely refused to use beside commode to have bowel movements after bowel prep. Pt had several watery BM's which went all over pt and bed. Briefs did now hold BM. Had large bedpan but pt could not wait for it. Pt's skin very thin, can't leave on bedpan long. Had waffle mattress on bed but had to throw away because of stool. Just spoke with Dr Rachel Dunaway who works with Dr Garth Esquivel. Informed her of situation and that pt has NOT had clear BM yet. She states she will inform Dr Garth Esquivel that pt's stool is still brown. Pt is NPO at this time. Pt's wife stated pt was vaccinated with two Moderna vaccines. She stated she would bring proof when she comes back to visit in morning.

## 2021-09-24 NOTE — PROGRESS NOTES
Patient received from pacu. Vitals taken. Assessment completed. No distress noted. See doc flowsheet and transfer navigator for details. POC and education reviewed with patient. Call light within reach, and pt educated on its use. Bed in lowest position, and locked. Side rails up x 2. Denied further questions or needs at this time. Will continue to monitor.

## 2021-09-24 NOTE — ANESTHESIA PRE PROCEDURE
Department of Anesthesiology  Preprocedure Note       Name:  Alejandra Lara   Age:  66 y.o.  :  1950                                          MRN:  [id number]         Date:  2021      Surgeon: Jodee Gomez):  Rolando Euceda MD    Procedure: Procedure(s):  COLONOSCOPY DIAGNOSTIC (PT WILL BE ADMITTED DAY BEFORE)    Medications prior to admission:   Prior to Admission medications    Medication Sig Start Date End Date Taking? Authorizing Provider   oxyCODONE-acetaminophen (PERCOCET) 5-325 MG per tablet Take 1 tablet by mouth every 6 hours as needed for Pain. Yes Historical Provider, MD   atorvastatin (LIPITOR) 80 MG tablet  21  Yes Historical Provider, MD   lidocaine-prilocaine (EMLA) 2.5-2.5 % cream APPLY SMALL AMOUNT TO ACCESS SITE (AVF) 1 TO 2 HOURS BEFORE DIALYSIS. COVER WITH OCCLUSIVE DRESSING (SARAN WRAP) 21  Yes Historical Provider, MD   timolol (TIMOPTIC) 0.5 % ophthalmic solution 1 drop 2 times daily   Yes Historical Provider, MD   torsemide (DEMADEX) 20 MG tablet  21  Yes Historical Provider, MD   apixaban (ELIQUIS) 2.5 MG TABS tablet Take 1 tablet by mouth 2 times daily 19  Yes Aryan Gonzalez MD   ARIPiprazole (ABILIFY) 5 MG tablet Take 5 mg by mouth daily  17  Yes Historical Provider, MD   Ferric Citrate (AURYXIA) 1  MG(Fe) TABS Take 210 mg by mouth 2 times daily Patients med list states frequency is 2-3 times a day   Yes Historical Provider, MD   lamoTRIgine (LAMICTAL) 25 MG tablet Take 25 mg by mouth daily Takes 50mg on days of dialysis   Yes Historical Provider, MD   isosorbide mononitrate (IMDUR) 30 MG CR tablet Take 30 mg by mouth daily  14  Yes Historical Provider, MD   pantoprazole (PROTONIX) 40 MG tablet Take 40 mg by mouth daily  10/17/13  Yes Historical Provider, MD   levothyroxine (LEVOTHROID) 50 MCG tablet Take 50 mcg by mouth Daily. Yes Historical Provider, MD   Cholecalciferol (VITAMIN D3) 2000 UNITS CAPS Take 1,000 Units by mouth daily.    Yes Historical MD Tae       Current medications:    Current Facility-Administered Medications   Medication Dose Route Frequency Provider Last Rate Last Admin    insulin lispro (HUMALOG) injection vial 0-6 Units  0-6 Units SubCUTAneous TID WC Ryan Campuzano MD        insulin lispro (HUMALOG) injection vial 0-3 Units  0-3 Units SubCUTAneous Nightly Ryan Campuzano MD        dextrose 5 % in lactated ringers infusion   IntraVENous Continuous Ryan Campuzano MD 75 mL/hr at 09/24/21 1401 New Bag at 09/24/21 1401     Facility-Administered Medications Ordered in Other Encounters   Medication Dose Route Frequency Provider Last Rate Last Admin    0.9 % sodium chloride infusion   IntraVENous Continuous Albina MD Ele   Stopped at 01/16/15 1410       Allergies:     Allergies   Allergen Reactions    Bactrim [Sulfamethoxazole-Trimethoprim]     Sulfa Antibiotics Other (See Comments)       Problem List:    Patient Active Problem List   Diagnosis Code    Subdural bleeding (HCC) I62.00    Coagulation defect ([address] D68.9    Chronic atrial fibrillation ([address] I48.20    Respiratory failure, acute ([address] J96.00    Traumatic cerebral hemorrhage ([address] S06.369A    Cerebral contusion ([address] U60.336Z    Pneumonia J18.9    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    Subarachnoid bleed ([facility]) I60.9    Acute ischemic stroke ([address] I63.9    Bradyarrhythmia I49.8    Hyperkalemia E87.5    Deep vein thrombosis (DVT) of left lower extremity ([facility]) I82.402    Hypocalcemia E83.51    History of superior vena cava filter placement Z95.828    Aphasia R47.01    Other specified hypothyroidism E03.8    Parietal lobe infarction ([address] I63.89    Type 2 diabetes mellitus with left diabetic foot ulcer (HCC) E11.621, L97.529    Acute on chronic congestive heart failure (HCC) I50.9    NADIRA on CPAP G47.33, Z99.89    CKD (chronic kidney disease) stage 4, GFR 15-29 ml/min ([facility]) N18.4    Venous stasis dermatitis of both lower extremities I87.2    Foot ulcer due to secondary DM ([facility]) E13.621, L97.509    Anemia associated with chronic renal failure N18.9, D63.1    Secondary hyperparathyroidism of renal origin ([facility] N25.81    Proteinuria R80.9    Chronic diastolic CHF (congestive heart failure) ([facility]) I50.32    Knee effusion, right M25.461    Hypoxia R09.02    Chronic pain of right knee M25.561, G89.29    Diabetes mellitus type 2 in obese ([facility]) E11.69, E66.9    Acute metabolic encephalopathy I62.96    Seizure disorder ([facility]) G40.909    History of cerebral infarction Z86.73    Renal lesion N28.9    Lower GI bleed K92.2    Hypovolemic shock ([facility]) R57.1    Hematochezia K92.1    Anemia D64.9    Stercoral ulcer of rectum K62.6    Pyogenic arthritis of right knee joint ([facility]) M00.9    ESRD (end stage renal disease) ([facility]) N18.6    Essential hypertension I10    Right knee pain M25.561    Cellulitis L03.90    History of DVT (deep vein thrombosis) Z86.718    On continuous oral anticoagulation Z79.01    Elevated C-reactive protein (CRP) R79.82    Left leg cellulitis L03. 116    MRSA infection A49.02    Streptococcal infection A49.1    Arteriovenous fistula for hemodialysis in place, primary [facility]) Z99.2    Closed right hip fracture, initial encounter ([facility] S72.001A    Cl[facility] fracture [facility] right hip [facility] S72.001A    Thyroid disease E07.9    Hyponatremia E87.1    Uncontrolled type 2 diabetes mellitus with hyperglycemia ([facility]) E11.65    History of hemiarthroplasty of right hip Z96.641    Surgical wound dehiscence T81.31XA    Post-op pain G89.18    Infection due to ESBL-producing Klebsiella pneumoniae A49.8, Z16.12    Proteus mirabilis infection A49.8    Infection and inflammatory reaction due to internal right hip prosthesis, subsequent encounter T84.51XD    Severe malnutrition ([facility] E43    Surgical wound infection T81.49XA    Failure of right total hip arthroplasty ([facility]) T84.010A    Prosthetic hip infection ([facility]) T84.59XA, Z96.649    Closed fracture of left femur ([address] S72. 92XA    Abscess of right thigh L02.415    Osteomyelitis ([address] M86.9    Rectal bleeding K62.5       Past Medical History:        Diagnosis Date    A-fib ([address]     Acute encephalopathy     Acute ischemic stroke ([address] 07/13/2013    Acute metabolic encephalopathy 18/83/0351    Acute on chronic congestive heart failure ([address] 05/09/2014    Acute on chronic diastolic CHF (congestive heart failure) ([address] 02/25/2017    Altered mental status 08/24/2013    Anemia     Anemia associated with chronic renal failure 05/28/2014    Aphasia 08/24/2013    ARF (acute renal failure) ([address] 05/28/2014    From pre renal azotemia from newly added diuretic and ARB use, creat peaked at 2.8 from 2 in may 2014    Arteriovenous fistula for hemodialysis in place, primary ([address] 11/17/2017    Arthritis     Bradyarrhythmia 07/13/2013    Cellulitis 09/17/2018    Cerebral contusion ([address] 07/07/2013    CHF (congestive heart failure) (HCC)     Chronic atrial fibrillation (HCC) 07/06/2013    Chronic kidney disease     Chronic pain of right knee     CKD (chronic kidney disease) stage 4, GFR 15-29 ml/min ([address] 05/28/2014    From diabetic and ischemic nephrosclerosis, creat now 2-2.5, GFR 25-30 ml/min    Coagulation defect ([address] 07/06/2013    Diabetes mellitus ([address]     Diabetes mellitus type 2 in obese ([address]     Diarrhea 07/16/2013    DVT (deep venous thrombosis) (HCC)     Elevated C-reactive protein (CRP)     ESRD (end stage renal disease) ([address] 03/23/2017    Essential hypertension 03/23/2017    Fever 07/18/2013    Foot ulcer due to secondary DM ([address] 05/28/2014    Left side on antibiotics    GERD (gastroesophageal reflux disease)     Hematochezia 03/04/2017    History of cerebral infarction 03/01/2017    History of DVT (deep vein thrombosis) 09/17/2018    History of superior vena cava filter placement 07/18/2013    Hx of blood clots     Hyperkalemia 07/16/2013    Hyperlipidemia     Hypernatremia 07/11/2013    Hypertension     Hypocalcemia 07/18/2013    Hypovolemic shock ([address] 03/04/2017    Hypoxia     Irregular heartbeat     hx of a-fib    Knee effusion, right 02/25/2017    Left leg cellulitis     Lower GI bleed 03/04/2017    lymphedema     MDRO (multiple drug resistant organisms) resistance     Metabolic acidosis 77/05/9942    MRSA (methicillin resistant staph aureus) culture positive 09/17/2018    leg    On continuous oral anticoagulation 09/17/2018    NADIRA on CPAP     Other specified hypothyroidism 08/24/2013    Parietal lobe infarction ([address] 08/26/2013    Pneumonia     ESBL Klebsiella     Post traumatic encephalopathy 07/12/2013    Postoperative wound infection of right hip     Proteinuria 11/30/2016    Fluctuates between 2-3 grams, cant use ACEI due to hyperkalemia    Pyogenic arthritis of right knee joint ([address] 03/23/2017    Pyrexia 07/19/2013    Renal insufficiency     Renal lesion 03/03/2017    Respiratory failure, acute ([address] 07/07/2013    Right knee pain     SAH (subarachnoid hemorrhage) ([address]     Secondary hyperparathyroidism of renal origin ([address] 12/16/2015    Seizure disorder ([address] 03/01/2017    Stercoral ulcer of rectum     Streptococcal infection     Subarachnoid bleed ([address] 07/13/2013    Subdural bleeding ([address] 07/05/2013    Thyroid disease     Traumatic cerebral hemorrhage ([address] 07/07/2013    Type 2 diabetes mellitus with left diabetic foot ulcer ([address] 04/16/2014    Unspecified cerebral artery occlusion with cerebral infarction     Venous stasis dermatitis 05/28/2014    Venous stasis dermatitis of both lower extremities 05/28/2014       Past Surgical History:        Procedure Laterality Date    ABDOMEN SURGERY      CARDIAC CATHETERIZATION      COLONOSCOPY      DILATATION, ESOPHAGUS      FOOT DEBRIDEMENT Left     FOOT SURGERY Right     #5 toe removed  , 1/2 toes #1, #2  partially removed    GASTRIC BYPASS SURGERY      GASTROSTOMY 09/24/2021    HCT 25.5 09/24/2021    .8 09/24/2021    RDW 14.7 09/24/2021    PLT 87 09/24/2021     05/18/2012       CMP:   Lab Results   Component Value Date     09/24/2021    K 3.6 09/24/2021    CL 96 09/24/2021    CO2 33 09/24/2021    BUN 21 09/24/2021    CREATININE 2.69 09/24/2021    GFRAA 28 09/24/2021    LABGLOM 23 09/24/2021    GLUCOSE 143 09/24/2021    GLUCOSE 135 05/18/2012    PROT 7.0 02/24/2020    PROT 6.2 06/27/2012    CALCIUM 8.2 09/24/2021    BILITOT 0.82 02/24/2020    ALKPHOS 88 02/24/2020    AST 15 02/24/2020    ALT 8 02/24/2020       POC Tests:   Recent Labs     09/24/21  1138   POCGLU 116*       Coags:   Lab Results   Component Value Date    PROTIME 12.2 08/21/2019    PROTIME 12.8 04/17/2012    INR 1.2 08/21/2019    APTT 33.8 08/21/2019       HCG (If Applicable): No results found for: PREGTESTUR, PREGSERUM, HCG, HCGQUANT     ABGs: No results found for: PHART, PO2ART, DFU8RNY, EUU7MLJ, BEART, R9GRMDYD     Type & Screen (If Applicable):  No results found for: LABABO, LABRH    Drug/Infectious Status (If Applicable):  Lab Results   Component Value Date    HEPCAB NONREACTIVE 03/01/2017       COVID-19 Screening (If Applicable):   Lab Results   Component Value Date    COVID19 Not Detected 09/23/2021           Anesthesia Evaluation  Patient summary reviewed and Nursing notes reviewed no history of anesthetic complications:   Airway: Mallampati: II  TM distance: >3 FB   Neck ROM: full  Mouth opening: > = 3 FB Dental: normal exam         Pulmonary:normal exam    (+) sleep apnea:                             Cardiovascular:  Exercise tolerance: poor (<4 METS),   (+) hypertension:, dysrhythmias:, CHF:,       ECG reviewed    Rate: normal                    Neuro/Psych:   (+) CVA:, psychiatric history:             ROS comment: Aphasia  GI/Hepatic/Renal:   (+) GERD:, PUD,           Endo/Other:    (+) Diabetes, hypothyroidism::., .                 Abdominal:             Vascular:           Other Findings:             Anesthesia Plan      MAC     ASA 3       Induction: intravenous. MIPS: prophylactic pharmacologic antiemetic agents not administered perioperatively for documented reasons. Anesthetic plan and risks discussed with patient and spouse. Plan discussed with CRNA.     Attending anesthesiologist reviewed and agrees with Preprocedure content              Lila Syed DO   9/24/2021

## 2021-09-27 ENCOUNTER — CARE COORDINATION (OUTPATIENT)
Dept: CASE MANAGEMENT | Age: 71
End: 2021-09-27

## 2021-09-27 NOTE — DISCHARGE SUMMARY
Surgery Discharge Summary     Patient Identification  Raúl Guidry is a 66 y.o. male.   :  1950  Admit Date:  2021    Discharge date:   2021  6:23 PM                                   Disposition: home    Discharge Diagnoses:   Patient Active Problem List   Diagnosis    Subdural bleeding ([address]    Coagulation defect ([address]    Chronic atrial fibrillation ([address]    Respiratory failure, acute ([address]    Traumatic cerebral hemorrhage (HCC)    Cerebral contusion ([address]    Pneumonia    Post traumatic encephalopathy    Subarachnoid bleed (HCC)    Acute ischemic stroke ([address]    Bradyarrhythmia    Hyperkalemia    Deep vein thrombosis (DVT) of left lower extremity ([facility])    Hypocalcemia    History of superior vena cava filter placement    Aphasia    Other specified hypothyroidism    Parietal lobe infarction ([address]    Type 2 diabetes mellitus with left diabetic foot ulcer ([address]    Acute on chronic congestive heart failure (HCC)    NADIRA on CPAP    CKD (chronic kidney disease) stage 4, GFR 15-29 ml/min ([facility])    Venous stasis dermatitis of both lower extremities    Foot ulcer due to secondary DM ([address]    Anemia associated with chronic renal failure    Secondary hyperparathyroidism of renal origin ([address]    Proteinuria    Chronic diastolic CHF (congestive heart failure) ([facility])    Knee effusion, right    Hypoxia    Chronic pain of right knee    Diabetes mellitus type 2 in obese ([facility])    Acute metabolic encephalopathy    Seizure disorder ([address]    History of cerebral infarction    Renal lesion    Lower GI bleed    Hypovolemic shock ([address]    Hematochezia    Anemia    Stercoral ulcer of rectum    Pyogenic arthritis of right knee joint ([address]    ESRD (end stage renal disease) ([address]    Essential hypertension    Right knee pain    Cellulitis    History of DVT (deep vein thrombosis)    On continuous oral anticoagulation    Elevated C-reactive protein (CRP)    Left leg cellulitis    MRSA infection    Streptococcal infection    Arteriovenous fistula for hemodialysis in place, primary [facility])    Closed [facility] fracture[facility]    Closed fracture of right hip ([facility]    Thyroid disease    Hyponatremia    Uncontrolled type 2 diabetes mellitus with hyperglycemia ([facility]    History of hemiarthroplasty of right hip    Surgical wound dehiscence    Post-op pain    Infection due to ESBL-producing Klebsiella pneumoniae    Proteus mirabilis infection    Infection and inflammatory reaction due to internal right hip prosthesis, subsequent encounter    Severe malnutrition ([facility]    Surgical wound infection    Failure of right total hip arthroplasty ([facility]    Prosthetic hip infection ([facility]    Closed fracture of left femur ([facility]    Abscess of right thigh    [facility]    Rectal bleeding       Condition on discharge: stable    Consults: none    Surgery: colonoscopy    Patient Instructions: Activity: no heavy lifting, pushing, pulling for 6 weeks, no driving for 2 weeks or while on analgesics  Diet: As tolerated  Follow-up as needed    See pre-printed instructions in chart and given to patient upon discharge. Discharge Medications:      Tu Concepcion   Home Medication Instructions DOP:[id number]    Printed on:09/27/21 3771   Medication Information                      apixaban (ELIQUIS) 2.5 MG TABS tablet  Take 1 tablet by mouth 2 times daily             ARIPiprazole (ABILIFY) 5 MG tablet  Take 5 mg by mouth daily              atorvastatin (LIPITOR) 80 MG tablet               Cholecalciferol (VITAMIN D3) 2000 UNITS CAPS  Take 1,000 Units by mouth daily.              Ferric Citrate (AURYXIA) 1  MG(Fe) TABS  Take 210 mg by mouth 2 times daily Patients med list states frequency is 2-3 times a day             isosorbide mononitrate (IMDUR) 30 MG CR tablet  Take 30 mg by mouth daily              lamoTRIgine (LAMICTAL) 25 MG tablet  Take 25 mg by mouth daily Takes 50mg on days of dialysis             levothyroxine (LEVOTHROID) 50 MCG tablet  Take 50 mcg by mouth Daily.             lidocaine-prilocaine (EMLA) 2.5-2.5 % cream  APPLY SMALL AMOUNT TO ACCESS SITE (AVF) 1 TO 2 HOURS BEFORE DIALYSIS. COVER WITH OCCLUSIVE DRESSING (SARAN WRAP)             oxyCODONE-acetaminophen (PERCOCET) 5-325 MG per tablet  Take 1 tablet by mouth every 6 hours as needed for Pain.             pantoprazole (PROTONIX) 40 MG tablet  Take 40 mg by mouth daily              polyethylene glycol (MIRALAX) 17 g packet  Take 17 g by mouth daily             timolol (TIMOPTIC) 0.5 % ophthalmic solution  1 drop 2 times daily             torsemide (DEMADEX) 20 MG tablet                    HPI and Hospital Course:   66 y.o. male presented on 9/23/2021 for diagnostic colonoscopy and inpatient need for bowel preparation for adequate prep. Patient was given golytely and additional enema. He underwent colonoscopy on 9/24/2021 and subsequently discharged. Hospital course was unremarkable.        Electronically signed by Kike Galarza DO on 9/27/2021 at 6:48 AM

## 2021-09-27 NOTE — CARE COORDINATION
[facility] Transitions Initial Follow Up Call    Call within 2 business days of discharge: Yes    Patient: Kusum Hernandez Patient : 1950   MRN: <[id number]>  Reason for Admission: rectal bleeding  COLONOSCOPY DIAGNOSTIC WITH ARGON PLASMA   Discharge Date: 21 RARS: No data recorded    Last Discharge [address] [address]       Complaint Diagnosis Description Type Department Provider    21   Admission (Discharged) Caryle Ghee, MD           Spoke with: [facility]. 1ST ATTEMPT. NO ANSWER. Left HIPPA compliant message to return call to this writer. Call back from wife Pratik Boswell. Pratik Boswell states pt. Is fine now . Pt. Has no further issues with rectal bleeding appetite is good. Fluids restricted. Jesse stahl Is on kidney dialysis. Minimal urination. Wheelchair bound. Pt. Takes percocet for pain due to a spacer placed in his hip. Discussed pending appts. With PCP and Gastro. Jullindaaustin Elbert states she will make these appts. Pt. Uses a wheelchair Lajoyce Dukes for transport. Medication reconciliation completed. Non [facility] provider. Not using Miralax - bowels move daily. Pt has no HHC or DME needs at this time. Final call. Transitions of Care Initial Call    Was this an external facility discharge? No Discharge Facility: n/a    Challenges to be reviewed by the provider   Additional needs identified to be addressed with provider: No  none             Method of communication with provider : none      Advance Care Planning:   Does patient have an Advance Directive: reviewed and current. Was this a readmission? No  Patient stated reason for admission: rectal bleeding  Patients top risk factors for readmission: medical condition-rectal bleeding    Care Transition Nurse (CTN) contacted the family by telephone to perform post hospital discharge assessment. Verified name and  with family as identifiers. Provided introduction to self, and explanation of the CTN role.      CTN reviewed discharge instructions, medical action plan and red flags with family who verbalized understanding. Family given an opportunity to ask questions and does not have any further questions or concerns at this time. Were discharge instructions available to patient? Yes. Reviewed appropriate site of care based on symptoms and resources available to patient including: PCP, Specialist, When to call 911 and [employer]. The family agrees to contact the PCP office for questions related to their healthcare. Medication reconciliation was performed with family, who verbalizes understanding of administration of home medications. Advised obtaining a 90-day supply of all daily and as-needed medications. Covid Risk Education     Educated patient about risk for severe COVID-19 due to risk factors according to CDC guidelines. LPN CC reviewed discharge instructions, medical action plan and red flag symptoms with the family who verbalized understanding. Discussed COVID vaccination status: Yes. Education provided on COVID-19 vaccination as appropriate. Discussed exposure protocols and quarantine with CDC Guidelines. Family was given an opportunity to verbalize any questions and concerns and agrees to contact LPN CC or health care provider for questions related to their healthcare. Reviewed and educated family on any new and changed medications related to discharge diagnosis. Was patient discharged with a pulse oximeter? No Discussed and confirmed pulse oximeter discharge instructions and when to notify provider or seek emergency care. LPN CC provided contact information. No further follow-up call indicated based on severity of symptoms and risk factors. Plan for next call: n/a        Facility: [address] [place] [address]    Non-face-to-face services provided:  Obtained and reviewed discharge summary and/or continuity of care documents    Care Transitions 24 Hour Call    Care Transitions Interventions         Follow Up  No future appointments.     Cal Greco LPN Shelley Reed LPN Care Transitions Nurse   [phone]

## 2022-02-13 ENCOUNTER — APPOINTMENT (OUTPATIENT)
Dept: GENERAL RADIOLOGY | Age: 72
End: 2022-02-13
Payer: MEDICARE

## 2022-02-13 ENCOUNTER — HOSPITAL ENCOUNTER (EMERGENCY)
Age: 72
Discharge: HOME | End: 2022-02-13
Attending: EMERGENCY MEDICINE
Payer: MEDICARE

## 2022-02-13 VITALS
DIASTOLIC BLOOD PRESSURE: 61 MMHG | WEIGHT: 250 LBS | HEIGHT: 72 IN | TEMPERATURE: 97.5 F | BODY MASS INDEX: 33.86 KG/M2 | OXYGEN SATURATION: 96 % | SYSTOLIC BLOOD PRESSURE: 98 MMHG | RESPIRATION RATE: 18 BRPM | HEART RATE: 82 BPM

## 2022-02-13 DIAGNOSIS — L03.011 CELLULITIS OF RIGHT LITTLE FINGER: ICD-10-CM

## 2022-02-13 DIAGNOSIS — S61.206A OPEN WOUND OF RIGHT LITTLE FINGER: Primary | ICD-10-CM

## 2022-02-13 LAB
ABSOLUTE EOS #: 0.1 K/UL (ref 0–0.4)
ABSOLUTE IMMATURE GRANULOCYTE: ABNORMAL K/UL (ref 0–0.3)
ABSOLUTE LYMPH #: 0.6 K/UL (ref 1–4.8)
ABSOLUTE MONO #: 0.4 K/UL (ref 0.1–1.2)
ANION GAP SERPL CALCULATED.3IONS-SCNC: 8 MMOL/L (ref 9–17)
BASOPHILS # BLD: 1 % (ref 0–2)
BASOPHILS ABSOLUTE: 0 K/UL (ref 0–0.2)
BUN BLDV-MCNC: 24 MG/DL (ref 8–23)
BUN/CREAT BLD: ABNORMAL (ref 9–20)
CALCIUM SERPL-MCNC: 8 MG/DL (ref 8.6–10.4)
CHLORIDE BLD-SCNC: 97 MMOL/L (ref 98–107)
CO2: 28 MMOL/L (ref 20–31)
CREAT SERPL-MCNC: 3.16 MG/DL (ref 0.7–1.2)
DIFFERENTIAL TYPE: ABNORMAL
EOSINOPHILS RELATIVE PERCENT: 3 % (ref 1–4)
GFR AFRICAN AMERICAN: 23 ML/MIN
GFR NON-AFRICAN AMERICAN: 19 ML/MIN
GFR SERPL CREATININE-BSD FRML MDRD: ABNORMAL ML/MIN/{1.73_M2}
GFR SERPL CREATININE-BSD FRML MDRD: ABNORMAL ML/MIN/{1.73_M2}
GLUCOSE BLD-MCNC: 215 MG/DL (ref 70–99)
HCT VFR BLD CALC: 33.5 % (ref 41–53)
HEMOGLOBIN: 10.3 G/DL (ref 13.5–17.5)
IMMATURE GRANULOCYTES: ABNORMAL %
LYMPHOCYTES # BLD: 18 % (ref 24–44)
MCH RBC QN AUTO: 32.3 PG (ref 26–34)
MCHC RBC AUTO-ENTMCNC: 30.9 G/DL (ref 31–37)
MCV RBC AUTO: 104.6 FL (ref 80–100)
MONOCYTES # BLD: 11 % (ref 2–11)
NRBC AUTOMATED: ABNORMAL PER 100 WBC
PDW BLD-RTO: 18.3 % (ref 12.5–15.4)
PLATELET # BLD: 123 K/UL (ref 140–450)
PLATELET ESTIMATE: ABNORMAL
PMV BLD AUTO: 7.3 FL (ref 6–12)
POTASSIUM SERPL-SCNC: 3.8 MMOL/L (ref 3.7–5.3)
RBC # BLD: 3.2 M/UL (ref 4.5–5.9)
RBC # BLD: ABNORMAL 10*6/UL
SEG NEUTROPHILS: 67 % (ref 36–66)
SEGMENTED NEUTROPHILS ABSOLUTE COUNT: 2.3 K/UL (ref 1.8–7.7)
SODIUM BLD-SCNC: 133 MMOL/L (ref 135–144)
WBC # BLD: 3.4 K/UL (ref 3.5–11)
WBC # BLD: ABNORMAL 10*3/UL

## 2022-02-13 PROCEDURE — 85025 COMPLETE CBC W/AUTO DIFF WBC: CPT

## 2022-02-13 PROCEDURE — 80048 BASIC METABOLIC PNL TOTAL CA: CPT

## 2022-02-13 PROCEDURE — 6370000000 HC RX 637 (ALT 250 FOR IP): Performed by: PHYSICIAN ASSISTANT

## 2022-02-13 PROCEDURE — 99284 EMERGENCY DEPT VISIT MOD MDM: CPT

## 2022-02-13 PROCEDURE — 36415 COLL VENOUS BLD VENIPUNCTURE: CPT

## 2022-02-13 PROCEDURE — 73140 X-RAY EXAM OF FINGER(S): CPT

## 2022-02-13 RX ORDER — LEVOTHYROXINE SODIUM 0.1 MG/1
TABLET ORAL
COMMUNITY
Start: 2022-01-06

## 2022-02-13 RX ORDER — DOXYCYCLINE HYCLATE 100 MG
100 TABLET ORAL ONCE
Status: COMPLETED | OUTPATIENT
Start: 2022-02-13 | End: 2022-02-13

## 2022-02-13 RX ORDER — DOXYCYCLINE HYCLATE 100 MG
100 TABLET ORAL 2 TIMES DAILY
Qty: 20 TABLET | Refills: 0 | Status: SHIPPED | OUTPATIENT
Start: 2022-02-13 | End: 2022-02-23

## 2022-02-13 RX ADMIN — DOXYCYCLINE HYCLATE 100 MG: 100 TABLET ORAL at 16:38

## 2022-02-13 ASSESSMENT — PAIN DESCRIPTION - DESCRIPTORS: DESCRIPTORS: ACHING

## 2022-02-13 ASSESSMENT — PAIN DESCRIPTION - ORIENTATION: ORIENTATION: RIGHT

## 2022-02-13 ASSESSMENT — PAIN - FUNCTIONAL ASSESSMENT: PAIN_FUNCTIONAL_ASSESSMENT: PREVENTS OR INTERFERES SOME ACTIVE ACTIVITIES AND ADLS

## 2022-02-13 ASSESSMENT — PAIN DESCRIPTION - PAIN TYPE: TYPE: ACUTE PAIN

## 2022-02-13 ASSESSMENT — PAIN SCALES - GENERAL: PAINLEVEL_OUTOF10: 5

## 2022-02-13 ASSESSMENT — PAIN DESCRIPTION - FREQUENCY: FREQUENCY: INTERMITTENT

## 2022-02-13 ASSESSMENT — PAIN DESCRIPTION - LOCATION: LOCATION: FINGER (COMMENT WHICH ONE)

## 2022-02-13 NOTE — ED PROVIDER NOTES
[id number] [facility] ED  [address] [facility] [address]  Phone: [phone]  Fax: [phone]        Pt Name: Douglas Brooks  MRN: [id number]  [place] 1950  Date of evaluation: 2/13/22    [address] [place] [address]       Chief Complaint   Patient presents with    Wound Infection       HISTORY OF PRESENT ILLNESS (Location/Symptom, Timing/Onset, Context/Setting, Quality, Duration, Modifying Factors, Severity)      Douglas Brooks is a 74 y.o. male with pertinent PMH of type 2 diabetes who presents to the ED via private auto with a finger injury. Patient's wife is bedside and history is additional sent from her. She reports that the patient hit his right hand on something on Tuesday of this week and she dressed the wound with a Band-Aid a couple days ago. She states that they had not been checking it and today they looked at it and noticed that it was a wound and he had increased pain and redness over the last 24 hours. Denies any repeat trauma to the finger. He has exacerbation of pain on moving his finger. Denies take any medication for symptoms. Denies any other exacerbating or alleviating factors. He has had toes amputated in the past for his diabetes. Denies any fever, chills, recent illness, numbness or tingling to the finger, or any other concerns at this time. Of note, did not check in sugars and she is unsure what they have been running.     PAST MEDICAL / SURGICAL / SOCIAL / FAMILY HISTORY     PMH:  has a past medical history of A-fib ([address] Acute encephalopathy, Acute ischemic stroke ([address] Acute metabolic encephalopathy, Acute on chronic congestive heart failure ([address] Acute on chronic diastolic CHF (congestive heart failure) ([address] Altered mental status, Anemia, Anemia associated with chronic renal failure, Aphasia, ARF (acute renal failure) (HCC), Arteriovenous fistula for hemodialysis in place, primary ([address] Arthritis, Bradyarrhythmia, Cellulitis, Cerebral contusion ([address] CHF (congestive heart failure) ([facility]), Chronic atrial fibrillation ([facility]), Chronic kidney disease, Chronic pain of right knee, CKD (chronic kidney disease) stage 4, GFR 15-29 ml/min ([facility]), Coagulation defect ([address] Diabetes mellitus ([address] Diabetes mellitus type 2 in obese ([address] Diarrhea, DVT (deep venous thrombosis) ([facility]), Elevated C-reactive protein (CRP), ESRD (end stage renal disease) ([address] Essential hypertension, Fever, Foot ulcer due to secondary DM ([address] GERD (gastroesophageal reflux disease), Hematochezia, History of cerebral infarction, History of DVT (deep vein thrombosis), History of superior vena cava filter placement, Hx of blood clots, Hyperkalemia, Hyperlipidemia, Hypernatremia, Hypertension, Hypocalcemia, Hypovolemic shock ([facility]), Hypoxia, Irregular heartbeat, Knee effusion, right, Left leg cellulitis, Lower GI bleed, lymphedema, MDRO (multiple drug resistant organisms) resistance, Metabolic acidosis, MRSA (methicillin resistant staph aureus) culture positive, On continuous oral anticoagulation, NADIRA on CPAP, Other specified hypothyroidism, Parietal lobe infarction ([address] Pneumonia, Post traumatic encephalopathy, Postoperative wound infection of right hip, Proteinuria, Pyogenic arthritis of right knee joint ([address] Pyrexia, Renal insufficiency, Renal lesion, Respiratory failure, acute ([address] Right knee pain, SAH (subarachnoid hemorrhage) ([address] Secondary hyperparathyroidism of renal origin ([address] Seizure disorder ([address] Stercoral ulcer of rectum, Streptococcal infection, Subarachnoid bleed ([address] Subdural bleeding ([address] Thyroid disease, Traumatic cerebral hemorrhage ([address] Type 2 diabetes mellitus with left diabetic foot ulcer ([address] Unspecified cerebral artery occlusion with cerebral infarction, Venous stasis dermatitis, and Venous stasis dermatitis of both lower extremities. Surgical History:  has a past surgical history that includes Gastric bypass surgery; Vena Cava Filter Placement;  Foot Debridement (Left); Foot surgery (Right); Tonsillectomy; Colonoscopy; skin biopsy; Dilatation, esophagus; tracheostomy (summer 2013); Gastrostomy tube placement (summer 2013); Abdomen surgery; Cardiac catheterization; pr knee scope,diagnostic (Right, 3/23/2017); HEMIARTHROPLASTY HIP (Right, 5/15/2019); incision and drainage (Right, 6/26/2019); Revision total hip arthroplasty (Right, 06/28/2019); Revision total hip arthroplasty (Right, 6/28/2019); and Colonoscopy (N/A, 9/24/2021). Social History:  reports that he has never smoked. He has never used smokeless tobacco. He reports that he does not drink alcohol and does not use drugs. Family History: He indicated that the status of his father is unknown. He indicated that the status of his sister is unknown.   family history includes Cancer in his sister; Coronary Art Dis in his father. Psychiatric History: None    Allergies: Bactrim [sulfamethoxazole-trimethoprim] and Sulfa antibiotics    Home Medications:   Prior to Admission medications    Medication Sig Start Date End Date Taking? Authorizing Provider   iron sucrose (VENOFER) 20 MG/ML injection 100 mg 2/8/22 1/31/23 Yes Historical Provider, MD   MIDODRINE HCL PO Take 10 mg by mouth 9/16/21 9/15/22 Yes Historical Provider, MD   doxycycline hyclate (VIBRA-TABS) 100 MG tablet Take 1 tablet by mouth 2 times daily for 10 days 2/13/22 2/23/22 Yes Nydia Bush PA-C   levothyroxine (SYNTHROID) 100 MCG tablet  1/6/22   Historical Provider, MD   oxyCODONE-acetaminophen (PERCOCET) 5-325 MG per tablet Take 1 tablet by mouth every 6 hours as needed for Pain. Historical Provider, MD   atorvastatin (LIPITOR) 80 MG tablet  6/20/21   Historical Provider, MD   lidocaine-prilocaine (EMLA) 2.5-2.5 % cream APPLY SMALL AMOUNT TO ACCESS SITE (AVF) 1 TO 2 HOURS BEFORE DIALYSIS.  COVER WITH OCCLUSIVE DRESSING (SARAN WRAP) 7/22/21   Historical Provider, MD   timolol (TIMOPTIC) 0.5 % ophthalmic solution 1 drop 2 times daily    Historical Provider, MD   torsemide (DEMADEX) 20 MG tablet  5/31/21   Historical Provider, MD   apixaban (ELIQUIS) 2.5 MG TABS tablet Take 1 tablet by mouth 2 times daily 7/29/19   Samantha Silveira MD   ARIPiprazole (ABILIFY) 5 MG tablet Take 5 mg by mouth daily  7/26/17   Historical Provider, MD   Ferric Citrate (AURYXIA) 1  MG(Fe) TABS Take 210 mg by mouth 2 times daily Patients med list states frequency is 2-3 times a day    Historical Provider, MD   lamoTRIgine (LAMICTAL) 25 MG tablet Take 25 mg by mouth daily Takes 50mg on days of dialysis    Historical Provider, MD   isosorbide mononitrate (IMDUR) 30 MG CR tablet Take 30 mg by mouth daily  12/12/14   Historical Provider, MD   pantoprazole (PROTONIX) 40 MG tablet Take 40 mg by mouth daily  10/17/13   Historical Provider, MD   levothyroxine (LEVOTHROID) 50 MCG tablet Take 50 mcg by mouth Daily. Historical Provider, MD   Cholecalciferol (VITAMIN D3) 2000 UNITS CAPS Take 1,000 Units by mouth daily. Historical Provider, MD       REVIEW OF SYSTEMS  (2-9 systems for level 4, 10 ormore for level 5)      Review of Systems    Constitutional: See HPI. Eyes: Denies vision changes. HENT: Denies sore throat or neck pain. Respiratory: Denies cough or shortness of breath. Cardiovascular: Denies chest pain. Musculoskeletal:  See HPI. Skin:  See HPI. Neurologic:  Denies new numbness or weakness. Psychiatric: Denies sleep disturbances. All other systems negative except as marked. PHYSICAL EXAM  (up to 7 for level 4, 8 or more for level 5)      INITIAL VITALS:  height is 6' (1.829 m) and weight is 113.4 kg (250 lb). His oral temperature is 97.5 °F (36.4 °C). His blood pressure is 98/61 and his pulse is 82. His respiration is 18 and oxygen saturation is 96%. Vital signs reviewed. Physical Exam    General:  Alert, cooperative, well-groomed, well-nourished, appears stated age, and is in no acute distress.    Head:  Normocephalic, atraumatic, and without obvious abnormality. Eyes:  Sclerae/conjunctivae clear without injection, pallor, or icterus. Corneas clear without opacities. EOM's intact. ENT: Ears and nose are all without obvious masses lesion or deformity. No oropharynx examination performed due to aerosolization risk during COVID-19 pandemic. Neck: Supple and symmetrical. Trachea midline. No adenopathy. No jugular venous distention. Extremities:   Right ring finger. There is diffuse tenderness to palpation, but primarily over the PIP joint. There is full ROM of the joint. Sensation intact. Cap refill 2-3 seconds. Skin: Soft, good turgor, and well-hydrated. See extremities. Neurologic: GCS is 15. Wheelchair user. Speech clear. Psychiatric: Normal mood and affect. Normal behavior. Coherent thought process. DIFFERENTIAL DIAGNOSIS / MDM     Patient presents emergency department clean as described above. Vital signs demonstrate some borderline hypotension, but are otherwise unremarkable. Physical exam demonstrates a frail appearing nontoxic male in no acute distress. Digit exam as described as above and will obtain x-ray and labs and likely plan for antibiotics for home.     PLAN (LABS / IMAGING / EKG):  Orders Placed This Encounter   Procedures    XR FINGER RIGHT (MIN 2 VIEWS)    CBC Auto Differential    Basic Metabolic Panel w/ Reflex to MG       MEDICATIONS ORDERED:  Orders Placed This Encounter   Medications    doxycycline hyclate (VIBRA-TABS) tablet 100 mg     Order Specific Question:   Antimicrobial Indications     Answer:   Skin and Soft Tissue Infection    doxycycline hyclate (VIBRA-TABS) 100 MG tablet     Sig: Take 1 tablet by mouth 2 times daily for 10 days     Dispense:  20 tablet     Refill:  0    doxycycline hyclate (VIBRA-TABS) tablet 100 mg     Order Specific Question:   Antimicrobial Indications     Answer:   Skin and Soft Tissue Infection       Controlled Substances Monitoring:     DIAGNOSTIC RESULTS     RADIOLOGY: All images are read by the radiologist and their interpretations are reviewed. XR FINGER RIGHT (MIN 2 VIEWS)    Result Date: 2/13/2022  EXAMINATION: THREE XRAY VIEWS OF THE RIGHT FINGERS 2/13/2022 11:33 am COMPARISON: None. HISTORY: ORDERING SYSTEM PROVIDED HISTORY: hit finger 5 days ago - wound black with surrounding erythema - hx diabetes TECHNOLOGIST PROVIDED HISTORY: hit finger 5 days ago - wound black with surrounding erythema - hx diabetes Reason for Exam: wound infection FINDINGS: The visualized bones are osteopenic. There is no evidence of fracture or dislocation. . The  joint spaces appear well maintained. The soft tissues are unremarkable. Extensive vascular calcifications are seen compatible with atherosclerotic disease.      No acute bony abnormalities are noted      LABS:  Results for orders placed or performed during the hospital encounter of 02/13/22   CBC Auto Differential   Result Value Ref Range    WBC 3.4 (L) 3.5 - 11.0 k/uL    RBC 3.20 (L) 4.5 - 5.9 m/uL    Hemoglobin 10.3 (L) 13.5 - 17.5 g/dL    Hematocrit 33.5 (L) 41 - 53 %    .6 (H) 80 - 100 fL    MCH 32.3 26 - 34 pg    MCHC 30.9 (L) 31 - 37 g/dL    RDW 18.3 (H) 12.5 - 15.4 %    Platelets 177 (L) 178 - 450 k/uL    MPV 7.3 6.0 - 12.0 fL    NRBC Automated NOT REPORTED per 100 WBC    Differential Type NOT REPORTED     Seg Neutrophils 67 (H) 36 - 66 %    Lymphocytes 18 (L) 24 - 44 %    Monocytes 11 2 - 11 %    Eosinophils % 3 1 - 4 %    Basophils 1 0 - 2 %    Immature Granulocytes NOT REPORTED 0 %    Segs Absolute 2.30 1.8 - 7.7 k/uL    Absolute Lymph # 0.60 (L) 1.0 - 4.8 k/uL    Absolute Mono # 0.40 0.1 - 1.2 k/uL    Absolute Eos # 0.10 0.0 - 0.4 k/uL    Basophils Absolute 0.00 0.0 - 0.2 k/uL    Absolute Immature Granulocyte NOT REPORTED 0.00 - 0.30 k/uL    WBC Morphology NOT REPORTED     RBC Morphology NOT REPORTED     Platelet Estimate NOT REPORTED    Basic Metabolic Panel w/ Reflex to MG   Result Value Ref Range    Glucose 215 (H) 70 - 99 mg/dL    BUN 24 (H) 8 - 23 mg/dL    CREATININE 3.16 (H) 0.70 - 1.20 mg/dL    Bun/Cre Ratio NOT REPORTED 9 - 20    Calcium 8.0 (L) 8.6 - 10.4 mg/dL    Sodium 133 (L) 135 - 144 mmol/L    Potassium 3.8 3.7 - 5.3 mmol/L    Chloride 97 (L) 98 - 107 mmol/L    CO2 28 20 - 31 mmol/L    Anion Gap 8 (L) 9 - 17 mmol/L    GFR Non-African American 19 (L) >60 mL/min    GFR  23 (L) >60 mL/min    GFR Comment          GFR Staging NOT REPORTED        EMERGENCY DEPARTMENT COURSE           Vitals:    Vitals:    02/13/22 1424   BP: 98/61   Pulse: 82   Resp: 18   Temp: 97.5 °F (36.4 °C)   TempSrc: Oral   SpO2: 96%   Weight: 113.4 kg (250 lb)   Height: 6' (1.829 m)     -------------------------  BP: 98/61, Temp: 97.5 °F (36.4 °C), Pulse: 82, Resp: 18      RE-EVALUATION:  No osteomyelitis. Patient discharged home with doxycycline. Advised to follow-up with his wound clinic that he has seen in the past and PCP. The patient and/or family and I have discussed the diagnosis and risks, and we agree with discharging home to follow-up with their pertinent providers. The patient appears stable for discharge and has been instructed to return immediately for new concerning symptoms or if the symptoms worsen in any way. The patient understands that at this time there is no evidence for a more malignant underlying process, but the patient also understands that early in the process of an illness or injury, an emergency department workup can be falsely reassuring. Routine discharge counseling was given, and the patient understands that worsening, changing or persistent symptoms should prompt an immediate call or follow up with their primary physician or return to the emergency department. I have reviewed the disposition diagnosis with the patient and or their family/guardian. I have answered their questions and given discharge instructions.  They voiced understanding of these instructions and did not have any further questions or complaints. This patient was seen by the attending physician and they agreed with the assessment and plan. CONSULTS:  None    PROCEDURES:  None    FINAL IMPRESSION      1. Open wound of right little finger    2. Cellulitis of right little finger          DISPOSITION / PLAN     CONDITION ON DISPOSITION:   Good / Stable for discharge. PATIENT REFERRED TO:  Your wound specialist    Call in 1 day  To schedule an appointment for wound re-check    Stephen Graves APRN - CNP  [address] [address]  [phone]            DISCHARGE MEDICATIONS:  Discharge Medication List as of 2/13/2022  4:31 PM      START taking these medications    Details   doxycycline hyclate (VIBRA-TABS) 100 MG tablet Take 1 tablet by mouth 2 times daily for 10 days, Disp-20 tablet, R-0Normal             John Flores PA-C   Emergency Medicine Physician Assistant    (Please note that portions of this note were completed with a voice recognition program.  Efforts were made to edit the dictations but occasionally words aremis-transcribed.)        John Flores PA-C  02/13/22 [id number]

## 2022-02-13 NOTE — ED PROVIDER NOTES
Emergency Department           COMPLAINT       Chief Complaint   Patient presents with    Wound Infection      PHYSICAL EXAM      ED Triage Vitals [02/13/22 1424]   BP Temp Temp Source Pulse Resp SpO2 Height Weight   98/61 97.5 °F (36.4 °C) Oral 82 18 96 % 6' (1.829 m) 250 lb (113.4 kg)     Constitutional: Alert, nontoxic  HEENT: Extraocular muscles intact,  Neck: Trachea midline,  Musculoskeletal: Right finger ulceration over the proximal interphalangeal joint on the dorsum of the hand measuring approximately 1.5 cm in length and 1 cm in width with surrounding erythema.   Right small finger  Neurologic: Motor intact right hand  Skin: Warm and dry     Physical Exam  DIAGNOSTIC RESULTS     EKG: All EKG's are interpreted by the Emergency Department Physician who either signs or Co-signs this chart in the absence of a cardiologist.    Not indicated unless otherwise documented above or in the midlevel documentation    LABS:  Results for orders placed or performed during the hospital encounter of 02/13/22   CBC Auto Differential   Result Value Ref Range    WBC 3.4 (L) 3.5 - 11.0 k/uL    RBC 3.20 (L) 4.5 - 5.9 m/uL    Hemoglobin 10.3 (L) 13.5 - 17.5 g/dL    Hematocrit 33.5 (L) 41 - 53 %    .6 (H) 80 - 100 fL    MCH 32.3 26 - 34 pg    MCHC 30.9 (L) 31 - 37 g/dL    RDW 18.3 (H) 12.5 - 15.4 %    Platelets 116 (L) 278 - 450 k/uL    MPV 7.3 6.0 - 12.0 fL    NRBC Automated NOT REPORTED per 100 WBC    Differential Type NOT REPORTED     Seg Neutrophils 67 (H) 36 - 66 %    Lymphocytes 18 (L) 24 - 44 %    Monocytes 11 2 - 11 %    Eosinophils % 3 1 - 4 %    Basophils 1 0 - 2 %    Immature Granulocytes NOT REPORTED 0 %    Segs Absolute 2.30 1.8 - 7.7 k/uL    Absolute Lymph # 0.60 (L) 1.0 - 4.8 k/uL    Absolute Mono # 0.40 0.1 - 1.2 k/uL    Absolute Eos # 0.10 0.0 - 0.4 k/uL    Basophils Absolute 0.00 0.0 - 0.2 k/uL    Absolute Immature Granulocyte NOT REPORTED 0.00 - 0.30 k/uL    WBC Morphology NOT REPORTED     RBC Morphology NOT REPORTED     Platelet Estimate NOT REPORTED    Basic Metabolic Panel w/ Reflex to MG   Result Value Ref Range    Glucose 215 (H) 70 - 99 mg/dL    BUN 24 (H) 8 - 23 mg/dL    CREATININE 3.16 (H) 0.70 - 1.20 mg/dL    Bun/Cre Ratio NOT REPORTED 9 - 20    Calcium 8.0 (L) 8.6 - 10.4 mg/dL    Sodium 133 (L) 135 - 144 mmol/L    Potassium 3.8 3.7 - 5.3 mmol/L    Chloride 97 (L) 98 - 107 mmol/L    CO2 28 20 - 31 mmol/L    Anion Gap 8 (L) 9 - 17 mmol/L    GFR Non-African American 19 (L) >60 mL/min    GFR  23 (L) >60 mL/min    GFR Comment          GFR Staging NOT REPORTED        Not indicated unless otherwise documented above or in the midlevel documentation    RADIOLOGY:   I reviewedthe radiologist interpretations:  XR FINGER RIGHT (MIN 2 VIEWS)   Final Result   No acute bony abnormalities are noted             Not indicated unless otherwise documented above or in the midlevel documentation    EMERGENCY DEPARTMENT COURSE:       PERTINENT ATTENDING PHYSICIAN COMMENTS:    Wound right ring finger. Redness concern for infection. X-ray rule out osteo-. Check blood sugar and labs. Antibiotics. 3:15 PM x-ray no signs of osteomyelitis. Will treat for cellulitis. Blood sugar slightly elevated creatinine close to his norm. Anemia. Will discharge on antibiotics follow-up and return if worsening symptoms or any other concerns. Faculty Attestation    I performed a history and physical examination of the patient and discussed management with the mid level provideer. I reviewed the mid level provider's note and agree with the documented findings and plan of care. Any areas of disagreement are noted on the chart. I was personally present for the key portions of any procedures. I have documented in the chart those procedures where I was not present during the key portions. I have reviewed the emergency nurses triage note.  I agree with the chief complaint, past medical history, past surgical history, allergies, medications, social and family history as documented unless otherwise noted below. Documentation of the HPI, Physical Exam and Medical Decision Making performed by medical students or scribes is based on my personal performance of the HPI, PE and MDM. For Physician Assistant/ Nurse Practitioner cases/documentation I have personally evaluated this patient and have completed at least one if not all key elements of the E/M (history, physical exam, and MDM). Additional findings are as noted.        Ana Pettit DO  02/13/22 1515

## 2022-08-31 NOTE — PROGRESS NOTES
[address] [place] [id number]  Dept: [phone]  Dept Fax: [phone]        Ambulatory Follow Up    Subjective:   HPI:  Kip East is a 68y.o. year old male who presents to our office today for post op follow up of Right hip danielle on 5/15/19. He has been at a SNF. He is not making much progress with therapy. He walked with a walker prior to in injury. Presents today in wheelchair. Complains of pain when working with therapy. Wife is frustrated with progress. Basically the  Wife states same as last week and that he has had minimal improvement but slightly better. Dressing still has some mild ooze. ROS: No fevers, chills, nausea, vomiting, shortness of breath, chest pain, numbness or tingling. I have reviewed the CC, HPI, ROS, PMH, FHX, Social History. I agree with the documentation provided by other staff, residents, and/or medical students and have reviewed their documentation prior to providing my signature indicating agreement. Objective :   General: Kip East is a 68 y.o. male who is alert and oriented and sitting comfortably in our office. Neuro: Alert and oriented. Eyes: Extra-ocular muscles intact  Mouth: Oral mucosa moist. No perioral lesions  Pulm: Respirations unlabored and regular. Skin: Warm, well perfused  Psych: Patient has good fund of knowledge and displays understanging of exam, diagnosis, and plan. Ortho Exam  MS:  Unable to stand. Unable to straight leg raise. Superior aspect of incision healing well. Inferior aspect has some granulation tissue present with some superficial dehiscence. Compartments soft and compressible. TA/EHL/FHL/GS motor intact. Deep and Superficial Peroneal/Saphenous/Sural SILT. 2+ DP pulse. Radiology:   X-rays reviewed from EMR. No new imaging performed today.     Assessment/Plan:   Kip East is a 68y.o. year old male 3 weeks out from right hip danielle    Incision examined, mild Detail Level: Generalized

## 2023-01-11 NOTE — PROGRESS NOTES
Patient arrived to unit at 21:35 via stretcher. Patient a&o x3. Patient able to make all needs known. Patient c/o 10/10 right leg and hip pain. PRN Norco given at 23:21. Belonings brought in; jacket. Patient has a MATTHEW fistula; positive for thrill and bruit with a LISSETTE drain attached draining serosanguinous fluid. Patient has a #20G to RAC; saline locked. Contact isolation initiated for VRE and MRSA. V/S:[id number]%; room air. No respiratory distress noted. Patient placed on tele per order. Patient has +3 non-pitting edema to RLE. Patient has bilateral pulmonary compression socks on. Upon assessment patient has three missing digits to right foot and two missing from left foot. Patient is a renal diet. Patient is a diabetic; HS blood glucose 150. Family at the bedside when patient was admitted.      Kym Murguia RN  5/13/19 04:24 Additional Notes: Has tried oral abx, bpo and no change. unable to take ocp due to stroke risk. Has IUD. Dicsusssed spironolactone and pt would like to start. Add dapsone qam and tretion qhs\\nCerave cleanser once per day and Klaron the other times of day Render Risk Assessment In Note?: no Detail Level: Detailed
